# Patient Record
Sex: FEMALE | Race: WHITE | Employment: OTHER | URBAN - METROPOLITAN AREA
[De-identification: names, ages, dates, MRNs, and addresses within clinical notes are randomized per-mention and may not be internally consistent; named-entity substitution may affect disease eponyms.]

---

## 2017-01-23 ENCOUNTER — HOSPITAL ENCOUNTER (INPATIENT)
Facility: HOSPITAL | Age: 72
LOS: 3 days | Discharge: HOME HEALTH | DRG: 470 | End: 2017-01-26
Attending: EMERGENCY MEDICINE | Admitting: HOSPITALIST
Payer: MEDICARE

## 2017-01-23 ENCOUNTER — APPOINTMENT (OUTPATIENT)
Dept: GENERAL RADIOLOGY | Facility: HOSPITAL | Age: 72
DRG: 470 | End: 2017-01-23
Attending: ORTHOPAEDIC SURGERY
Payer: MEDICARE

## 2017-01-23 ENCOUNTER — ANESTHESIA EVENT (OUTPATIENT)
Dept: SURGERY | Facility: HOSPITAL | Age: 72
DRG: 470 | End: 2017-01-23
Payer: MEDICARE

## 2017-01-23 ENCOUNTER — SURGERY (OUTPATIENT)
Age: 72
End: 2017-01-23

## 2017-01-23 ENCOUNTER — ANESTHESIA (OUTPATIENT)
Dept: SURGERY | Facility: HOSPITAL | Age: 72
DRG: 470 | End: 2017-01-23
Payer: MEDICARE

## 2017-01-23 ENCOUNTER — APPOINTMENT (OUTPATIENT)
Dept: GENERAL RADIOLOGY | Facility: HOSPITAL | Age: 72
DRG: 470 | End: 2017-01-23
Attending: EMERGENCY MEDICINE
Payer: MEDICARE

## 2017-01-23 DIAGNOSIS — S72.012A SUBCAPITAL FRACTURE OF LEFT HIP, CLOSED, INITIAL ENCOUNTER (HCC): Primary | ICD-10-CM

## 2017-01-23 DIAGNOSIS — S72.002A HIP FRACTURE, LEFT, CLOSED, INITIAL ENCOUNTER (HCC): ICD-10-CM

## 2017-01-23 LAB
ANTIBODY SCREEN: NEGATIVE
RH BLOOD TYPE: POSITIVE

## 2017-01-23 PROCEDURE — 73552 X-RAY EXAM OF FEMUR 2/>: CPT

## 2017-01-23 PROCEDURE — 73502 X-RAY EXAM HIP UNI 2-3 VIEWS: CPT

## 2017-01-23 PROCEDURE — 73501 X-RAY EXAM HIP UNI 1 VIEW: CPT

## 2017-01-23 PROCEDURE — 0SRB04Z REPLACEMENT OF LEFT HIP JOINT WITH CERAMIC ON POLYETHYLENE SYNTHETIC SUBSTITUTE, OPEN APPROACH: ICD-10-PCS | Performed by: ORTHOPAEDIC SURGERY

## 2017-01-23 PROCEDURE — 99222 1ST HOSP IP/OBS MODERATE 55: CPT | Performed by: HOSPITALIST

## 2017-01-23 PROCEDURE — 76001 XR OR HIP (1 VIEWS) >60 C-ARM  (CPT=73501/76001): CPT

## 2017-01-23 DEVICE — TRI-LOCK BPS FEMORAL STEM 12/14 TAPER TRI-LOCK BPS W/GRIPTION SIZE 4 STD 103MM
Type: IMPLANTABLE DEVICE | Site: HIP | Status: FUNCTIONAL
Brand: TRI-LOCK GRIPTION

## 2017-01-23 DEVICE — PINNACLE HIP SOLUTIONS ALTRX POLYETHYLENE ACETABULAR LINER +4 NEUTRAL 32MM ID 50MM OD
Type: IMPLANTABLE DEVICE | Site: HIP | Status: FUNCTIONAL
Brand: PINNACLE ALTRX

## 2017-01-23 DEVICE — BIOLOX DELTA CERAMIC FEMORAL HEAD 32MM DIA +5.0 12/14 TAPER
Type: IMPLANTABLE DEVICE | Site: HIP | Status: FUNCTIONAL
Brand: BIOLOX DELTA

## 2017-01-23 DEVICE — PINNACLE GRIPTION ACETABULAR SHELL SECTOR 50MM OD
Type: IMPLANTABLE DEVICE | Site: HIP | Status: FUNCTIONAL
Brand: PINNACLE GRIPTION

## 2017-01-23 RX ORDER — ACETAMINOPHEN 325 MG/1
650 TABLET ORAL EVERY 4 HOURS PRN
Status: DISCONTINUED | OUTPATIENT
Start: 2017-01-23 | End: 2017-01-26

## 2017-01-23 RX ORDER — SENNOSIDES 8.6 MG
17.2 TABLET ORAL NIGHTLY
Status: DISCONTINUED | OUTPATIENT
Start: 2017-01-23 | End: 2017-01-26

## 2017-01-23 RX ORDER — HYDROCODONE BITARTRATE AND ACETAMINOPHEN 7.5; 325 MG/1; MG/1
1 TABLET ORAL EVERY 4 HOURS PRN
Status: DISCONTINUED | OUTPATIENT
Start: 2017-01-23 | End: 2017-01-26

## 2017-01-23 RX ORDER — MORPHINE SULFATE 2 MG/ML
4 INJECTION, SOLUTION INTRAMUSCULAR; INTRAVENOUS ONCE
Status: COMPLETED | OUTPATIENT
Start: 2017-01-23 | End: 2017-01-23

## 2017-01-23 RX ORDER — MAGNESIUM HYDROXIDE 1200 MG/15ML
LIQUID ORAL CONTINUOUS PRN
Status: DISCONTINUED | OUTPATIENT
Start: 2017-01-23 | End: 2017-01-23

## 2017-01-23 RX ORDER — CYCLOBENZAPRINE HCL 10 MG
10 TABLET ORAL EVERY 8 HOURS PRN
Status: DISCONTINUED | OUTPATIENT
Start: 2017-01-23 | End: 2017-01-26

## 2017-01-23 RX ORDER — ROCURONIUM BROMIDE 10 MG/ML
INJECTION, SOLUTION INTRAVENOUS AS NEEDED
Status: DISCONTINUED | OUTPATIENT
Start: 2017-01-23 | End: 2017-01-23 | Stop reason: SURG

## 2017-01-23 RX ORDER — MORPHINE SULFATE 10 MG/ML
6 INJECTION, SOLUTION INTRAMUSCULAR; INTRAVENOUS EVERY 10 MIN PRN
Status: DISCONTINUED | OUTPATIENT
Start: 2017-01-23 | End: 2017-01-23 | Stop reason: HOSPADM

## 2017-01-23 RX ORDER — ONDANSETRON 2 MG/ML
4 INJECTION INTRAMUSCULAR; INTRAVENOUS EVERY 4 HOURS PRN
Status: DISCONTINUED | OUTPATIENT
Start: 2017-01-23 | End: 2017-01-26

## 2017-01-23 RX ORDER — ONDANSETRON 2 MG/ML
4 INJECTION INTRAMUSCULAR; INTRAVENOUS ONCE
Status: COMPLETED | OUTPATIENT
Start: 2017-01-23 | End: 2017-01-23

## 2017-01-23 RX ORDER — BISACODYL 10 MG
10 SUPPOSITORY, RECTAL RECTAL
Status: DISCONTINUED | OUTPATIENT
Start: 2017-01-23 | End: 2017-01-26

## 2017-01-23 RX ORDER — SODIUM CHLORIDE, SODIUM LACTATE, POTASSIUM CHLORIDE, CALCIUM CHLORIDE 600; 310; 30; 20 MG/100ML; MG/100ML; MG/100ML; MG/100ML
INJECTION, SOLUTION INTRAVENOUS CONTINUOUS PRN
Status: DISCONTINUED | OUTPATIENT
Start: 2017-01-23 | End: 2017-01-23 | Stop reason: SURG

## 2017-01-23 RX ORDER — NEOSTIGMINE METHYLSULFATE 0.5 MG/ML
INJECTION INTRAVENOUS AS NEEDED
Status: DISCONTINUED | OUTPATIENT
Start: 2017-01-23 | End: 2017-01-23 | Stop reason: SURG

## 2017-01-23 RX ORDER — MORPHINE SULFATE 4 MG/ML
4 INJECTION, SOLUTION INTRAMUSCULAR; INTRAVENOUS EVERY 10 MIN PRN
Status: DISCONTINUED | OUTPATIENT
Start: 2017-01-23 | End: 2017-01-23 | Stop reason: HOSPADM

## 2017-01-23 RX ORDER — MIDAZOLAM HYDROCHLORIDE 1 MG/ML
INJECTION INTRAMUSCULAR; INTRAVENOUS AS NEEDED
Status: DISCONTINUED | OUTPATIENT
Start: 2017-01-23 | End: 2017-01-23 | Stop reason: SURG

## 2017-01-23 RX ORDER — LORAZEPAM 2 MG/ML
0.5 INJECTION INTRAMUSCULAR ONCE
Status: COMPLETED | OUTPATIENT
Start: 2017-01-23 | End: 2017-01-23

## 2017-01-23 RX ORDER — ONDANSETRON 2 MG/ML
INJECTION INTRAMUSCULAR; INTRAVENOUS AS NEEDED
Status: DISCONTINUED | OUTPATIENT
Start: 2017-01-23 | End: 2017-01-23 | Stop reason: SURG

## 2017-01-23 RX ORDER — DIPHENHYDRAMINE HYDROCHLORIDE 50 MG/ML
12.5 INJECTION INTRAMUSCULAR; INTRAVENOUS EVERY 4 HOURS PRN
Status: DISCONTINUED | OUTPATIENT
Start: 2017-01-23 | End: 2017-01-26

## 2017-01-23 RX ORDER — HYDROMORPHONE HYDROCHLORIDE 1 MG/ML
0.8 INJECTION, SOLUTION INTRAMUSCULAR; INTRAVENOUS; SUBCUTANEOUS EVERY 2 HOUR PRN
Status: DISCONTINUED | OUTPATIENT
Start: 2017-01-23 | End: 2017-01-26

## 2017-01-23 RX ORDER — GABAPENTIN 300 MG/1
600 CAPSULE ORAL ONCE
Status: COMPLETED | OUTPATIENT
Start: 2017-01-23 | End: 2017-01-23

## 2017-01-23 RX ORDER — HYDROMORPHONE HYDROCHLORIDE 1 MG/ML
0.6 INJECTION, SOLUTION INTRAMUSCULAR; INTRAVENOUS; SUBCUTANEOUS EVERY 5 MIN PRN
Status: DISCONTINUED | OUTPATIENT
Start: 2017-01-23 | End: 2017-01-23 | Stop reason: HOSPADM

## 2017-01-23 RX ORDER — FAMOTIDINE 10 MG/ML
20 INJECTION, SOLUTION INTRAVENOUS 2 TIMES DAILY
Status: DISCONTINUED | OUTPATIENT
Start: 2017-01-23 | End: 2017-01-26

## 2017-01-23 RX ORDER — ACETAMINOPHEN 325 MG/1
650 TABLET ORAL EVERY 6 HOURS PRN
Status: DISCONTINUED | OUTPATIENT
Start: 2017-01-23 | End: 2017-01-23

## 2017-01-23 RX ORDER — HYDROMORPHONE HYDROCHLORIDE 1 MG/ML
0.4 INJECTION, SOLUTION INTRAMUSCULAR; INTRAVENOUS; SUBCUTANEOUS EVERY 5 MIN PRN
Status: DISCONTINUED | OUTPATIENT
Start: 2017-01-23 | End: 2017-01-23 | Stop reason: HOSPADM

## 2017-01-23 RX ORDER — SODIUM CHLORIDE 9 MG/ML
INJECTION, SOLUTION INTRAVENOUS CONTINUOUS
Status: DISCONTINUED | OUTPATIENT
Start: 2017-01-23 | End: 2017-01-26

## 2017-01-23 RX ORDER — POLYETHYLENE GLYCOL 3350 17 G/17G
17 POWDER, FOR SOLUTION ORAL DAILY PRN
Status: DISCONTINUED | OUTPATIENT
Start: 2017-01-23 | End: 2017-01-26

## 2017-01-23 RX ORDER — NALOXONE HYDROCHLORIDE 0.4 MG/ML
80 INJECTION, SOLUTION INTRAMUSCULAR; INTRAVENOUS; SUBCUTANEOUS AS NEEDED
Status: DISCONTINUED | OUTPATIENT
Start: 2017-01-23 | End: 2017-01-23 | Stop reason: HOSPADM

## 2017-01-23 RX ORDER — HYDROCODONE BITARTRATE AND ACETAMINOPHEN 5; 325 MG/1; MG/1
2 TABLET ORAL AS NEEDED
Status: DISCONTINUED | OUTPATIENT
Start: 2017-01-23 | End: 2017-01-23 | Stop reason: HOSPADM

## 2017-01-23 RX ORDER — FAMOTIDINE 20 MG/1
20 TABLET ORAL 2 TIMES DAILY
Status: DISCONTINUED | OUTPATIENT
Start: 2017-01-23 | End: 2017-01-26

## 2017-01-23 RX ORDER — LIDOCAINE HYDROCHLORIDE 10 MG/ML
INJECTION, SOLUTION EPIDURAL; INFILTRATION; INTRACAUDAL; PERINEURAL AS NEEDED
Status: DISCONTINUED | OUTPATIENT
Start: 2017-01-23 | End: 2017-01-23 | Stop reason: SURG

## 2017-01-23 RX ORDER — HYDROMORPHONE HYDROCHLORIDE 1 MG/ML
0.2 INJECTION, SOLUTION INTRAMUSCULAR; INTRAVENOUS; SUBCUTANEOUS EVERY 2 HOUR PRN
Status: DISCONTINUED | OUTPATIENT
Start: 2017-01-23 | End: 2017-01-26

## 2017-01-23 RX ORDER — DIPHENHYDRAMINE HYDROCHLORIDE 50 MG/ML
25 INJECTION INTRAMUSCULAR; INTRAVENOUS ONCE AS NEEDED
Status: ACTIVE | OUTPATIENT
Start: 2017-01-23 | End: 2017-01-23

## 2017-01-23 RX ORDER — SODIUM CHLORIDE 0.9 % (FLUSH) 0.9 %
10 SYRINGE (ML) INJECTION AS NEEDED
Status: DISCONTINUED | OUTPATIENT
Start: 2017-01-23 | End: 2017-01-26

## 2017-01-23 RX ORDER — ACETAMINOPHEN 500 MG
1000 TABLET ORAL ONCE
Status: COMPLETED | OUTPATIENT
Start: 2017-01-23 | End: 2017-01-23

## 2017-01-23 RX ORDER — DIPHENHYDRAMINE HCL 25 MG
25 CAPSULE ORAL EVERY 4 HOURS PRN
Status: DISCONTINUED | OUTPATIENT
Start: 2017-01-23 | End: 2017-01-26

## 2017-01-23 RX ORDER — HYDROMORPHONE HYDROCHLORIDE 1 MG/ML
0.5 INJECTION, SOLUTION INTRAMUSCULAR; INTRAVENOUS; SUBCUTANEOUS ONCE
Status: COMPLETED | OUTPATIENT
Start: 2017-01-23 | End: 2017-01-23

## 2017-01-23 RX ORDER — METOCLOPRAMIDE HYDROCHLORIDE 5 MG/ML
10 INJECTION INTRAMUSCULAR; INTRAVENOUS EVERY 6 HOURS PRN
Status: ACTIVE | OUTPATIENT
Start: 2017-01-23 | End: 2017-01-25

## 2017-01-23 RX ORDER — SODIUM CHLORIDE 9 MG/ML
INJECTION, SOLUTION INTRAVENOUS CONTINUOUS
Status: DISCONTINUED | OUTPATIENT
Start: 2017-01-23 | End: 2017-01-23

## 2017-01-23 RX ORDER — HALOPERIDOL 5 MG/ML
0.25 INJECTION INTRAMUSCULAR ONCE AS NEEDED
Status: DISCONTINUED | OUTPATIENT
Start: 2017-01-23 | End: 2017-01-23 | Stop reason: HOSPADM

## 2017-01-23 RX ORDER — HYDROCODONE BITARTRATE AND ACETAMINOPHEN 5; 325 MG/1; MG/1
1 TABLET ORAL AS NEEDED
Status: DISCONTINUED | OUTPATIENT
Start: 2017-01-23 | End: 2017-01-23 | Stop reason: HOSPADM

## 2017-01-23 RX ORDER — ONDANSETRON 2 MG/ML
4 INJECTION INTRAMUSCULAR; INTRAVENOUS EVERY 6 HOURS PRN
Status: DISCONTINUED | OUTPATIENT
Start: 2017-01-23 | End: 2017-01-23

## 2017-01-23 RX ORDER — LORAZEPAM 2 MG/ML
0.5 INJECTION INTRAMUSCULAR EVERY 6 HOURS PRN
Status: DISCONTINUED | OUTPATIENT
Start: 2017-01-23 | End: 2017-01-23

## 2017-01-23 RX ORDER — DEXAMETHASONE SODIUM PHOSPHATE 4 MG/ML
VIAL (ML) INJECTION AS NEEDED
Status: DISCONTINUED | OUTPATIENT
Start: 2017-01-23 | End: 2017-01-23 | Stop reason: SURG

## 2017-01-23 RX ORDER — SODIUM CHLORIDE, SODIUM LACTATE, POTASSIUM CHLORIDE, CALCIUM CHLORIDE 600; 310; 30; 20 MG/100ML; MG/100ML; MG/100ML; MG/100ML
INJECTION, SOLUTION INTRAVENOUS CONTINUOUS
Status: DISCONTINUED | OUTPATIENT
Start: 2017-01-23 | End: 2017-01-26

## 2017-01-23 RX ORDER — DOCUSATE SODIUM 100 MG/1
100 CAPSULE, LIQUID FILLED ORAL 2 TIMES DAILY
Status: DISCONTINUED | OUTPATIENT
Start: 2017-01-23 | End: 2017-01-26

## 2017-01-23 RX ORDER — SCOLOPAMINE TRANSDERMAL SYSTEM 1 MG/1
1 PATCH, EXTENDED RELEASE TRANSDERMAL ONCE
Status: DISCONTINUED | OUTPATIENT
Start: 2017-01-23 | End: 2017-01-23

## 2017-01-23 RX ORDER — HYDROMORPHONE HYDROCHLORIDE 1 MG/ML
0.4 INJECTION, SOLUTION INTRAMUSCULAR; INTRAVENOUS; SUBCUTANEOUS EVERY 2 HOUR PRN
Status: DISCONTINUED | OUTPATIENT
Start: 2017-01-23 | End: 2017-01-26

## 2017-01-23 RX ORDER — HYDROCODONE BITARTRATE AND ACETAMINOPHEN 7.5; 325 MG/1; MG/1
2 TABLET ORAL EVERY 4 HOURS PRN
Status: DISCONTINUED | OUTPATIENT
Start: 2017-01-23 | End: 2017-01-26

## 2017-01-23 RX ORDER — HYDROMORPHONE HYDROCHLORIDE 1 MG/ML
0.8 INJECTION, SOLUTION INTRAMUSCULAR; INTRAVENOUS; SUBCUTANEOUS EVERY 2 HOUR PRN
Status: DISCONTINUED | OUTPATIENT
Start: 2017-01-23 | End: 2017-01-25

## 2017-01-23 RX ORDER — HYDROMORPHONE HYDROCHLORIDE 1 MG/ML
0.2 INJECTION, SOLUTION INTRAMUSCULAR; INTRAVENOUS; SUBCUTANEOUS EVERY 5 MIN PRN
Status: DISCONTINUED | OUTPATIENT
Start: 2017-01-23 | End: 2017-01-23 | Stop reason: HOSPADM

## 2017-01-23 RX ORDER — SODIUM PHOSPHATE, DIBASIC AND SODIUM PHOSPHATE, MONOBASIC 7; 19 G/133ML; G/133ML
1 ENEMA RECTAL ONCE AS NEEDED
Status: ACTIVE | OUTPATIENT
Start: 2017-01-23 | End: 2017-01-23

## 2017-01-23 RX ORDER — MORPHINE SULFATE 2 MG/ML
2 INJECTION, SOLUTION INTRAMUSCULAR; INTRAVENOUS EVERY 10 MIN PRN
Status: DISCONTINUED | OUTPATIENT
Start: 2017-01-23 | End: 2017-01-23 | Stop reason: HOSPADM

## 2017-01-23 RX ORDER — GLYCOPYRROLATE 0.2 MG/ML
INJECTION INTRAMUSCULAR; INTRAVENOUS AS NEEDED
Status: DISCONTINUED | OUTPATIENT
Start: 2017-01-23 | End: 2017-01-23 | Stop reason: SURG

## 2017-01-23 RX ORDER — HYDROMORPHONE HYDROCHLORIDE 1 MG/ML
0.2 INJECTION, SOLUTION INTRAMUSCULAR; INTRAVENOUS; SUBCUTANEOUS EVERY 2 HOUR PRN
Status: DISCONTINUED | OUTPATIENT
Start: 2017-01-23 | End: 2017-01-25

## 2017-01-23 RX ORDER — ONDANSETRON 2 MG/ML
4 INJECTION INTRAMUSCULAR; INTRAVENOUS ONCE AS NEEDED
Status: DISCONTINUED | OUTPATIENT
Start: 2017-01-23 | End: 2017-01-23 | Stop reason: HOSPADM

## 2017-01-23 RX ORDER — HYDROMORPHONE HYDROCHLORIDE 1 MG/ML
0.4 INJECTION, SOLUTION INTRAMUSCULAR; INTRAVENOUS; SUBCUTANEOUS EVERY 2 HOUR PRN
Status: DISCONTINUED | OUTPATIENT
Start: 2017-01-23 | End: 2017-01-25

## 2017-01-23 RX ADMIN — NEOSTIGMINE METHYLSULFATE 3 MG: 0.5 INJECTION INTRAVENOUS at 21:10:00

## 2017-01-23 RX ADMIN — LIDOCAINE HYDROCHLORIDE 25 MG: 10 INJECTION, SOLUTION EPIDURAL; INFILTRATION; INTRACAUDAL; PERINEURAL at 19:46:00

## 2017-01-23 RX ADMIN — SODIUM CHLORIDE, SODIUM LACTATE, POTASSIUM CHLORIDE, CALCIUM CHLORIDE: 600; 310; 30; 20 INJECTION, SOLUTION INTRAVENOUS at 21:00:00

## 2017-01-23 RX ADMIN — ROCURONIUM BROMIDE 50 MG: 10 INJECTION, SOLUTION INTRAVENOUS at 19:46:00

## 2017-01-23 RX ADMIN — SODIUM CHLORIDE, SODIUM LACTATE, POTASSIUM CHLORIDE, CALCIUM CHLORIDE: 600; 310; 30; 20 INJECTION, SOLUTION INTRAVENOUS at 21:39:00

## 2017-01-23 RX ADMIN — ONDANSETRON 4 MG: 2 INJECTION INTRAMUSCULAR; INTRAVENOUS at 19:46:00

## 2017-01-23 RX ADMIN — GLYCOPYRROLATE 0.6 MG: 0.2 INJECTION INTRAMUSCULAR; INTRAVENOUS at 21:10:00

## 2017-01-23 RX ADMIN — DEXAMETHASONE SODIUM PHOSPHATE 4 MG: 4 MG/ML VIAL (ML) INJECTION at 19:46:00

## 2017-01-23 RX ADMIN — MIDAZOLAM HYDROCHLORIDE 2 MG: 1 INJECTION INTRAMUSCULAR; INTRAVENOUS at 19:46:00

## 2017-01-23 RX ADMIN — SODIUM CHLORIDE, SODIUM LACTATE, POTASSIUM CHLORIDE, CALCIUM CHLORIDE: 600; 310; 30; 20 INJECTION, SOLUTION INTRAVENOUS at 19:45:00

## 2017-01-23 NOTE — H&P
[address] [facility] Patient Status:  Inpatient    10/2/1945 MRN [id number]   Location [facility] 4W/SW/SE Attending Ebenezer Zuñiga MD   Hosp Day # 0 PCP PHYSICIAN NONSTAFF     Date:  2017    Date Normal affect, calm and appropriate  Skin:  No rash, no lesion    Results:    No results for input(s): WBC, HGB, HCT, PLT, RBC, MCV, MCH, MCHC, RDW, NEPRELIM in the last 72 hours.   No results for input(s): BUN, CREATSERUM, GFRAA, GFRNAA, CA, ALB, NA, K, CL

## 2017-01-23 NOTE — ED INITIAL ASSESSMENT (HPI)
Pt arrived via medics to rm 37 with 20g left ac sl for complaint of fall and left hip pain, states she fell yesterday on buttocks, no loc and denied pain last night however this am unable to bear weight or ambulate

## 2017-01-23 NOTE — ED PROVIDER NOTES
Patient Seen in: [facility] 4w/sw/se    History   Patient presents with:  Trauma (cardiovascular, musculoskeletal)    Stated Complaint: fall, left hip pain    HPI    Patient complains of l hip pain fell today. Can't stand. Pain 10/10.   No head n oiented *3, 2-12 intact, no focal deficit noted  SKIN: good skin turgor, no  rashes  PSYCH: calm, cooperative,    Differential includes:fx vs dislocation vs contusion    ED Course   Labs Reviewed - No data to display    MDM       Cardiac Monitor: Pulse Cody Abts

## 2017-01-24 ENCOUNTER — APPOINTMENT (OUTPATIENT)
Dept: PHYSICAL THERAPY | Facility: HOSPITAL | Age: 72
DRG: 470 | End: 2017-01-24
Attending: ORTHOPAEDIC SURGERY
Payer: MEDICARE

## 2017-01-24 LAB
ANION GAP SERPL CALC-SCNC: 7 MMOL/L (ref 0–18)
BUN SERPL-MCNC: 11 MG/DL (ref 8–20)
BUN/CREAT SERPL: 15.7 (ref 10–20)
CALCIUM SERPL-MCNC: 7.6 MG/DL (ref 8.5–10.5)
CHLORIDE SERPL-SCNC: 106 MMOL/L (ref 95–110)
CO2 SERPL-SCNC: 26 MMOL/L (ref 22–32)
CREAT SERPL-MCNC: 0.7 MG/DL (ref 0.5–1.5)
ERYTHROCYTE [DISTWIDTH] IN BLOOD BY AUTOMATED COUNT: 15.6 % (ref 11–15)
GLUCOSE SERPL-MCNC: 133 MG/DL (ref 70–99)
HCT VFR BLD AUTO: 24.3 % (ref 35–48)
HGB BLD-MCNC: 7.8 G/DL (ref 12–16)
MCH RBC QN AUTO: 27.3 PG (ref 27–32)
MCHC RBC AUTO-ENTMCNC: 32 G/DL (ref 32–37)
MCV RBC AUTO: 85.5 FL (ref 80–100)
OSMOLALITY UR CALC.SUM OF ELEC: 289 MOSM/KG (ref 275–295)
PLATELET # BLD AUTO: 177 K/UL (ref 140–400)
PMV BLD AUTO: 8.2 FL (ref 7.4–10.3)
POTASSIUM SERPL-SCNC: 4.5 MMOL/L (ref 3.3–5.1)
RBC # BLD AUTO: 2.85 M/UL (ref 3.7–5.4)
SODIUM SERPL-SCNC: 139 MMOL/L (ref 136–144)
WBC # BLD AUTO: 8.2 K/UL (ref 4–11)

## 2017-01-24 PROCEDURE — 99232 SBSQ HOSP IP/OBS MODERATE 35: CPT | Performed by: HOSPITALIST

## 2017-01-24 RX ORDER — PSEUDOEPHEDRINE HCL 30 MG
100 TABLET ORAL 2 TIMES DAILY
Qty: 60 CAPSULE | Refills: 0 | Status: SHIPPED | OUTPATIENT
Start: 2017-01-24 | End: 2017-07-19

## 2017-01-24 RX ORDER — MELATONIN
325 2 TIMES DAILY WITH MEALS
Qty: 60 TABLET | Refills: 0 | Status: SHIPPED | OUTPATIENT
Start: 2017-01-24 | End: 2017-07-19

## 2017-01-24 RX ORDER — MELATONIN
325 2 TIMES DAILY WITH MEALS
Status: DISCONTINUED | OUTPATIENT
Start: 2017-01-24 | End: 2017-01-26

## 2017-01-24 RX ORDER — HYDROCODONE BITARTRATE AND ACETAMINOPHEN 5; 325 MG/1; MG/1
1-2 TABLET ORAL EVERY 6 HOURS PRN
Qty: 45 TABLET | Refills: 0 | Status: SHIPPED | OUTPATIENT
Start: 2017-01-24 | End: 2017-07-19

## 2017-01-24 NOTE — ANESTHESIA PREPROCEDURE EVALUATION
Anesthesia PreOp Note    HPI:     Ronnell Cavazos is a 73year old female who presents for preoperative consultation requested by: Joselyn De MD    Date of Surgery: 1/23/2017    Procedure(s):  HIP TOTAL ANTERIOR APPROACH  Indication: Hip fracture ([address] irrigation 500 mL 500 mL Irrigation Once Tosha Quinn MD    EPINEPHrine (ADRENALIN) 1 MG/ML 0.5 mg, ketorolac tromethamine (TORADOL) 30 MG/ML 30 mg, morphINE Sulfate (PF) 5 mg in sodium chloride 0.9 % syringe  Intra-articular Once Tosha Quinn MD    [address] the anesthetic plan, benefits, risks, major complications, and any alternative forms of anesthetic management. All of the patient's questions were answered to the best of my ability. The patient desires the anesthetic management as planned.   Macrina Salinas

## 2017-01-24 NOTE — PROGRESS NOTES
[facility]    Progress Note    Rajat Porras Patient Status:  Inpatient    10/2/1945 MRN [id number]   Location [facility] 4W/SW/SE Attending Ebenezer Zuñiga MD   Hosp Day # 1 PCP PHYSICIAN NONSTAFF     Subjective:  Feels well.  No c guidance utilized during left hip arthroplasty.  Left hip prosthesis in anatomic position             Medications:  • Senna  17.2 mg Oral Nightly   • docusate sodium  100 mg Oral BID   • famoTIDine  20 mg Oral BID    Or   • famoTIDine  20 mg Intravenous BID

## 2017-01-24 NOTE — OPERATIVE REPORT
Operative Note  DATE OF SURGERY: 01/23/2017    Anesthesia Type:  General  Pre-Op Diagnosis:     (1) left femoral neck fracture    Post-Op Diagnosis:     (1) left femoral neck fracture  Procedure Performed  left DENY    Indications  Left femoral neck fractur the capsule and noted to be a severe arthritis about the hip as well as superior osteophyte formation. We made our femoral neck cut according to our preoperative template. We identified the acetabulum.   We placed The retractors carefully and started ream condition extubated without complication. POSTOPERATIVE PLAN:  Weightbearing as tolerated. Deep venous thrombosis prophylaxis. Mobilization. Perioperative antibiotics.     IMPLANTS: DePuy Trilock stem size 4 standard offset with a 50 cup, 52 x 32 charmaine

## 2017-01-24 NOTE — PHYSICAL THERAPY NOTE
PHYSICAL THERAPY HIP TREATMENT NOTE - INPATIENT    Room Number: 367/690-F            Presenting Problem: left nila    Problem List  Principal Problem:    Hip fracture, left, closed, initial encounter  Active Problems:    Hip fracture, left ([address]      ASSESS Lot    AM-PAC Score:  Raw Score: 17   PT Approx Degree of Impairment Score: 50.57%   Standardized Score (AM-PAC Scale): 42.13   CMS Modifier (G-Code): CK    FUNCTIONAL ABILITY STATUS  Gait Assessment   Gait Assistance: Minimum assistance (cga with cues)  D

## 2017-01-24 NOTE — PLAN OF CARE
Pt came from PACU ~2315. Pain has been minimal overnight, given one 7.5 norco around 2345. Ancef for IVAB. Ankle pumps and scds for DVT. To be started on an anticoagulant this AM.  Tolerating foods and fluids well, so progressed to general diet.   Ant p

## 2017-01-24 NOTE — PLAN OF CARE
DISCHARGE PLANNING    • Discharge to home or other facility with appropriate resources Progressing        HEMATOLOGIC - ADULT    • Free from bleeding injury Progressing        MUSCULOSKELETAL - ADULT    • Return mobility to safest level of function Progres

## 2017-01-24 NOTE — ANESTHESIA POSTPROCEDURE EVALUATION
Patient: Angel Rodriguez    Procedure Summary     Date Anesthesia Start Anesthesia Stop Room / Location    01/23/17 1945 2140 [address] [facility] MAIN OR 11 / 300 [facility] MAIN OR       Procedure Diagnosis Surgeon Responsible Provider    HIP TOTAL ANTERIOR APPROACH (Left ) Hip f

## 2017-01-24 NOTE — OCCUPATIONAL THERAPY NOTE
OCCUPATIONAL THERAPY EVALUATION - INPATIENT      Room Number: 415/415-A  Evaluation Date: 1/24/2017  Type of Evaluation: Initial  Presenting Problem: fall, s/p  LT DENY    Physician Order: IP Consult to Occupational Therapy  Reason for Therapy: ADL/IADL Dys 1+1 to enter 7+7 with one rail to second floor  Assistive Device(s) Used: none    Prior Level of [place]:I in all areas, drives, retired.  and adult son available to assist as needed per pt report.      SUBJECTIVE  \"I have balanace issues\" fol using R/W with wide ALICIA, step to gait pattern, appears \"stiff\" and anxious      FUNCTIONAL ADL ASSESSMENT  Grooming: I from sitting  Bathing: NT  Upper Body Dressing: set up   Lower Body Dressing: CGA for standing portion.  Pt is able to reach MARY feet wi

## 2017-01-24 NOTE — CONSULTS
[facility]    Report of Consultation    Francisco Adandread Patient Status:  Inpatient    10/2/1945 MRN [id number]   Location [address] [facility] Attending Jose Bradshaw MD   Hosp Day # 0 PCP PHYSICIAN NONSTAFF     Date of Admis Intravenous, Q6H PRN  •  [MAR Hold] bupivacaine liposome (EXPAREL) 1.3 % injection SUSP 20 mL, 20 mL, Infiltration, Once  •  [MAR Hold] Tranexamic Acid (CYKLOKAPRON) 1,000 mg in sodium chloride 0.9 % 60 mL IVPB, 1,000 mg, Intravenous, 2 times per day on Mo Prochlorperazine Edisylate (COMPAZINE) injection 5 mg, 5 mg, Intravenous, Once PRN  •  haloperidol lactate (HALDOL) 5 MG/ML injection 0.25 mg, 0.25 mg, Intravenous, Once PRN  •  Naloxone HCl (NARCAN) 0.4 MG/ML injection 80 mcg, 80 mcg, Intravenous, PRN bilaterally. Motor:  No arm or leg weakness noted. Finger-to-nose coordination is intact. Gait deferred.      Laboratory Data:  BLOOD TYPE, ABO AND RH M[[id number]]                         Final result               ANTIBODY SCREEN[[id number]]

## 2017-01-25 LAB
ANION GAP SERPL CALC-SCNC: 6 MMOL/L (ref 0–18)
BUN SERPL-MCNC: 11 MG/DL (ref 8–20)
BUN/CREAT SERPL: 14.9 (ref 10–20)
CALCIUM SERPL-MCNC: 8.2 MG/DL (ref 8.5–10.5)
CHLORIDE SERPL-SCNC: 106 MMOL/L (ref 95–110)
CO2 SERPL-SCNC: 26 MMOL/L (ref 22–32)
CREAT SERPL-MCNC: 0.74 MG/DL (ref 0.5–1.5)
ERYTHROCYTE [DISTWIDTH] IN BLOOD BY AUTOMATED COUNT: 15.6 % (ref 11–15)
GLUCOSE SERPL-MCNC: 137 MG/DL (ref 70–99)
HCT VFR BLD AUTO: 21.6 % (ref 35–48)
HGB BLD-MCNC: 7 G/DL (ref 12–16)
MCH RBC QN AUTO: 27.7 PG (ref 27–32)
MCHC RBC AUTO-ENTMCNC: 32.6 G/DL (ref 32–37)
MCV RBC AUTO: 84.7 FL (ref 80–100)
OSMOLALITY UR CALC.SUM OF ELEC: 288 MOSM/KG (ref 275–295)
PLATELET # BLD AUTO: 160 K/UL (ref 140–400)
PMV BLD AUTO: 9 FL (ref 7.4–10.3)
POTASSIUM SERPL-SCNC: 3.7 MMOL/L (ref 3.3–5.1)
RBC # BLD AUTO: 2.55 M/UL (ref 3.7–5.4)
SODIUM SERPL-SCNC: 138 MMOL/L (ref 136–144)
WBC # BLD AUTO: 6.4 K/UL (ref 4–11)

## 2017-01-25 PROCEDURE — 30233N1 TRANSFUSION OF NONAUTOLOGOUS RED BLOOD CELLS INTO PERIPHERAL VEIN, PERCUTANEOUS APPROACH: ICD-10-PCS | Performed by: HOSPITALIST

## 2017-01-25 PROCEDURE — 99233 SBSQ HOSP IP/OBS HIGH 50: CPT | Performed by: HOSPITALIST

## 2017-01-25 RX ORDER — SODIUM CHLORIDE 0.9 % (FLUSH) 0.9 %
10 SYRINGE (ML) INJECTION AS NEEDED
Status: DISCONTINUED | OUTPATIENT
Start: 2017-01-25 | End: 2017-01-26

## 2017-01-25 RX ORDER — SODIUM CHLORIDE 9 MG/ML
INJECTION, SOLUTION INTRAVENOUS
Status: DISPENSED
Start: 2017-01-25 | End: 2017-01-25

## 2017-01-25 RX ORDER — SODIUM CHLORIDE 9 MG/ML
INJECTION, SOLUTION INTRAVENOUS ONCE
Status: COMPLETED | OUTPATIENT
Start: 2017-01-25 | End: 2017-01-26

## 2017-01-25 RX ORDER — POTASSIUM CHLORIDE 20 MEQ/1
40 TABLET, EXTENDED RELEASE ORAL ONCE
Status: COMPLETED | OUTPATIENT
Start: 2017-01-25 | End: 2017-01-25

## 2017-01-25 NOTE — PROGRESS NOTES
[facility]    Progress Note    Angel Rodriguez Patient Status:  Inpatient    10/2/1945 MRN [id number]   Location [facility] 4W/SW/SE Attending Ari Gray,  [facility] Se Day # 2 PCP PHYSICIAN NONSTAFF       Subjective:   Hip erin

## 2017-01-25 NOTE — PAYOR COMM NOTE
Attending Physician: Candance Lewis, MD    Review Type: CONTINUED STAY  Reviewer: Iker Bianchi     Date: January 25, 2017 - 10:44 AM  Payor: [address] [place] [address] Number: [id number]  Admit date: 1/23/2017  7:47 AM        SHARON GLEASON tab 40 mEq     Date Action Dose Route User    1/25/2017 0901 Given 40 mEq Oral Albino NGUYEN RN      [facility]) tab TABS 17.2 mg     Date Action Dose Route User    1/24/2017 2059 Given 17.2 mg Oral Alisha Cortes RN      0.9%  NaCl infusion     [employer]    Positive for stated complaint: fall, left hip pain  Other systems are as noted in HPI. Constitutional and vital signs reviewed. All other systems reviewed and negative except as noted above.     PSFH elements reviewed from today and agreed specified. Medications Prescribed:  There are no discharge medications for this patient.       Present on Admission  Date Reviewed: 9/29/2014          ICD-10-CM Noted POA    * (Principal)Hip fracture, left, closed, initial encounter S72.002A 1/23/2017 Un Allergies    Medications :  Not on File      Review of Systems:  A comprehensive 12 point review of systems was negative. See History of Present Illness for other pertinent details.     Physical Exam:  Temp:  [99.1 °F (37.3 °C)] 99.1 °F (37.3 °C)  Pulse:  [ AM        REVIEWER COMMENTS:     OTHER:

## 2017-01-25 NOTE — PHYSICAL THERAPY NOTE
PHYSICAL THERAPY HIP TREATMENT NOTE - INPATIENT    Room Number: 949/760-S            Presenting Problem: left nila    Problem List  Principal Problem:    Hip fracture, left, closed, initial encounter [facility])  Active Problems:    Hip fracture, left ([facility] [address] Score (AM-PAC Scale): 42.13   CMS Modifier (G-Code): CK    FUNCTIONAL ABILITY STATUS  Gait Assessment   Gait Assistance: Not tested  Distance (ft): 200  Assistive Device: Rolling walker  Pattern: L Decreased stance time (step through gait pattern with verb

## 2017-01-25 NOTE — PAYOR COMM NOTE
Admit Orders     Start     Ordered    01/23/17 1128  Admit to inpatient Once   Once     Ordering Provider:  Sarah Huntley MD   Question:  Diagnosis  Answer:  Hip fracture, left, closed, initial encounter [facility])    01/23/17 1129    01/23/17 1045  Admit to inpat

## 2017-01-25 NOTE — PROGRESS NOTES
[address] [facility] Patient Status:  Inpatient    10/2/1945 MRN [id number]   Location [facility] 4W/SW/SE Attending Donovan Mai, [facility] Day # 2 PCP PHYSICIAN NONSTAFF     Subjective:  Post-Operative Day: 2 Status Post left

## 2017-01-26 VITALS
RESPIRATION RATE: 18 BRPM | WEIGHT: 118 LBS | SYSTOLIC BLOOD PRESSURE: 133 MMHG | DIASTOLIC BLOOD PRESSURE: 67 MMHG | HEART RATE: 82 BPM | TEMPERATURE: 100 F | BODY MASS INDEX: 18.96 KG/M2 | HEIGHT: 66 IN | OXYGEN SATURATION: 96 %

## 2017-01-26 LAB
BLOOD TYPE BARCODE: 5100
ERYTHROCYTE [DISTWIDTH] IN BLOOD BY AUTOMATED COUNT: 16.4 % (ref 11–15)
HCT VFR BLD AUTO: 25.4 % (ref 35–48)
HGB BLD-MCNC: 8.3 G/DL (ref 12–16)
MCH RBC QN AUTO: 27.2 PG (ref 27–32)
MCHC RBC AUTO-ENTMCNC: 32.7 G/DL (ref 32–37)
MCV RBC AUTO: 83.1 FL (ref 80–100)
PLATELET # BLD AUTO: 161 K/UL (ref 140–400)
PMV BLD AUTO: 8.3 FL (ref 7.4–10.3)
POTASSIUM SERPL-SCNC: 4.1 MMOL/L (ref 3.3–5.1)
RBC # BLD AUTO: 3.05 M/UL (ref 3.7–5.4)
WBC # BLD AUTO: 7 K/UL (ref 4–11)

## 2017-01-26 PROCEDURE — 99239 HOSP IP/OBS DSCHRG MGMT >30: CPT | Performed by: HOSPITALIST

## 2017-01-26 RX ORDER — CYCLOBENZAPRINE HCL 10 MG
10 TABLET ORAL EVERY 8 HOURS PRN
Qty: 20 TABLET | Refills: 0 | Status: SHIPPED | OUTPATIENT
Start: 2017-01-26 | End: 2017-07-19

## 2017-01-26 NOTE — PLAN OF CARE
DISCHARGE PLANNING    • Discharge to home or other facility with appropriate resources Progressing    Plan is [facility] with [facility] HEMATOLOGIC - ADULT    • Free from bleeding injury Progressing    No bleeding noted. hgb 7.0, 1 unit prbc  given.

## 2017-01-26 NOTE — PHYSICAL THERAPY NOTE
PHYSICAL THERAPY HIP TREATMENT NOTE - INPATIENT    Room Number: 770/674-V            Presenting Problem: left nila    Problem List  Principal Problem:    Hip fracture, left, closed, initial encounter [facility])  Active Problems:    Hip fracture, left ([address] Standardized Score (AM-PAC Scale): 42.13   CMS Modifier (G-Code): CK    FUNCTIONAL ABILITY STATUS  Gait Assessment   Gait Assistance: Supervision  Distance (ft): 150  Assistive Device: Rolling walker  Pattern: L Decreased stance time (step through gait p

## 2017-01-26 NOTE — OCCUPATIONAL THERAPY NOTE
OCCUPATIONAL THERAPY TREATMENT NOTE - INPATIENT     Room Number: 058/714-S         Presenting Problem: fall, s/p  LT DENY    Problem List  Principal Problem:    Hip fracture, left, closed, initial encounter [facility])  Active Problems:    Hip fracture, left ([address] another person does the patient currently need…  -   Putting on and taking off regular lower body clothing?: A Little  -   Bathing (including washing, rinsing, drying)?: A Little  -   Toileting, which includes using toilet, bedpan or urinal? : None  -   Pu

## 2017-01-26 NOTE — DISCHARGE PLANNING
The plan is for the pt. To discharge home today 1/26 with [facility]. [facility] is aware and will start care tomorrow 1/27.     [facility] ext [phone]

## 2017-01-26 NOTE — DISCHARGE SUMMARY
[place] [facility]    Discharge Summary    Ronal Rmaesh Patient Status:  Inpatient    10/2/1945 MRN [id number]   Location [facility] 4W/SW/SE Attending No att. providers found   Hosp Day # 3 PCP PHYSICIAN NONSTAFF     Date of Admis Medications:      Discharge Medications      START taking these medications       Instructions Prescription details    aspirin 325 MG Tbec   Commonly known as:  ECOTRIN        Take 1 tablet (325 mg total) by mouth 2 (two) times daily.     Stop taking on:  3 Specialties:  SURGERY, ORTHOPEDIC, Surgery, Orthopaedic    Why:  For wound re-check    Contact information:    Ebenezer Clifford [address]  [address] [address] [phone]                 Other Discharge Instructions:     Keep dressing and incision dry and

## 2017-01-27 NOTE — PAYOR COMM NOTE
Odell Cavazos #[id number]  (23 year old F)       Ingris Cota MD Physician Signed  Discharge Summaries 1/26/2017  3:39 PM      [address]    Discharge Summary  [facility] with PT/OT  - DVT proph: eliquis.  Ecotrin bid on dc    Acute blood loss anemia 2/2 surgery  - transfused 1 unit prbc 1/25  - cont ferrous sulfate    DVT proph: eliquis        Discharge Condition: Good    Discharge Medications:       Discharge Medications  old F)       Raman Walden MD Physician Signed  Progress Notes 1/25/2017 12:04 PM      Expand All Collapse [facility] Patient Status:  Inpatient    Virlinda Pressman 10/2/1945  Melvin Rubio [id number]    Location

## 2017-01-27 NOTE — PAYOR COMM NOTE
DISCHARGED 1/26  THIS IS COLIN'S PATIENT  [phone]    Christophe Feliz #[id number]  (42 year old F)       Brittany Young MD Physician Signed  Discharge Summaries 1/26/2017  3:39 PM      Expand All Collapse All      Elm hip total arthroplasty  - pain controlled  - worked well with PT/OT  - DVT proph: eliquis.  Ecotrin bid on dc    Acute blood loss anemia 2/2 surgery  - transfused 1 unit prbc 1/25  - cont ferrous sulfate    DVT proph: eliquis        Discharge Condition: Go Tbec  - Cyclobenzaprine HCl 10 MG Tabs  - docusate sodium 100 MG Caps  - ferrous sulfate 325 (65 FE) MG Tbec  - HYDROcodone-acetaminophen 5-325 MG Tabs  - Sennosides 17.2 MG Tabs            Follow up Visits:              Follow-up Information       Follow 01/25/2017    BUN  11  01/25/2017    NA  138  01/25/2017    K  3.7  01/25/2017    CL  106  01/25/2017    CO2  26  01/25/2017    GLU  137*  01/25/2017    CA  8.2*  01/25/2017        Xr Hip W Or Wo Pelvis 1 View, Left (cpt=73501)    1/23/2017  CONCLUSION:    cyanosis, no clubbing  Neurologic:  nonfocal  Psychiatric:  Normal affect, calm and appropriate  Skin:  No rash, no lesion       Intake/Output Summary (Last 24 hours) at 01/24/17 1244  Last data filed at 01/24/17 0600    Gross per 24 hour    Intake    3099 PF, Cyclobenzaprine HCl     Assessment and Plan:      Acute subcapital left hip fracture  - s/p L hip total arthroplasty  - pain control, dilaudid IV prn  - PT/OT  - DVT proph: eliquis    Acute blood loss anemia 2/2 surgery  - cbc in am  - ferrous sulfate CL   106    CO2   26    GLU   133*        Xr Femur Min 2 Views Left (cpt=73552)    1/23/2017  CONCLUSION:         1. Subcapital fracture left hip.         Xr Hip W Or Wo Pelvis 1 View, Left (cpt=73501)    1/23/2017  CONCLUSION:         1.  Status post lef Diagnosis:     (1) left femoral neck fracture  Procedure Performed  left DENY    Indications  Left femoral neck fracture  DATE OF SURGERY: 01/23/2017    SURGEON:  Alex Medina M.D.  [facility]  ASSISTANT:  Gray Fish and Dalia rasmussen, surgical assistant.  Please according to our preoperative template.  We identified the acetabulum.  We placed The retractors carefully and started reaming.  We reamed up in 1 mm increments up to a 49 and inserted a 50 mm cup.  The cup was placed under fluoroscopic to assure appropria Mobilization.  Perioperative antibiotics. IMPLANTS: DePuy Trilock stem size 4 standard offset with a 50 cup, 52 x 32 neutral liner, and a 32, +5 Delta ceramic head.         Cristian Hamilton #[id number]  (73 year old F)       [facility] 4TH [id number] SpO2 98%  Breastfeeding?  No    PULSE OX Nl on room air    GENERAL: in pain           HEAD: normocephalic, atraumatic,    EYES: PERRLA, EOMI, conj sclera clear  THROAT: mmm, no lesions  NECK: supple, no meningeal signs  LUNGS: no resp distress, cta bilater [address] [facility] [address] 4W/SW/SE  Attending  Victoriano Garcia MD    Hosp Day #  0  PCP  PHYSICIAN Juan De La Cruz     Date:  1/23/2017    Date of Admission:  1/23/2017      Chief Complaint: left hip pain    HPI: Althea Diallo is a(n) 73year old female with n for input(s): WBC, HGB, HCT, PLT, RBC, MCV, MCH, MCHC, RDW, NEPRELIM in the last 72 hours.   No results for input(s): BUN, CREATSERUM, GFRAA, GFRNAA, CA, ALB, NA, K, CL, CO2, GLU, MG, PHOS, BILT, AST, ALT, ALKPHO, TP, JULIANA, GGT, LIP, PTT, PT, INR, DDIMER, TS

## 2017-07-10 ENCOUNTER — APPOINTMENT (OUTPATIENT)
Dept: CT IMAGING | Facility: HOSPITAL | Age: 72
End: 2017-07-10
Attending: EMERGENCY MEDICINE
Payer: COMMERCIAL

## 2017-07-10 ENCOUNTER — HOSPITAL ENCOUNTER (EMERGENCY)
Facility: HOSPITAL | Age: 72
Discharge: HOME | End: 2017-07-10
Attending: EMERGENCY MEDICINE
Payer: COMMERCIAL

## 2017-07-10 VITALS
TEMPERATURE: 98 F | SYSTOLIC BLOOD PRESSURE: 120 MMHG | WEIGHT: 130 LBS | HEART RATE: 89 BPM | HEIGHT: 65 IN | RESPIRATION RATE: 18 BRPM | OXYGEN SATURATION: 96 % | DIASTOLIC BLOOD PRESSURE: 68 MMHG | BODY MASS INDEX: 21.66 KG/M2

## 2017-07-10 DIAGNOSIS — F10.929: ICD-10-CM

## 2017-07-10 DIAGNOSIS — S01.01XA SCALP LACERATION, INITIAL ENCOUNTER: Primary | ICD-10-CM

## 2017-07-10 LAB
ANION GAP SERPL CALC-SCNC: 11 MMOL/L (ref 0–18)
BASOPHILS # BLD: 0 K/UL (ref 0–0.2)
BASOPHILS NFR BLD: 1 %
BUN SERPL-MCNC: 9 MG/DL (ref 8–20)
BUN/CREAT SERPL: 14.1 (ref 10–20)
CALCIUM SERPL-MCNC: 8.8 MG/DL (ref 8.5–10.5)
CHLORIDE SERPL-SCNC: 107 MMOL/L (ref 95–110)
CO2 SERPL-SCNC: 26 MMOL/L (ref 22–32)
CREAT SERPL-MCNC: 0.64 MG/DL (ref 0.5–1.5)
EOSINOPHIL # BLD: 0.1 K/UL (ref 0–0.7)
EOSINOPHIL NFR BLD: 1 %
ERYTHROCYTE [DISTWIDTH] IN BLOOD BY AUTOMATED COUNT: 19.9 % (ref 11–15)
ETHANOL SERPL-MCNC: 366 MG/DL
GLUCOSE SERPL-MCNC: 107 MG/DL (ref 70–99)
HCT VFR BLD AUTO: 31.4 % (ref 35–48)
HGB BLD-MCNC: 10.2 G/DL (ref 12–16)
LYMPHOCYTES # BLD: 2.2 K/UL (ref 1–4)
LYMPHOCYTES NFR BLD: 37 %
MCH RBC QN AUTO: 28.1 PG (ref 27–32)
MCHC RBC AUTO-ENTMCNC: 32.4 G/DL (ref 32–37)
MCV RBC AUTO: 86.9 FL (ref 80–100)
MONOCYTES # BLD: 0.5 K/UL (ref 0–1)
MONOCYTES NFR BLD: 8 %
NEUTROPHILS # BLD AUTO: 3.2 K/UL (ref 1.8–7.7)
NEUTROPHILS NFR BLD: 54 %
OSMOLALITY UR CALC.SUM OF ELEC: 297 MOSM/KG (ref 275–295)
PLATELET # BLD AUTO: 236 K/UL (ref 140–400)
PMV BLD AUTO: 8.1 FL (ref 7.4–10.3)
POTASSIUM SERPL-SCNC: 4 MMOL/L (ref 3.3–5.1)
RBC # BLD AUTO: 3.61 M/UL (ref 3.7–5.4)
SODIUM SERPL-SCNC: 144 MMOL/L (ref 136–144)
WBC # BLD AUTO: 5.9 K/UL (ref 4–11)

## 2017-07-10 PROCEDURE — 80320 DRUG SCREEN QUANTALCOHOLS: CPT | Performed by: EMERGENCY MEDICINE

## 2017-07-10 PROCEDURE — 70450 CT HEAD/BRAIN W/O DYE: CPT | Performed by: EMERGENCY MEDICINE

## 2017-07-10 PROCEDURE — 85025 COMPLETE CBC W/AUTO DIFF WBC: CPT | Performed by: EMERGENCY MEDICINE

## 2017-07-10 PROCEDURE — 72125 CT NECK SPINE W/O DYE: CPT | Performed by: EMERGENCY MEDICINE

## 2017-07-10 PROCEDURE — 80048 BASIC METABOLIC PNL TOTAL CA: CPT | Performed by: EMERGENCY MEDICINE

## 2017-07-10 PROCEDURE — 36415 COLL VENOUS BLD VENIPUNCTURE: CPT

## 2017-07-10 PROCEDURE — 12002 RPR S/N/AX/GEN/TRNK2.6-7.5CM: CPT

## 2017-07-10 PROCEDURE — 99284 EMERGENCY DEPT VISIT MOD MDM: CPT
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
## 2017-07-10 NOTE — ED NOTES
Discharge information given to pt, voices understanding.  Waiting for cab while in the [address] [facility]

## 2017-07-10 NOTE — PROGRESS NOTES
Has never seen us can come in and see anyone. Should follow-up with an MD whether she sees us or she has another MD but she should come in and be seen.
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
## 2017-07-19 ENCOUNTER — OFFICE VISIT (OUTPATIENT)
Dept: INTERNAL MEDICINE CLINIC | Facility: CLINIC | Age: 72
End: 2017-07-19

## 2017-07-19 VITALS
WEIGHT: 115.38 LBS | HEART RATE: 80 BPM | DIASTOLIC BLOOD PRESSURE: 80 MMHG | BODY MASS INDEX: 18.11 KG/M2 | TEMPERATURE: 99 F | SYSTOLIC BLOOD PRESSURE: 138 MMHG | HEIGHT: 67 IN

## 2017-07-19 DIAGNOSIS — Z48.02 ENCOUNTER FOR STAPLE REMOVAL: Primary | ICD-10-CM

## 2017-07-19 DIAGNOSIS — E04.1 THYROID NODULE: ICD-10-CM

## 2017-07-19 PROCEDURE — G0463 HOSPITAL OUTPT CLINIC VISIT: HCPCS | Performed by: INTERNAL MEDICINE

## 2017-07-19 PROCEDURE — 99213 OFFICE O/P EST LOW 20 MIN: CPT | Performed by: INTERNAL MEDICINE

## 2017-07-21 NOTE — PATIENT INSTRUCTIONS
ASSESSMENT/PLAN:   Encounter for staple removal  (primary encounter diagnosis)-staples removed wound healed.   Thyroid nodule in the ER at CAT scan of the head and neck and there was incidental finding of thyroid nodule ultrasound was recommended will order
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
## 2017-09-05 ENCOUNTER — TELEPHONE (OUTPATIENT)
Dept: INTERNAL MEDICINE CLINIC | Facility: CLINIC | Age: 72
End: 2017-09-05

## 2017-09-07 ENCOUNTER — HOSPITAL ENCOUNTER (OUTPATIENT)
Dept: ULTRASOUND IMAGING | Facility: HOSPITAL | Age: 72
Discharge: HOME | End: 2017-09-07
Attending: INTERNAL MEDICINE
Payer: MEDICARE

## 2017-09-07 DIAGNOSIS — E04.1 THYROID NODULE: ICD-10-CM

## 2017-09-07 PROCEDURE — 76536 US EXAM OF HEAD AND NECK: CPT | Performed by: INTERNAL MEDICINE

## 2017-09-11 ENCOUNTER — OFFICE VISIT (OUTPATIENT)
Dept: INTERNAL MEDICINE CLINIC | Facility: CLINIC | Age: 72
End: 2017-09-11

## 2017-09-11 VITALS
HEIGHT: 67 IN | TEMPERATURE: 99 F | HEART RATE: 78 BPM | SYSTOLIC BLOOD PRESSURE: 138 MMHG | BODY MASS INDEX: 18.65 KG/M2 | WEIGHT: 118.81 LBS | DIASTOLIC BLOOD PRESSURE: 77 MMHG

## 2017-09-11 DIAGNOSIS — D64.9 ANEMIA, UNSPECIFIED TYPE: ICD-10-CM

## 2017-09-11 DIAGNOSIS — E04.2 MULTINODULAR GOITER: ICD-10-CM

## 2017-09-11 DIAGNOSIS — Z28.21 INFLUENZA VACCINE REFUSED: ICD-10-CM

## 2017-09-11 DIAGNOSIS — Z00.00 PREVENTATIVE HEALTH CARE: ICD-10-CM

## 2017-09-11 DIAGNOSIS — M85.80 OSTEOPENIA, UNSPECIFIED LOCATION: ICD-10-CM

## 2017-09-11 DIAGNOSIS — R03.0 ELEVATED BP WITHOUT DIAGNOSIS OF HYPERTENSION: ICD-10-CM

## 2017-09-11 DIAGNOSIS — Z00.00 MEDICARE ANNUAL WELLNESS VISIT, SUBSEQUENT: Primary | ICD-10-CM

## 2017-09-11 DIAGNOSIS — Z92.89 HISTORY OF SCREENING MAMMOGRAPHY: ICD-10-CM

## 2017-09-11 PROBLEM — S72.002A HIP FRACTURE, LEFT (HCC): Status: RESOLVED | Noted: 2017-01-23 | Resolved: 2017-09-11

## 2017-09-11 PROBLEM — S72.002A HIP FRACTURE, LEFT, CLOSED, INITIAL ENCOUNTER (HCC): Status: RESOLVED | Noted: 2017-01-23 | Resolved: 2017-09-11

## 2017-09-11 LAB
ALBUMIN SERPL BCP-MCNC: 4.1 G/DL (ref 3.5–4.8)
ALBUMIN/GLOB SERPL: 1.7 {RATIO} (ref 1–2)
ALP SERPL-CCNC: 90 U/L (ref 32–100)
ALT SERPL-CCNC: 10 U/L (ref 14–54)
ANION GAP SERPL CALC-SCNC: 9 MMOL/L (ref 0–18)
AST SERPL-CCNC: 18 U/L (ref 15–41)
BASOPHILS # BLD: 0 K/UL (ref 0–0.2)
BASOPHILS NFR BLD: 1 %
BILIRUB SERPL-MCNC: 0.2 MG/DL (ref 0.3–1.2)
BILIRUB UR QL: NEGATIVE
BUN SERPL-MCNC: 9 MG/DL (ref 8–20)
BUN/CREAT SERPL: 12.2 (ref 10–20)
CALCIUM SERPL-MCNC: 9.3 MG/DL (ref 8.5–10.5)
CHLORIDE SERPL-SCNC: 105 MMOL/L (ref 95–110)
CHOLEST SERPL-MCNC: 259 MG/DL (ref 110–200)
CLARITY UR: CLEAR
CO2 SERPL-SCNC: 28 MMOL/L (ref 22–32)
COLOR UR: YELLOW
CREAT SERPL-MCNC: 0.74 MG/DL (ref 0.5–1.5)
EOSINOPHIL # BLD: 0.2 K/UL (ref 0–0.7)
EOSINOPHIL NFR BLD: 3 %
ERYTHROCYTE [DISTWIDTH] IN BLOOD BY AUTOMATED COUNT: 17.8 % (ref 11–15)
GLOBULIN PLAS-MCNC: 2.4 G/DL (ref 2.5–3.7)
GLUCOSE SERPL-MCNC: 95 MG/DL (ref 70–99)
GLUCOSE UR-MCNC: NEGATIVE MG/DL
HCT VFR BLD AUTO: 33.2 % (ref 35–48)
HDLC SERPL-MCNC: 74 MG/DL
HGB BLD-MCNC: 10.8 G/DL (ref 12–16)
HYALINE CASTS #/AREA URNS AUTO: 1 /LPF
KETONES UR-MCNC: NEGATIVE MG/DL
LDLC SERPL CALC-MCNC: 167 MG/DL (ref 0–99)
LEUKOCYTE ESTERASE UR QL STRIP.AUTO: NEGATIVE
LYMPHOCYTES # BLD: 1.5 K/UL (ref 1–4)
LYMPHOCYTES NFR BLD: 23 %
MCH RBC QN AUTO: 27.7 PG (ref 27–32)
MCHC RBC AUTO-ENTMCNC: 32.5 G/DL (ref 32–37)
MCV RBC AUTO: 85.3 FL (ref 80–100)
MONOCYTES # BLD: 0.5 K/UL (ref 0–1)
MONOCYTES NFR BLD: 8 %
NEUTROPHILS # BLD AUTO: 4.3 K/UL (ref 1.8–7.7)
NEUTROPHILS NFR BLD: 66 %
NITRITE UR QL STRIP.AUTO: NEGATIVE
NONHDLC SERPL-MCNC: 185 MG/DL
OSMOLALITY UR CALC.SUM OF ELEC: 292 MOSM/KG (ref 275–295)
PH UR: 5 [PH] (ref 5–8)
PLATELET # BLD AUTO: 282 K/UL (ref 140–400)
PMV BLD AUTO: 8.5 FL (ref 7.4–10.3)
POTASSIUM SERPL-SCNC: 4.3 MMOL/L (ref 3.3–5.1)
PROT SERPL-MCNC: 6.5 G/DL (ref 5.9–8.4)
PROT UR-MCNC: NEGATIVE MG/DL
RBC # BLD AUTO: 3.9 M/UL (ref 3.7–5.4)
RBC #/AREA URNS AUTO: 1 /HPF
SODIUM SERPL-SCNC: 142 MMOL/L (ref 136–144)
SP GR UR STRIP: 1.01 (ref 1–1.03)
TRIGL SERPL-MCNC: 90 MG/DL (ref 1–149)
TSH SERPL-ACNC: 0.8 UIU/ML (ref 0.45–5.33)
UROBILINOGEN UR STRIP-ACNC: <2
VIT C UR-MCNC: NEGATIVE MG/DL
WBC # BLD AUTO: 6.5 K/UL (ref 4–11)
WBC #/AREA URNS AUTO: <1 /HPF

## 2017-09-11 PROCEDURE — G0439 PPPS, SUBSEQ VISIT: HCPCS | Performed by: INTERNAL MEDICINE

## 2017-09-11 PROCEDURE — 36415 COLL VENOUS BLD VENIPUNCTURE: CPT | Performed by: INTERNAL MEDICINE

## 2017-09-11 PROCEDURE — 96160 PT-FOCUSED HLTH RISK ASSMT: CPT | Performed by: INTERNAL MEDICINE

## 2017-09-11 PROCEDURE — 99397 PER PM REEVAL EST PAT 65+ YR: CPT | Performed by: INTERNAL MEDICINE

## 2017-09-11 NOTE — PROGRESS NOTES
HPI:   Mart Chris is a 73year old female who presents for a Medicare Subsequent Annual Wellness visit (Pt already had Initial Annual Wellness).   Patient here for annual Medicare physical exam denies any complaints patient refusing colonoscopy today discharge or itching, no complaint of urinary incontinence   MUSCULOSKELETAL: denies back pain  NEURO: denies headaches  PSYCHE: denies depression or anxiety  HEMATOLOGIC: denies hx of anemia  ENDOCRINE: denies thyroid history  ALL/ASTHMA: denies hx of all 20/50   Left Eye Visual Acuity: Uncorrected Left Eye Chart Acuity: 20/70   Both Eyes Visual Acuity: Uncorrected Both Eyes Chart Acuity: 20/40   Able To Tolerate Visual Acuity: Yes           SUGGESTED VACCINATIONS - Influenza, Pneumococcal, Zoster, Tetanus hypertension stable watch diet watch salt intake follow  -     CBC WITH DIFFERENTIAL WITH PLATELET; Future  -     COMP METABOLIC PANEL (14); Future  -     LIPID PANEL;  Future  -     TSH W REFLEX TO FREE T4; Future  -     URINALYSIS, ROUTINE; Future  - document but we do not have it in Epic, and patient is instructed to get our office a copy of it for scanning into Epic           PLAN:  The patient indicates understanding of these issues and agrees to the plan. No Follow-up on file.      SOY Diallo 0-No    Fall/Risk Scorin    Scoring Interpretation: 0 - 3 No Risk     Depression Screening (PHQ-2/PHQ-9): Over the LAST 2 WEEKS   Little interest or pleasure in doing things (over the last two weeks)?: Not at all    Feeling down, depressed, or hopeless previous visit. No flowsheet data found. Pap and Pelvic      Pap: Every 3 yrs age 21-68 or Pap+HPV every 5 yrs age 33-67, age 72 and older at high risk There are no preventive care reminders to display for this patient.  Update InDMusic if appl (mg/dL)   Date Value   07/10/2017 9    No flowsheet data found. Drug Serum Conc  Annually No results found for: DIGOXIN, DIG, VALP No flowsheet data found. Diabetes      HgbA1C  Annually No results found for: A1C No flowsheet data found.     Creat/al

## 2017-09-14 ENCOUNTER — OFFICE VISIT (OUTPATIENT)
Dept: OTOLARYNGOLOGY | Facility: CLINIC | Age: 72
End: 2017-09-14

## 2017-09-14 ENCOUNTER — APPOINTMENT (OUTPATIENT)
Dept: LAB | Facility: HOSPITAL | Age: 72
End: 2017-09-14
Attending: INTERNAL MEDICINE
Payer: MEDICARE

## 2017-09-14 VITALS
WEIGHT: 118.63 LBS | DIASTOLIC BLOOD PRESSURE: 68 MMHG | SYSTOLIC BLOOD PRESSURE: 120 MMHG | BODY MASS INDEX: 18.62 KG/M2 | TEMPERATURE: 99 F | HEIGHT: 67 IN

## 2017-09-14 DIAGNOSIS — R03.0 ELEVATED BP WITHOUT DIAGNOSIS OF HYPERTENSION: ICD-10-CM

## 2017-09-14 DIAGNOSIS — Z00.00 MEDICARE ANNUAL WELLNESS VISIT, SUBSEQUENT: ICD-10-CM

## 2017-09-14 DIAGNOSIS — E04.1 THYROID NODULE: Primary | ICD-10-CM

## 2017-09-14 DIAGNOSIS — M85.80 OSTEOPENIA, UNSPECIFIED LOCATION: ICD-10-CM

## 2017-09-14 DIAGNOSIS — D64.9 ANEMIA, UNSPECIFIED TYPE: ICD-10-CM

## 2017-09-14 DIAGNOSIS — Z28.21 INFLUENZA VACCINE REFUSED: ICD-10-CM

## 2017-09-14 LAB — HEMOCCULT STL QL: NEGATIVE

## 2017-09-14 PROCEDURE — 99203 OFFICE O/P NEW LOW 30 MIN: CPT | Performed by: OTOLARYNGOLOGY

## 2017-09-14 PROCEDURE — G0463 HOSPITAL OUTPT CLINIC VISIT: HCPCS | Performed by: OTOLARYNGOLOGY

## 2017-09-14 PROCEDURE — 82272 OCCULT BLD FECES 1-3 TESTS: CPT

## 2017-09-14 NOTE — PROGRESS NOTES
Francisco Escamilla is a 73year old female. Patient presents with:  Thyroid Problem: Thyroid ultrasound 9/7/17    HPI:   She was noted to have an enlarged thyroid during a full medical examination.  She had an ultrasound which demonstrates nodules in both lo Normal. Pupil - Right: Normal, Left: Normal.    Ears Normal Inspection - Right: Normal, Left: Normal. Canal - Left: Normal. TM - Right: Normal, Left: Normal.     ASSESSMENT AND PLAN:   1.  Thyroid nodule  He has what appears to be a multinodular goiter but

## 2017-10-12 ENCOUNTER — TELEPHONE (OUTPATIENT)
Dept: INTERNAL MEDICINE CLINIC | Facility: CLINIC | Age: 72
End: 2017-10-12

## 2018-03-05 ENCOUNTER — TELEPHONE (OUTPATIENT)
Dept: OTOLARYNGOLOGY | Facility: CLINIC | Age: 73
End: 2018-03-05

## 2018-03-05 DIAGNOSIS — E04.2 MULTINODULAR GOITER: Primary | ICD-10-CM

## 2018-04-03 ENCOUNTER — TELEPHONE (OUTPATIENT)
Dept: INTERNAL MEDICINE CLINIC | Facility: CLINIC | Age: 73
End: 2018-04-03

## 2018-05-02 ENCOUNTER — TELEPHONE (OUTPATIENT)
Dept: INTERNAL MEDICINE CLINIC | Facility: CLINIC | Age: 73
End: 2018-05-02

## 2018-08-06 ENCOUNTER — PATIENT OUTREACH (OUTPATIENT)
Dept: CASE MANAGEMENT | Age: 73
End: 2018-08-06

## 2018-09-11 ENCOUNTER — HOSPITAL ENCOUNTER (OUTPATIENT)
Dept: BONE DENSITY | Facility: HOSPITAL | Age: 73
Discharge: HOME | End: 2018-09-11
Attending: INTERNAL MEDICINE
Payer: MEDICARE

## 2018-09-11 ENCOUNTER — TELEPHONE (OUTPATIENT)
Dept: CASE MANAGEMENT | Age: 73
End: 2018-09-11

## 2018-09-11 ENCOUNTER — HOSPITAL ENCOUNTER (OUTPATIENT)
Dept: MAMMOGRAPHY | Facility: HOSPITAL | Age: 73
Discharge: HOME | End: 2018-09-11
Attending: INTERNAL MEDICINE
Payer: MEDICARE

## 2018-09-11 DIAGNOSIS — Z00.00 PREVENTATIVE HEALTH CARE: ICD-10-CM

## 2018-09-11 DIAGNOSIS — M85.80 OSTEOPENIA, UNSPECIFIED LOCATION: ICD-10-CM

## 2018-09-11 DIAGNOSIS — Z92.89 HISTORY OF SCREENING MAMMOGRAPHY: ICD-10-CM

## 2018-09-11 DIAGNOSIS — Z12.39 BREAST CANCER SCREENING: Primary | ICD-10-CM

## 2018-09-11 PROCEDURE — 77067 SCR MAMMO BI INCL CAD: CPT | Performed by: INTERNAL MEDICINE

## 2018-09-11 PROCEDURE — 77080 DXA BONE DENSITY AXIAL: CPT | Performed by: INTERNAL MEDICINE

## 2018-09-11 NOTE — TELEPHONE ENCOUNTER
Patient returned our call and scheduled Px exam for 9/19/18. Dexa scan and mammogram are scheduled for today.

## 2018-10-16 ENCOUNTER — PATIENT OUTREACH (OUTPATIENT)
Dept: CASE MANAGEMENT | Age: 73
End: 2018-10-16

## 2018-12-07 ENCOUNTER — MA CHART PREP (OUTPATIENT)
Dept: FAMILY MEDICINE CLINIC | Facility: CLINIC | Age: 73
End: 2018-12-07

## 2019-01-31 ENCOUNTER — TELEPHONE (OUTPATIENT)
Dept: CASE MANAGEMENT | Age: 74
End: 2019-01-31

## 2019-01-31 NOTE — TELEPHONE ENCOUNTER
Patient is eligible for a 2019 Annual Medicare Health Assessment. Left message to call back [phone]. Letter sent.

## 2019-06-23 ENCOUNTER — HOSPITAL ENCOUNTER (EMERGENCY)
Facility: HOSPITAL | Age: 74
Discharge: HOME | End: 2019-06-23
Attending: EMERGENCY MEDICINE
Payer: MEDICARE

## 2019-06-23 VITALS
WEIGHT: 125 LBS | DIASTOLIC BLOOD PRESSURE: 70 MMHG | SYSTOLIC BLOOD PRESSURE: 136 MMHG | TEMPERATURE: 99 F | OXYGEN SATURATION: 99 % | RESPIRATION RATE: 18 BRPM | HEIGHT: 66 IN | BODY MASS INDEX: 20.09 KG/M2 | HEART RATE: 98 BPM

## 2019-06-23 DIAGNOSIS — R45.851 SUICIDAL IDEATION: ICD-10-CM

## 2019-06-23 DIAGNOSIS — T74.91XA DOMESTIC VIOLENCE OF ADULT, INITIAL ENCOUNTER: Primary | ICD-10-CM

## 2019-06-23 PROCEDURE — 80307 DRUG TEST PRSMV CHEM ANLYZR: CPT | Performed by: EMERGENCY MEDICINE

## 2019-06-23 PROCEDURE — 87086 URINE CULTURE/COLONY COUNT: CPT | Performed by: EMERGENCY MEDICINE

## 2019-06-23 PROCEDURE — 99284 EMERGENCY DEPT VISIT MOD MDM: CPT

## 2019-06-23 PROCEDURE — 80048 BASIC METABOLIC PNL TOTAL CA: CPT | Performed by: EMERGENCY MEDICINE

## 2019-06-23 PROCEDURE — 81001 URINALYSIS AUTO W/SCOPE: CPT | Performed by: EMERGENCY MEDICINE

## 2019-06-23 PROCEDURE — 85025 COMPLETE CBC W/AUTO DIFF WBC: CPT | Performed by: EMERGENCY MEDICINE

## 2019-06-23 PROCEDURE — 96360 HYDRATION IV INFUSION INIT: CPT

## 2019-06-23 PROCEDURE — 80320 DRUG SCREEN QUANTALCOHOLS: CPT | Performed by: EMERGENCY MEDICINE
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
## 2019-06-24 NOTE — ED PROVIDER NOTES
Pt seen by psych liaison here and deemed appropriate for d/c home. Patient states she feels safe going home. She states she has a phone in a backpack should she have concern for her safety and will leave.   She adamantly has no thoughts of harming herself

## 2019-08-21 ENCOUNTER — TELEPHONE (OUTPATIENT)
Dept: CASE MANAGEMENT | Age: 74
End: 2019-08-21

## 2019-11-12 ENCOUNTER — TELEPHONE (OUTPATIENT)
Dept: CASE MANAGEMENT | Age: 74
End: 2019-11-12

## 2019-11-12 NOTE — TELEPHONE ENCOUNTER
Patient is due for 2019 Medicare AHA visit, no answer and no voicemail, letters have been previously sent to patient on 1/31/19 and 8/21/19.

## 2020-06-23 ENCOUNTER — APPOINTMENT (OUTPATIENT)
Dept: CT IMAGING | Facility: HOSPITAL | Age: 75
DRG: 812 | End: 2020-06-23
Attending: EMERGENCY MEDICINE
Payer: MEDICARE

## 2020-06-23 ENCOUNTER — APPOINTMENT (OUTPATIENT)
Dept: GENERAL RADIOLOGY | Facility: HOSPITAL | Age: 75
DRG: 812 | End: 2020-06-23
Attending: EMERGENCY MEDICINE
Payer: MEDICARE

## 2020-06-23 ENCOUNTER — HOSPITAL ENCOUNTER (INPATIENT)
Facility: HOSPITAL | Age: 75
LOS: 6 days | Discharge: NURSING, REHAB OR LONG-TERM CARE | DRG: 812 | End: 2020-06-29
Attending: EMERGENCY MEDICINE | Admitting: INTERNAL MEDICINE
Payer: MEDICARE

## 2020-06-23 DIAGNOSIS — S09.90XA INJURY OF HEAD, INITIAL ENCOUNTER: ICD-10-CM

## 2020-06-23 DIAGNOSIS — S42.224A CLOSED 2-PART NONDISPLACED FRACTURE OF SURGICAL NECK OF RIGHT HUMERUS, INITIAL ENCOUNTER: ICD-10-CM

## 2020-06-23 DIAGNOSIS — F10.920 ALCOHOLIC INTOXICATION WITHOUT COMPLICATION (HCC): ICD-10-CM

## 2020-06-23 DIAGNOSIS — D64.9 ANEMIA, UNSPECIFIED TYPE: Primary | ICD-10-CM

## 2020-06-23 LAB
ALBUMIN SERPL-MCNC: 3.5 G/DL (ref 3.4–5)
ALBUMIN SERPL-MCNC: 3.5 G/DL (ref 3.4–5)
ALBUMIN/GLOB SERPL: 1.1 {RATIO} (ref 1–2)
ALP LIVER SERPL-CCNC: 88 U/L (ref 55–142)
ALP LIVER SERPL-CCNC: 89 U/L (ref 55–142)
ALT SERPL-CCNC: 15 U/L (ref 13–56)
ALT SERPL-CCNC: 15 U/L (ref 13–56)
AMPHET UR QL SCN: NEGATIVE
ANION GAP SERPL CALC-SCNC: 10 MMOL/L (ref 0–18)
ANION GAP SERPL CALC-SCNC: 10 MMOL/L (ref 0–18)
ANTIBODY SCREEN: NEGATIVE
APTT PPP: 25.2 SECONDS (ref 23.2–35.3)
AST SERPL-CCNC: 13 U/L (ref 15–37)
AST SERPL-CCNC: 17 U/L (ref 15–37)
BACTERIA UR QL AUTO: NEGATIVE /HPF
BARBITURATES UR QL SCN: NEGATIVE
BASOPHILS # BLD AUTO: 0.04 X10(3) UL (ref 0–0.2)
BASOPHILS # BLD AUTO: 0.04 X10(3) UL (ref 0–0.2)
BASOPHILS NFR BLD AUTO: 0.5 %
BASOPHILS NFR BLD AUTO: 0.6 %
BENZODIAZ UR QL SCN: NEGATIVE
BILIRUB DIRECT SERPL-MCNC: <0.1 MG/DL (ref 0–0.2)
BILIRUB SERPL-MCNC: 0.2 MG/DL (ref 0.1–2)
BILIRUB SERPL-MCNC: 0.3 MG/DL (ref 0.1–2)
BILIRUB UR QL: NEGATIVE
BUN BLD-MCNC: 11 MG/DL (ref 7–18)
BUN BLD-MCNC: 13 MG/DL (ref 7–18)
BUN/CREAT SERPL: 15.3 (ref 10–20)
BUN/CREAT SERPL: 22 (ref 10–20)
CALCIUM BLD-MCNC: 8.2 MG/DL (ref 8.5–10.1)
CALCIUM BLD-MCNC: 8.4 MG/DL (ref 8.5–10.1)
CANNABINOIDS UR QL SCN: NEGATIVE
CHLORIDE SERPL-SCNC: 106 MMOL/L (ref 98–112)
CHLORIDE SERPL-SCNC: 111 MMOL/L (ref 98–112)
CLARITY UR: CLEAR
CO2 SERPL-SCNC: 25 MMOL/L (ref 21–32)
CO2 SERPL-SCNC: 27 MMOL/L (ref 21–32)
COCAINE UR QL: NEGATIVE
COLOR UR: YELLOW
CREAT BLD-MCNC: 0.59 MG/DL (ref 0.55–1.02)
CREAT BLD-MCNC: 0.72 MG/DL (ref 0.55–1.02)
CREAT UR-SCNC: 56.1 MG/DL
DEPRECATED HBV CORE AB SER IA-ACNC: 4.1 NG/ML (ref 18–340)
DEPRECATED RDW RBC AUTO: 54.8 FL (ref 35.1–46.3)
DEPRECATED RDW RBC AUTO: 56.1 FL (ref 35.1–46.3)
EOSINOPHIL # BLD AUTO: 0 X10(3) UL (ref 0–0.7)
EOSINOPHIL # BLD AUTO: 0.04 X10(3) UL (ref 0–0.7)
EOSINOPHIL NFR BLD AUTO: 0 %
EOSINOPHIL NFR BLD AUTO: 0.6 %
ERYTHROCYTE [DISTWIDTH] IN BLOOD BY AUTOMATED COUNT: 21.5 % (ref 11–15)
ERYTHROCYTE [DISTWIDTH] IN BLOOD BY AUTOMATED COUNT: 21.7 % (ref 11–15)
ETHANOL SERPL-MCNC: 378 MG/DL (ref ?–3)
GLOBULIN PLAS-MCNC: 3.1 G/DL (ref 2.8–4.4)
GLUCOSE BLD-MCNC: 83 MG/DL (ref 70–99)
GLUCOSE BLD-MCNC: 98 MG/DL (ref 70–99)
GLUCOSE UR-MCNC: NEGATIVE MG/DL
HAV IGM SER QL: 1.7 MG/DL (ref 1.6–2.6)
HAV IGM SER QL: NONREACTIVE
HBV CORE IGM SER QL: NONREACTIVE
HBV SURFACE AG SERPL QL IA: NONREACTIVE
HCT VFR BLD AUTO: 24.8 % (ref 35–48)
HCT VFR BLD AUTO: 27.3 % (ref 35–48)
HCV AB SERPL QL IA: NONREACTIVE
HGB BLD-MCNC: 7.2 G/DL (ref 12–16)
HGB BLD-MCNC: 7.7 G/DL (ref 12–16)
IMM GRANULOCYTES # BLD AUTO: 0.02 X10(3) UL (ref 0–1)
IMM GRANULOCYTES # BLD AUTO: 0.04 X10(3) UL (ref 0–1)
IMM GRANULOCYTES NFR BLD: 0.2 %
IMM GRANULOCYTES NFR BLD: 0.6 %
INR BLD: 1.04 (ref 0.9–1.2)
IRON SATURATION: 2 % (ref 15–50)
IRON SERPL-MCNC: 9 UG/DL (ref 50–170)
KETONES UR-MCNC: NEGATIVE MG/DL
LEUKOCYTE ESTERASE UR QL STRIP.AUTO: NEGATIVE
LYMPHOCYTES # BLD AUTO: 0.88 X10(3) UL (ref 1–4)
LYMPHOCYTES # BLD AUTO: 2.85 X10(3) UL (ref 1–4)
LYMPHOCYTES NFR BLD AUTO: 10.9 %
LYMPHOCYTES NFR BLD AUTO: 45 %
M PROTEIN MFR SERPL ELPH: 6.6 G/DL (ref 6.4–8.2)
M PROTEIN MFR SERPL ELPH: 6.8 G/DL (ref 6.4–8.2)
MCH RBC QN AUTO: 20.4 PG (ref 26–34)
MCH RBC QN AUTO: 20.6 PG (ref 26–34)
MCHC RBC AUTO-ENTMCNC: 28.2 G/DL (ref 31–37)
MCHC RBC AUTO-ENTMCNC: 29 G/DL (ref 31–37)
MCV RBC AUTO: 71.1 FL (ref 80–100)
MCV RBC AUTO: 72.2 FL (ref 80–100)
MDMA UR QL SCN: NEGATIVE
METHADONE UR QL SCN: NEGATIVE
MONOCYTES # BLD AUTO: 0.39 X10(3) UL (ref 0.1–1)
MONOCYTES # BLD AUTO: 0.59 X10(3) UL (ref 0.1–1)
MONOCYTES NFR BLD AUTO: 6.2 %
MONOCYTES NFR BLD AUTO: 7.3 %
NEUTROPHILS # BLD AUTO: 2.98 X10 (3) UL (ref 1.5–7.7)
NEUTROPHILS # BLD AUTO: 2.98 X10(3) UL (ref 1.5–7.7)
NEUTROPHILS # BLD AUTO: 6.51 X10 (3) UL (ref 1.5–7.7)
NEUTROPHILS # BLD AUTO: 6.51 X10(3) UL (ref 1.5–7.7)
NEUTROPHILS NFR BLD AUTO: 47 %
NEUTROPHILS NFR BLD AUTO: 81.1 %
NITRITE UR QL STRIP.AUTO: NEGATIVE
OPIATES UR QL SCN: NEGATIVE
OSMOLALITY SERPL CALC.SUM OF ELEC: 295 MOSM/KG (ref 275–295)
OSMOLALITY SERPL CALC.SUM OF ELEC: 301 MOSM/KG (ref 275–295)
OXYCODONE UR QL SCN: NEGATIVE
PCP UR QL SCN: NEGATIVE
PH UR: 5.5 [PH] (ref 5–8)
PLATELET # BLD AUTO: 279 10(3)UL (ref 150–450)
PLATELET # BLD AUTO: 315 10(3)UL (ref 150–450)
PLATELET MORPHOLOGY: NORMAL
POTASSIUM SERPL-SCNC: 3.3 MMOL/L (ref 3.5–5.1)
POTASSIUM SERPL-SCNC: 3.9 MMOL/L (ref 3.5–5.1)
PROT UR-MCNC: NEGATIVE MG/DL
PROTHROMBIN TIME: 13.4 SECONDS (ref 11.8–14.5)
RBC # BLD AUTO: 3.49 X10(6)UL (ref 3.8–5.3)
RBC # BLD AUTO: 3.78 X10(6)UL (ref 3.8–5.3)
RBC #/AREA URNS AUTO: 1 /HPF
RH BLOOD TYPE: POSITIVE
SARS-COV-2 RNA RESP QL NAA+PROBE: NOT DETECTED
SODIUM SERPL-SCNC: 143 MMOL/L (ref 136–145)
SODIUM SERPL-SCNC: 146 MMOL/L (ref 136–145)
SP GR UR STRIP: 1.01 (ref 1–1.03)
TOTAL IRON BINDING CAPACITY: 499 UG/DL (ref 240–450)
TRANSFERRIN SERPL-MCNC: 335 MG/DL (ref 200–360)
UROBILINOGEN UR STRIP-ACNC: <2
WBC # BLD AUTO: 6.3 X10(3) UL (ref 4–11)
WBC # BLD AUTO: 8 X10(3) UL (ref 4–11)
WBC #/AREA URNS AUTO: 0 /HPF

## 2020-06-23 PROCEDURE — 99222 1ST HOSP IP/OBS MODERATE 55: CPT | Performed by: INTERNAL MEDICINE

## 2020-06-23 PROCEDURE — 72125 CT NECK SPINE W/O DYE: CPT | Performed by: EMERGENCY MEDICINE

## 2020-06-23 PROCEDURE — 70450 CT HEAD/BRAIN W/O DYE: CPT | Performed by: EMERGENCY MEDICINE

## 2020-06-23 PROCEDURE — 73030 X-RAY EXAM OF SHOULDER: CPT | Performed by: EMERGENCY MEDICINE

## 2020-06-23 PROCEDURE — 70486 CT MAXILLOFACIAL W/O DYE: CPT | Performed by: EMERGENCY MEDICINE

## 2020-06-23 RX ORDER — ACETAMINOPHEN 500 MG
500 TABLET ORAL EVERY 6 HOURS PRN
Status: DISCONTINUED | OUTPATIENT
Start: 2020-06-23 | End: 2020-06-29

## 2020-06-23 RX ORDER — LORAZEPAM 1 MG/1
2 TABLET ORAL
Status: DISCONTINUED | OUTPATIENT
Start: 2020-06-23 | End: 2020-06-25

## 2020-06-23 RX ORDER — FOLIC ACID 1 MG/1
1 TABLET ORAL DAILY
Status: DISCONTINUED | OUTPATIENT
Start: 2020-06-23 | End: 2020-06-29

## 2020-06-23 RX ORDER — METOCLOPRAMIDE HYDROCHLORIDE 5 MG/ML
10 INJECTION INTRAMUSCULAR; INTRAVENOUS EVERY 8 HOURS PRN
Status: DISCONTINUED | OUTPATIENT
Start: 2020-06-23 | End: 2020-06-29

## 2020-06-23 RX ORDER — MELATONIN
100 DAILY
Status: DISCONTINUED | OUTPATIENT
Start: 2020-06-24 | End: 2020-06-29

## 2020-06-23 RX ORDER — LORAZEPAM 2 MG/ML
2 INJECTION INTRAMUSCULAR
Status: DISCONTINUED | OUTPATIENT
Start: 2020-06-23 | End: 2020-06-25

## 2020-06-23 RX ORDER — MORPHINE SULFATE 4 MG/ML
4 INJECTION, SOLUTION INTRAMUSCULAR; INTRAVENOUS ONCE
Status: DISCONTINUED | OUTPATIENT
Start: 2020-06-23 | End: 2020-06-29

## 2020-06-23 RX ORDER — ACETAMINOPHEN 325 MG/1
650 TABLET ORAL EVERY 6 HOURS PRN
Status: COMPLETED | OUTPATIENT
Start: 2020-06-23 | End: 2020-06-23

## 2020-06-23 RX ORDER — MULTIPLE VITAMINS W/ MINERALS TAB 9MG-400MCG
1 TAB ORAL DAILY
Status: DISCONTINUED | OUTPATIENT
Start: 2020-06-23 | End: 2020-06-29

## 2020-06-23 RX ORDER — MAGNESIUM OXIDE 400 MG (241.3 MG MAGNESIUM) TABLET
400 TABLET ONCE
Status: COMPLETED | OUTPATIENT
Start: 2020-06-23 | End: 2020-06-23

## 2020-06-23 RX ORDER — LORAZEPAM 2 MG/ML
1 INJECTION INTRAMUSCULAR
Status: DISCONTINUED | OUTPATIENT
Start: 2020-06-23 | End: 2020-06-25

## 2020-06-23 RX ORDER — ACETAMINOPHEN 325 MG/1
TABLET ORAL
Status: COMPLETED
Start: 2020-06-23 | End: 2020-06-23

## 2020-06-23 RX ORDER — ACETAMINOPHEN 500 MG
500 TABLET ORAL EVERY 6 HOURS PRN
Status: DISCONTINUED | OUTPATIENT
Start: 2020-06-23 | End: 2020-06-23

## 2020-06-23 RX ORDER — SODIUM CHLORIDE 9 MG/ML
INJECTION, SOLUTION INTRAVENOUS CONTINUOUS
Status: DISCONTINUED | OUTPATIENT
Start: 2020-06-23 | End: 2020-06-27

## 2020-06-23 RX ORDER — ACETAMINOPHEN 325 MG/1
650 TABLET ORAL ONCE
Status: COMPLETED | OUTPATIENT
Start: 2020-06-23 | End: 2020-06-23

## 2020-06-23 RX ORDER — LORAZEPAM 1 MG/1
1 TABLET ORAL
Status: DISCONTINUED | OUTPATIENT
Start: 2020-06-23 | End: 2020-06-25

## 2020-06-23 NOTE — CONSULTS
[facility] HOSP - [facility]    Report of Consultation    Angel Rodriguez Patient Status:  Inpatient    10/2/1945 MRN [id number]   Location [facility] 5SW/SE Attending Abdifatah Guan MD   Hosp Day # 0 PCP Linda Campbell MD     Date of Admission: is 102. Her respiration is 20 and oxygen saturation is 94%. Physical Exam:  Some ecchymosis to the right shoulder. No gross deformity. No tenderness to the clavicle, AC joint, elbow, distal radius/ulna, or anatomic snuff box.   She has pain along the la thickening. 4. Small periapical dental abscess involving tooth # 15. 5. Lesser incidental findings as above.    Remote - PS  Dictated by (CST): Albaro Ragland MD on 6/23/2020 at 10:56 AM     Finalized by (CST): Albaro Ragland MD on 6/23/2020 at 11:05 AM

## 2020-06-23 NOTE — H&P
[facility]    History and Physical    Jeff Moni Patient Status:  Inpatient    10/2/1945 MRN [id number]   Location [facility] 5SW/SE Attending Kaylee Ordoñez MD   Hosp Day # 0 PCP Ramírez Jay MD     Date:  2020  Date palpitations and leg swelling. Gastrointestinal: Negative for heartburn, nausea, abdominal pain, diarrhea, constipation, blood in stool, abdominal distention, anal bleeding and rectal pain. Endocrine: Negative. Genitourinary: Negative.     Musculoske 06/23/2020    .0 06/23/2020    CREATSERUM 0.72 06/23/2020    BUN 11 06/23/2020     (H) 06/23/2020    K 3.3 (L) 06/23/2020     06/23/2020    CO2 25.0 06/23/2020    GLU 98 06/23/2020    CA 8.4 (L) 06/23/2020    ALB 3.5 06/23/2020    [facility] [address] [place] [address] pronounced at C4-C5 and C5-C6. 3. Bilateral low-density thyroid nodules measuring up to 2 cm in the right lobe. 4. Lesser incidental findings as above.    Remote - PS  Dictated by (CST): Rebecca Rollins MD on 6/23/2020 at 11:05 AM     Finalized by (CST):

## 2020-06-23 NOTE — ED NOTES
Orders for admission, patient is aware of plan and ready to go upstairs.  Any questions, please call ED RN Vinod Enriquez  at extension [phone]

## 2020-06-23 NOTE — CONSULTS
[facility]   G[facility]enterology Consultation Note    Reyne Moritz  Patient Status:    Emergency  Date of Admission:         6/23/2020, Hospital day #0  Attending:   Joseph Pena MD  PCP:     Ruma Sunshine MD    Reason for Consult or melena. She denies any abdominal pain, nausea or vomiting. She denies unintentional weight loss, chest pain or shortness of breath. She denies history of heart attack, stroke, cardiac stents/pacemaker or obstructive sleep apnea.     Pertinent Family negative for dysuria or gross hematuria  INTEGUMENT/BREAST:  SKIN:  negative for jaundice   ENDOCRINE:  negative for cold intolerance and heat intolerance  MUSCULOSKELETAL: +back and shoulder pain   BEHAVIOR/PSYCH:  +tangential +anxious +hx depression abnormalities. Dictated by (CST): Janelle Grubbs MD on 6/23/2020 at 10:35 AM     Finalized by (CST): Janelle Grubbs MD on 6/23/2020 at 10:46 AM          Ct Facial Bones (cpt=70486)    Result Date: 6/23/2020  CONCLUSION:  1.  No acute fracture o blood loss is possible, however it is difficult to obtain a history today. Patient has refused rectal examination, denies any known hematochezia/melena. Iron studies have been ordered.  The patient should be monitored for DTs/withdrawal, and urgent EGD/CLN
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
## 2020-06-24 LAB
BASOPHILS # BLD AUTO: 0.02 X10(3) UL (ref 0–0.2)
BASOPHILS # BLD AUTO: 0.03 X10(3) UL (ref 0–0.2)
BASOPHILS NFR BLD AUTO: 0.2 %
BASOPHILS NFR BLD AUTO: 0.4 %
CHLORIDE SERPL-SCNC: 106 MMOL/L (ref 98–112)
CO2 SERPL-SCNC: 27 MMOL/L (ref 21–32)
DEPRECATED RDW RBC AUTO: 51.3 FL (ref 35.1–46.3)
DEPRECATED RDW RBC AUTO: 52.3 FL (ref 35.1–46.3)
EOSINOPHIL # BLD AUTO: 0 X10(3) UL (ref 0–0.7)
EOSINOPHIL # BLD AUTO: 0.01 X10(3) UL (ref 0–0.7)
EOSINOPHIL NFR BLD AUTO: 0 %
EOSINOPHIL NFR BLD AUTO: 0.1 %
ERYTHROCYTE [DISTWIDTH] IN BLOOD BY AUTOMATED COUNT: 20.9 % (ref 11–15)
ERYTHROCYTE [DISTWIDTH] IN BLOOD BY AUTOMATED COUNT: 21 % (ref 11–15)
HAV IGM SER QL: 1.7 MG/DL (ref 1.6–2.6)
HCT VFR BLD AUTO: 22.5 % (ref 35–48)
HCT VFR BLD AUTO: 23.4 % (ref 35–48)
HGB BLD-MCNC: 6.6 G/DL (ref 12–16)
HGB BLD-MCNC: 6.9 G/DL (ref 12–16)
HGB BLD-MCNC: 9 G/DL (ref 12–16)
IMM GRANULOCYTES # BLD AUTO: 0.03 X10(3) UL (ref 0–1)
IMM GRANULOCYTES # BLD AUTO: 0.05 X10(3) UL (ref 0–1)
IMM GRANULOCYTES NFR BLD: 0.4 %
IMM GRANULOCYTES NFR BLD: 0.6 %
LYMPHOCYTES # BLD AUTO: 0.71 X10(3) UL (ref 1–4)
LYMPHOCYTES # BLD AUTO: 0.79 X10(3) UL (ref 1–4)
LYMPHOCYTES NFR BLD AUTO: 10.5 %
LYMPHOCYTES NFR BLD AUTO: 9.1 %
MCH RBC QN AUTO: 20.3 PG (ref 26–34)
MCH RBC QN AUTO: 20.4 PG (ref 26–34)
MCHC RBC AUTO-ENTMCNC: 29.3 G/DL (ref 31–37)
MCHC RBC AUTO-ENTMCNC: 29.5 G/DL (ref 31–37)
MCV RBC AUTO: 69 FL (ref 80–100)
MCV RBC AUTO: 69.2 FL (ref 80–100)
MONOCYTES # BLD AUTO: 0.8 X10(3) UL (ref 0.1–1)
MONOCYTES # BLD AUTO: 0.83 X10(3) UL (ref 0.1–1)
MONOCYTES NFR BLD AUTO: 11.9 %
MONOCYTES NFR BLD AUTO: 9.6 %
NEUTROPHILS # BLD AUTO: 5.15 X10 (3) UL (ref 1.5–7.7)
NEUTROPHILS # BLD AUTO: 5.15 X10(3) UL (ref 1.5–7.7)
NEUTROPHILS # BLD AUTO: 6.96 X10 (3) UL (ref 1.5–7.7)
NEUTROPHILS # BLD AUTO: 6.96 X10(3) UL (ref 1.5–7.7)
NEUTROPHILS NFR BLD AUTO: 76.7 %
NEUTROPHILS NFR BLD AUTO: 80.5 %
PLATELET # BLD AUTO: 265 10(3)UL (ref 150–450)
PLATELET # BLD AUTO: 268 10(3)UL (ref 150–450)
POTASSIUM SERPL-SCNC: 3.2 MMOL/L (ref 3.5–5.1)
POTASSIUM SERPL-SCNC: 3.7 MMOL/L (ref 3.5–5.1)
RBC # BLD AUTO: 3.25 X10(6)UL (ref 3.8–5.3)
RBC # BLD AUTO: 3.39 X10(6)UL (ref 3.8–5.3)
SODIUM SERPL-SCNC: 139 MMOL/L (ref 136–145)
WBC # BLD AUTO: 6.7 X10(3) UL (ref 4–11)
WBC # BLD AUTO: 8.7 X10(3) UL (ref 4–11)

## 2020-06-24 PROCEDURE — 99233 SBSQ HOSP IP/OBS HIGH 50: CPT | Performed by: INTERNAL MEDICINE

## 2020-06-24 PROCEDURE — 30233N1 TRANSFUSION OF NONAUTOLOGOUS RED BLOOD CELLS INTO PERIPHERAL VEIN, PERCUTANEOUS APPROACH: ICD-10-PCS | Performed by: INTERNAL MEDICINE

## 2020-06-24 PROCEDURE — 99233 SBSQ HOSP IP/OBS HIGH 50: CPT | Performed by: PHYSICIAN ASSISTANT

## 2020-06-24 PROCEDURE — 90792 PSYCH DIAG EVAL W/MED SRVCS: CPT | Performed by: OTHER

## 2020-06-24 RX ORDER — POTASSIUM CHLORIDE 1.5 G/1.77G
40 POWDER, FOR SOLUTION ORAL EVERY 4 HOURS
Status: COMPLETED | OUTPATIENT
Start: 2020-06-24 | End: 2020-06-24

## 2020-06-24 RX ORDER — SODIUM CHLORIDE 9 MG/ML
INJECTION, SOLUTION INTRAVENOUS ONCE
Status: DISCONTINUED | OUTPATIENT
Start: 2020-06-24 | End: 2020-06-29

## 2020-06-24 RX ORDER — MAGNESIUM OXIDE 400 MG (241.3 MG MAGNESIUM) TABLET
400 TABLET ONCE
Status: COMPLETED | OUTPATIENT
Start: 2020-06-24 | End: 2020-06-24

## 2020-06-24 RX ORDER — POTASSIUM CHLORIDE 14.9 MG/ML
20 INJECTION INTRAVENOUS ONCE
Status: COMPLETED | OUTPATIENT
Start: 2020-06-24 | End: 2020-06-24

## 2020-06-24 RX ORDER — ACETAMINOPHEN AND CODEINE PHOSPHATE 300; 30 MG/1; MG/1
1 TABLET ORAL 2 TIMES DAILY PRN
Status: DISCONTINUED | OUTPATIENT
Start: 2020-06-24 | End: 2020-06-29

## 2020-06-24 NOTE — PROGRESS NOTES
[facility]    Progress Note    Rylan Mikes Patient Status:  Inpatient    10/2/1945 MRN [id number]   Location [facility] 5SW/SE Attending Sheridan Espinoza MD   Hosp Day # 1 PCP Cathryn Oppenheim, MD        Subjective:   Subjective comminuted fracture of the surgical neck of the right humerus as discussed above. No dislocation.     Dictated by (CST): Sharmila Hogan MD on 6/23/2020 at 11:51 AM     Finalized by (CST): Sharmila Hogan MD on 6/23/2020 at 11:55 AM          Ct Brain Or Head MD Hadley Rice PA-C  6/24/2020

## 2020-06-24 NOTE — PAYOR COMM NOTE
--------------  ADMISSION REVIEW     Payor: [employer] [place] #:  [id number]  Authorization Number: [id number]       ED Provider Notes        Patient Seen in: [facility] Emergency Department      History   Patient presents with: internal trauma by exam.  Neurological: Speech slurred. Motor and sensation is intact and symmetric to bilateral upper and lower extremities. Skin: No laceration or abrasions.   Musculoskeletal                Head: Superficial abrasions and contusions not (*)     MCHC 28.2 (*)     RDW-SD 56.1 (*)     RDW 21.7 (*)     All other components within normal limits   RAPID SARS-COV-2 BY PCR - Normal   CBC WITH DIFFERENTIAL WITH PLATELET    Narrative:      The following orders were created for panel order CBC WITH D diagnosis)  Alcoholic intoxication without complication ([facility] [address]  Closed 2-part nondisplaced fracture of surgical neck of right humerus, initial encounter  Injury of head, initial encounter    Disposition:  Admit  6/23/2020  1:01 pm                   H&P - H& asymmetry, speech difficulty, weakness, light-headedness and numbness. Hematological: Negative. Psychiatric/Behavioral: Negative.         Physical Exam:   Vital Signs:  Blood pressure 137/59, pulse 87, temperature 98.2 °F (36.8 °C), temperature source 06/23/2020         Xr Shoulder, Complete (min 2 Views), Right (cpt=73030)    Result Date: 6/23/2020  CONCLUSION:  1. Nondisplaced minimally impacted slightly comminuted fracture of the surgical neck of the right humerus as discussed above. No dislocation. Low voltage -possible pulmonary disease.  ABNORMAL When compared with ECG of 12/27/2015 05:21:10 No significant change Electronically signed on 49/68/2866 at 76:66 by Tiffanie Khan MD      Assessment/Plan:     Anemia, unspecified type  Microcytic first and foremost. At present, it does not seem the patient will be cooperative with GI work up.     Recommend:  -PPI ppx   -iron studies   -monitor Hgb and GI output  -monitor for DTs/withdrawal  -hospitalist/social work to address above concerns  -noted celebration at home. 6/23 ORTHO    Impression:     Anemia, unspecified type  Per medicine       Alcoholic intoxication without complication [facility])  Per medicine.   Avoid narcotic pain meds, Tylenol for pain control.       Closed 2-part nondisp patient), chronic disease and certainly GI blood loss is possible. Today patient denies any hematochezia/melena, no abd pain/reflux.  She has refused rectal examination on multiple accounts - states she has checked her own stool for blood at home and this h 6/24/2020 06:41 6/24/2020 09:03   Hemoglobin Latest Ref Range: 12.0 - 16.0 g/dL 7.7 (L) 7.2 (L) 6.6 (LL) 6.9 (LL)               06/24/20 0841 98.3 °F (36.8 °C) 88 18 137/63 98 % — None (Room air       06/23/20 0932 97.3 °F (36.3 °C) 114 20 139/73 97 % 120

## 2020-06-24 NOTE — PROGRESS NOTES
[facility]     Gastroenterology Progress Note    Nasreen Julian Patient Status:  Inpatient    10/2/1945 MRN [id number]   Location [facility] 5SW/SE Attending Daniela Estrada MD   Hosp Day # 1 PCP MD Ra Restrepo - starting with EGD evaluation OR pursuing bi-directional same day - patient refuses both. She understands risks of delayed diagnosis of ulcer disease, malignancy, or slow bleed and missed lesions. She accepts these risks.  Reports she would prefer to [place] 06/23/2020    PTT 25.2 06/23/2020    INR 1.04 06/23/2020    TSH 0.80 09/11/2017    MG 1.7 06/24/2020    ETOH 378 (H) 06/23/2020       Xr Shoulder, Complete (min 2 Views), Right (cpt=73030)    Result Date: 6/23/2020  CONCLUSION:  1.  Nondisplaced minimally i Date: 6/23/2020  ECG Report  Interpretation  -------------------------- Sinus Tachycardia Low voltage -possible pulmonary disease.  ABNORMAL When compared with ECG of 12/27/2015 05:21:10 No significant change Electronically signed on 06/23/2020 at 14:50 by

## 2020-06-24 NOTE — PLAN OF CARE
Problem: Patient Centered Care  Goal: Patient preferences are identified and integrated in the patient's plan of care  Description  Interventions:  - What would you like us to know as we care for you?  Used to work as a nurse in critical care  - Provide t physical limitations  - Instruct pt to call for assistance with activity based on assessment  - Modify environment to reduce risk of injury  - Provide assistive devices as appropriate  - Consider OT/PT consult to assist with strengthening/mobility  - Encou

## 2020-06-24 NOTE — CM/SW NOTE
MDO for AD. No records on file in [address] CM provided pt with document and instruction on completion. CM available to witness if pt wants to complete while hospitalized. Ref sent to [facility] due to prior hx. Asked for confirmation of pt status.     Social wo

## 2020-06-24 NOTE — PROGRESS NOTES
[facility]    Progress Note    Macey Romero Patient Status:  Inpatient    10/2/1945 MRN [id number]   Location [facility] 5SW/SE Attending Brenda Leggett MD   Hosp Day # 1 PCP Matthieu Fletcher MD        Subjective:she is feelin Cardiovascular: Normal rate and regular rhythm. Exam reveals no gallop and no friction rub. No murmur heard. Pulmonary/Chest: Effort normal and breath sounds normal. No respiratory distress. She has no wheezes. She has no rales.  She exhibits no tend minimally impacted slightly comminuted fracture of the surgical neck of the right humerus as discussed above. No dislocation.     Dictated by (CST): Magalis Hoang MD on 6/23/2020 at 11:51 AM     Finalized by (CST): Magalis Hoang MD on 6/23/2020 at 11:55 02:53 by MD Catrachito Patten MD  6/24/2020

## 2020-06-25 ENCOUNTER — TELEPHONE (OUTPATIENT)
Dept: INTERNAL MEDICINE CLINIC | Facility: CLINIC | Age: 75
End: 2020-06-25

## 2020-06-25 PROBLEM — F39 EPISODIC MOOD DISORDER (HCC): Status: ACTIVE | Noted: 2020-06-25

## 2020-06-25 LAB
AMPHET UR QL SCN: NEGATIVE
BASOPHILS # BLD AUTO: 0.02 X10(3) UL (ref 0–0.2)
BASOPHILS NFR BLD AUTO: 0.2 %
BENZODIAZ UR QL SCN: NEGATIVE
BLOOD TYPE BARCODE: 5100
CANNABINOIDS UR QL SCN: NEGATIVE
COCAINE UR QL: NEGATIVE
CREAT UR-SCNC: 29.9 MG/DL
DEPRECATED RDW RBC AUTO: 57.1 FL (ref 35.1–46.3)
EOSINOPHIL # BLD AUTO: 0.03 X10(3) UL (ref 0–0.7)
EOSINOPHIL NFR BLD AUTO: 0.3 %
ERYTHROCYTE [DISTWIDTH] IN BLOOD BY AUTOMATED COUNT: 22.1 % (ref 11–15)
HAV IGM SER QL: 1.9 MG/DL (ref 1.6–2.6)
HCT VFR BLD AUTO: 31 % (ref 35–48)
HGB BLD-MCNC: 9.3 G/DL (ref 12–16)
IMM GRANULOCYTES # BLD AUTO: 0.03 X10(3) UL (ref 0–1)
IMM GRANULOCYTES NFR BLD: 0.3 %
LYMPHOCYTES # BLD AUTO: 1.05 X10(3) UL (ref 1–4)
LYMPHOCYTES NFR BLD AUTO: 11.1 %
MCH RBC QN AUTO: 21.8 PG (ref 26–34)
MCHC RBC AUTO-ENTMCNC: 30 G/DL (ref 31–37)
MCV RBC AUTO: 72.8 FL (ref 80–100)
MDMA UR QL SCN: NEGATIVE
MONOCYTES # BLD AUTO: 1.03 X10(3) UL (ref 0.1–1)
MONOCYTES NFR BLD AUTO: 10.9 %
NEUTROPHILS # BLD AUTO: 7.27 X10 (3) UL (ref 1.5–7.7)
NEUTROPHILS # BLD AUTO: 7.27 X10(3) UL (ref 1.5–7.7)
NEUTROPHILS NFR BLD AUTO: 77.2 %
OXYCODONE UR QL SCN: NEGATIVE
PLATELET # BLD AUTO: 254 10(3)UL (ref 150–450)
POTASSIUM SERPL-SCNC: 4.1 MMOL/L (ref 3.5–5.1)
RBC # BLD AUTO: 4.26 X10(6)UL (ref 3.8–5.3)
WBC # BLD AUTO: 9.4 X10(3) UL (ref 4–11)

## 2020-06-25 PROCEDURE — 99232 SBSQ HOSP IP/OBS MODERATE 35: CPT | Performed by: INTERNAL MEDICINE

## 2020-06-25 NOTE — PROGRESS NOTES
[facility]    Progress Note    Ronnell Cavazos Patient Status:  Inpatient    10/2/1945 MRN [id number]   Location [facility] 5SW/SE Attending Marvell Soulier, MD   Hosp Day # 2 PCP Donovan Cobrin MD        Subjective:she is feelin regular rhythm. Exam reveals no gallop and no friction rub. No murmur heard. Pulmonary/Chest: Effort normal and breath sounds normal. No respiratory distress. She has no wheezes. She has no rales. She exhibits no tenderness. Abdominal: Soft.  Bowel s (cpt=73030)    Result Date: 6/23/2020  CONCLUSION:  1. Nondisplaced minimally impacted slightly comminuted fracture of the surgical neck of the right humerus as discussed above. No dislocation.     Dictated by (CST): Anisa Grover MD on 6/23/2020 at 11:51 05:21:10 No significant change Electronically signed on 51/98/2996 at 06:70 by MD Angelo Ugalde MD  6/25/2020

## 2020-06-25 NOTE — TELEPHONE ENCOUNTER
Ivis Galvan with [facility] calling to see if Dr. Mary Cole will sign home health orders for the patient for nursing visits and physical therapy. Verbal order is okay. Please advise.

## 2020-06-25 NOTE — DIETARY NOTE
ADULT NUTRITION INITIAL ASSESSMENT    RECOMMENDATIONS TO MD:  CPM    Pt is at moderate nutrition risk. Pt does not meet malnutrition criteria.       NUTRITION DIAGNOSIS/PROBLEM:  Inadequate oral intake related to limited access to food as evidenced by katelin index is 21.26 kg/m².          BMI Classification: 19-24.9 kg/m2 - WNL  IBW: 115 #       104% IBW       Usual Body Wt: 130# per pt        92% UBW  WEIGHT HISTORY:   Wt Readings from Last 6 Encounters:  06/23/20 : 54.4 kg (120 lb)  06/23/19 : 56.7 kg (125 lb
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
## 2020-06-25 NOTE — CM/SW NOTE
CM notified by Sadie Hartman RN that OT rec is [facility]. Ref placed in [facility], will present pt with accepting facilities when available. / to remain available for support and/or discharge planning.      Wei Trammell, RN

## 2020-06-25 NOTE — OCCUPATIONAL THERAPY NOTE
OCCUPATIONAL THERAPY EVALUATION - INPATIENT     Room Number: 560/560-A  Evaluation Date: 6/25/2020  Type of Evaluation: Initial  Presenting Problem: (anemia)    Physician Order: IP Consult to Occupational Therapy  Reason for Therapy: ADL/IADL Dysfunction a clicks' Inpatient Daily Activity Short Form. Research supports that patients with this level of impairment may benefit from [facility].      DISCHARGE RECOMMENDATIONS  OT Discharge Recommendations: Sub-acute rehabilitation  OT Device Recommendations: TBD    PLAN Source: Monitor    O2 SATURATIONS    COGNITION  Overall Cognitive Status:  WFL - within functional limits    Communication: Receptive and expressive language intact    Behavioral/Emotional/Social:  Patient was pleasant and cooperative    RANGE OF MOTION addressed    OT Goals  Patients self stated goal is: does not state      Patient will complete functional transfer with CGA  Comment:     Patient will complete toileting with min A   Comment:     Patient will tolerate standing for 2-3 minutes in prep for a

## 2020-06-25 NOTE — PROGRESS NOTES
[facility]    Progress Note    Yoshi Vizcaino Patient Status:  Inpatient    10/2/1945 MRN [id number]   Location [facility] 5SW/SE Attending Radha Page MD   Hosp Day # 2 PCP Sorin Bloom MD        Subjective:     Constitu

## 2020-06-25 NOTE — PHYSICAL THERAPY NOTE
PHYSICAL THERAPY EVALUATION - INPATIENT     Room Number: 560/560-A  Evaluation Date: 6/25/2020  Type of Evaluation: Initial   Physician Order: PT Eval and Treat    Presenting Problem: anemia, alcohol intoxication, R humeral fx  Reason for Therapy: Skippy Ducking pre-admission status. The patient's Approx Degree of Impairment: 64.91% has been calculated based on documentation in the [facility] '6 clicks' Inpatient Basic Mobility Short Form.   Research supports that patients with this level of impairment may benefit from THERAPY EXAMINATION     OBJECTIVE  Precautions: Limb alert - right;Bed/chair alarm  Fall Risk: High fall risk    WEIGHT BEARING RESTRICTION  Weight Bearing Restriction: R upper extremity  R Upper Extremity: Non-Weight Bearing(sling)    PAIN ASSESSMENT  Rat SBA    Transfers: sit to stand tx with LUE support at MIN A x 2, verbal cues for hand placement    Exercise/Education Provided:  Bed mobility  Body mechanics  Energy conservation  Functional activity tolerated  Gait training  Neuromuscular re-educate  [place]

## 2020-06-26 LAB
BASOPHILS # BLD AUTO: 0.02 X10(3) UL (ref 0–0.2)
BASOPHILS NFR BLD AUTO: 0.3 %
DEPRECATED RDW RBC AUTO: 57.3 FL (ref 35.1–46.3)
EOSINOPHIL # BLD AUTO: 0.08 X10(3) UL (ref 0–0.7)
EOSINOPHIL NFR BLD AUTO: 1.1 %
ERYTHROCYTE [DISTWIDTH] IN BLOOD BY AUTOMATED COUNT: 22.2 % (ref 11–15)
HCT VFR BLD AUTO: 27.7 % (ref 35–48)
HEMOCCULT STL QL: NEGATIVE
HGB BLD-MCNC: 8.4 G/DL (ref 12–16)
IMM GRANULOCYTES # BLD AUTO: 0.03 X10(3) UL (ref 0–1)
IMM GRANULOCYTES NFR BLD: 0.4 %
LYMPHOCYTES # BLD AUTO: 1.16 X10(3) UL (ref 1–4)
LYMPHOCYTES NFR BLD AUTO: 15.8 %
MCH RBC QN AUTO: 22 PG (ref 26–34)
MCHC RBC AUTO-ENTMCNC: 30.3 G/DL (ref 31–37)
MCV RBC AUTO: 72.7 FL (ref 80–100)
MONOCYTES # BLD AUTO: 0.83 X10(3) UL (ref 0.1–1)
MONOCYTES NFR BLD AUTO: 11.3 %
NEUTROPHILS # BLD AUTO: 5.22 X10 (3) UL (ref 1.5–7.7)
NEUTROPHILS # BLD AUTO: 5.22 X10(3) UL (ref 1.5–7.7)
NEUTROPHILS NFR BLD AUTO: 71.1 %
PLATELET # BLD AUTO: 250 10(3)UL (ref 150–450)
RBC # BLD AUTO: 3.81 X10(6)UL (ref 3.8–5.3)
WBC # BLD AUTO: 7.3 X10(3) UL (ref 4–11)

## 2020-06-26 PROCEDURE — 99232 SBSQ HOSP IP/OBS MODERATE 35: CPT | Performed by: INTERNAL MEDICINE

## 2020-06-26 NOTE — CM/SW NOTE
Pt rec is [facility]. Talon Bosch pt options provided to pt. Choice is [facility]. Savita Colvin liaison notified and expedited ins auth requested. Plan [facility] pending ins auth. / to remain available for support and/or discharge planning.

## 2020-06-26 NOTE — PROGRESS NOTES
[facility]    Progress Note    Gurdeep Alonso Patient Status:  Inpatient    10/2/1945 MRN [id number]   Location [facility] 5SW/SE Attending Jeannine Larson MD   Hosp Day # 3 PCP Aguilar Luevano MD        Subjective:she is feelin regular rhythm. Exam reveals no gallop and no friction rub. No murmur heard. Pulmonary/Chest: Effort normal and breath sounds normal. No respiratory distress. She has no wheezes. She has no rales. She exhibits no tenderness. Abdominal: Soft.  Bowel s MD  6/26/2020

## 2020-06-26 NOTE — PLAN OF CARE
Pt is alert and oriented. Plan for pt is home tomorrow to an acute rehab facility. Will continue to monitor.       Problem: Patient Centered Care  Goal: Patient preferences are identified and integrated in the patient's plan of care  Description  Interventi falls.  - [place] fall precautions as indicated by assessment.  - Educate pt/family on patient safety including physical limitations  - Instruct pt to call for assistance with activity based on assessment  - Modify environment to reduce risk of injury  -

## 2020-06-27 ENCOUNTER — APPOINTMENT (OUTPATIENT)
Dept: ULTRASOUND IMAGING | Facility: HOSPITAL | Age: 75
DRG: 812 | End: 2020-06-27
Attending: INTERNAL MEDICINE
Payer: MEDICARE

## 2020-06-27 LAB
BASOPHILS # BLD AUTO: 0.01 X10(3) UL (ref 0–0.2)
BASOPHILS NFR BLD AUTO: 0.2 %
DEPRECATED RDW RBC AUTO: 60.6 FL (ref 35.1–46.3)
EOSINOPHIL # BLD AUTO: 0.11 X10(3) UL (ref 0–0.7)
EOSINOPHIL NFR BLD AUTO: 1.9 %
ERYTHROCYTE [DISTWIDTH] IN BLOOD BY AUTOMATED COUNT: 22.5 % (ref 11–15)
HCT VFR BLD AUTO: 25.4 % (ref 35–48)
HGB BLD-MCNC: 7.5 G/DL (ref 12–16)
IMM GRANULOCYTES # BLD AUTO: 0.03 X10(3) UL (ref 0–1)
IMM GRANULOCYTES NFR BLD: 0.5 %
LYMPHOCYTES # BLD AUTO: 0.69 X10(3) UL (ref 1–4)
LYMPHOCYTES NFR BLD AUTO: 12.2 %
MCH RBC QN AUTO: 22.1 PG (ref 26–34)
MCHC RBC AUTO-ENTMCNC: 29.5 G/DL (ref 31–37)
MCV RBC AUTO: 74.7 FL (ref 80–100)
MONOCYTES # BLD AUTO: 0.58 X10(3) UL (ref 0.1–1)
MONOCYTES NFR BLD AUTO: 10.3 %
NEUTROPHILS # BLD AUTO: 4.23 X10 (3) UL (ref 1.5–7.7)
NEUTROPHILS # BLD AUTO: 4.23 X10(3) UL (ref 1.5–7.7)
NEUTROPHILS NFR BLD AUTO: 74.9 %
PLATELET # BLD AUTO: 244 10(3)UL (ref 150–450)
RBC # BLD AUTO: 3.4 X10(6)UL (ref 3.8–5.3)
T4 FREE SERPL-MCNC: 1 NG/DL (ref 0.8–1.7)
TSI SER-ACNC: 0.78 MIU/ML (ref 0.36–3.74)
VIT B12 SERPL-MCNC: 170 PG/ML (ref 193–986)
WBC # BLD AUTO: 5.7 X10(3) UL (ref 4–11)

## 2020-06-27 PROCEDURE — 76536 US EXAM OF HEAD AND NECK: CPT | Performed by: INTERNAL MEDICINE

## 2020-06-27 PROCEDURE — 99233 SBSQ HOSP IP/OBS HIGH 50: CPT | Performed by: INTERNAL MEDICINE

## 2020-06-27 RX ORDER — MECLIZINE HYDROCHLORIDE 25 MG/1
12.5 TABLET ORAL 3 TIMES DAILY PRN
Status: DISCONTINUED | OUTPATIENT
Start: 2020-06-27 | End: 2020-06-29

## 2020-06-27 RX ORDER — CYANOCOBALAMIN 1000 UG/ML
1000 INJECTION INTRAMUSCULAR; SUBCUTANEOUS ONCE
Status: COMPLETED | OUTPATIENT
Start: 2020-06-27 | End: 2020-06-28

## 2020-06-27 RX ORDER — PANTOPRAZOLE SODIUM 40 MG/1
40 TABLET, DELAYED RELEASE ORAL
Status: DISCONTINUED | OUTPATIENT
Start: 2020-06-27 | End: 2020-06-29

## 2020-06-27 NOTE — PLAN OF CARE
Pt in bed throughout shift. Offered to get her to chair and pt refusing at this time. Non weight bearing maintained to RUE. Sling in place. Hand warm and good cap refill. VSS. Denies pain. Using bedpan for voiding.    Problem: Patient Centered Care  Goal: P for physical needs  - Identify cognitive and physical deficits and behaviors that affect risk of falls.   - [place] fall precautions as indicated by assessment.  - Educate pt/family on patient safety including physical limitations  - Instruct pt to call f

## 2020-06-27 NOTE — PLAN OF CARE
Pt is alert and oriented. Plan will be to go to [facility] and we are waiting on insurance approval. Will continue to monitor.       Problem: Patient Centered Care  Goal: Patient preferences are identified and integrated in the patient's plan of care  [place] affect risk of falls.   - [place] fall precautions as indicated by assessment.  - Educate pt/family on patient safety including physical limitations  - Instruct pt to call for assistance with activity based on assessment  - Modify environment to reduce ri Problem: PAIN - ADULT  Goal: Verbalizes/displays adequate comfort level or patient's stated pain goal  Description  INTERVENTIONS:  - Encourage pt to monitor pain and request assistance  - Assess pain using appropriate pain scale  - Administer analgesics appropriate  - Identify discharge learning needs (meds, wound care, etc)  - Arrange for interpreters to assist at discharge as needed  - Consider post-discharge preferences of patient/family/discharge partner  - Complete POLST form as appropriate  - Assess

## 2020-06-28 LAB
ANION GAP SERPL CALC-SCNC: 8 MMOL/L (ref 0–18)
BASOPHILS # BLD AUTO: 0.02 X10(3) UL (ref 0–0.2)
BASOPHILS NFR BLD AUTO: 0.3 %
BUN BLD-MCNC: 9 MG/DL (ref 7–18)
BUN/CREAT SERPL: 12.7 (ref 10–20)
CALCIUM BLD-MCNC: 8.6 MG/DL (ref 8.5–10.1)
CHLORIDE SERPL-SCNC: 103 MMOL/L (ref 98–112)
CO2 SERPL-SCNC: 29 MMOL/L (ref 21–32)
CREAT BLD-MCNC: 0.71 MG/DL (ref 0.55–1.02)
DEPRECATED RDW RBC AUTO: 59.1 FL (ref 35.1–46.3)
EOSINOPHIL # BLD AUTO: 0.11 X10(3) UL (ref 0–0.7)
EOSINOPHIL NFR BLD AUTO: 1.6 %
ERYTHROCYTE [DISTWIDTH] IN BLOOD BY AUTOMATED COUNT: 22.3 % (ref 11–15)
GLUCOSE BLD-MCNC: 126 MG/DL (ref 70–99)
HAV IGM SER QL: 1.9 MG/DL (ref 1.6–2.6)
HCT VFR BLD AUTO: 27.5 % (ref 35–48)
HGB BLD-MCNC: 8.3 G/DL (ref 12–16)
IMM GRANULOCYTES # BLD AUTO: 0.03 X10(3) UL (ref 0–1)
IMM GRANULOCYTES NFR BLD: 0.4 %
LYMPHOCYTES # BLD AUTO: 0.74 X10(3) UL (ref 1–4)
LYMPHOCYTES NFR BLD AUTO: 10.7 %
MCH RBC QN AUTO: 22.4 PG (ref 26–34)
MCHC RBC AUTO-ENTMCNC: 30.2 G/DL (ref 31–37)
MCV RBC AUTO: 74.1 FL (ref 80–100)
MONOCYTES # BLD AUTO: 0.65 X10(3) UL (ref 0.1–1)
MONOCYTES NFR BLD AUTO: 9.4 %
NEUTROPHILS # BLD AUTO: 5.34 X10 (3) UL (ref 1.5–7.7)
NEUTROPHILS # BLD AUTO: 5.34 X10(3) UL (ref 1.5–7.7)
NEUTROPHILS NFR BLD AUTO: 77.6 %
OSMOLALITY SERPL CALC.SUM OF ELEC: 290 MOSM/KG (ref 275–295)
PLATELET # BLD AUTO: 280 10(3)UL (ref 150–450)
POTASSIUM SERPL-SCNC: 3.2 MMOL/L (ref 3.5–5.1)
RBC # BLD AUTO: 3.71 X10(6)UL (ref 3.8–5.3)
SODIUM SERPL-SCNC: 140 MMOL/L (ref 136–145)
WBC # BLD AUTO: 6.9 X10(3) UL (ref 4–11)

## 2020-06-28 PROCEDURE — 99233 SBSQ HOSP IP/OBS HIGH 50: CPT | Performed by: INTERNAL MEDICINE

## 2020-06-28 RX ORDER — POTASSIUM CHLORIDE 20 MEQ/1
40 TABLET, EXTENDED RELEASE ORAL EVERY 4 HOURS
Status: COMPLETED | OUTPATIENT
Start: 2020-06-28 | End: 2020-06-28

## 2020-06-28 NOTE — PROGRESS NOTES
[facility]    Progress Note    Marylen Gondola Patient Status:  Inpatient    10/2/1945 MRN [id number]   Location [facility] 5SW/SE Attending Eduard Lee MD   Hosp Day # 4 PCP Valente Rojas MD        Subjective:     Constitu Non-toxic appearance. She does not have a sickly appearance. She does not appear ill. No distress. She is not intubated. HENT:   Mouth/Throat: Oropharynx is clear and moist. No oropharyngeal exudate or posterior oropharyngeal erythema.    Neck: No trachea insurance authorization. Social work. PT OT. Discussed with patient. Discussed with RN taking care of patient. Results:     Lab Results   Component Value Date    WBC 5.7 06/27/2020    HGB 7.5 (L) 06/27/2020    HCT 25.4 (L) 06/27/2020    .

## 2020-06-28 NOTE — PROGRESS NOTES
[facility]    Progress Note    Reyne Moritz Patient Status:  Inpatient    10/2/1945 MRN [id number]   Location [facility] 5SW/SE Attending Sylvia Rod MD   Hosp Day # 5 PCP Ruma Sunshine MD        Subjective:     Constitu Non-toxic appearance. She does not have a sickly appearance. She does not appear ill. No distress. She is not intubated. HENT:   Mouth/Throat: Oropharynx is clear and moist. No oropharyngeal exudate or posterior oropharyngeal erythema.    Neck: No trachea nodule. NL TFT's. Needs Bx. As outpt. VT. Check lytes. Electrolyte protocol. Cards. consulted. Patient wanting rehab. Awaiting for insurance authorization. Social work. PT OT. Discussed with patient.   Discussed with RN taking care of pat Antonio Croft MD on 6/27/2020 at 9:08 PM     Finalized by (CST): Josee Luciano MD on 6/27/2020 at 9:17 PM                 Precious Pugh MD  6/28/2020

## 2020-06-28 NOTE — PLAN OF CARE
Pt alert and oriented. Up to chair throughout shift with one assist and pivot. One run of v-tach today. MD aware. Orders placed. Electrolyte protocol initiated. Denies pain. Right arm in sling.      Problem: Patient Centered Care  Goal: Patient prefere needs  - Identify cognitive and physical deficits and behaviors that affect risk of falls.   - [place] fall precautions as indicated by assessment.  - Educate pt/family on patient safety including physical limitations  - Instruct pt to call for assistance appropriate  - Assess patient's ability to be responsible for managing their own health  - Refer to Case Management Department for coordinating discharge planning if the patient needs post-hospital services based on physician/LIP order or complex needs rel

## 2020-06-29 ENCOUNTER — APPOINTMENT (OUTPATIENT)
Dept: CV DIAGNOSTICS | Facility: HOSPITAL | Age: 75
DRG: 812 | End: 2020-06-29
Attending: INTERNAL MEDICINE
Payer: MEDICARE

## 2020-06-29 VITALS
HEART RATE: 86 BPM | BODY MASS INDEX: 21.26 KG/M2 | WEIGHT: 120 LBS | OXYGEN SATURATION: 98 % | HEIGHT: 63 IN | TEMPERATURE: 98 F | SYSTOLIC BLOOD PRESSURE: 135 MMHG | RESPIRATION RATE: 18 BRPM | DIASTOLIC BLOOD PRESSURE: 57 MMHG

## 2020-06-29 LAB
OPIATES, UR, 6-ACETYLMORPHINE: <10 NG/ML
OPIATES, URINE, CODEINE: 261 NG/ML
OPIATES, URINE, HYDROCODONE: <20 NG/ML
OPIATES, URINE, HYDROMORPHONE: <20 NG/ML
OPIATES, URINE, MORPHINE: 38 NG/ML
OPIATES, URINE, NORHYDROCODONE: <20 NG/ML
OPIATES, URINE, NOROXYCODONE: <20 NG/ML
OPIATES, URINE, NOROXYMORPHONE: <20 NG/ML
OPIATES, URINE, OXYCODONE: <20 NG/ML
OPIATES, URINE, OXYMORPHONE: <20 NG/ML
POTASSIUM SERPL-SCNC: 4.7 MMOL/L (ref 3.5–5.1)

## 2020-06-29 PROCEDURE — 93306 TTE W/DOPPLER COMPLETE: CPT | Performed by: INTERNAL MEDICINE

## 2020-06-29 PROCEDURE — 99239 HOSP IP/OBS DSCHRG MGMT >30: CPT | Performed by: INTERNAL MEDICINE

## 2020-06-29 RX ORDER — PANTOPRAZOLE SODIUM 40 MG/1
40 TABLET, DELAYED RELEASE ORAL
Qty: 90 TABLET | Refills: 0 | Status: SHIPPED | OUTPATIENT
Start: 2020-06-30

## 2020-06-29 RX ORDER — MULTIPLE VITAMINS W/ MINERALS TAB 9MG-400MCG
1 TAB ORAL DAILY
Qty: 30 TABLET | Refills: 0 | Status: SHIPPED | OUTPATIENT
Start: 2020-06-30

## 2020-06-29 RX ORDER — FOLIC ACID 1 MG/1
1 TABLET ORAL DAILY
Qty: 90 TABLET | Refills: 0 | Status: SHIPPED | OUTPATIENT
Start: 2020-06-30

## 2020-06-29 RX ORDER — MELATONIN
100 DAILY
Qty: 90 TABLET | Refills: 0 | Status: SHIPPED | OUTPATIENT
Start: 2020-06-30

## 2020-06-29 NOTE — DISCHARGE SUMMARY
DISCHARGE SUMMARY     Marylen Gondola Patient Status:  Inpatient    10/2/1945 MRN [id number]   [facility] 5SW/SE Attending Eduard Lee MD   Hosp Day # 6 PCP Valente Rojas MD     Date of Admission: 2020  Date of Discharge:  well-nourished. HENT:   Head: Normocephalic and atraumatic. Eyes: Pupils are equal, round, and reactive to light. EOM are normal.   Neck: Neck supple. Cardiovascular: Normal rate and regular rhythm. Exam reveals no gallop and no friction rub.     No

## 2020-06-29 NOTE — OCCUPATIONAL THERAPY NOTE
OCCUPATIONAL THERAPY TREATMENT NOTE - INPATIENT    Room Number: 560/560-A    Presenting Problem: (anemia)     Problem List  Principal Problem:    Anemia, unspecified type  Active Problems:    Anemia    Alcoholic intoxication without complication ([facility]    C Balance activities; Energy conservation/work simplification techniques;ADL training;Functional transfer training; Endurance training;Patient/Family education;Patient/Family training;Equipment eval/education; Compensatory technique education    SUBJECTIVE  \"T Patient will complete functional transfer with CGA  Comment: mod    Patient will complete toileting with min A   Comment: progressing    Patient will tolerate standing for 2-3 minutes in prep for adls with CGA   Comment: tolerates 3 minutes with mod a

## 2020-06-29 NOTE — PHYSICAL THERAPY NOTE
PHYSICAL THERAPY TREATMENT NOTE - INPATIENT     Room Number: 560/560-A       Presenting Problem: anemia, alcohol intoxication, R humeral fx    Problem List  Principal Problem:    Anemia, unspecified type  Active Problems:    Anemia    Alcoholic intoxicatio STAY. PT recommendation: GHULAM. DISCHARGE RECOMMENDATIONS  PT Discharge Recommendations: Sub-acute rehabilitation;24 hour care/supervision     PLAN  PT Treatment Plan: Bed mobility; Body mechanics; Patient education;Gait training;Neuromuscular re-educate;T tested     Patient End of Session: Up in chair;Needs met;Call light within reach;RN aware of session/findings; All patient questions and concerns addressed; Alarm set    CURRENT GOALS     Goals to be met by: 7/9/20  Patient Goal Patient's self-stated goal is

## 2020-06-29 NOTE — CM/SW NOTE
Austin Traylor @ [facility] that pt has insurance authorization.     OSWALDO Casarez, [facility]  Social Work   LME:#[id number]
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
## 2020-06-29 NOTE — CM/SW NOTE
06/29/20 1100   Discharge disposition   Expected discharge disposition Skilled Nurs   Name of [address] [facility]   Patient is Discharged to a [address] [place] [address] Yes   Discharge transportation [address] [facility]  (6:00 per [employer])

## 2020-06-29 NOTE — PROGRESS NOTES
Prepared pt transfer papers, no physical scripts in transfer paper work. Mic Koroma RN aware she needs to remove IV prior to dc and include echo reading in dc papers.

## 2020-06-30 ENCOUNTER — EXTERNAL FACILITY (OUTPATIENT)
Dept: INTERNAL MEDICINE CLINIC | Facility: CLINIC | Age: 75
End: 2020-06-30

## 2020-06-30 DIAGNOSIS — F39 EPISODIC MOOD DISORDER (HCC): ICD-10-CM

## 2020-06-30 DIAGNOSIS — S42.224A CLOSED 2-PART NONDISPLACED FRACTURE OF SURGICAL NECK OF RIGHT HUMERUS, INITIAL ENCOUNTER: ICD-10-CM

## 2020-06-30 DIAGNOSIS — D50.8 OTHER IRON DEFICIENCY ANEMIA: ICD-10-CM

## 2020-06-30 PROCEDURE — 1111F DSCHRG MED/CURRENT MED MERGE: CPT | Performed by: INTERNAL MEDICINE

## 2020-06-30 PROCEDURE — 99306 1ST NF CARE HIGH MDM 50: CPT | Performed by: INTERNAL MEDICINE
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
## 2020-06-30 NOTE — PAYOR COMM NOTE
--------------  DISCHARGE REVIEW    Payor: [facility] [employer] [place] #:  [id number]  Authorization Number: [id number]    Admit date: 6/23/20  Admit time:  1440  Discharge Date: 6/29/2020  6:58 PM     Admitting Physician: Sheridan Espinoza MD  Att n pain meds as needed   mood disorder - seen by psych , refusing medication    Injury of head, initial encounter  nsvt- seen by cardio- echo done , monitor electrolytes   Thyroid nodule will need outpatient FNA   Ct head negative , monitor   Gi and dvtp pr 135/57    -----------------------------------------------------------------------------------------------  PATIENT DISCHARGE INSTRUCTIONS: See electronic chart    Zacarias Menon MD 6/29/2020    Time spent:  30 to 74 minutes      Electronically signed by [employer]

## 2020-07-01 ENCOUNTER — INITIAL APN SNF VISIT (OUTPATIENT)
Dept: INTERNAL MEDICINE CLINIC | Facility: SKILLED NURSING FACILITY | Age: 75
End: 2020-07-01

## 2020-07-01 VITALS
DIASTOLIC BLOOD PRESSURE: 70 MMHG | WEIGHT: 120 LBS | BODY MASS INDEX: 21 KG/M2 | HEART RATE: 100 BPM | TEMPERATURE: 98 F | SYSTOLIC BLOOD PRESSURE: 153 MMHG | RESPIRATION RATE: 20 BRPM | OXYGEN SATURATION: 98 %

## 2020-07-01 DIAGNOSIS — M85.80 OSTEOPENIA, UNSPECIFIED LOCATION: ICD-10-CM

## 2020-07-01 DIAGNOSIS — S42.224A CLOSED 2-PART NONDISPLACED FRACTURE OF SURGICAL NECK OF RIGHT HUMERUS, INITIAL ENCOUNTER: ICD-10-CM

## 2020-07-01 DIAGNOSIS — D50.8 OTHER IRON DEFICIENCY ANEMIA: ICD-10-CM

## 2020-07-01 DIAGNOSIS — F39 EPISODIC MOOD DISORDER (HCC): ICD-10-CM

## 2020-07-01 PROCEDURE — 99310 SBSQ NF CARE HIGH MDM 45: CPT | Performed by: NURSE PRACTITIONER

## 2020-07-01 NOTE — PROGRESS NOTES
[facility]  : 10/2/1945  Age 69year old  female patient is admitted to [place] [address] for rehab and strengthening     Chief complaint: Injury of head,  Altered mental status, Closed 2-part nondisplaced fracture of surgical neck of right h Surgical History:   Procedure Laterality Date   • HIP REPLACEMENT SURGERY Left    • HIP TOTAL ANTERIOR APPROACH Left 1/23/2017    Performed by Rafat Meier MD at [address] [place] [address] MAIN OR     Family History   Problem Relation Age of Onset   • Heart Disorder Father    • hematuria  MUSCULOSKELETAL:no joint complaints upper or lower extremities  NEURO:no sensory or motor complaint  PSYCHE: no symptoms of depression or anxiety  HEMATOLOGY:denies hx anemia  ENDOCRINE: denies excessive thirst or urination; denies unexpected wt monitor   -GI and dvtp prophylaxis      5. Malnutrition   -cont Ensure Plus po bid   -Dietician consult, to eval and treat in rehab    This is a 35 minute visit and greater than 50% of the time was spent counseling the patient and/or coordinating care.

## 2020-07-02 ENCOUNTER — EXTERNAL FACILITY (OUTPATIENT)
Dept: INTERNAL MEDICINE CLINIC | Facility: CLINIC | Age: 75
End: 2020-07-02

## 2020-07-02 DIAGNOSIS — D64.9 ANEMIA, UNSPECIFIED TYPE: ICD-10-CM

## 2020-07-02 DIAGNOSIS — F39 EPISODIC MOOD DISORDER (HCC): ICD-10-CM

## 2020-07-02 DIAGNOSIS — S42.224A CLOSED 2-PART NONDISPLACED FRACTURE OF SURGICAL NECK OF RIGHT HUMERUS, INITIAL ENCOUNTER: ICD-10-CM

## 2020-07-02 DIAGNOSIS — E04.2 MULTINODULAR GOITER: ICD-10-CM

## 2020-07-02 PROCEDURE — 99309 SBSQ NF CARE MODERATE MDM 30: CPT | Performed by: INTERNAL MEDICINE

## 2020-07-02 NOTE — PROGRESS NOTES
follow up      Past Medical History:  Diagnosis Date  • Depression    • ETOH abuse    • Falls frequently      12/27/2017     Past Surgical History:  Procedure Laterality Date  • HIP REPLACEMENT SURGERY Left    • HIP TOTAL ANTERIOR APPROACH Left 1/23/2017 refused egd and colonoscopy , hb slight drop , continue with ppi , moniotr hb    a/p       Alcoholic intoxication without complication (HCC)on  Resolved  , seen by psych refused any medication,        Closed 2-part nondisplaced fracture of surgical neck of

## 2020-07-04 PROCEDURE — 99308 SBSQ NF CARE LOW MDM 20: CPT | Performed by: INTERNAL MEDICINE

## 2020-07-05 ENCOUNTER — EXTERNAL FACILITY (OUTPATIENT)
Dept: INTERNAL MEDICINE CLINIC | Facility: CLINIC | Age: 75
End: 2020-07-05

## 2020-07-05 DIAGNOSIS — F39 EPISODIC MOOD DISORDER (HCC): ICD-10-CM

## 2020-07-05 DIAGNOSIS — S42.224A CLOSED 2-PART NONDISPLACED FRACTURE OF SURGICAL NECK OF RIGHT HUMERUS, INITIAL ENCOUNTER: ICD-10-CM

## 2020-07-05 DIAGNOSIS — D50.8 OTHER IRON DEFICIENCY ANEMIA: ICD-10-CM

## 2020-07-05 NOTE — PROGRESS NOTES
follow up    SEEN 7/4/2020  Past Medical History:  Diagnosis Date  • Depression    • ETOH abuse    • Falls frequently      12/27/2017     Past Surgical History:  Procedure Laterality Date  • HIP REPLACEMENT SURGERY Left    • HIP TOTAL ANTERIOR APPROACH Lef hb noted , she refused egd and colonoscopy , hb slight drop , continue with ppi , moniotr hb    a/p       Alcoholic intoxication without complication (HCC)on  Resolved  ,       Closed 2-part nondisplaced fracture of surgical neck of right humerus, initial

## 2020-07-06 ENCOUNTER — HOSPITAL ENCOUNTER (OUTPATIENT)
Dept: GENERAL RADIOLOGY | Facility: HOSPITAL | Age: 75
Discharge: HOME | End: 2020-07-06
Attending: ORTHOPAEDIC SURGERY
Payer: MEDICARE

## 2020-07-06 ENCOUNTER — SNF VISIT (OUTPATIENT)
Dept: INTERNAL MEDICINE CLINIC | Facility: SKILLED NURSING FACILITY | Age: 75
End: 2020-07-06

## 2020-07-06 ENCOUNTER — OFFICE VISIT (OUTPATIENT)
Dept: ORTHOPEDICS CLINIC | Facility: CLINIC | Age: 75
End: 2020-07-06
Payer: COMMERCIAL

## 2020-07-06 VITALS
RESPIRATION RATE: 20 BRPM | BODY MASS INDEX: 21 KG/M2 | OXYGEN SATURATION: 98 % | SYSTOLIC BLOOD PRESSURE: 130 MMHG | TEMPERATURE: 97 F | HEART RATE: 88 BPM | WEIGHT: 120 LBS | DIASTOLIC BLOOD PRESSURE: 61 MMHG

## 2020-07-06 VITALS — HEIGHT: 67 IN | BODY MASS INDEX: 18.21 KG/M2 | WEIGHT: 116 LBS

## 2020-07-06 DIAGNOSIS — Z47.89 ORTHOPEDIC AFTERCARE: ICD-10-CM

## 2020-07-06 DIAGNOSIS — S42.224A CLOSED 2-PART NONDISPLACED FRACTURE OF SURGICAL NECK OF RIGHT HUMERUS, INITIAL ENCOUNTER: ICD-10-CM

## 2020-07-06 DIAGNOSIS — M85.80 OSTEOPENIA, UNSPECIFIED LOCATION: ICD-10-CM

## 2020-07-06 DIAGNOSIS — F39 EPISODIC MOOD DISORDER (HCC): ICD-10-CM

## 2020-07-06 DIAGNOSIS — S42.224A CLOSED 2-PART NONDISPLACED FRACTURE OF SURGICAL NECK OF RIGHT HUMERUS, INITIAL ENCOUNTER: Primary | ICD-10-CM

## 2020-07-06 DIAGNOSIS — D50.8 OTHER IRON DEFICIENCY ANEMIA: ICD-10-CM

## 2020-07-06 PROCEDURE — 73030 X-RAY EXAM OF SHOULDER: CPT | Performed by: ORTHOPAEDIC SURGERY

## 2020-07-06 PROCEDURE — 99309 SBSQ NF CARE MODERATE MDM 30: CPT | Performed by: NURSE PRACTITIONER

## 2020-07-06 PROCEDURE — 99203 OFFICE O/P NEW LOW 30 MIN: CPT | Performed by: ORTHOPAEDIC SURGERY

## 2020-07-06 PROCEDURE — G0463 HOSPITAL OUTPT CLINIC VISIT: HCPCS | Performed by: ORTHOPAEDIC SURGERY

## 2020-07-06 RX ORDER — SERTRALINE HYDROCHLORIDE 25 MG/1
25 TABLET, FILM COATED ORAL DAILY
COMMUNITY

## 2020-07-06 NOTE — PROGRESS NOTES
Lexi Bean  : 10/2/1945  Age 69year old  female patient is admitted to [facility] 6 20 for rehab and strengthening      Chief complaint: Injury of head,  Altered mental status, Closed 2-part nondisplaced fracture of surgical neck of right the house. She mentioned it to Dr. Chloé Pierre as well. Patient was a nurse in critical care in past. She reports she is a full code and no changes needed to advance directives.  Discussed with RN that patient having surgery on Friday, will need to make sure excessive thirst or urination; denies unexpected wt gain or wt loss  ALLERGY/IMM.: denies food or seasonal allergies        PHYSICAL EXAM:  GENERAL HEALTH: thin female, appears in no distress   LINES, TUBES, DRAINS: sling  SKIN: none  WOUND:none  EYES: PER rehab    This is a 25 minute visit and greater than 50% of the time was spent counseling the patient and/or coordinating care.     MARQUIS Mccord  07/06/20   10:50 AM

## 2020-07-06 NOTE — H&P
NURSING INTAKE COMMENTS: Patient presents with:  Consult: right shoulder injury- pt fell on 6/23/20 and injured right arm. pt went to ER and had XR. HPI: This 69year old female presents today almost 2 weeks after right proximal humerus fracture.   X- Smoker      Smokeless tobacco: Never Used    Substance and Sexual Activity      Alcohol use:  Yes        Alcohol/week: 3.0 standard drinks        Types: 3 Glasses of wine per week        Comment: socail      Drug use: No      Sexual activity: Not on file There is no dislocation. Intact glenohumeral joint. Mild degenerative narrowing of the AC joint. SOFT TISSUES: Negative. No visible soft tissue swelling. EFFUSION: None visible. OTHER: Negative. CONCLUSION:  1.  Nondisplaced minimally impacted sli Dictated by (CST): Adria House MD on 6/23/2020 at 10:35 AM     Finalized by (CST): Adria House MD on 6/23/2020 at 10:46 AM          Ct Facial Bones (cpt=70486)    Result Date: 6/23/2020  PROCEDURE: CT FACIAL BONES (CPT=70486)  COMPARISON: E mucosal thickening. 4. Small periapical dental abscess involving tooth # 15. 5. Lesser incidental findings as above.    Remote - PS  Dictated by (CST): Rebecca Rollins MD on 6/23/2020 at 10:56 AM     Finalized by (CST): Rebecca Rollins MD on 6/23/2020 at 11 Meera Man MD on 6/23/2020 at 11:05 AM     Finalized by (CST): Jean Marie Edge MD on 6/23/2020 at 11:09 AM          Us Thyroid (WQC=57292)    Result Date: 6/27/2020  PROCEDURE: US THYROID (CPT= 71513)  COMPARISON: [facility], [facility]. [facility], [address] [place] [address] Score:   3 TI-RADS CLASSIFICATION:  3   Nodule #2     Location/Size:   Lower pole, 1.3 x 0.9 x 1.1 cm similar in size to the prior study Composition:   Mixed cystic and solid (1)   Echogenicity:   Hyperechoic or isoechoic (1) Shape:    Wider-than-tall (0) up for 5 years.            Dictated by (CST): Rudy Tavarez MD on 6/27/2020 at 9:08 PM     Finalized by (CST): Rudy Tavarez MD on 6/27/2020 at 9:17 PM             Lab Results   Component Value Date    WBC 6.9 06/28/2020    HGB 8.3 (L) 06/28/2020

## 2020-07-07 ENCOUNTER — TELEPHONE (OUTPATIENT)
Dept: ORTHOPEDICS CLINIC | Facility: CLINIC | Age: 75
End: 2020-07-07

## 2020-07-07 ENCOUNTER — EXTERNAL FACILITY (OUTPATIENT)
Dept: INTERNAL MEDICINE CLINIC | Facility: CLINIC | Age: 75
End: 2020-07-07

## 2020-07-07 ENCOUNTER — MED REC SCAN ONLY (OUTPATIENT)
Dept: ORTHOPEDICS CLINIC | Facility: CLINIC | Age: 75
End: 2020-07-07

## 2020-07-07 DIAGNOSIS — S42.224A CLOSED 2-PART NONDISPLACED FRACTURE OF SURGICAL NECK OF RIGHT HUMERUS, INITIAL ENCOUNTER: ICD-10-CM

## 2020-07-07 DIAGNOSIS — D51.3 OTHER DIETARY VITAMIN B12 DEFICIENCY ANEMIA: ICD-10-CM

## 2020-07-07 DIAGNOSIS — F10.920 ALCOHOLIC INTOXICATION WITHOUT COMPLICATION (HCC): ICD-10-CM

## 2020-07-07 DIAGNOSIS — F39 EPISODIC MOOD DISORDER (HCC): ICD-10-CM

## 2020-07-07 PROCEDURE — 99309 SBSQ NF CARE MODERATE MDM 30: CPT | Performed by: INTERNAL MEDICINE

## 2020-07-07 NOTE — PROGRESS NOTES
oliver baptiste      Past Medical History:  Diagnosis Date  • Depression    • ETOH abuse    • Falls frequently      12/27/2017     Past Surgical History:  Procedure Laterality Date  • HIP REPLACEMENT SURGERY Left    • HIP TOTAL ANTERIOR APPROACH Left 1/23/2017 - s/p 1 unit prbc  hb noted , she refused egd and colonoscopy , hb slight drop , continue with ppi , moniotr hb    a/p       Alcoholic intoxication without complication (HCC)on  Resolved  ,       Closed 2-part nondisplaced fracture of surgical neck of righ

## 2020-07-07 NOTE — TELEPHONE ENCOUNTER
S/w April. Informed her that the only test she mostly needs is the COVID test.  Informed her that PAT will be giving her a call to let them know where/when to obtain it. She verbalized understanding.

## 2020-07-07 NOTE — TELEPHONE ENCOUNTER
April/Jarrod Encinas calling for mutual patient to ask if any orders or labs need completion prior to upcoming procedure scheduled 7/10/2020, please call at:[phone],thanks.

## 2020-07-07 NOTE — H&P
NURSING INTAKE COMMENTS: No chief complaint on file. HPI: This 69year old female presents today almost 2 weeks after right proximal humerus fracture. X-ray in the office today shows its displaced. She was alone in the office.   She has been living a Alcohol/week: 3.0 standard drinks        Types: 3 Glasses of wine per week        Comment: socail      Drug use: No      Sexual activity: Not on file       Review of Systems:  GENERAL: feels generally well, no significant weight loss or weight gain  SKIN No visible soft tissue swelling. EFFUSION: None visible. OTHER: Negative. CONCLUSION:  1. Nondisplaced minimally impacted slightly comminuted fracture of the surgical neck of the right humerus as discussed above. No dislocation.     Dictated by (CS 6/23/2020 at 10:46 AM          Ct Facial Bones (cpt=70486)    Result Date: 6/23/2020  PROCEDURE: CT FACIAL BONES (WMY=60868)  COMPARISON: [facility], CT BRAIN OR HEAD (CPT=70450), 6/23/2020, 10:12 AM.  [facility], [facility] Remote - PS  Dictated by (CST): Terra Bustos MD on 6/23/2020 at 10:56 AM     Finalized by (CST): Terra Bustos MD on 6/23/2020 at 11:05 AM          Ct Spine Cervical (cpt=72125)    Result Date: 6/23/2020  PROCEDURE: CT SPINE CERVICAL (CPT=72125)  COM Thyroid (cpt=76536)    Result Date: 6/27/2020  PROCEDURE: US THYROID (CPT= 38174)  COMPARISON: [facility], [place], [place] HEAD/NECK (FMM=65492), 9/07/2017, 2:10 PM.  INDICATIONS: Nodules.   TECHNIQUE: High-resolution ultrasound was performed of to the prior study Composition:   Mixed cystic and solid (1)   Echogenicity:   Hyperechoic or isoechoic (1) Shape:    Wider-than-tall (0)  Margin:   Smooth (0)  Echogenic Foci:   None or large comet tail artifacts (0)   Total Score:   2 TI-RADS CLASSIFICATI Tash Rich MD on 6/27/2020 at 9:17 PM             Lab Results   Component Value Date    WBC 6.9 06/28/2020    HGB 8.3 (L) 06/28/2020    .0 06/28/2020      Lab Results   Component Value Date     (H) 06/28/2020    BUN 9 06/28/2020    CRE

## 2020-07-08 ENCOUNTER — SNF VISIT (OUTPATIENT)
Dept: INTERNAL MEDICINE CLINIC | Facility: SKILLED NURSING FACILITY | Age: 75
End: 2020-07-08

## 2020-07-08 ENCOUNTER — TELEPHONE (OUTPATIENT)
Dept: INTERNAL MEDICINE CLINIC | Facility: CLINIC | Age: 75
End: 2020-07-08

## 2020-07-08 ENCOUNTER — TELEPHONE (OUTPATIENT)
Dept: ORTHOPEDICS CLINIC | Facility: CLINIC | Age: 75
End: 2020-07-08

## 2020-07-08 VITALS
DIASTOLIC BLOOD PRESSURE: 55 MMHG | TEMPERATURE: 97 F | BODY MASS INDEX: 19 KG/M2 | WEIGHT: 121 LBS | RESPIRATION RATE: 20 BRPM | SYSTOLIC BLOOD PRESSURE: 118 MMHG | HEART RATE: 76 BPM | OXYGEN SATURATION: 98 %

## 2020-07-08 DIAGNOSIS — S42.224A CLOSED 2-PART NONDISPLACED FRACTURE OF SURGICAL NECK OF RIGHT HUMERUS, INITIAL ENCOUNTER: ICD-10-CM

## 2020-07-08 DIAGNOSIS — D64.9 ANEMIA, UNSPECIFIED TYPE: ICD-10-CM

## 2020-07-08 DIAGNOSIS — Z01.818 PRE-OP TESTING: ICD-10-CM

## 2020-07-08 DIAGNOSIS — M85.80 OSTEOPENIA, UNSPECIFIED LOCATION: ICD-10-CM

## 2020-07-08 PROCEDURE — 99309 SBSQ NF CARE MODERATE MDM 30: CPT | Performed by: NURSE PRACTITIONER

## 2020-07-08 NOTE — PROGRESS NOTES
Pt informed is due for Medicare Annual Health Assessment visit and offered assistance in rescheduling cancelled appointment of 9/19/18, states has to arrange transportation and will call back to schedule.
maximum assist (25% patients effort)

## 2020-07-08 NOTE — TELEPHONE ENCOUNTER
Called [email or web address] to give us a call back to let us know if pt would like to keep Dr Micha Cain as their PCP or if they have a new PCP.  If pt would like to keep  as their PCP, needs to schedule an annual physical.

## 2020-07-08 NOTE — TELEPHONE ENCOUNTER
S/w Jhonathan Bell and she informed me if I needed to do a pre determination. Informed her that I only requested if pt needed a PA or not. She stts that she will remove case from pre determination due to CPT code 38484 does not need a PA.   I verbalized Le[place]

## 2020-07-08 NOTE — TELEPHONE ENCOUNTER
Per Danika Kim needing to speak to Abigail Dewey regarding an expedited request for surgical procedure on 7/10/20, that they received.  Please advise
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
## 2020-07-09 ENCOUNTER — EXTERNAL FACILITY (OUTPATIENT)
Dept: INTERNAL MEDICINE CLINIC | Facility: CLINIC | Age: 75
End: 2020-07-09

## 2020-07-09 DIAGNOSIS — F39 EPISODIC MOOD DISORDER (HCC): ICD-10-CM

## 2020-07-09 DIAGNOSIS — F10.920 ALCOHOLIC INTOXICATION WITHOUT COMPLICATION (HCC): ICD-10-CM

## 2020-07-09 DIAGNOSIS — D64.9 ANEMIA, UNSPECIFIED TYPE: ICD-10-CM

## 2020-07-09 DIAGNOSIS — S42.224A CLOSED 2-PART NONDISPLACED FRACTURE OF SURGICAL NECK OF RIGHT HUMERUS, INITIAL ENCOUNTER: ICD-10-CM

## 2020-07-09 PROCEDURE — 99309 SBSQ NF CARE MODERATE MDM 30: CPT | Performed by: INTERNAL MEDICINE

## 2020-07-09 RX ORDER — ACETAMINOPHEN 325 MG/1
650 TABLET ORAL EVERY 6 HOURS PRN
COMMUNITY

## 2020-07-09 NOTE — PAT NURSING NOTE
Spoke to Olivia Pearl at [facility]. [place] received the pre-op instructions via fax and verbalized understanding.
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
## 2020-07-10 ENCOUNTER — HOSPITAL ENCOUNTER (OUTPATIENT)
Facility: HOSPITAL | Age: 75
Setting detail: HOSPITAL OUTPATIENT SURGERY
Discharge: HOME | End: 2020-07-10
Attending: ORTHOPAEDIC SURGERY | Admitting: ORTHOPAEDIC SURGERY
Payer: MEDICARE

## 2020-07-10 ENCOUNTER — APPOINTMENT (OUTPATIENT)
Dept: GENERAL RADIOLOGY | Facility: HOSPITAL | Age: 75
End: 2020-07-10
Attending: ORTHOPAEDIC SURGERY
Payer: MEDICARE

## 2020-07-10 ENCOUNTER — ANESTHESIA (OUTPATIENT)
Dept: SURGERY | Facility: HOSPITAL | Age: 75
End: 2020-07-10
Payer: MEDICARE

## 2020-07-10 ENCOUNTER — ANESTHESIA EVENT (OUTPATIENT)
Dept: SURGERY | Facility: HOSPITAL | Age: 75
End: 2020-07-10
Payer: MEDICARE

## 2020-07-10 VITALS
HEART RATE: 66 BPM | DIASTOLIC BLOOD PRESSURE: 78 MMHG | TEMPERATURE: 97 F | SYSTOLIC BLOOD PRESSURE: 123 MMHG | RESPIRATION RATE: 14 BRPM | WEIGHT: 115.19 LBS | BODY MASS INDEX: 18.08 KG/M2 | HEIGHT: 67 IN | OXYGEN SATURATION: 98 %
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
## 2020-07-10 PROBLEM — S42.291S: Status: ACTIVE | Noted: 2020-06-23
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
## 2020-07-10 PROBLEM — M80.021D: Status: ACTIVE | Noted: 2020-07-10

## 2020-07-10 LAB — SARS-COV-2 RNA RESP QL NAA+PROBE: NOT DETECTED

## 2020-07-10 PROCEDURE — 3E0T3BZ INTRODUCTION OF ANESTHETIC AGENT INTO PERIPHERAL NERVES AND PLEXI, PERCUTANEOUS APPROACH: ICD-10-PCS | Performed by: ORTHOPAEDIC SURGERY

## 2020-07-10 PROCEDURE — 76942 ECHO GUIDE FOR BIOPSY: CPT | Performed by: ANESTHESIOLOGY

## 2020-07-10 PROCEDURE — 99152 MOD SED SAME PHYS/QHP 5/>YRS: CPT | Performed by: ORTHOPAEDIC SURGERY

## 2020-07-10 PROCEDURE — 76000 FLUOROSCOPY <1 HR PHYS/QHP: CPT | Performed by: ORTHOPAEDIC SURGERY

## 2020-07-10 PROCEDURE — 64415 NJX AA&/STRD BRCH PLXS IMG: CPT | Performed by: ORTHOPAEDIC SURGERY

## 2020-07-10 PROCEDURE — 0PSC04Z REPOSITION RIGHT HUMERAL HEAD WITH INTERNAL FIXATION DEVICE, OPEN APPROACH: ICD-10-PCS | Performed by: ORTHOPAEDIC SURGERY

## 2020-07-10 PROCEDURE — 76942 ECHO GUIDE FOR BIOPSY: CPT | Performed by: ORTHOPAEDIC SURGERY

## 2020-07-10 DEVICE — SCREW LOCKING 3.5X32 212.112: Type: IMPLANTABLE DEVICE | Site: HUMERUS | Status: FUNCTIONAL

## 2020-07-10 DEVICE — SCREW LOCKING 3.5X26 212.109: Type: IMPLANTABLE DEVICE | Site: HUMERUS | Status: FUNCTIONAL

## 2020-07-10 DEVICE — IMPLANTABLE DEVICE: Type: IMPLANTABLE DEVICE | Site: HUMERUS | Status: FUNCTIONAL

## 2020-07-10 DEVICE — SCREW LOCKING 3.5X22 212.107: Type: IMPLANTABLE DEVICE | Site: HUMERUS | Status: FUNCTIONAL

## 2020-07-10 DEVICE — SCREW LOCKING 3.5X34 212.113: Type: IMPLANTABLE DEVICE | Site: HUMERUS | Status: FUNCTIONAL

## 2020-07-10 RX ORDER — SODIUM CHLORIDE, SODIUM LACTATE, POTASSIUM CHLORIDE, CALCIUM CHLORIDE 600; 310; 30; 20 MG/100ML; MG/100ML; MG/100ML; MG/100ML
INJECTION, SOLUTION INTRAVENOUS CONTINUOUS
Status: DISCONTINUED | OUTPATIENT
Start: 2020-07-10 | End: 2020-07-10

## 2020-07-10 RX ORDER — HYDROCODONE BITARTRATE AND ACETAMINOPHEN 5; 325 MG/1; MG/1
1 TABLET ORAL AS NEEDED
Status: DISCONTINUED | OUTPATIENT
Start: 2020-07-10 | End: 2020-07-10

## 2020-07-10 RX ORDER — IBUPROFEN 200 MG
600 TABLET ORAL EVERY 8 HOURS PRN
Qty: 40 TABLET | Refills: 0 | Status: SHIPPED | OUTPATIENT
Start: 2020-07-10

## 2020-07-10 RX ORDER — FAMOTIDINE 20 MG/1
20 TABLET ORAL ONCE
Status: DISCONTINUED | OUTPATIENT
Start: 2020-07-10 | End: 2020-07-10 | Stop reason: HOSPADM

## 2020-07-10 RX ORDER — HYDROCODONE BITARTRATE AND ACETAMINOPHEN 5; 325 MG/1; MG/1
1 TABLET ORAL EVERY 8 HOURS PRN
Qty: 20 TABLET | Refills: 0 | Status: SHIPPED | OUTPATIENT
Start: 2020-07-10

## 2020-07-10 RX ORDER — MORPHINE SULFATE 4 MG/ML
4 INJECTION, SOLUTION INTRAMUSCULAR; INTRAVENOUS EVERY 10 MIN PRN
Status: DISCONTINUED | OUTPATIENT
Start: 2020-07-10 | End: 2020-07-10

## 2020-07-10 RX ORDER — ONDANSETRON 2 MG/ML
4 INJECTION INTRAMUSCULAR; INTRAVENOUS EVERY 6 HOURS PRN
Status: DISCONTINUED | OUTPATIENT
Start: 2020-07-10 | End: 2020-07-10

## 2020-07-10 RX ORDER — METOCLOPRAMIDE 10 MG/1
10 TABLET ORAL ONCE
Status: DISCONTINUED | OUTPATIENT
Start: 2020-07-10 | End: 2020-07-10 | Stop reason: HOSPADM

## 2020-07-10 RX ORDER — CEFAZOLIN SODIUM/WATER 2 G/20 ML
2 SYRINGE (ML) INTRAVENOUS ONCE
Status: DISCONTINUED | OUTPATIENT
Start: 2020-07-10 | End: 2020-07-10 | Stop reason: HOSPADM

## 2020-07-10 RX ORDER — HYDROCODONE BITARTRATE AND ACETAMINOPHEN 5; 325 MG/1; MG/1
2 TABLET ORAL AS NEEDED
Status: DISCONTINUED | OUTPATIENT
Start: 2020-07-10 | End: 2020-07-10

## 2020-07-10 RX ORDER — CEFAZOLIN SODIUM 1 G/3ML
INJECTION, POWDER, FOR SOLUTION INTRAMUSCULAR; INTRAVENOUS AS NEEDED
Status: DISCONTINUED | OUTPATIENT
Start: 2020-07-10 | End: 2020-07-10 | Stop reason: SURG

## 2020-07-10 RX ORDER — ONDANSETRON 2 MG/ML
4 INJECTION INTRAMUSCULAR; INTRAVENOUS ONCE AS NEEDED
Status: DISCONTINUED | OUTPATIENT
Start: 2020-07-10 | End: 2020-07-10

## 2020-07-10 RX ORDER — MORPHINE SULFATE 4 MG/ML
2 INJECTION, SOLUTION INTRAMUSCULAR; INTRAVENOUS EVERY 10 MIN PRN
Status: DISCONTINUED | OUTPATIENT
Start: 2020-07-10 | End: 2020-07-10

## 2020-07-10 RX ORDER — MORPHINE SULFATE 10 MG/ML
6 INJECTION, SOLUTION INTRAMUSCULAR; INTRAVENOUS EVERY 10 MIN PRN
Status: DISCONTINUED | OUTPATIENT
Start: 2020-07-10 | End: 2020-07-10

## 2020-07-10 RX ORDER — HYDROMORPHONE HYDROCHLORIDE 1 MG/ML
0.2 INJECTION, SOLUTION INTRAMUSCULAR; INTRAVENOUS; SUBCUTANEOUS EVERY 5 MIN PRN
Status: DISCONTINUED | OUTPATIENT
Start: 2020-07-10 | End: 2020-07-10

## 2020-07-10 RX ORDER — HYDROMORPHONE HYDROCHLORIDE 1 MG/ML
0.6 INJECTION, SOLUTION INTRAMUSCULAR; INTRAVENOUS; SUBCUTANEOUS EVERY 5 MIN PRN
Status: DISCONTINUED | OUTPATIENT
Start: 2020-07-10 | End: 2020-07-10

## 2020-07-10 RX ORDER — HYDROMORPHONE HYDROCHLORIDE 1 MG/ML
0.4 INJECTION, SOLUTION INTRAMUSCULAR; INTRAVENOUS; SUBCUTANEOUS EVERY 5 MIN PRN
Status: DISCONTINUED | OUTPATIENT
Start: 2020-07-10 | End: 2020-07-10

## 2020-07-10 RX ORDER — ROPIVACAINE HYDROCHLORIDE 5 MG/ML
INJECTION, SOLUTION EPIDURAL; INFILTRATION; PERINEURAL
Status: COMPLETED | OUTPATIENT
Start: 2020-07-10 | End: 2020-07-10

## 2020-07-10 RX ORDER — PROCHLORPERAZINE EDISYLATE 5 MG/ML
5 INJECTION INTRAMUSCULAR; INTRAVENOUS ONCE AS NEEDED
Status: DISCONTINUED | OUTPATIENT
Start: 2020-07-10 | End: 2020-07-10

## 2020-07-10 RX ORDER — HALOPERIDOL 5 MG/ML
0.25 INJECTION INTRAMUSCULAR ONCE AS NEEDED
Status: DISCONTINUED | OUTPATIENT
Start: 2020-07-10 | End: 2020-07-10

## 2020-07-10 RX ORDER — ONDANSETRON 2 MG/ML
INJECTION INTRAMUSCULAR; INTRAVENOUS AS NEEDED
Status: DISCONTINUED | OUTPATIENT
Start: 2020-07-10 | End: 2020-07-10 | Stop reason: SURG

## 2020-07-10 RX ORDER — ACETAMINOPHEN 500 MG
1000 TABLET ORAL ONCE
Status: COMPLETED | OUTPATIENT
Start: 2020-07-10 | End: 2020-07-10

## 2020-07-10 RX ORDER — CYANOCOBALAMIN (VITAMIN B-12) 1000 MCG
1 TABLET, EXTENDED RELEASE ORAL 2 TIMES DAILY WITH MEALS
Qty: 60 TABLET | Refills: 0 | Status: SHIPPED | OUTPATIENT
Start: 2020-07-10

## 2020-07-10 RX ORDER — MIDAZOLAM HYDROCHLORIDE 1 MG/ML
INJECTION INTRAMUSCULAR; INTRAVENOUS
Status: COMPLETED | OUTPATIENT
Start: 2020-07-10 | End: 2020-07-10

## 2020-07-10 RX ORDER — NALOXONE HYDROCHLORIDE 0.4 MG/ML
80 INJECTION, SOLUTION INTRAMUSCULAR; INTRAVENOUS; SUBCUTANEOUS AS NEEDED
Status: DISCONTINUED | OUTPATIENT
Start: 2020-07-10 | End: 2020-07-10

## 2020-07-10 RX ADMIN — ROPIVACAINE HYDROCHLORIDE 30 ML: 5 INJECTION, SOLUTION EPIDURAL; INFILTRATION; PERINEURAL at 11:22:00

## 2020-07-10 RX ADMIN — ONDANSETRON 4 MG: 2 INJECTION INTRAMUSCULAR; INTRAVENOUS at 13:08:00

## 2020-07-10 RX ADMIN — CEFAZOLIN SODIUM 2 G: 1 INJECTION, POWDER, FOR SOLUTION INTRAMUSCULAR; INTRAVENOUS at 11:57:00

## 2020-07-10 RX ADMIN — MIDAZOLAM HYDROCHLORIDE 2 MG: 1 INJECTION INTRAMUSCULAR; INTRAVENOUS at 11:22:00

## 2020-07-10 NOTE — ANESTHESIA POSTPROCEDURE EVALUATION
Patient: Macey Romero    Procedure Summary     Date:  07/10/20 Room / Location:  [facility] OR  / [facility] OR    Anesthesia Start:  1137 Anesthesia Stop:  1628    Procedure:  HUMERUS OPEN REDUCTION INTERNAL FIXATION/ PINNING (Right ) Diagnosis:  (righ

## 2020-07-10 NOTE — INTERVAL H&P NOTE
Pre-op Diagnosis: right proximal humerus fracture    The above referenced H&P was reviewed by Danae Abraham MD on 7/10/2020, the patient was examined and no significant changes have occurred in the patient's condition since the H&P was performed.   I dis

## 2020-07-10 NOTE — BRIEF OP NOTE
Pre-Operative Diagnosis: 1) right proximal humerus fracture, 2) pathological fracture due to osteoporosis right humerus, 3) fall at home     Post-Operative Diagnosis: Same     Procedure Performed: open reduction internal fixation right proximal humerus, fl

## 2020-07-10 NOTE — ANESTHESIA PROCEDURE NOTES
Peripheral Block  Date/Time: 7/10/2020 11:22 AM  Performed by: Lit Espinal MD  Authorized by: Lit Espinal MD       General Information and Staff    Start Time:  7/10/2020 11:16 AM  End Time:  7/10/2020 11:22 AM  Anesthesiologist:  Lit Espinal MD

## 2020-07-10 NOTE — ANESTHESIA PREPROCEDURE EVALUATION
Anesthesia PreOp Note    HPI:     Gurdeep Alonso is a 69year old female who presents for preoperative consultation requested by: Mesfin Hanson MD    Date of Surgery: 7/10/2020    Procedure(s):  HUMERUS OPEN REDUCTION INTERNAL FIXATION/ PINNING  Ind capsule, Rfl: 0, 7/9/2020 at 2000  multivitamin with minerals Oral Tab, Take 1 tablet by mouth daily. , Disp: 30 tablet, Rfl: 0, 7/9/2020 at Unknown time  Pantoprazole Sodium 40 MG Oral Tab EC, Take 1 tablet (40 mg total) by mouth every morning before break Comment: socail      Drug use: No      Sexual activity: Not on file    Lifestyle      Physical activity:        Days per week: Not on file        Minutes per session: Not on file      Stress: Not on file    Relationships      Social connections: Nursing notes reviewed    No history of anesthetic complications   Airway   Mallampati: II  TM distance: >3 FB  Neck ROM: full  Dental      Pulmonary - normal exam   Cardiovascular - normal exam  Exercise tolerance: good    ROS comment: TTE 6/20  Study Con

## 2020-07-10 NOTE — ANESTHESIA PROCEDURE NOTES
Airway  Date/Time: 7/10/2020 11:40 AM  Urgency: Elective    Difficult airway    General Information and Staff    Patient location during procedure: OR  Anesthesiologist: Anita Puri MD  Performed: anesthesiologist     Indications and Patient Condition

## 2020-07-11 NOTE — OPERATIVE REPORT
[facility]    PATIENT'S NAME: Julien Castellanos   ATTENDING PHYSICIAN: Chaitanya Coelho MD   OPERATING PHYSICIAN: Natasha Coelho MD   PATIENT ACCOUNT#:   [id number]    LOCATION:  [facility] 6 [facility] 10  MEDICAL RECORD #:   [id number]       ANA ROSA atrophic but was preserved. The fracture was then reduced and a [employer] stainless steel proximal humerus locking plate for the right side was then applied and 2 shaft screws were placed. This helped reduce the fracture further.   A third screw was then pl 61:28:31  [facility] [id number]  [place]/    cc: Madhu Edge MD

## 2020-07-13 ENCOUNTER — SNF VISIT (OUTPATIENT)
Dept: INTERNAL MEDICINE CLINIC | Facility: SKILLED NURSING FACILITY | Age: 75
End: 2020-07-13

## 2020-07-13 VITALS
WEIGHT: 124 LBS | TEMPERATURE: 97 F | SYSTOLIC BLOOD PRESSURE: 118 MMHG | OXYGEN SATURATION: 98 % | BODY MASS INDEX: 19 KG/M2 | DIASTOLIC BLOOD PRESSURE: 68 MMHG | HEART RATE: 66 BPM | RESPIRATION RATE: 20 BRPM

## 2020-07-13 DIAGNOSIS — Z87.898 NO POST-OP COMPLICATIONS: ICD-10-CM

## 2020-07-13 DIAGNOSIS — S42.291S CLOSED 3-PART FRACTURE OF PROXIMAL HUMERUS, RIGHT, SEQUELA: ICD-10-CM
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
## 2020-07-13 DIAGNOSIS — M80.021D: ICD-10-CM

## 2020-07-13 DIAGNOSIS — D51.3 OTHER DIETARY VITAMIN B12 DEFICIENCY ANEMIA: ICD-10-CM
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
## 2020-07-13 PROCEDURE — 99309 SBSQ NF CARE MODERATE MDM 30: CPT | Performed by: NURSE PRACTITIONER

## 2020-07-13 NOTE — PROGRESS NOTES
Fei Craft  : 10/2/1945  Age 69year old  female patient is admitted to [place] [address] for rehab and strengthening      Chief complaint: Injury of head,  Altered mental status, Closed 2-part nondisplaced fracture of surgical neck of right Babs Johnson       ALLERGIES:  No Known Allergies    CODE STATUS:  Full Code    ADVANCED CARE PLANNING TEAM: will need family care plan, s/p  surgery 7/10/20, unclear of discharge plan  and with  home situation          CURRENT MEDICATIONS - reviewed and updated    Cu palpitations   GI: denies nausea, vomiting, constipation, diarrhea; no rectal bleeding; no heartburn  :no dysuria, urgency or frequency; no vaginal discharge; no urinary incontinence; no hematuria  MUSCULOSKELETAL:no joint complaints upper or lower extre Anmol Maldonado follow up 7/6/20, s/p  surgery 7/10/20 for open reduction internal fixation   - continue with sling   -staples in place   - non operative management  -  cont pain meds as needed, Norco 5mg/325mg  or  Tylenol 500 mg prn        4. Injury of

## 2020-07-14 ENCOUNTER — EXTERNAL FACILITY (OUTPATIENT)
Dept: INTERNAL MEDICINE CLINIC | Facility: CLINIC | Age: 75
End: 2020-07-14

## 2020-07-14 DIAGNOSIS — S42.291S CLOSED 3-PART FRACTURE OF PROXIMAL HUMERUS, RIGHT, SEQUELA: ICD-10-CM

## 2020-07-14 DIAGNOSIS — F39 EPISODIC MOOD DISORDER (HCC): ICD-10-CM

## 2020-07-14 DIAGNOSIS — E04.2 MULTINODULAR GOITER: ICD-10-CM

## 2020-07-14 PROCEDURE — 99309 SBSQ NF CARE MODERATE MDM 30: CPT | Performed by: INTERNAL MEDICINE

## 2020-07-15 ENCOUNTER — SNF VISIT (OUTPATIENT)
Dept: INTERNAL MEDICINE CLINIC | Facility: SKILLED NURSING FACILITY | Age: 75
End: 2020-07-15

## 2020-07-15 ENCOUNTER — MED REC SCAN ONLY (OUTPATIENT)
Dept: INTERNAL MEDICINE CLINIC | Facility: CLINIC | Age: 75
End: 2020-07-15

## 2020-07-15 VITALS
HEART RATE: 77 BPM | OXYGEN SATURATION: 98 % | RESPIRATION RATE: 20 BRPM | DIASTOLIC BLOOD PRESSURE: 59 MMHG | TEMPERATURE: 97 F | WEIGHT: 124 LBS | BODY MASS INDEX: 19 KG/M2 | SYSTOLIC BLOOD PRESSURE: 114 MMHG
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
## 2020-07-15 DIAGNOSIS — S42.291S CLOSED 3-PART FRACTURE OF PROXIMAL HUMERUS, RIGHT, SEQUELA: ICD-10-CM

## 2020-07-15 DIAGNOSIS — D51.3 OTHER DIETARY VITAMIN B12 DEFICIENCY ANEMIA: ICD-10-CM
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
## 2020-07-15 DIAGNOSIS — R52 INADEQUATE PAIN CONTROL: ICD-10-CM

## 2020-07-15 PROCEDURE — 99308 SBSQ NF CARE LOW MDM 20: CPT | Performed by: NURSE PRACTITIONER

## 2020-07-15 NOTE — PROGRESS NOTES
Jeff Montenegro  : 10/2/1945  Age 69year old  female patient is admitted to [place] [address] for rehab and strengthening      Chief complaint: Injury of head,  Altered mental status, Closed 2-part nondisplaced fracture of surgical neck of right STATUS:  Full Code     ADVANCED CARE PLANNING TEAM: will need family care plan, s/p  surgery 7/10/20, discharge 7/30/20            CURRENT MEDICATIONS - reviewed and updated            Current Outpatient Medications   Medication Sig Dispense Refill   • ibu constipation, diarrhea; no rectal bleeding; no heartburn  :no dysuria, urgency or frequency; no vaginal discharge; no urinary incontinence; no hematuria  MUSCULOSKELETAL:no joint complaints upper or lower extremities  NEURO:no sensory or motor complaint surgery 7/10/20 for open reduction internal fixation   - continue with sling   -staples in place   - non operative management  -  cont pain meds as needed, Norco 5mg/325mg  or  Tylenol 500 mg prn        4. Injury of head- resolved   -CT head negative , vishnu

## 2020-07-16 ENCOUNTER — EXTERNAL FACILITY (OUTPATIENT)
Dept: INTERNAL MEDICINE CLINIC | Facility: CLINIC | Age: 75
End: 2020-07-16

## 2020-07-16 ENCOUNTER — TELEPHONE (OUTPATIENT)
Dept: ORTHOPEDICS CLINIC | Facility: CLINIC | Age: 75
End: 2020-07-16

## 2020-07-16 DIAGNOSIS — E04.2 MULTINODULAR GOITER: ICD-10-CM

## 2020-07-16 DIAGNOSIS — F10.920 ALCOHOLIC INTOXICATION WITHOUT COMPLICATION (HCC): ICD-10-CM

## 2020-07-16 DIAGNOSIS — S42.291S CLOSED 3-PART FRACTURE OF PROXIMAL HUMERUS, RIGHT, SEQUELA: ICD-10-CM

## 2020-07-16 DIAGNOSIS — F39 EPISODIC MOOD DISORDER (HCC): ICD-10-CM

## 2020-07-16 PROCEDURE — 99309 SBSQ NF CARE MODERATE MDM 30: CPT | Performed by: INTERNAL MEDICINE

## 2020-07-16 NOTE — TELEPHONE ENCOUNTER
Spoke with Jazmin Tovar RN at [facility] and informed her per Dr. Jerri Bustos as long as the patient is not having problems to follow up as planned and wound should be painted daily with Betadine and staples may come out 2 weeks after surgery.  Jazmin Tovar RN verbalize

## 2020-07-16 NOTE — PROGRESS NOTES
follow up      Past Medical History:  Diagnosis Date  • Depression    • ETOH abuse    • Falls frequently      12/27/2017     Past Surgical History:  Procedure Laterality Date  • HIP REPLACEMENT SURGERY Left    • HIP TOTAL ANTERIOR APPROACH Left 1/23/2017 colonoscopy , hb slight drop , continue with ppi , moniotr hb    a/p       Alcoholic intoxication without complication (HCC)  Resolved  ,       Closed 2-part nondisplaced fracture of surgical neck of right humerus  s/p internal  fixation  she is doing ok ,
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
## 2020-07-16 NOTE — TELEPHONE ENCOUNTER
If there is no problem, f/u as planned. Wound should be painted daily with Betadine and staples can come out 2 weeks after surgery.

## 2020-07-16 NOTE — TELEPHONE ENCOUNTER
Lilian Awad from [address] [place] [address] Dr walsh. Pt had surgery on 7-10-20. Post op appointment is scheduled 7-28-20. Should pt be seen sooner? Do want us to remove the staples?   Please call to advise

## 2020-07-16 NOTE — TELEPHONE ENCOUNTER
Dr. Jair Alejandra please see message below from [facility], [facility]. - [facility] and advise, thanks. Per surgery notes on 7-10-20 patient to return in 1 week.

## 2020-07-20 ENCOUNTER — SNF VISIT (OUTPATIENT)
Dept: INTERNAL MEDICINE CLINIC | Facility: SKILLED NURSING FACILITY | Age: 75
End: 2020-07-20

## 2020-07-20 VITALS
BODY MASS INDEX: 19 KG/M2 | DIASTOLIC BLOOD PRESSURE: 63 MMHG | WEIGHT: 122 LBS | OXYGEN SATURATION: 97 % | RESPIRATION RATE: 20 BRPM | TEMPERATURE: 98 F | SYSTOLIC BLOOD PRESSURE: 142 MMHG | HEART RATE: 82 BPM

## 2020-07-20 DIAGNOSIS — S42.291S CLOSED 3-PART FRACTURE OF PROXIMAL HUMERUS, RIGHT, SEQUELA: ICD-10-CM

## 2020-07-20 DIAGNOSIS — F39 EPISODIC MOOD DISORDER (HCC): ICD-10-CM

## 2020-07-20 DIAGNOSIS — M80.021D: ICD-10-CM

## 2020-07-20 DIAGNOSIS — D51.3 OTHER DIETARY VITAMIN B12 DEFICIENCY ANEMIA: ICD-10-CM

## 2020-07-20 DIAGNOSIS — R26.89 ABNORMALITY OF GAIT DUE TO IMPAIRMENT OF BALANCE: ICD-10-CM

## 2020-07-20 PROCEDURE — 99309 SBSQ NF CARE MODERATE MDM 30: CPT | Performed by: NURSE PRACTITIONER

## 2020-07-20 NOTE — PROGRESS NOTES
Makayla Joya  : 10/2/1945  Age 69year old  female patient is admitted to [facility] 6  20 for rehab and strengthening      Chief complaint: Injury of head,  Altered mental status, Closed 2-part nondisplaced fracture of surgical neck of right STATUS:  Full Code    ADVANCED CARE PLANNING TEAM: will need family care plan, s/p  surgery 7/10/20, discharge 7/30/20           CURRENT MEDICATIONS - reviewed and updated     Current Outpatient Medications   Medication Sig Dispense Refill   • ibuprofen 20 diarrhea; no rectal bleeding; no heartburn  :no dysuria, urgency or frequency; no vaginal discharge; no urinary incontinence; no hematuria  MUSCULOSKELETAL:no joint complaints upper or lower extremities  NEURO:no sensory or motor complaint  PSYCHE: no sy for open reduction internal fixation  and to see again next week   - continue with sling   -staples in place   - non operative management  -  cont pain meds as needed, Norco 5mg/325mg  or  Tylenol 500 mg prn        4. Injury of head- resolved   -CT head neg

## 2020-07-21 ENCOUNTER — EXTERNAL FACILITY (OUTPATIENT)
Dept: INTERNAL MEDICINE CLINIC | Facility: CLINIC | Age: 75
End: 2020-07-21

## 2020-07-21 DIAGNOSIS — S42.291S CLOSED 3-PART FRACTURE OF PROXIMAL HUMERUS, RIGHT, SEQUELA: ICD-10-CM

## 2020-07-21 DIAGNOSIS — F39 EPISODIC MOOD DISORDER (HCC): ICD-10-CM

## 2020-07-21 DIAGNOSIS — D64.9 ANEMIA, UNSPECIFIED TYPE: ICD-10-CM

## 2020-07-21 PROCEDURE — 99308 SBSQ NF CARE LOW MDM 20: CPT | Performed by: INTERNAL MEDICINE
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
## 2020-07-22 ENCOUNTER — SNF VISIT (OUTPATIENT)
Dept: INTERNAL MEDICINE CLINIC | Facility: SKILLED NURSING FACILITY | Age: 75
End: 2020-07-22

## 2020-07-22 VITALS
DIASTOLIC BLOOD PRESSURE: 72 MMHG | HEART RATE: 78 BPM | TEMPERATURE: 98 F | BODY MASS INDEX: 19 KG/M2 | OXYGEN SATURATION: 98 % | SYSTOLIC BLOOD PRESSURE: 124 MMHG | RESPIRATION RATE: 20 BRPM | WEIGHT: 121 LBS

## 2020-07-22 DIAGNOSIS — D51.3 OTHER DIETARY VITAMIN B12 DEFICIENCY ANEMIA: ICD-10-CM

## 2020-07-22 DIAGNOSIS — S42.291S CLOSED 3-PART FRACTURE OF PROXIMAL HUMERUS, RIGHT, SEQUELA: ICD-10-CM

## 2020-07-22 DIAGNOSIS — F39 EPISODIC MOOD DISORDER (HCC): ICD-10-CM

## 2020-07-22 PROCEDURE — 99309 SBSQ NF CARE MODERATE MDM 30: CPT | Performed by: NURSE PRACTITIONER

## 2020-07-22 NOTE — PROGRESS NOTES
Polo Hernandez  : 10/2/1945  Age 69year old  female patient is admitted to [place] [address]  20 for rehab and strengthening      Chief complaint: Injury of head,  Altered mental status, Closed 2-part nondisplaced fracture of surgical neck of right new issues or concerns.         ALLERGIES:  No Known Allergies    CODE STATUS:  Full Code    ADVANCED CARE PLANNING TEAM: will need family care plan, s/p  surgery 7/10/20, discharge 7/30/20    -would benefit from outpatient therapy     CURRENT MEDICATIONS or cough   CARDIOVASCULAR:denies chest pain, no palpitations   GI: denies nausea, vomiting, constipation, diarrhea; no rectal bleeding; no heartburn  :no dysuria, urgency or frequency; no vaginal discharge; no urinary incontinence; no hematuria  MUSCULOS surgical neck of right humerus  - Seen by ortho Dr. Lucio Daily follow up 7/6/20, s/p  surgery 7/10/20 for open reduction internal fixation  and to see again next week   - continue with sling   -staples in place   - non operative management  -  cont

## 2020-07-23 ENCOUNTER — EXTERNAL FACILITY (OUTPATIENT)
Dept: INTERNAL MEDICINE CLINIC | Facility: CLINIC | Age: 75
End: 2020-07-23

## 2020-07-23 DIAGNOSIS — S42.291S CLOSED 3-PART FRACTURE OF PROXIMAL HUMERUS, RIGHT, SEQUELA: ICD-10-CM

## 2020-07-23 DIAGNOSIS — F39 EPISODIC MOOD DISORDER (HCC): ICD-10-CM

## 2020-07-23 DIAGNOSIS — D64.9 ANEMIA, UNSPECIFIED TYPE: ICD-10-CM

## 2020-07-23 PROCEDURE — 99309 SBSQ NF CARE MODERATE MDM 30: CPT | Performed by: INTERNAL MEDICINE

## 2020-07-23 NOTE — PROGRESS NOTES
follow up      Past Medical History:  Diagnosis Date  • Depression    • ETOH abuse    • Falls frequently      12/27/2017     Past Surgical History:  Procedure Laterality Date  • HIP REPLACEMENT SURGERY Left    • HIP TOTAL ANTERIOR APPROACH Left 1/23/2017 stable , ocntinue with ppi     Alcoholic intoxication without complication (HCC)  Resolved  ,   anemia - monitor hb, continue with iron pills , repeat cbc     Closed 2-part nondisplaced fracture of surgical neck of right humerus  s/p internal  fixation  sh

## 2020-07-27 ENCOUNTER — SNF VISIT (OUTPATIENT)
Dept: INTERNAL MEDICINE CLINIC | Facility: SKILLED NURSING FACILITY | Age: 75
End: 2020-07-27

## 2020-07-27 VITALS
OXYGEN SATURATION: 98 % | RESPIRATION RATE: 20 BRPM | WEIGHT: 121 LBS | DIASTOLIC BLOOD PRESSURE: 58 MMHG | SYSTOLIC BLOOD PRESSURE: 133 MMHG | HEART RATE: 66 BPM | BODY MASS INDEX: 19 KG/M2 | TEMPERATURE: 97 F

## 2020-07-27 DIAGNOSIS — M85.80 OSTEOPENIA, UNSPECIFIED LOCATION: ICD-10-CM

## 2020-07-27 DIAGNOSIS — Z02.9 DISCHARGE PLANNING ISSUES: ICD-10-CM
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
## 2020-07-27 DIAGNOSIS — S42.291S CLOSED 3-PART FRACTURE OF PROXIMAL HUMERUS, RIGHT, SEQUELA: ICD-10-CM
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
## 2020-07-27 PROCEDURE — 3075F SYST BP GE 130 - 139MM HG: CPT | Performed by: NURSE PRACTITIONER

## 2020-07-27 PROCEDURE — 99309 SBSQ NF CARE MODERATE MDM 30: CPT | Performed by: NURSE PRACTITIONER

## 2020-07-27 PROCEDURE — 3078F DIAST BP <80 MM HG: CPT | Performed by: NURSE PRACTITIONER

## 2020-07-27 NOTE — PROGRESS NOTES
Soren Guzmán  : 10/2/1945  Age 69year old  female patient is admitted to [place] [address] 20 for rehab and strengthening      Chief complaint: Injury of head,  Altered mental status, Closed 2-part nondisplaced fracture of surgical neck of right time. Reports participating well in therapy, balance still  slightly off. No changes in bowels or bladder .  No other new issues or concerns.         ALLERGIES:  No Known Allergies    CODE STATUS:  Full Code    ADVANCED CARE PLANNING TEAM:  will need family complaints or deficits  HENT: denies nasal congestion, sinus pain or sore throat;  RESPIRATORY: denies shortness of breath, wheezing or cough   CARDIOVASCULAR:denies chest pain, no palpitations   GI: denies nausea, vomiting, constipation, diarrhea; no rect hospital   - iv fluids in hospital  -cont  mvi/thiamine   - psych consult in hospital       3.  Closed 2-part nondisplaced fracture of surgical neck of right humerus  - Seen by ortho Dr. Kimberly Montilla follow up 7/6/20, s/p  surgery 7/10/20 for open re

## 2020-07-28 ENCOUNTER — TELEPHONE (OUTPATIENT)
Dept: ORTHOPEDICS CLINIC | Facility: CLINIC | Age: 75
End: 2020-07-28

## 2020-07-28 ENCOUNTER — EXTERNAL FACILITY (OUTPATIENT)
Dept: INTERNAL MEDICINE CLINIC | Facility: CLINIC | Age: 75
End: 2020-07-28

## 2020-07-28 DIAGNOSIS — F39 EPISODIC MOOD DISORDER (HCC): ICD-10-CM

## 2020-07-28 DIAGNOSIS — D64.9 ANEMIA, UNSPECIFIED TYPE: ICD-10-CM

## 2020-07-28 DIAGNOSIS — S42.291S CLOSED 3-PART FRACTURE OF PROXIMAL HUMERUS, RIGHT, SEQUELA: ICD-10-CM

## 2020-07-28 PROCEDURE — 99309 SBSQ NF CARE MODERATE MDM 30: CPT | Performed by: INTERNAL MEDICINE

## 2020-07-28 NOTE — TELEPHONE ENCOUNTER
I spoke to her nurse. Apparently there was a miscommunication and MsJc Naseem Nakuldisha thought the appointment was this coming Thursday. It was actually today. The nurse asked if she can remove the staples and I told her that was fine.   The appointment has bee

## 2020-07-28 NOTE — PROGRESS NOTES
follow up      Past Medical History:  Diagnosis Date  • Depression    • ETOH abuse    • Falls frequently      12/27/2017     Past Surgical History:  Procedure Laterality Date  • HIP REPLACEMENT SURGERY Left    • HIP TOTAL ANTERIOR APPROACH Left 1/23/2017 prbc, continue with iron pills , hb stable ppi - will foloow up cbc putpatient    Alcoholic intoxication without complication (HCC)  Resolved  ,   anemia - monitor hb, continue with iron pills , repeat cbc     Closed 2-part nondisplaced fracture of surgica

## 2020-07-28 NOTE — TELEPHONE ENCOUNTER
Per April pt is being discharged on 7/30, requesting to speak to Dr. Jessica Man or PA. Please call thank you.

## 2020-07-29 ENCOUNTER — SNF VISIT (OUTPATIENT)
Dept: INTERNAL MEDICINE CLINIC | Facility: SKILLED NURSING FACILITY | Age: 75
End: 2020-07-29

## 2020-07-29 VITALS
TEMPERATURE: 98 F | DIASTOLIC BLOOD PRESSURE: 61 MMHG | BODY MASS INDEX: 19 KG/M2 | OXYGEN SATURATION: 98 % | RESPIRATION RATE: 20 BRPM | HEART RATE: 81 BPM | SYSTOLIC BLOOD PRESSURE: 138 MMHG | WEIGHT: 121 LBS

## 2020-07-29 DIAGNOSIS — M80.021D: ICD-10-CM
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
## 2020-07-29 DIAGNOSIS — S42.291S CLOSED 3-PART FRACTURE OF PROXIMAL HUMERUS, RIGHT, SEQUELA: ICD-10-CM

## 2020-07-29 DIAGNOSIS — Z02.9 DISCHARGE PLANNING ISSUES: ICD-10-CM

## 2020-07-29 DIAGNOSIS — F39 EPISODIC MOOD DISORDER (HCC): ICD-10-CM

## 2020-07-29 PROCEDURE — 3078F DIAST BP <80 MM HG: CPT | Performed by: NURSE PRACTITIONER

## 2020-07-29 PROCEDURE — 3075F SYST BP GE 130 - 139MM HG: CPT | Performed by: NURSE PRACTITIONER

## 2020-07-29 PROCEDURE — 99310 SBSQ NF CARE HIGH MDM 45: CPT | Performed by: NURSE PRACTITIONER

## 2020-07-29 NOTE — PROGRESS NOTES
Mercado Abebe, 82/1945, 69year old, female is being discharged from [facility] 6 7/30/20 to home with  and son       DISCHARGE SUMMARY    Date of Admission [address] [place] [address] Avenue: 6/23/20 to 6/29/20    Date of Discharge [facility] : 6/29/20 to 7/3 home moved to  723/20. Would benefit from outpatient therapy but may start with [facility].  SW arranging, home with [facility], then may transition to outpatient after becomes WB.    Pain improved  with Norco or pain tylenol, reports pain 2/10 .  Hyacinth soft, nondistended; no organomegaly, no masses; no bruits; nontender, no guarding, no rebound tenderness.   :no suprapubic distension  LYMPHATIC:no lymphedema  MUSCULOSKELETAL: no acute synovitis upper or lower extremity s/p surgery , sling in place  EXTR

## 2020-07-30 ENCOUNTER — TELEPHONE (OUTPATIENT)
Dept: INTERNAL MEDICINE CLINIC | Facility: CLINIC | Age: 75
End: 2020-07-30

## 2020-07-30 ENCOUNTER — EXTERNAL FACILITY (OUTPATIENT)
Dept: INTERNAL MEDICINE CLINIC | Facility: CLINIC | Age: 75
End: 2020-07-30

## 2020-07-30 DIAGNOSIS — D64.9 ANEMIA, UNSPECIFIED TYPE: ICD-10-CM

## 2020-07-30 DIAGNOSIS — F39 EPISODIC MOOD DISORDER (HCC): ICD-10-CM

## 2020-07-30 DIAGNOSIS — E04.2 MULTINODULAR GOITER: ICD-10-CM

## 2020-07-30 DIAGNOSIS — S42.291S CLOSED 3-PART FRACTURE OF PROXIMAL HUMERUS, RIGHT, SEQUELA: ICD-10-CM

## 2020-07-30 PROCEDURE — 99316 NF DSCHRG MGMT 30 MIN+: CPT | Performed by: INTERNAL MEDICINE

## 2020-07-30 NOTE — PROGRESS NOTES
follow up      Past Medical History:  Diagnosis Date  • Depression    • ETOH abuse    • Falls frequently      12/27/2017     Past Surgical History:  Procedure Laterality Date  • HIP REPLACEMENT SURGERY Left    • HIP TOTAL ANTERIOR APPROACH Left 1/23/2017 with iron pills ,, protoninx- will recheck hb outpatient     Alcoholic intoxication without complication (HCC)  Resolved  ,   anemia -  continue with iron supplement, will check  hb     Closed 2-part nondisplaced fracture of surgical neck of right humerus

## 2020-07-30 NOTE — TELEPHONE ENCOUNTER
Franca, clinical intake from [facility] states patient is being referred to a different agency due to her psych needs.

## 2020-07-31 ENCOUNTER — PATIENT OUTREACH (OUTPATIENT)
Dept: CASE MANAGEMENT | Age: 75
End: 2020-07-31

## 2020-07-31 DIAGNOSIS — Z02.9 ENCOUNTERS FOR ADMINISTRATIVE PURPOSE: ICD-10-CM

## 2020-07-31 NOTE — PROGRESS NOTES
Initial Post Discharge Follow Up   Discharge Date: 7/10/20  Contact Date: 7/31/2020    Consent Verification:  Assessment Completed With: Patient  HIPAA Verified?   Yes    Discharge Dx:   S/p [facility] for Injury of head,  Altered mental status, Closed 2-part n Tab Take 650 mg by mouth every 6 (six) hours as needed for Pain. • Sertraline HCl 25 MG Oral Tab Take 25 mg by mouth daily. • folic acid 1 MG Oral Tab Take 1 tablet (1 mg total) by mouth daily.  90 tablet 0   • Iron polysacch [id number]- appointments? No     NCM Reviewed upcoming Specialist Appt with patient     Yes, Ortho on 8/3       Interventions by NCM: NCM reviewed discharge instructions and when to seek medical attention with the patient. She states that she is feeling great.  She s

## 2020-08-03 ENCOUNTER — HOSPITAL ENCOUNTER (OUTPATIENT)
Dept: GENERAL RADIOLOGY | Facility: HOSPITAL | Age: 75
Discharge: HOME | End: 2020-08-03
Attending: ORTHOPAEDIC SURGERY
Payer: MEDICARE

## 2020-08-03 ENCOUNTER — OFFICE VISIT (OUTPATIENT)
Dept: ORTHOPEDICS CLINIC | Facility: CLINIC | Age: 75
End: 2020-08-03
Payer: COMMERCIAL

## 2020-08-03 VITALS — HEIGHT: 67 IN | WEIGHT: 122 LBS | BODY MASS INDEX: 19.15 KG/M2

## 2020-08-03 DIAGNOSIS — Z47.89 ORTHOPEDIC AFTERCARE: ICD-10-CM

## 2020-08-03 DIAGNOSIS — S42.224D CLOSED 2-PART NONDISPLACED FRACTURE OF SURGICAL NECK OF RIGHT HUMERUS WITH ROUTINE HEALING, SUBSEQUENT ENCOUNTER: Primary | ICD-10-CM

## 2020-08-03 DIAGNOSIS — M80.021D: ICD-10-CM

## 2020-08-03 PROCEDURE — 3008F BODY MASS INDEX DOCD: CPT | Performed by: ORTHOPAEDIC SURGERY

## 2020-08-03 PROCEDURE — 99024 POSTOP FOLLOW-UP VISIT: CPT | Performed by: ORTHOPAEDIC SURGERY

## 2020-08-03 PROCEDURE — G0463 HOSPITAL OUTPT CLINIC VISIT: HCPCS | Performed by: ORTHOPAEDIC SURGERY

## 2020-08-03 PROCEDURE — 73030 X-RAY EXAM OF SHOULDER: CPT | Performed by: ORTHOPAEDIC SURGERY

## 2020-08-03 NOTE — PROGRESS NOTES
NURSING INTAKE COMMENTS: Patient presents with:  Post-Op: R humerus repair follow up, staples removed at [facility], denies signs of infection or pain, wearing sling consistently      HPI: This 69year old female presents today for follow-up after right pro tablet (40 mg total) by mouth every morning before breakfast. 90 tablet 0   • Thiamine HCl 100 MG Oral Tab Take 1 tablet (100 mg total) by mouth daily.  90 tablet 0     No Known Allergies  Family History   Problem Relation Age of Onset   • Heart Disorder Fa noted.  Musculoskeletal: Able to forward flex to 80 degrees abduct to 70 degrees. Neurological: Sensation gross intact light touch throughout right upper extremity. 5/5 strength with thumb extension, abduction of fingers, and  strength.     Imaging: X FINDINGS:  BONES: Again visualized is a transverse comminuted fracture of the proximal humeral neck. There appears to be lateral displacement of the greater tuberosity, slightly increased since 6/23/2020.   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   I instructed her

## 2020-08-12 ENCOUNTER — TELEPHONE (OUTPATIENT)
Dept: INTERNAL MEDICINE CLINIC | Facility: CLINIC | Age: 75
End: 2020-08-12

## 2020-08-12 NOTE — TELEPHONE ENCOUNTER
Patient was no show for her appointment yesterday per Dr Kwame Longoria patient needs to be seen message was left with family member to have patient call office

## 2020-08-12 NOTE — TELEPHONE ENCOUNTER
Patient returned call and stated she does not feel as though she needs to be seen. Also mentioned that when she needs an appointment she will reach out and request one.

## 2021-07-21 ENCOUNTER — TELEPHONE (OUTPATIENT)
Dept: INTERNAL MEDICINE CLINIC | Facility: CLINIC | Age: 76
End: 2021-07-21

## 2022-05-16 ENCOUNTER — TELEPHONE (OUTPATIENT)
Dept: CASE MANAGEMENT | Age: 77
End: 2022-05-16

## 2022-05-16 NOTE — TELEPHONE ENCOUNTER
Patient needs to contact insurance company to have PCP updated. Insurance has patient assigned to Dr Blessing Richards. Left message to call back.

## 2023-02-01 ENCOUNTER — MED REC SCAN ONLY (OUTPATIENT)
Dept: INTERNAL MEDICINE CLINIC | Facility: CLINIC | Age: 78
End: 2023-02-01

## 2023-03-16 NOTE — TELEPHONE ENCOUNTER
LMTCB.   Pls transfer call to ext [phone] Arava Counseling:  Patient counseled regarding adverse effects of Arava including but not limited to nausea, vomiting, abnormalities in liver function tests. Patients may develop mouth sores, rash, diarrhea, and abnormalities in blood counts. The patient understands that monitoring is required including LFTs and blood counts.  There is a rare possibility of scarring of the liver and lung problems that can occur when taking methotrexate. Persistent nausea, loss of appetite, pale stools, dark urine, cough, and shortness of breath should be reported immediately. Patient advised to discontinue Arava treatment and consult with a physician prior to attempting conception. The patient will have to undergo a treatment to eliminate Arava from the body prior to conception.
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
## 2024-07-05 ENCOUNTER — HOSPITAL ENCOUNTER (EMERGENCY)
Facility: HOSPITAL | Age: 79
Discharge: HOME | End: 2024-07-05
Attending: EMERGENCY MEDICINE
Payer: MEDICARE

## 2024-07-05 ENCOUNTER — TELEPHONE (OUTPATIENT)
Dept: INTERNAL MEDICINE CLINIC | Facility: CLINIC | Age: 79
End: 2024-07-05

## 2024-07-05 ENCOUNTER — APPOINTMENT (OUTPATIENT)
Dept: GENERAL RADIOLOGY | Facility: HOSPITAL | Age: 79
End: 2024-07-05
Payer: MEDICARE

## 2024-07-05 VITALS
BODY MASS INDEX: 16 KG/M2 | OXYGEN SATURATION: 98 % | WEIGHT: 105 LBS | TEMPERATURE: 98 F | HEART RATE: 113 BPM | RESPIRATION RATE: 20 BRPM | SYSTOLIC BLOOD PRESSURE: 112 MMHG | DIASTOLIC BLOOD PRESSURE: 74 MMHG

## 2024-07-05 DIAGNOSIS — J98.01 BRONCHOSPASM: ICD-10-CM

## 2024-07-05 DIAGNOSIS — J20.8 VIRAL BRONCHITIS: ICD-10-CM

## 2024-07-05 LAB
ATRIAL RATE: 97 BPM
P AXIS: 83 DEGREES
P-R INTERVAL: 176 MS
Q-T INTERVAL: 344 MS
QRS DURATION: 74 MS
QTC CALCULATION (BEZET): 436 MS
R AXIS: -6 DEGREES
T AXIS: 81 DEGREES
VENTRICULAR RATE: 97 BPM

## 2024-07-05 PROCEDURE — 93005 ELECTROCARDIOGRAM TRACING: CPT

## 2024-07-05 PROCEDURE — 99284 EMERGENCY DEPT VISIT MOD MDM: CPT

## 2024-07-05 PROCEDURE — 71045 X-RAY EXAM CHEST 1 VIEW: CPT | Performed by: EMERGENCY MEDICINE

## 2024-07-05 PROCEDURE — 93010 ELECTROCARDIOGRAM REPORT: CPT

## 2024-07-05 PROCEDURE — 94640 AIRWAY INHALATION TREATMENT: CPT

## 2024-07-05 RX ORDER — ALBUTEROL SULFATE 90 UG/1
2 AEROSOL, METERED RESPIRATORY (INHALATION) EVERY 4 HOURS PRN
Qty: 18 G | Refills: 0 | Status: SHIPPED | OUTPATIENT
Start: 2024-07-05 | End: 2024-08-04

## 2024-07-05 RX ORDER — PREDNISONE 20 MG/1
TABLET ORAL
Qty: 12 TABLET | Refills: 0 | Status: SHIPPED | OUTPATIENT
Start: 2024-07-05 | End: 2024-07-10

## 2024-07-05 RX ORDER — IPRATROPIUM BROMIDE AND ALBUTEROL SULFATE 2.5; .5 MG/3ML; MG/3ML
3 SOLUTION RESPIRATORY (INHALATION) ONCE
Status: COMPLETED | OUTPATIENT
Start: 2024-07-05 | End: 2024-07-05

## 2024-07-05 NOTE — ED INITIAL ASSESSMENT (HPI)
Patient c/o cough for the past 2 weeks with clear phlegm. Patient states she also lost her voice as well and feels the coughing has gotten worse. Denies fevers. Denies sick contacts. Denies N/V/D.

## 2024-07-05 NOTE — ED PROVIDER NOTES
Patient Seen in: [facility] Emergency Department      History     Chief Complaint   Patient presents with    Cough/URI     Stated Complaint: productive cough, geetha    Subjective:   HPI    78-year-old female former smoker with 2 weeks of ongoing cough and sputum production.  No fever.  No recent travel.  No recent antibiotic therapy.  No established cardiac history.  Mild sore throat associated with a cough    Objective:   Past Medical History:    Depression    ETOH abuse    Falls frequently    2017    History of blood transfusion    [facility]               Past Surgical History:   Procedure Laterality Date    Hip replacement surgery Left                 Social History     Socioeconomic History    Marital status:    Tobacco Use    Smoking status: Former     Current packs/day: 0.00     Average packs/day: 0.5 packs/day for 40.0 years (20.0 ttl pk-yrs)     Types: Cigarettes     Start date: 1978     Quit date: 2018     Years since quittin.9    Smokeless tobacco: Never   Vaping Use    Vaping status: Never Used   Substance and Sexual Activity    Alcohol use: Yes     Alcohol/week: 3.0 standard drinks of alcohol     Types: 3 Glasses of wine per week     Comment: socail    Drug use: No     Social Determinants of Health      Received from [facility]    Social Connections    Received from [facility]    Housing Stability              Review of Systems    Positive for stated Chief Complaint: Cough/URI    Other systems are as noted in HPI.  Constitutional and vital signs reviewed.      All other systems reviewed and negative except as noted above.    Physical Exam     ED Triage Vitals [24 1604]   /65   Pulse (!) 123   Resp 22   Temp 98.3 °F (36.8 °C)   Temp src Oral   SpO2 97 %   O2 Device None (Room air)       Current Vitals:   Vital Signs  BP: 104/65  Pulse: (!) 123  Resp: 22  Temp: 98.3 °F (36.8 °C)  Temp src: Oral    Oxygen Therapy  SpO2: 97 %  O2  Device: None (Room air)            Physical Exam    Constitutional: Oriented to person, place, and time.  Appears well-developed. No distress.   Head: Normocephalic and atraumatic.   Eyes: Conjunctivae are normal. Pupils are equal, round, and reactive to light.   ENT: Posterior visualized portions of the oropharynx grossly normal.  Neck: Normal range of motion. Neck supple.  No stridor.  No obvious mass on palpation or lymphadenopathy.  Cardiovascular: Normal rate, regular rhythm and intact distal pulses.    Pulmonary/Chest: Effort normal. No respiratory distress.  Mild coarse breath sounds posteriorly and bilaterally.  Mild fine end expiratory wheezes well.  No crackles  Abdominal: Soft. There is no tenderness. There is no guarding.   Musculoskeletal: Normal range of motion. No edema or tenderness.   Neurological: Alert and oriented to person, place, and time.   Skin: Skin is warm and dry.   Psychiatric: Normal mood and affect.  Behavior is normal.   Nursing note and vitals reviewed.    Differential diagnosis includes viral syndrome, bronchitis with bronchospasm, less likely bacterial pneumonia pulmonary edema pleural effusion.      ED Course   Labs Reviewed - No data to display  EKG    Rate, intervals and axes as noted on EKG Report.  Rate: 97  Rhythm: Sinus Rhythm  Reading: No gross acute ischemic changes                 XR CHEST AP PORTABLE  (CPT=71045)    Result Date: 7/5/2024  PROCEDURE: XR CHEST AP PORTABLE  (CPT=71045) TIME: 1716 hours.   COMPARISON: [facility], X CHEST PORTABLE, 12/27/2015, 6:29 AM.  INDICATIONS: Productive cough and shortness of breath for 2 weeks with white plegm  TECHNIQUE:   Single view.   FINDINGS:  CARDIAC/VASC: Heart is normal size.  Aortic atherosclerosis. MEDIAST/ALLEY:   No visible mass or adenopathy. LUNGS/PLEURA: Calcified granuloma in the left lung is similar to the prior exam.  No significant pulmonary parenchymal abnormalities.  No effusion or pleural  thickening. BONES: No fracture or visible bony lesion. OTHER: Negative.          CONCLUSION: No radiographic evidence of acute cardiopulmonary abnormality.    [facility]    Dictated by (CST): Percy Anderson MD on 7/05/2024 at 5:47 PM     Finalized by (CST): Percy Anderson MD on 7/05/2024 at 5:47 PM                  [facility]                                         Medical Decision Making  Heart rate improved at rest.  No concern for PE or cardiac/aortic pathology.  Patient will be prescribed Ventolin and steroids.  She was instructed to follow-up with her doctor to return with worsening or change.    I did discuss privately with Nadia the nurse in the room.  The patient does feel safe at home with her son.  She states her son is safe as well.  She knows what to do in case she feels in danger regarding her and her son.    Problems Addressed:  Bronchospasm: acute illness or injury  Viral bronchitis: acute illness or injury    Amount and/or Complexity of Data Reviewed  Radiology: ordered and independent interpretation performed. Decision-making details documented in ED Course.     Details: By my review there is no obvious evidence of pulmonary edema, pleural effusion, pneumothorax or focal infiltrate on x-ray imaging.    Risk  OTC drugs.  Prescription drug management.        Disposition and Plan     Clinical Impression:  1. Viral bronchitis    2. Bronchospasm         Disposition:  Discharge  7/5/2024  5:56 pm    Follow-up:  Precious Maravilla MD  [address] [place] [id number]  [phone]    Schedule an appointment as soon as possible for a visit      We recommend that you schedule follow up care with a primary care provider within the next three months to obtain basic health screening including reassessment of your blood pressure.      Medications Prescribed:  Current Discharge Medication List        START taking these medications    Details   albuterol 108 (90 Base) MCG/ACT Inhalation Aero Soln Inhale 2 puffs  into the lungs every 4 (four) hours as needed.  Qty: 18 g, Refills: 0      predniSONE 20 MG Oral Tab Take 3 tablets (60 mg total) by mouth daily for 2 days, THEN 2 tablets (40 mg total) daily for 3 days.  Qty: 12 tablet, Refills: 0
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
## 2024-07-05 NOTE — ED QUICK NOTES
Discharge instructions reviewed with patient. Verbalized understanding. [employer] called. Pt assisted out to waiting room.

## 2024-07-26 ENCOUNTER — OFFICE VISIT (OUTPATIENT)
Dept: INTERNAL MEDICINE CLINIC | Facility: CLINIC | Age: 79
End: 2024-07-26

## 2024-07-26 ENCOUNTER — LAB ENCOUNTER (OUTPATIENT)
Dept: LAB | Age: 79
End: 2024-07-26
Attending: INTERNAL MEDICINE
Payer: MEDICARE

## 2024-07-26 VITALS
HEART RATE: 103 BPM | BODY MASS INDEX: 14.12 KG/M2 | WEIGHT: 90 LBS | TEMPERATURE: 98 F | HEIGHT: 67 IN | SYSTOLIC BLOOD PRESSURE: 86 MMHG | DIASTOLIC BLOOD PRESSURE: 46 MMHG

## 2024-07-26 DIAGNOSIS — R49.0 HOARSENESS: Primary | ICD-10-CM

## 2024-07-26 DIAGNOSIS — R63.4 UNINTENTIONAL WEIGHT LOSS: ICD-10-CM

## 2024-07-26 LAB
ALBUMIN SERPL-MCNC: 4.3 G/DL (ref 3.2–4.8)
ALBUMIN/GLOB SERPL: 1.4 {RATIO} (ref 1–2)
ALP LIVER SERPL-CCNC: 108 U/L
ALT SERPL-CCNC: <7 U/L
ANION GAP SERPL CALC-SCNC: 9 MMOL/L (ref 0–18)
AST SERPL-CCNC: 13 U/L (ref ?–34)
BILIRUB SERPL-MCNC: 0.4 MG/DL (ref 0.2–1.1)
BUN BLD-MCNC: 10 MG/DL (ref 9–23)
BUN/CREAT SERPL: 10.3 (ref 10–20)
CALCIUM BLD-MCNC: 9.9 MG/DL (ref 8.7–10.4)
CHLORIDE SERPL-SCNC: 103 MMOL/L (ref 98–112)
CO2 SERPL-SCNC: 30 MMOL/L (ref 21–32)
CREAT BLD-MCNC: 0.97 MG/DL
DEPRECATED RDW RBC AUTO: 42.8 FL (ref 35.1–46.3)
EGFRCR SERPLBLD CKD-EPI 2021: 61 ML/MIN/1.73M2 (ref 60–?)
ERYTHROCYTE [DISTWIDTH] IN BLOOD BY AUTOMATED COUNT: 13 % (ref 11–15)
FASTING STATUS PATIENT QL REPORTED: NO
GLOBULIN PLAS-MCNC: 3.1 G/DL (ref 2–3.5)
GLUCOSE BLD-MCNC: 119 MG/DL (ref 70–99)
HCT VFR BLD AUTO: 38.9 %
HGB BLD-MCNC: 12.5 G/DL
MCH RBC QN AUTO: 29.1 PG (ref 26–34)
MCHC RBC AUTO-ENTMCNC: 32.1 G/DL (ref 31–37)
MCV RBC AUTO: 90.5 FL
OSMOLALITY SERPL CALC.SUM OF ELEC: 294 MOSM/KG (ref 275–295)
PLATELET # BLD AUTO: 379 10(3)UL (ref 150–450)
POTASSIUM SERPL-SCNC: 4.4 MMOL/L (ref 3.5–5.1)
PROT SERPL-MCNC: 7.4 G/DL (ref 5.7–8.2)
RBC # BLD AUTO: 4.3 X10(6)UL
SODIUM SERPL-SCNC: 142 MMOL/L (ref 136–145)
T3FREE SERPL-MCNC: 2.87 PG/ML (ref 2.4–4.2)
T4 FREE SERPL-MCNC: 1.2 NG/DL (ref 0.8–1.7)
TSI SER-ACNC: 0.28 MIU/ML (ref 0.55–4.78)
WBC # BLD AUTO: 8.4 X10(3) UL (ref 4–11)

## 2024-07-26 PROCEDURE — 80053 COMPREHEN METABOLIC PANEL: CPT | Performed by: INTERNAL MEDICINE

## 2024-07-26 PROCEDURE — 84481 FREE ASSAY (FT-3): CPT | Performed by: INTERNAL MEDICINE

## 2024-07-26 PROCEDURE — 36415 COLL VENOUS BLD VENIPUNCTURE: CPT | Performed by: INTERNAL MEDICINE

## 2024-07-26 PROCEDURE — 99204 OFFICE O/P NEW MOD 45 MIN: CPT | Performed by: INTERNAL MEDICINE

## 2024-07-26 PROCEDURE — 84443 ASSAY THYROID STIM HORMONE: CPT | Performed by: INTERNAL MEDICINE

## 2024-07-26 PROCEDURE — 85027 COMPLETE CBC AUTOMATED: CPT | Performed by: INTERNAL MEDICINE

## 2024-07-26 PROCEDURE — 84439 ASSAY OF FREE THYROXINE: CPT | Performed by: INTERNAL MEDICINE

## 2024-07-26 NOTE — PROGRESS NOTES
Hortencia Kelley is a 78 year old female.   Chief Complaint   Patient presents with    ER F/U     7/5/24 - URI - completed prednisone - c/o phlegm     HPI:   Ms. Kelley presents this morning for her initial visit for ER follow-up.    Since the beginning of this month, she has had ongoing symptoms of sore throat worse when swallowing, hoarseness and cough productive of clear sputum.  No associated fever.  No nasal congestion, purulent nasal drainage, sinus pressure or pain, ear pain, purulent sputum, hemoptysis, wheezing or shortness of breath.  She went to [place] ER on July 5.  Chest x-ray was negative.  A 5-day course of prednisone and albuterol were prescribed.  She has completed the course of prednisone.  She used albuterol once.  Symptoms persist.  Of note, according to records, she has lost 15 pounds since her ER visit earlier this month.    Retired nurse.  She denies any significant past medical history.  Record review reveals a history of alcohol dependence and withdrawal.  She underwent left DENY for a left femoral neck fracture after a fall in January 2017, and ORIF of right humerus fracture July 2020.  She takes no regular medications.  No medication allergies.    She previously smoked 1/2 pack of cigarettes daily for 50 years but quit 5 years ago.  She says she has not had any alcohol for 3 years.  Current Outpatient Medications   Medication Sig Dispense Refill    albuterol 108 (90 Base) MCG/ACT Inhalation Aero Soln Inhale 2 puffs into the lungs every 4 (four) hours as needed. 18 g 0     No Known Allergies   Past Medical History:    Depression    ETOH abuse    Falls frequently    12/27/2017    History of blood transfusion    [facility]      Past Surgical History:   Procedure Laterality Date    Hip replacement surgery Left       Social History:  Social History     Socioeconomic History    Marital status:    Tobacco Use    Smoking status: Former     Current packs/day: 0.00     Average packs/day: 0.5  packs/day for 40.0 years (20.0 ttl pk-yrs)     Types: Cigarettes     Start date: 1978     Quit date: 2018     Years since quittin.0    Smokeless tobacco: Never   Vaping Use    Vaping status: Never Used   Substance and Sexual Activity    Alcohol use: Yes     Alcohol/week: 3.0 standard drinks of alcohol     Types: 3 Glasses of wine per week     Comment: socail    Drug use: No     Social Determinants of Health      Received from [facility]    Social Connections    Received from [facility]    Housing Stability        EXAM:   GENERAL: Pleasant female appearing well but extremely thin, with significant hoarseness but breathing comfortably in no distress  BP (!) 86/46   Pulse 103   Temp 97.9 °F (36.6 °C)   Ht 5' 7\" (1.702 m)   Wt 90 lb (40.8 kg)   BMI 14.10 kg/m²   HEENT: Anicteric, conjunctiva pink, oropharynx normal without mass or lesions  NECK: Supple without mass or thyromegaly  NODES: No peripheral adenopathy  LUNGS: Resonant to percussion, slightly decreased breath sounds bilaterally, clear to auscultation without crackles or wheezes  CARDIAC: Rhythm regular S1 S2 normal without murmur or edema  ABDOMEN: Bowel sounds normal soft nontender without mass or hepatosplenomegaly      ASSESSMENT AND PLAN:   1. Hoarseness  Persistent.  Concerned about possible malignancy given associated weight loss and history of tobacco/alcohol dependence  Check CMP CBC and TSH with reflex T4 today.  Order sent  Recommend appointment with ENT as soon as possible.  Referral with contact information given.  She will schedule.  - Comp Metabolic Panel (14)  - CBC, Platelet; No Differential  - TSH W Reflex To Free T4  - ENT - INTERNAL    2. Unintentional weight loss  As above  - Comp Metabolic Panel (14)  - CBC, Platelet; No Differential  - TSH W Reflex To Free T4  - ENT - INTERNAL      The patient indicates understanding of these issues and agrees to the plan.  The patient is asked to  return as needed.    Flako Purvis MD  7/26/2024  11:11 AM

## 2024-07-29 ENCOUNTER — OFFICE VISIT (OUTPATIENT)
Dept: OTOLARYNGOLOGY | Facility: CLINIC | Age: 79
End: 2024-07-29

## 2024-07-29 DIAGNOSIS — R49.0 HOARSENESS: ICD-10-CM

## 2024-07-29 DIAGNOSIS — J38.00 VOCAL CORD PARALYSIS: Primary | ICD-10-CM

## 2024-07-29 NOTE — PROGRESS NOTES
Hortencia Kelley is a 78 year old female.   Chief Complaint   Patient presents with    Throat Problem     Difficulty swallowing and hoarseness x1 month     HPI:   78-year-old presents with hoarseness and difficulty swallowing.  Has been going on for about 1 month.  She says she has difficulty swallowing solids and she has been coughing up phlegm.  No difficulty breathing.  Has a history of smoking she says she quit 5 years ago    Current Outpatient Medications   Medication Sig Dispense Refill    albuterol 108 (90 Base) MCG/ACT Inhalation Aero Soln Inhale 2 puffs into the lungs every 4 (four) hours as needed. 18 g 0      Past Medical History:    Depression    ETOH abuse    Falls frequently    2017    History of blood transfusion    [facility]       Social History:  Social History     Socioeconomic History    Marital status:    Tobacco Use    Smoking status: Former     Current packs/day: 0.00     Average packs/day: 0.5 packs/day for 40.0 years (20.0 ttl pk-yrs)     Types: Cigarettes     Start date: 1978     Quit date: 2018     Years since quittin.0    Smokeless tobacco: Never   Vaping Use    Vaping status: Never Used   Substance and Sexual Activity    Alcohol use: Yes     Alcohol/week: 3.0 standard drinks of alcohol     Types: 3 Glasses of wine per week     Comment: socail    Drug use: No     Social Determinants of Health      Received from [facility]    Social Connections    Received from [facility]    Housing Stability      Past Surgical History:   Procedure Laterality Date    Hip replacement surgery Left          EXAM:   There were no vitals taken for this visit.    System Details   Skin Inspection - Normal.   Constitutional Overall appearance - Normal.   Head/Face Symmetric, TMJ tenderness not present    Eyes EOMI, PERRL   Right ear:  Canal clear, TM intact, no GRACE   Left ear:  Canal clear, TM intact, no GRACE   Nose: Septum midline, inferior turbinates not  enlarged, nasal valves without collapse    Oral cavity/Oropharynx: No lesions or masses on inspection or palpation, tonsils symmetric    Neck: Soft without LAD, thyroid not enlarged  Voice hoarse and slightly breathy/ no stridor   Other:      SCOPES AND PROCEDURES:       Flexible Laryngoscopy Procedure Note (73027)    Due to inability for adequate examination of the larynx and need for magnification to perform the examination, endoscopy was performed.  Risks and benefits were discussed with patient/family and they have given verbal consent to proceed.    Pre-operative Diagnosis:   1. Vocal cord paralysis    2. Hoarseness        Post-operative Diagnosis: Same    Procedure: Diagnostic flexible fiberoptic laryngoscopy    Anesthesia: Topical anesthetic [place]     Surgeon Luis Angel Courtney MD    EBL: 0cc    Procedure Detail & Findings:     After placement of topical anesthetic intranasally the flexible laryngoscope was inserted into the nare and driven through the nasal cavity with no significant abnormal findings noted in the nasal cavities or nasopharynx. The oropharynx, hypopharynx and larynx were subsequently examined and the following findings were noted:        Base of Tongue: Normal        Valeculla: Normal        Arytenoids: Normal/Erythemous: Normal        Introitus of the esophagus: Normal        Epiglottis: Normal        False vocal cords: Normal        True vocal cords: Left true vocal cord is paralyzed in the paramedian position.  No obvious mucosal changes        Subglottic space: Subglottic tumor?  Possible tumor in the anterior region of the subglottis        Mobility of True vocal cords: Normal    Condition: Stable    Complications: Patient tolerated the procedure well with no immediate complication.    Luis Angel Courtney MD    AUDIOGRAM AND IMAGING:         IMPRESSION:   1. Vocal cord paralysis  - CT SOFT TISSUE OF NECK(CONTRAST ONLY) (CPT=70491); Future    2. Hoarseness       Recommendations:  -She has a left  true vocal cord paralysis, possible malignancy of the larynx although no obvious mucosal changes on the laryngoscopy.  Possible subglottic?  -Will obtain a CT scan of her neck stat with contrast  -Discussed that this may require a biopsy depending on the CT scan results we will follow-up on the CT scan results    The patient indicates understanding of these issues and agrees to the plan.  Consult from Dr Purvis regarding larynx evaluation    Luis Angel Courtney MD  7/29/2024  1:24 PM

## 2024-07-30 ENCOUNTER — HOSPITAL ENCOUNTER (OUTPATIENT)
Dept: CT IMAGING | Facility: HOSPITAL | Age: 79
Discharge: HOME | End: 2024-07-30
Attending: STUDENT IN AN ORGANIZED HEALTH CARE EDUCATION/TRAINING PROGRAM
Payer: MEDICARE

## 2024-07-30 DIAGNOSIS — J38.00 VOCAL CORD PARALYSIS: ICD-10-CM

## 2024-07-30 PROCEDURE — 70491 CT SOFT TISSUE NECK W/DYE: CPT | Performed by: STUDENT IN AN ORGANIZED HEALTH CARE EDUCATION/TRAINING PROGRAM

## 2024-07-31 ENCOUNTER — TELEPHONE (OUTPATIENT)
Dept: OTOLARYNGOLOGY | Facility: CLINIC | Age: 79
End: 2024-07-31

## 2024-07-31 DIAGNOSIS — J98.59 MEDIASTINAL MASS: Primary | ICD-10-CM

## 2024-07-31 NOTE — TELEPHONE ENCOUNTER
Dr. Courtney, See patient message below, CT soft tissue of neck results are available. Thank you.

## 2024-07-31 NOTE — TELEPHONE ENCOUNTER
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 Will refer to Thoracic surgery and have office reach out to her to coordinate.

## 2024-08-07 ENCOUNTER — APPOINTMENT (OUTPATIENT)
Dept: PICC SERVICES | Facility: HOSPITAL | Age: 79
End: 2024-08-07
Attending: HOSPITALIST
Payer: MEDICARE

## 2024-08-07 ENCOUNTER — APPOINTMENT (OUTPATIENT)
Dept: GENERAL RADIOLOGY | Facility: HOSPITAL | Age: 79
End: 2024-08-07
Attending: EMERGENCY MEDICINE
Payer: MEDICARE

## 2024-08-07 ENCOUNTER — APPOINTMENT (OUTPATIENT)
Dept: CV DIAGNOSTICS | Facility: HOSPITAL | Age: 79
End: 2024-08-07
Attending: HOSPITALIST
Payer: MEDICARE

## 2024-08-07 ENCOUNTER — APPOINTMENT (OUTPATIENT)
Dept: CT IMAGING | Facility: HOSPITAL | Age: 79
End: 2024-08-07
Attending: EMERGENCY MEDICINE
Payer: MEDICARE

## 2024-08-07 ENCOUNTER — HOSPITAL ENCOUNTER (INPATIENT)
Facility: HOSPITAL | Age: 79
LOS: 13 days | Discharge: HOME HEALTH | End: 2024-08-20
Attending: EMERGENCY MEDICINE | Admitting: EMERGENCY MEDICINE
Payer: MEDICARE

## 2024-08-07 DIAGNOSIS — I21.4 NSTEMI (NON-ST ELEVATED MYOCARDIAL INFARCTION) (HCC): Primary | ICD-10-CM

## 2024-08-07 PROBLEM — K22.89 ESOPHAGEAL MASS: Status: ACTIVE | Noted: 2024-08-07

## 2024-08-07 LAB
ANION GAP SERPL CALC-SCNC: 11 MMOL/L (ref 0–18)
APTT PPP: 26.8 SECONDS (ref 23–36)
APTT PPP: 34.8 SECONDS (ref 23–36)
ATRIAL RATE: 105 BPM
ATRIAL RATE: 108 BPM
BASOPHILS # BLD AUTO: 0.04 X10(3) UL (ref 0–0.2)
BASOPHILS NFR BLD AUTO: 0.3 %
BUN BLD-MCNC: 24 MG/DL (ref 9–23)
BUN/CREAT SERPL: 34.8 (ref 10–20)
CALCIUM BLD-MCNC: 9.4 MG/DL (ref 8.7–10.4)
CHLORIDE SERPL-SCNC: 101 MMOL/L (ref 98–112)
CHOLEST SERPL-MCNC: 188 MG/DL (ref ?–200)
CO2 SERPL-SCNC: 27 MMOL/L (ref 21–32)
CREAT BLD-MCNC: 0.69 MG/DL
DEPRECATED RDW RBC AUTO: 42 FL (ref 35.1–46.3)
EGFRCR SERPLBLD CKD-EPI 2021: 90 ML/MIN/1.73M2 (ref 60–?)
EOSINOPHIL # BLD AUTO: 0.01 X10(3) UL (ref 0–0.7)
EOSINOPHIL NFR BLD AUTO: 0.1 %
ERYTHROCYTE [DISTWIDTH] IN BLOOD BY AUTOMATED COUNT: 12.7 % (ref 11–15)
FLUAV + FLUBV RNA SPEC NAA+PROBE: NEGATIVE
FLUAV + FLUBV RNA SPEC NAA+PROBE: NEGATIVE
GLUCOSE BLD-MCNC: 136 MG/DL (ref 70–99)
HCT VFR BLD AUTO: 35.8 %
HDLC SERPL-MCNC: 64 MG/DL (ref 40–59)
HGB BLD-MCNC: 11.5 G/DL
IMM GRANULOCYTES # BLD AUTO: 0.05 X10(3) UL (ref 0–1)
IMM GRANULOCYTES NFR BLD: 0.3 %
LDLC SERPL CALC-MCNC: 106 MG/DL (ref ?–100)
LYMPHOCYTES # BLD AUTO: 2.2 X10(3) UL (ref 1–4)
LYMPHOCYTES NFR BLD AUTO: 15.1 %
MCH RBC QN AUTO: 28.8 PG (ref 26–34)
MCHC RBC AUTO-ENTMCNC: 32.1 G/DL (ref 31–37)
MCV RBC AUTO: 89.7 FL
MONOCYTES # BLD AUTO: 1.02 X10(3) UL (ref 0.1–1)
MONOCYTES NFR BLD AUTO: 7 %
NEUTROPHILS # BLD AUTO: 11.24 X10 (3) UL (ref 1.5–7.7)
NEUTROPHILS # BLD AUTO: 11.24 X10(3) UL (ref 1.5–7.7)
NEUTROPHILS NFR BLD AUTO: 77.2 %
NONHDLC SERPL-MCNC: 124 MG/DL (ref ?–130)
OSMOLALITY SERPL CALC.SUM OF ELEC: 294 MOSM/KG (ref 275–295)
P AXIS: 78 DEGREES
P AXIS: 90 DEGREES
P-R INTERVAL: 162 MS
P-R INTERVAL: 180 MS
PLATELET # BLD AUTO: 344 10(3)UL (ref 150–450)
POTASSIUM SERPL-SCNC: 3.7 MMOL/L (ref 3.5–5.1)
Q-T INTERVAL: 324 MS
Q-T INTERVAL: 374 MS
QRS DURATION: 66 MS
QRS DURATION: 68 MS
QTC CALCULATION (BEZET): 434 MS
QTC CALCULATION (BEZET): 494 MS
R AXIS: -31 DEGREES
R AXIS: -53 DEGREES
RBC # BLD AUTO: 3.99 X10(6)UL
RSV RNA SPEC NAA+PROBE: NEGATIVE
SARS-COV-2 RNA RESP QL NAA+PROBE: NOT DETECTED
SODIUM SERPL-SCNC: 139 MMOL/L (ref 136–145)
T AXIS: 82 DEGREES
T AXIS: 84 DEGREES
TRIGL SERPL-MCNC: 100 MG/DL (ref 30–149)
TROPONIN I SERPL HS-MCNC: 1223 NG/L
TROPONIN I SERPL HS-MCNC: 1407 NG/L
TROPONIN I SERPL HS-MCNC: 703 NG/L
VENTRICULAR RATE: 105 BPM
VENTRICULAR RATE: 108 BPM
VLDLC SERPL CALC-MCNC: 17 MG/DL (ref 0–30)
WBC # BLD AUTO: 14.6 X10(3) UL (ref 4–11)

## 2024-08-07 PROCEDURE — 99223 1ST HOSP IP/OBS HIGH 75: CPT | Performed by: INTERNAL MEDICINE

## 2024-08-07 PROCEDURE — 93306 TTE W/DOPPLER COMPLETE: CPT | Performed by: HOSPITALIST

## 2024-08-07 PROCEDURE — 71260 CT THORAX DX C+: CPT | Performed by: EMERGENCY MEDICINE

## 2024-08-07 PROCEDURE — 71045 X-RAY EXAM CHEST 1 VIEW: CPT | Performed by: EMERGENCY MEDICINE

## 2024-08-07 PROCEDURE — 99223 1ST HOSP IP/OBS HIGH 75: CPT | Performed by: HOSPITALIST

## 2024-08-07 RX ORDER — ASPIRIN 81 MG/1
324 TABLET, CHEWABLE ORAL ONCE
Status: COMPLETED | OUTPATIENT
Start: 2024-08-07 | End: 2024-08-07

## 2024-08-07 RX ORDER — HEPARIN SODIUM 1000 [USP'U]/ML
60 INJECTION, SOLUTION INTRAVENOUS; SUBCUTANEOUS ONCE
Status: COMPLETED | OUTPATIENT
Start: 2024-08-07 | End: 2024-08-07

## 2024-08-07 RX ORDER — HEPARIN SODIUM AND DEXTROSE 10000; 5 [USP'U]/100ML; G/100ML
12 INJECTION INTRAVENOUS ONCE
Status: COMPLETED | OUTPATIENT
Start: 2024-08-07 | End: 2024-08-07

## 2024-08-07 RX ORDER — IPRATROPIUM BROMIDE AND ALBUTEROL SULFATE 2.5; .5 MG/3ML; MG/3ML
3 SOLUTION RESPIRATORY (INHALATION) EVERY 6 HOURS PRN
Status: DISCONTINUED | OUTPATIENT
Start: 2024-08-07 | End: 2024-08-20

## 2024-08-07 RX ORDER — ACETAMINOPHEN 500 MG
500 TABLET ORAL EVERY 4 HOURS PRN
Status: DISCONTINUED | OUTPATIENT
Start: 2024-08-07 | End: 2024-08-20

## 2024-08-07 RX ORDER — HEPARIN SODIUM 1000 [USP'U]/ML
30 INJECTION, SOLUTION INTRAVENOUS; SUBCUTANEOUS ONCE
Status: COMPLETED | OUTPATIENT
Start: 2024-08-07 | End: 2024-08-07

## 2024-08-07 RX ORDER — NITROGLYCERIN 0.4 MG/1
0.4 TABLET SUBLINGUAL EVERY 5 MIN PRN
Status: DISCONTINUED | OUTPATIENT
Start: 2024-08-07 | End: 2024-08-20

## 2024-08-07 RX ORDER — BUDESONIDE 0.25 MG/2ML
0.25 INHALANT ORAL 2 TIMES DAILY
Status: DISCONTINUED | OUTPATIENT
Start: 2024-08-07 | End: 2024-08-20

## 2024-08-07 RX ORDER — METHYLPREDNISOLONE SODIUM SUCCINATE 40 MG/ML
40 INJECTION, POWDER, LYOPHILIZED, FOR SOLUTION INTRAMUSCULAR; INTRAVENOUS EVERY 8 HOURS
Status: DISCONTINUED | OUTPATIENT
Start: 2024-08-07 | End: 2024-08-08

## 2024-08-07 RX ORDER — IPRATROPIUM BROMIDE AND ALBUTEROL SULFATE 2.5; .5 MG/3ML; MG/3ML
3 SOLUTION RESPIRATORY (INHALATION) EVERY 6 HOURS PRN
Status: DISCONTINUED | OUTPATIENT
Start: 2024-08-07 | End: 2024-08-13

## 2024-08-07 RX ORDER — IPRATROPIUM BROMIDE AND ALBUTEROL SULFATE 2.5; .5 MG/3ML; MG/3ML
3 SOLUTION RESPIRATORY (INHALATION) ONCE
Status: COMPLETED | OUTPATIENT
Start: 2024-08-07 | End: 2024-08-07

## 2024-08-07 RX ORDER — PREDNISONE 20 MG/1
60 TABLET ORAL ONCE
Status: COMPLETED | OUTPATIENT
Start: 2024-08-07 | End: 2024-08-07

## 2024-08-07 RX ORDER — DEXTROSE MONOHYDRATE, SODIUM CHLORIDE, AND POTASSIUM CHLORIDE 50; 1.49; 9 G/1000ML; G/1000ML; G/1000ML
INJECTION, SOLUTION INTRAVENOUS CONTINUOUS
Status: DISCONTINUED | OUTPATIENT
Start: 2024-08-07 | End: 2024-08-16

## 2024-08-07 RX ORDER — METOCLOPRAMIDE HYDROCHLORIDE 5 MG/ML
5 INJECTION INTRAMUSCULAR; INTRAVENOUS EVERY 8 HOURS PRN
Status: DISCONTINUED | OUTPATIENT
Start: 2024-08-07 | End: 2024-08-20

## 2024-08-07 RX ORDER — TEMAZEPAM 15 MG/1
15 CAPSULE ORAL NIGHTLY PRN
Status: DISCONTINUED | OUTPATIENT
Start: 2024-08-07 | End: 2024-08-20

## 2024-08-07 RX ORDER — BENZONATATE 100 MG/1
200 CAPSULE ORAL 3 TIMES DAILY PRN
Status: DISCONTINUED | OUTPATIENT
Start: 2024-08-07 | End: 2024-08-20

## 2024-08-07 RX ORDER — ONDANSETRON 2 MG/ML
4 INJECTION INTRAMUSCULAR; INTRAVENOUS EVERY 6 HOURS PRN
Status: DISCONTINUED | OUTPATIENT
Start: 2024-08-07 | End: 2024-08-20

## 2024-08-07 RX ORDER — HEPARIN SODIUM AND DEXTROSE 10000; 5 [USP'U]/100ML; G/100ML
INJECTION INTRAVENOUS CONTINUOUS
Status: DISCONTINUED | OUTPATIENT
Start: 2024-08-07 | End: 2024-08-09

## 2024-08-07 RX ORDER — BUDESONIDE 0.5 MG/2ML
INHALANT ORAL
Status: COMPLETED
Start: 2024-08-07 | End: 2024-08-07

## 2024-08-07 RX ORDER — ASPIRIN 325 MG
325 TABLET ORAL DAILY
Status: DISCONTINUED | OUTPATIENT
Start: 2024-08-08 | End: 2024-08-08

## 2024-08-07 NOTE — H&P
[facility]    PATIENT'S NAME: BENJAMIN BONDS   ATTENDING PHYSICIAN: Cali Mclain DO   PATIENT ACCOUNT#:   [id number]    LOCATION:  [address]  MEDICAL RECORD #:   [id number]       YOB: 1945  ADMISSION DATE:       08/07/2024    HISTORY AND PHYSICAL EXAMINATION    (Addendum added 08/07/2024)    CHIEF COMPLAINT:  COPD exacerbation, hoarseness, weight loss, and possible non-ST-elevation myocardial infarction.    HISTORY OF PRESENT ILLNESS:  Patient is a 78-year-old  female who came in to the emergency department for evaluation of persistent cough associated with chest pain.  EKG initially showed ST ischemic changes in the inferior leads.  Repeat EKG changes have resolved.  Troponin was 1223.  CBC showed white blood cell count of 14.6 with left shift.  Chemistry was unremarkable.  Chest x-ray showed granulomatous disease without radiographic evidence, acute intrathoracic process.  CT scan of the chest.  PE protocol still pending.  Patient was initiated on IV heparin in the emergency room, and given aspirin.  She will be admitted to the hospital for further management.     PAST MEDICAL HISTORY:  Chronic obstructive pulmonary disease, osteoarthritis, osteoporosis.    PAST SURGICAL HISTORY:  Left total hip arthroplasty, right proximal humerus open reduction and internal fixation.    MEDICATIONS:  Please see medication reconciliation list.    FAMILY HISTORY:  Mother with dementia.  Father had coronary artery disease.    SOCIAL HISTORY:  Ex-tobacco and alcohol user.  No current tobacco, alcohol, or drug use.  Usually independent for basic activities of daily living.    REVIEW OF SYSTEMS:  Patient reports since late June, around 5 to 6 weeks ago, started losing her voice, with persistent hoarseness, weight loss, cough productive of copious amount of clear phlegm.  Also, she has been having intermittent midsternal chest pain that comes and goes, mainly with cough.  She does not  do much physical activity at baseline.      PHYSICAL EXAMINATION:    GENERAL:  Alert.  Oriented to time, place, and person.  Appears cachectic.  VITAL SIGNS:  Temperature 99.1, pulse 101, respiratory rate 26, blood pressure 137/95, pulse ox 94% on room air.  HEENT:  Atraumatic.  Oropharynx clear.  NECK:  Supple.  No lymphadenopathy.  LUNGS:  Diminished breathing sounds bilaterally with faint end-expiratory wheezing.  HEART:  Regular rate and rhythm.  S1 and S2 auscultated.  No murmur.  ABDOMEN:  Soft, nondistended, no tenderness.  Positive bowel sounds.  EXTREMITIES:  No peripheral edema.  No cyanosis.  NEUROLOGIC:  Motor and sensory intact.    ASSESSMENT AND PLAN:    1.   Hoarseness, weight loss, and history of chronic tobacco use, rule out underlying malignancy.  2.   Non-ST-elevation myocardial infarction.  3.   Component of chronic obstructive pulmonary disease exacerbation.    Patient will be admitted to telemetry floor.  Obtain CT scan of the chest.  Rule out PE.  Start IV heparin, oral aspirin.  Trend troponin level.  Obtain 2D echocardiogram with Doppler.  DuoNeb and Solu-Medrol.  Monitor her respiratory and hemodynamic status.  Cardiology and Pulmonary consults.  Bed rest.  Further recommendations to follow.    ADDENDUM (Job [id number]):    CT scan of the chest showed soft tissue focus involving the left upper half of esophagus measuring 3 x 5 cm, causes upstream esophageal dilation, concerning for esophageal neoplasm, prominent lymph nodes and/or exophytic nodules within the mediastinum concerning for metastases.  There is effacement, narrowing of the trachea which appears to be related to mass effect caused by the esophageal distention and mass.  Gastroenterology consult will be notified.  Patient will be kept n.p.o. for now.    Dictated By Anita Knox MD  d: 08/07/2024 11:01:48  t: 08/07/2024 11:49:56  Job [id number]  FB/    cc: Cali Mclain DO

## 2024-08-07 NOTE — ED QUICK NOTES
Orders for admission, patient is aware of plan and ready to go upstairs. Any questions, please call ED CLEMENTE Garcia  at extension [phone].     Patient Covid vaccination status: Unvaccinated     COVID Test Ordered in ED: SARS-CoV-2/Flu A and B/RSV by PCR (GeneXpert)    COVID Suspicion at Admission: N/A    Running Infusions:    continuous dose heparin      None    Mental Status/LOC at time of transport: A&ox4    Other pertinent information:   CIWA score: N/A   NIH score:  N/A

## 2024-08-07 NOTE — ED INITIAL ASSESSMENT (HPI)
Pt to ED via EMS c/o SOB/Wheezing ongoing x 1 month. Pt was diagnosed with bronchitis and has history of COPD. Pt presents with audible wheezing and hoarse voice.

## 2024-08-07 NOTE — CONSULTS
[facility]   Gastroenterology Consultation Note    Hortencia Kelley  Patient Status:    Inpatient  Date of Admission:         8/7/2024, Hospital day #0  Attending:   Anita Knox MD  PCP:     No primary care provider on file.    Reason for Consultation:  Esophageal mass     History of Present Illness:  Hortencia Kelley is a a(n) 78 year old female w/ a history of COPD, former smoker with 20 pack year smoking history, depression, ETOH abuse, who presents with shortness of breath. She notes hoarseness of voice. Progressive dysphagia over the past few months, cannot tolerate solids, liquids ok. No abdominal pain, nausea or emesis. No blood in stool. No prior egd or colonoscopy. 10 lbs weight loss over the past few weeks.     History:  Past Medical History:    Depression    ETOH abuse    Falls frequently    12/27/2017    History of blood transfusion    [facility]      Past Surgical History:   Procedure Laterality Date    Hip replacement surgery Left      Family History   Problem Relation Age of Onset    Heart Disorder Father     Dementia Mother     Dementia Sister       reports that she quit smoking about 6 years ago. She started smoking about 46 years ago. She has a 20 pack-year smoking history. She has never used smokeless tobacco. She reports current alcohol use of about 3.0 standard drinks of alcohol per week. She reports that she does not use drugs.    Allergies:  No Known Allergies    Medications:    Current Facility-Administered Medications:     ipratropium-albuterol (Duoneb) 0.5-2.5 (3) MG/3ML inhalation solution 3 mL, 3 mL, Nebulization, Q6H PRN    heparin (Porcine) 51417 units/250mL infusion ED INITIAL DOSE, 12 Units/kg/hr, Intravenous, Once    heparin (Porcine) 51245 units/250mL infusion ACS/AFIB CONTINUOUS, 200-3,000 Units/hr, Intravenous, Continuous    acetaminophen (Tylenol Extra Strength) tab 500 mg, 500 mg, Oral, Q4H PRN    ondansetron (Zofran) 4 MG/2ML injection 4 mg, 4 mg, Intravenous,  Q6H PRN    metoclopramide (Reglan) 5 mg/mL injection 5 mg, 5 mg, Intravenous, Q8H PRN    temazepam (Restoril) cap 15 mg, 15 mg, Oral, Nightly PRN    guaiFENesin (Robitussin) 100 MG/5 ML oral liquid 200 mg, 200 mg, Oral, Q4H PRN    benzonatate (Tessalon) cap 200 mg, 200 mg, Oral, TID PRN    [START ON 2024] aspirin tab 325 mg, 325 mg, Oral, Daily    nitroglycerin (Nitrostat) SL tab 0.4 mg, 0.4 mg, Sublingual, Q5 Min PRN    ipratropium-albuterol (Duoneb) 0.5-2.5 (3) MG/3ML inhalation solution 3 mL, 3 mL, Nebulization, Q6H PRN    methylPREDNISolone sodium succinate (Solu-MEDROL) injection 40 mg, 40 mg, Intravenous, Q8H    potassium chloride 20 mEq in dextrose 5%-sodium chloride 0.9% 1000mL infusion premix, , Intravenous, Continuous  Medications Prior to Admission   Medication Sig Dispense Refill Last Dose    [] albuterol 108 (90 Base) MCG/ACT Inhalation Aero Soln Inhale 2 puffs into the lungs every 4 (four) hours as needed. 18 g 0     [] predniSONE 20 MG Oral Tab Take 3 tablets (60 mg total) by mouth daily for 2 days, THEN 2 tablets (40 mg total) daily for 3 days. 12 tablet 0        Review of Systems:  CONSTITUTIONAL:  negative for fevers, rigors  EYES:  negative for diplopia   RESPIRATORY:  negative for severe shortness of breath  CARDIOVASCULAR:  negative for crushing sub-sternal chest pain  GASTROINTESTINAL:  see HPI  GENITOURINARY:  negative for dysuria or gross hematuria  INTEGUMENT/BREAST:  SKIN:  negative for jaundice   ALLERGIC/IMMUNOLOGIC:  negative for hay fever  ENDOCRINE:  negative for cold intolerance and heat intolerance  MUSCULOSKELETAL:  negative for joint effusion/severe erythema  NEURO: negative for dizziness or loss of conscious or paresthesias  BEHAVIOR/PSYCH:  negative for psychotic behavior    Physical Exam:    Blood pressure 97/69, pulse 101, temperature 97.3 °F (36.3 °C), temperature source Oral, resp. rate 20, height 5' 7\" (1.702 m), weight 91 lb 7.9 oz (41.5 kg), SpO2 97%, not  currently breastfeeding. Body mass index is 14.33 kg/m².    General: awake, alert and oriented, no acute distress, frail appearing  HEENT: moist mucus membranes, hoarseness of voice   PULM: no conversational dyspnea  CARDIOVASCULAR: regular rate and rhythm, the extremities are warm and well perfused  GI: soft, non-tender, non-distended, + BS, no rebound/guarding   EXTREMITIES: no edema, moving all extremities  SKIN: no visible rash  NEURO: appropriate and interactive    Laboratory Data:  Lab Results   Component Value Date    WBC 14.6 08/07/2024    HGB 11.5 08/07/2024    HCT 35.8 08/07/2024    .0 08/07/2024    CREATSERUM 0.69 08/07/2024    BUN 24 08/07/2024     08/07/2024    K 3.7 08/07/2024     08/07/2024    CO2 27.0 08/07/2024     08/07/2024    CA 9.4 08/07/2024    PTT 26.8 08/07/2024       Imaging:  CT CHEST PE AORTA (IV ONLY) (CPT=71260)    Result Date: 8/7/2024  CONCLUSION:  1.  There is a soft tissue focus involving the upper half of the esophagus measuring approximately 30 x 33 x 56 mm.  It causes upstream esophageal dilatation.  This is concerning for esophageal neoplasm.  Prominent lymph nodes and/or exophytic nodules within the mediastinum concerning for metastasis.  There is effacement and narrowing of the trachea which appears to be related to mass effect caused by esophageal distention/mass. 2.  No pulmonary embolus.  No acute aortic injury. 3.  Coronary atherosclerosis. 4.  Renal cysts. 5.  14 mm nodule within the right lobe of the thyroid.  Correlate with 6/27/20 thyroid ultrasound.   Dictated by (CST): Dean Short MD on 8/07/2024 at 11:46 AM     Finalized by (CST): Dean Short MD on 8/07/2024 at 11:55 AM          XR CHEST AP PORTABLE  (CPT=71045)    Result Date: 8/7/2024  CONCLUSION:  Stable chest demonstrating sequela remote granulomatous disease without radiographically evident acute intrathoracic process.    Dictated by (CST): Roberto Hilaroi MD on 8/07/2024 at 10:13 AM      Finalized by (CST): Roberto Hilario MD on 8/07/2024 at 10:15 AM           Assessment & Plan   Hortencia Kelley is a a(n) 78 year old female w/ a history of COPD, former smoker with 20 pack year smoking history, depression, ETOH abuse, who presents with shortness of breath, weight loss, and dysphagia. 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 Will plan EGD during admission for biopsy and tissue diagnosis once stabilized from cardiac stance given NSTEMI. Ok for clear liquids from my stance.     Ina Huddleston MD  [facility]  8/7/2024    This note was partially prepared using Dragon Medical voice recognition dictation software. As a result, errors may occur. When identified, these errors have been corrected. While every attempt is made to correct errors during dictation, discrepancies may still exist.

## 2024-08-07 NOTE — ED QUICK NOTES
Orders for admission, patient is aware of plan and ready to go upstairs. Any questions, please call ED RN bruna at extension [phone].     Patient Covid vaccination status: Unvaccinated     COVID Test Ordered in ED: SARS-CoV-2/Flu A and B/RSV by PCR (GeneXpert)    COVID Suspicion at Admission: N/A    Running Infusions:  None    Mental Status/LOC at time of transport: x3    Other pertinent information:   CIWA score: N/A   NIH score:  N/A

## 2024-08-07 NOTE — CONSULTS
Cardiology Consult Note    Hortencia Kelley Patient Status:  Emergency    10/2/1945 MRN [id number]   Location [facility] EMERGENCY DEPARTMENT Attending Se Mclain,    Hosp Day # 0 PCP No primary care provider on file.     HPI.  Hortencia Kelley is a 78 year old female with a history of EtOH abuse, COPD, depression presenting to the hospital with shortness of breath and chest discomfort.  Triage vitals pertinent for pulse 118, respiratory rate 30, /95 mmHg, SpO2 97%.  Labs with normal CMP, troponin 1200, CBC with WBC 14.6.  Cardiology consulted for opinion on NSTEMI.  Upon my evaluation of the patient, she states she has shortness of breath, has had a hoarse voice since respiratory infection earlier this year.  Denies any chest pressure or chest pain prior to present to the emergency room or today.  CT pulm angiogram did not reveal pulm embolism however there is coronary artery  atherosclerosis as well as soft tissue esophageal mass measuring 30 x 30 x 56 mm concerning for neoplasm.  Patient is being admitted for further evaluation.        --------------------------------------------------------------------------------------------------------------------------------  ROS 10 systems reviewed, pertinent findings above.  ROS    History:  Past Medical History:    Depression    ETOH abuse    Falls frequently    2017    History of blood transfusion    [facility]      Past Surgical History:   Procedure Laterality Date    Hip replacement surgery Left      Family History   Problem Relation Age of Onset    Heart Disorder Father     Dementia Mother     Dementia Sister       reports that she quit smoking about 6 years ago. She started smoking about 46 years ago. She has a 20 pack-year smoking history. She has never used smokeless tobacco. She reports current alcohol use of about 3.0 standard drinks of alcohol per week. She reports that she does not use drugs.    Objective:   Temp: 99.1 °F (37.3  °C)  Pulse: 101  Resp: 26  BP: 137/95    Intake/Output:   No intake or output data in the 24 hours ending 08/07/24 1112    Physical Exam:     General: Alert and oriented x 3  HEENT: Normocephalic, anicteric sclera, neck supple.  Neck: No JVD, carotids 2+, no bruits.  Cardiac: Regular rate and rhythm. S1, S2 normal. No murmur, pericardial rub, S3.  Lungs: Clear without wheezes, rales, rhonchi or dullness.  Normal excursions and effort.  Abdomen: Soft, non-tender. BS-present.  Extremities: Without clubbing, cyanosis or edema.  Peripheral pulses are 2+.  Neurologic: Non-focal  Skin: Warm and dry.       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  Recommend admission for cardiac workup, echo and ischemic evaluation  GI consultation for esophageal mass evaluation/biopsy  Continue heparin infusion    Thank you for allowing me to take part in the care of Hortencia Kelley. Please call with any questions of concerns.    Level of care: C5    Dr. Fady Blanca,   August 07, 2024  11:12 AM

## 2024-08-07 NOTE — CONSULTS
[facility], [place]  part of [facility]    Report of Consultation    Hortencia Kelley Patient Status:  Inpatient    10/2/1945 MRN [id number]   Location [facility] 3W/SW Attending Anita Knox MD   Hosp Day # 0 PCP No primary care provider on file.     Date of Admission:  2024    Reason for Consultation:   COPD    History of Present Illness:   Patient is a 78-year-old female with prior history of tobacco abuse, COPD presenting with chief complaint of ongoing cough, dysphagia, hoarseness with some associated chest pain.  Troponin elevation on presentation emergency department.  Admits to 10 pound weight loss over the course of the last month.  CT chest with 5 cm mass seen within the esophagus.  Some occasional wheezing.  Saturation stable.  Hemodynamically stable    Past Medical History  Past Medical History:    Depression    ETOH abuse    Falls frequently    2017    History of blood transfusion    [facility]        Past Surgical History  Past Surgical History:   Procedure Laterality Date    Hip replacement surgery Left        Family History  Family History   Problem Relation Age of Onset    Heart Disorder Father     Dementia Mother     Dementia Sister        Social History  Social History     Socioeconomic History    Marital status:    Tobacco Use    Smoking status: Former     Current packs/day: 0.00     Average packs/day: 0.5 packs/day for 40.0 years (20.0 ttl pk-yrs)     Types: Cigarettes     Start date: 1978     Quit date: 2018     Years since quittin.0    Smokeless tobacco: Never   Vaping Use    Vaping status: Never Used   Substance and Sexual Activity    Alcohol use: Yes     Alcohol/week: 3.0 standard drinks of alcohol     Types: 3 Glasses of wine per week     Comment: socail    Drug use: No           Current Medications:  Current Facility-Administered Medications   Medication Dose Route Frequency    ipratropium-albuterol (Duoneb) 0.5-2.5 (3) MG/3ML inhalation  solution 3 mL  3 mL Nebulization Q6H PRN    heparin (Porcine) 52391 units/250mL infusion ED INITIAL DOSE  12 Units/kg/hr Intravenous Once    heparin (Porcine) 36309 units/250mL infusion ACS/AFIB CONTINUOUS  200-3,000 Units/hr Intravenous Continuous    acetaminophen (Tylenol Extra Strength) tab 500 mg  500 mg Oral Q4H PRN    ondansetron (Zofran) 4 MG/2ML injection 4 mg  4 mg Intravenous Q6H PRN    metoclopramide (Reglan) 5 mg/mL injection 5 mg  5 mg Intravenous Q8H PRN    temazepam (Restoril) cap 15 mg  15 mg Oral Nightly PRN    guaiFENesin (Robitussin) 100 MG/5 ML oral liquid 200 mg  200 mg Oral Q4H PRN    benzonatate (Tessalon) cap 200 mg  200 mg Oral TID PRN    [START ON 2024] aspirin tab 325 mg  325 mg Oral Daily    nitroglycerin (Nitrostat) SL tab 0.4 mg  0.4 mg Sublingual Q5 Min PRN    ipratropium-albuterol (Duoneb) 0.5-2.5 (3) MG/3ML inhalation solution 3 mL  3 mL Nebulization Q6H PRN    methylPREDNISolone sodium succinate (Solu-MEDROL) injection 40 mg  40 mg Intravenous Q8H    potassium chloride 20 mEq in dextrose 5%-sodium chloride 0.9% 1000mL infusion premix   Intravenous Continuous    pantoprazole (Protonix) 40 mg in sodium chloride 0.9% PF 10 mL IV push  40 mg Intravenous Q12H     Medications Prior to Admission   Medication Sig    [] albuterol 108 (90 Base) MCG/ACT Inhalation Aero Soln Inhale 2 puffs into the lungs every 4 (four) hours as needed.    [] predniSONE 20 MG Oral Tab Take 3 tablets (60 mg total) by mouth daily for 2 days, THEN 2 tablets (40 mg total) daily for 3 days.       Allergies  No Known Allergies    Review of Systems:   Constitutional: denies fevers, chills, weakness, fatigue, recent illness  HEENT: denies headache, sore throat, vision loss  Cardio: denies chest pain, chest pressure, palpitations  Respiratory: Dyspnea, cough, wheeze  GI: denies nausea, vomiting, abdominal pain  : denies dysuria, hematuria  Musculoskeletal: denies arthralgia, myalgia  Integumentary:  denies rash, itching  Neurological: denies syncope, weakness, dizziness,   Psychiatric: denies depression, anxiety  Hematologic: denies bruising        Physical Exam:   Blood pressure 97/69, pulse 101, temperature 97.3 °F (36.3 °C), temperature source Oral, resp. rate 20, height 5' 7\" (1.702 m), weight 91 lb 7.9 oz (41.5 kg), SpO2 97%, not currently breastfeeding.    Constitutional: no acute distress  Eyes: PERRL  ENT: nares patent  Neck: neck supple, no JVD  Cardio: RRR, S1 S2  Respiratory: Diminished expiratory breath sounds with faint expiratory wheeze  GI: abdomen soft, non tender, active bowel souds, no organomegaly  Extremities: no clubbing, cyanosis, edema  Neurologic: no gross motor deficits  Skin: warm, dry    Results:   Laboratory Data  Lab Results   Component Value Date    WBC 14.6 (H) 08/07/2024    HGB 11.5 (L) 08/07/2024    HCT 35.8 08/07/2024    .0 08/07/2024    CREATSERUM 0.69 08/07/2024    BUN 24 (H) 08/07/2024     08/07/2024    K 3.7 08/07/2024     08/07/2024    CO2 27.0 08/07/2024     (H) 08/07/2024    CA 9.4 08/07/2024    ALB 4.3 07/26/2024    ALKPHO 108 07/26/2024    TP 7.4 07/26/2024    AST 13 07/26/2024    ALT <7 (L) 07/26/2024    PTT 26.8 08/07/2024    INR 1.04 06/23/2020    PTP 13.4 06/23/2020    T4F 1.2 07/26/2024    TSH 0.282 (L) 07/26/2024    MG 1.9 06/28/2020    B12 170 (L) 06/27/2020    ETOH 378 (H) 06/23/2020         Imaging  CT CHEST PE AORTA (IV ONLY) (CPT=71260)    Result Date: 8/7/2024  CONCLUSION:  1.  There is a soft tissue focus involving the upper half of the esophagus measuring approximately 30 x 33 x 56 mm.  It causes upstream esophageal dilatation.  This is concerning for esophageal neoplasm.  Prominent lymph nodes and/or exophytic nodules within the mediastinum concerning for metastasis.  There is effacement and narrowing of the trachea which appears to be related to mass effect caused by esophageal distention/mass. 2.  No pulmonary embolus.  No  acute aortic injury. 3.  Coronary atherosclerosis. 4.  Renal cysts. 5.  14 mm nodule within the right lobe of the thyroid.  Correlate with 6/27/20 thyroid ultrasound.   Dictated by (CST): Dean Short MD on 8/07/2024 at 11:46 AM     Finalized by (CST): Dean Short MD on 8/07/2024 at 11:55 AM          XR CHEST AP PORTABLE  (CPT=71045)    Result Date: 8/7/2024  CONCLUSION:  Stable chest demonstrating sequela remote granulomatous disease without radiographically evident acute intrathoracic process.    Dictated by (CST): Roberto Hilario MD on 8/07/2024 at 10:13 AM     Finalized by (CST): Roberto Hilario MD on 8/07/2024 at 10:15 AM           Assessment   1.  Esophageal mass  2.  Hoarseness  3.  COPD with acute exacerbation  4.  Non-ST elevation myocardial infarction  5.  Prior nicotine dependence    Plan   -Patient presents with evidence of ongoing cough hoarseness weight loss with some associated chest pain.  -CT chest on presentation with mass seen within the mid/proximal esophagus concerning for esophageal neoplasm.  Effacement and narrowing of the trachea secondary to mass  -Eval by GI.  Plan for EGD with biopsies once cleared from cardiac perspective  -Concern for COPD exacerbation  -IV steroids and gradually wean  -Nebulizer treatments  -Further pulmonary disease per cardiology  -Reviewed vitals, labs and  -    Prakash Quispe DO  Pulmonary Critical Care Medicine  [facility]  8/7/2024  3:20 PM

## 2024-08-07 NOTE — ED PROVIDER NOTES
Patient Seen in: [facility] Emergency Department      History     Chief Complaint   Patient presents with    Difficulty Breathing     Stated Complaint:     Subjective:   HPI    78-year-old female with past medical history notable for anemia, depression, COPD presenting to the emergency department with complaints of shortness of breath and wheezing.    Patient states that symptoms have been intermittent over the past 2 months.  Patient states that she has had similar symptoms in the past which have been improved with steroids and inhalers.  Patient states that she noticed this episode occurring overnight.  States that symptoms have been ongoing worsening through this morning causing her to present to the emergency department.  Did not take any medications prior to presenting.  Does not report any specific aggravating relieving factors.  Denies acute fall or trauma otherwise.  Does report dull chest pain ongoing.  Denies exacerbation of chest pain symptoms with activity.    Denies sharp chest pain, head neck or back pain, abdominal pain, extremity sensation or strength changes, lower extremity edematous changes.    Objective:   Past Medical History:    Depression    ETOH abuse    Falls frequently    12/27/2017    History of blood transfusion    [facility]               No pertinent past surgical history.              No pertinent social history.            Review of Systems    Positive for stated Chief Complaint: Difficulty Breathing    Other systems are as noted in HPI.  Constitutional and vital signs reviewed.      All other systems reviewed and negative except as noted above.    Physical Exam     ED Triage Vitals [08/07/24 0909]   BP (!) 137/95   Pulse 118   Resp (!) 30   Temp 99.1 °F (37.3 °C)   Temp src Oral   SpO2 97 %   O2 Device None (Room air)       Current Vitals:   Vital Signs  BP: 97/69  Pulse: 101  Resp: 20  Temp: 97.3 °F (36.3 °C)  Temp src: Oral  MAP (mmHg): 79    Oxygen Therapy  SpO2: 97 %  O2  Device: None (Room air)            Physical Exam    Physical Exam:   BP 97/69 (BP Location: Right arm)   Pulse 101   Temp 97.3 °F (36.3 °C) (Oral)   Resp 20   Ht 170.2 cm (5' 7\")   Wt 41.5 kg   SpO2 97%   BMI 14.33 kg/m²  - I reviewed these vital signs    Constitutional: Pt is well appearing, in no distress  HEENT: Normocephalic/Atraumatic, EOM grossly normal, Conjunctiva Clear, MMM   Neck: Range of motion intact  Lungs: Diffuse inspiratory and expiratory wheezing noted in all lung fields, tachypneic, in no respiratory distress, no retractions noted  Cardiovascular: RRR: Yes, equal pulses  Abdominal: No discomfort to palpation   Back: No costo-vertebral angle discomfort on percussion, no point spinal ttp, no erythema or rash  Rectal: deferred  : deferred  Musculoskeletal: No deformities noted, No cyanosis/clubbing noted, No tenderness to palpation  Neurologic: A&O x 3, Speech clear, No facial asymmetry noted, Equal strength and sensation in extremities appreciated  Psychiatric: Mood and affect are appropriate, Speech not pressured, Thought process is logical  Skin: Warm and dry, No rash      ED Course     Labs Reviewed   CBC WITH DIFFERENTIAL WITH PLATELET - Abnormal; Notable for the following components:       Result Value    WBC 14.6 (*)     HGB 11.5 (*)     Neutrophil Absolute Prelim 11.24 (*)     Neutrophil Absolute 11.24 (*)     Monocyte Absolute 1.02 (*)     All other components within normal limits   BASIC METABOLIC PANEL (8) - Abnormal; Notable for the following components:    Glucose 136 (*)     BUN 24 (*)     BUN/CREA Ratio 34.8 (*)     All other components within normal limits   TROPONIN I HIGH SENSITIVITY - Abnormal; Notable for the following components:    Troponin I (High Sensitivity) 1,223 (*)     All other components within normal limits   LIPID PANEL - Abnormal; Notable for the following components:    HDL Cholesterol 64 (*)     LDL Cholesterol 106 (*)     All other components within normal  limits   TROPONIN I HIGH SENSITIVITY - Abnormal; Notable for the following components:    Troponin I (High Sensitivity) 1,407 (*)     All other components within normal limits   PTT, ACTIVATED - Normal   SARS-COV-2/FLU A AND B/RSV BY PCR ([employer]) - Normal    Narrative:     This test is intended for the qualitative detection and differentiation of SARS-CoV-2, influenza A, influenza B, and respiratory syncytial virus (RSV) viral RNA in nasopharyngeal or nares swabs from individuals suspected of respiratory viral infection consistent with COVID-19 by their healthcare provider. Signs and symptoms of respiratory viral infection due to SARS-CoV-2, influenza, and RSV can be similar.    Test performed using the Xpert Xpress SARS-CoV-2/FLU/RSV (real time RT-PCR)  assay on the [employer] instrument, [employer] [place] [address].   This test is being used under the Food and Drug Administration's Emergency Use Authorization.    The authorized Fact Sheet for Healthcare Providers for this assay is available upon request from the laboratory.   TROPONIN I HIGH SENSITIVITY   PTT, ACTIVATED          MDM         Medical Decision Making  78-year-old female presenting to the emergency department with complaints of dull chest pain with shortness of breath complaints and wheezing.    On arrival to the emergency department patient noted to have both inspiratory and expiratory wheezing noted.    Patient given prednisone in addition to DuoNeb.    ED Course as of 08/07/24 1624  ------------------------------------------------------------  Time: 08/07 0946  Comment: EKG read and interpreted by me showing rate 108, , QRS 66, QTc 434, sinus tachycardia, no STEMI.  ------------------------------------------------------------  Time: 08/07 0946  Comment: CBC with leukocytosis noted.  Patient afebrile.  Likely reactive.  Hemoglobin at 11.5 increased from patient's baseline less than concern for anemia as a cause.  Platelets  normal.  ------------------------------------------------------------  Time: 08/07 1014  Value: COVID19: Not Detected  Comment: (Reviewed)  ------------------------------------------------------------  Time: 08/07 1015  Value: INFLUENZA A BY PCR: Negative  Comment: (Reviewed)  ------------------------------------------------------------  Time: 08/07 1015  Value: INFLUENZA B BY PCR: Negative  Comment: (Reviewed)  ------------------------------------------------------------  Time: 08/07 1015  Value: RSV BY PCR: Negative.  Low concern for viral infectious etiology as a cause for the patient symptoms with positive findings as detailed on exam above.  Comment: (Reviewed)  ------------------------------------------------------------  Time: 08/07 1015  Comment: BMP with no acute electrolyte abnormality noted.  ------------------------------------------------------------  Time: 08/07 1015  Comment: Troponin elevated at 1223.  Patient was given aspirin in the emergency department.  ------------------------------------------------------------  Time: 08/07 1019  Comment: X-ray imaging read and interpreted me showing no evidence of acute pleural effusion.  Radiology read agreeable noting no pleural effusion, consolidation, pneumothorax.   Cranial numbness calcifications noted at left lung base.  Cardiomediastinal silhouette not enlarged.  Mild tortuosity of thoracic aorta noted.  No visible mass.  Pulmonary vascularity within normal limits. Unlikely acute pulmonary infectious etiology  ------------------------------------------------------------  Time: 08/07 1050  Comment: Repeat EKG to be obtained to ensure no interval changes.  ------------------------------------------------------------  Time: 08/07 1051  Comment: Heparin ordered in the emergency department after discussion with admitting physician.  Cardiology consulted.  ------------------------------------------------------------  Time: 08/07 1056  Comment: Pulmonology  consulted as well.  CT PE ordered per request of admitting physician.  ------------------------------------------------------------  Time: 08/07 1057  Comment: Repeat EKG read and interpreted by me showing rate 105, , QRS 68, QTc 494, sinus tachycardia, no STEMI  ------------------------------------------------------------  Time: 08/07 1157  Comment: CT PE with soft tissue focus involving upper half of the esophagus concerning for esophageal neoplasm.  Prominent lymph nodes within the mediastinum concerning for metastasis.  Effacement and narrowing of trachea which appears to be related to mass effect.  No PE.  14 mm nodule within the right lobe of thyroid noted.  Coronary atherosclerosis noted.  Renal cyst noted.     CT results discussed with admitting physician.  Gastroenterology consulted as well.  Concern for metastasis.     At this time suspect NSTEMI as a cause of the patient's dull chest pain complaints and elevated troponin.  Discomfort may be related to newly identified metastasis as well.  Suspect COPD exacerbation as a cause for the patient's wheezing and shortness of breath symptoms.    Patient will be admitted at this time.    Amount and/or Complexity of Data Reviewed  External Data Reviewed: notes.  Labs: ordered. Decision-making details documented in ED Course.  Radiology: ordered and independent interpretation performed. Decision-making details documented in ED Course.  ECG/medicine tests: ordered and independent interpretation performed. Decision-making details documented in ED Course.  Discussion of management or test interpretation with external provider(s): Admitting physician, cardiology, gastroenterology    Risk  Prescription drug management.  Decision regarding hospitalization.        Disposition and Plan     Clinical Impression:  1. NSTEMI (non-ST elevated myocardial infarction) (HCC)         Disposition:  Admit  8/7/2024 11:58 am    Follow-up:  No follow-up provider specified.  We  recommend that you schedule follow up care with a primary care provider within the next three months to obtain basic health screening including reassessment of your blood pressure.      Medications Prescribed:  Current Discharge Medication List                            Hospital Problems       Present on Admission  Date Reviewed: 7/29/2024            ICD-10-CM Noted POA    * (Principal) NSTEMI (non-ST elevated myocardial infarction) (HCC) I21.4 8/7/2024 Unknown    Esophageal mass K22.89 8/7/2024 Unknown

## 2024-08-07 NOTE — PLAN OF CARE
Patient is alert and oriented x4 on room air standby assist. Heparin gtt infusing. Fluids infusing. Patient updated on plan of care.     Problem: Patient Centered Care  Goal: Patient preferences are identified and integrated in the patient's plan of care  Description: Interventions:  - What would you like us to know as we care for you? I am from home with .  - Provide timely, complete, and accurate information to patient/family  - Incorporate patient and family knowledge, values, beliefs, and cultural backgrounds into the planning and delivery of care  - Encourage patient/family to participate in care and decision-making at the level they choose  - Honor patient and family perspectives and choices  Outcome: Progressing     Problem: Patient/Family Goals  Goal: Patient/Family Long Term Goal  Description: Patient's Long Term Goal: To get my voice back.    Interventions:  - see consults/plan of care  - See additional Care Plan goals for specific interventions  Outcome: Progressing  Goal: Patient/Family Short Term Goal  Description: Patient's Short Term Goal: To be comfortable.    Interventions:   - See additional Care Plan goals for specific interventions  Outcome: Progressing

## 2024-08-07 NOTE — H&P (VIEW-ONLY)
[facility]   Gastroenterology Consultation Note    Hortencia Kelley  Patient Status:    Inpatient  Date of Admission:         8/7/2024, Hospital day #0  Attending:   Anita Knox MD  PCP:     No primary care provider on file.    Reason for Consultation:  Esophageal mass     History of Present Illness:  Hortencia Kelley is a a(n) 78 year old female w/ a history of COPD, former smoker with 20 pack year smoking history, depression, ETOH abuse, who presents with shortness of breath. She notes hoarseness of voice. Progressive dysphagia over the past few months, cannot tolerate solids, liquids ok. No abdominal pain, nausea or emesis. No blood in stool. No prior egd or colonoscopy. 10 lbs weight loss over the past few weeks.     History:  Past Medical History:    Depression    ETOH abuse    Falls frequently    12/27/2017    History of blood transfusion    [facility]      Past Surgical History:   Procedure Laterality Date    Hip replacement surgery Left      Family History   Problem Relation Age of Onset    Heart Disorder Father     Dementia Mother     Dementia Sister       reports that she quit smoking about 6 years ago. She started smoking about 46 years ago. She has a 20 pack-year smoking history. She has never used smokeless tobacco. She reports current alcohol use of about 3.0 standard drinks of alcohol per week. She reports that she does not use drugs.    Allergies:  No Known Allergies    Medications:    Current Facility-Administered Medications:     ipratropium-albuterol (Duoneb) 0.5-2.5 (3) MG/3ML inhalation solution 3 mL, 3 mL, Nebulization, Q6H PRN    heparin (Porcine) 31745 units/250mL infusion ED INITIAL DOSE, 12 Units/kg/hr, Intravenous, Once    heparin (Porcine) 96824 units/250mL infusion ACS/AFIB CONTINUOUS, 200-3,000 Units/hr, Intravenous, Continuous    acetaminophen (Tylenol Extra Strength) tab 500 mg, 500 mg, Oral, Q4H PRN    ondansetron (Zofran) 4 MG/2ML injection 4 mg, 4 mg, Intravenous,  Q6H PRN    metoclopramide (Reglan) 5 mg/mL injection 5 mg, 5 mg, Intravenous, Q8H PRN    temazepam (Restoril) cap 15 mg, 15 mg, Oral, Nightly PRN    guaiFENesin (Robitussin) 100 MG/5 ML oral liquid 200 mg, 200 mg, Oral, Q4H PRN    benzonatate (Tessalon) cap 200 mg, 200 mg, Oral, TID PRN    [START ON 2024] aspirin tab 325 mg, 325 mg, Oral, Daily    nitroglycerin (Nitrostat) SL tab 0.4 mg, 0.4 mg, Sublingual, Q5 Min PRN    ipratropium-albuterol (Duoneb) 0.5-2.5 (3) MG/3ML inhalation solution 3 mL, 3 mL, Nebulization, Q6H PRN    methylPREDNISolone sodium succinate (Solu-MEDROL) injection 40 mg, 40 mg, Intravenous, Q8H    potassium chloride 20 mEq in dextrose 5%-sodium chloride 0.9% 1000mL infusion premix, , Intravenous, Continuous  Medications Prior to Admission   Medication Sig Dispense Refill Last Dose    [] albuterol 108 (90 Base) MCG/ACT Inhalation Aero Soln Inhale 2 puffs into the lungs every 4 (four) hours as needed. 18 g 0     [] predniSONE 20 MG Oral Tab Take 3 tablets (60 mg total) by mouth daily for 2 days, THEN 2 tablets (40 mg total) daily for 3 days. 12 tablet 0        Review of Systems:  CONSTITUTIONAL:  negative for fevers, rigors  EYES:  negative for diplopia   RESPIRATORY:  negative for severe shortness of breath  CARDIOVASCULAR:  negative for crushing sub-sternal chest pain  GASTROINTESTINAL:  see HPI  GENITOURINARY:  negative for dysuria or gross hematuria  INTEGUMENT/BREAST:  SKIN:  negative for jaundice   ALLERGIC/IMMUNOLOGIC:  negative for hay fever  ENDOCRINE:  negative for cold intolerance and heat intolerance  MUSCULOSKELETAL:  negative for joint effusion/severe erythema  NEURO: negative for dizziness or loss of conscious or paresthesias  BEHAVIOR/PSYCH:  negative for psychotic behavior    Physical Exam:    Blood pressure 97/69, pulse 101, temperature 97.3 °F (36.3 °C), temperature source Oral, resp. rate 20, height 5' 7\" (1.702 m), weight 91 lb 7.9 oz (41.5 kg), SpO2 97%, not  currently breastfeeding. Body mass index is 14.33 kg/m².    General: awake, alert and oriented, no acute distress, frail appearing  HEENT: moist mucus membranes, hoarseness of voice   PULM: no conversational dyspnea  CARDIOVASCULAR: regular rate and rhythm, the extremities are warm and well perfused  GI: soft, non-tender, non-distended, + BS, no rebound/guarding   EXTREMITIES: no edema, moving all extremities  SKIN: no visible rash  NEURO: appropriate and interactive    Laboratory Data:  Lab Results   Component Value Date    WBC 14.6 08/07/2024    HGB 11.5 08/07/2024    HCT 35.8 08/07/2024    .0 08/07/2024    CREATSERUM 0.69 08/07/2024    BUN 24 08/07/2024     08/07/2024    K 3.7 08/07/2024     08/07/2024    CO2 27.0 08/07/2024     08/07/2024    CA 9.4 08/07/2024    PTT 26.8 08/07/2024       Imaging:  CT CHEST PE AORTA (IV ONLY) (CPT=71260)    Result Date: 8/7/2024  CONCLUSION:  1.  There is a soft tissue focus involving the upper half of the esophagus measuring approximately 30 x 33 x 56 mm.  It causes upstream esophageal dilatation.  This is concerning for esophageal neoplasm.  Prominent lymph nodes and/or exophytic nodules within the mediastinum concerning for metastasis.  There is effacement and narrowing of the trachea which appears to be related to mass effect caused by esophageal distention/mass. 2.  No pulmonary embolus.  No acute aortic injury. 3.  Coronary atherosclerosis. 4.  Renal cysts. 5.  14 mm nodule within the right lobe of the thyroid.  Correlate with 6/27/20 thyroid ultrasound.   Dictated by (CST): Dean Short MD on 8/07/2024 at 11:46 AM     Finalized by (CST): Dean Short MD on 8/07/2024 at 11:55 AM          XR CHEST AP PORTABLE  (CPT=71045)    Result Date: 8/7/2024  CONCLUSION:  Stable chest demonstrating sequela remote granulomatous disease without radiographically evident acute intrathoracic process.    Dictated by (CST): Roberto Hilario MD on 8/07/2024 at 10:13 AM      Finalized by (CST): Roberto Hilario MD on 8/07/2024 at 10:15 AM           Assessment & Plan   Hortencia Kelley is a a(n) 78 year old female w/ a history of COPD, former smoker with 20 pack year smoking history, depression, ETOH abuse, who presents with shortness of breath, weight loss, and dysphagia. 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 Will plan EGD during admission for biopsy and tissue diagnosis once stabilized from cardiac stance given NSTEMI. Ok for clear liquids from my stance.     Ina Huddleston MD  [facility] Gastroenterology  8/7/2024    This note was partially prepared using Dragon Medical voice recognition dictation software. As a result, errors may occur. When identified, these errors have been corrected. While every attempt is made to correct errors during dictation, discrepancies may still exist.

## 2024-08-08 LAB
ANION GAP SERPL CALC-SCNC: 6 MMOL/L (ref 0–18)
APTT PPP: 45.2 SECONDS (ref 23–36)
APTT PPP: 48.5 SECONDS (ref 23–36)
APTT PPP: 73 SECONDS (ref 23–36)
BASOPHILS # BLD AUTO: 0.01 X10(3) UL (ref 0–0.2)
BASOPHILS NFR BLD AUTO: 0.1 %
BUN BLD-MCNC: 19 MG/DL (ref 9–23)
BUN/CREAT SERPL: 30.2 (ref 10–20)
CALCIUM BLD-MCNC: 8.9 MG/DL (ref 8.7–10.4)
CHLORIDE SERPL-SCNC: 107 MMOL/L (ref 98–112)
CO2 SERPL-SCNC: 28 MMOL/L (ref 21–32)
CREAT BLD-MCNC: 0.63 MG/DL
DEPRECATED RDW RBC AUTO: 41.6 FL (ref 35.1–46.3)
EGFRCR SERPLBLD CKD-EPI 2021: 92 ML/MIN/1.73M2 (ref 60–?)
EOSINOPHIL # BLD AUTO: 0 X10(3) UL (ref 0–0.7)
EOSINOPHIL NFR BLD AUTO: 0 %
ERYTHROCYTE [DISTWIDTH] IN BLOOD BY AUTOMATED COUNT: 12.9 % (ref 11–15)
GLUCOSE BLD-MCNC: 148 MG/DL (ref 70–99)
HCT VFR BLD AUTO: 28.2 %
HGB BLD-MCNC: 9.3 G/DL
IMM GRANULOCYTES # BLD AUTO: 0.04 X10(3) UL (ref 0–1)
IMM GRANULOCYTES NFR BLD: 0.5 %
LYMPHOCYTES # BLD AUTO: 0.58 X10(3) UL (ref 1–4)
LYMPHOCYTES NFR BLD AUTO: 6.9 %
MCH RBC QN AUTO: 29.1 PG (ref 26–34)
MCHC RBC AUTO-ENTMCNC: 33 G/DL (ref 31–37)
MCV RBC AUTO: 88.1 FL
MONOCYTES # BLD AUTO: 0.25 X10(3) UL (ref 0.1–1)
MONOCYTES NFR BLD AUTO: 3 %
NEUTROPHILS # BLD AUTO: 7.5 X10 (3) UL (ref 1.5–7.7)
NEUTROPHILS # BLD AUTO: 7.5 X10(3) UL (ref 1.5–7.7)
NEUTROPHILS NFR BLD AUTO: 89.5 %
OSMOLALITY SERPL CALC.SUM OF ELEC: 297 MOSM/KG (ref 275–295)
PLATELET # BLD AUTO: 283 10(3)UL (ref 150–450)
POTASSIUM SERPL-SCNC: 4.4 MMOL/L (ref 3.5–5.1)
RBC # BLD AUTO: 3.2 X10(6)UL
SODIUM SERPL-SCNC: 141 MMOL/L (ref 136–145)
WBC # BLD AUTO: 8.4 X10(3) UL (ref 4–11)

## 2024-08-08 PROCEDURE — 99233 SBSQ HOSP IP/OBS HIGH 50: CPT | Performed by: INTERNAL MEDICINE

## 2024-08-08 PROCEDURE — 99232 SBSQ HOSP IP/OBS MODERATE 35: CPT | Performed by: INTERNAL MEDICINE

## 2024-08-08 PROCEDURE — 99233 SBSQ HOSP IP/OBS HIGH 50: CPT | Performed by: HOSPITALIST

## 2024-08-08 RX ORDER — ASPIRIN 81 MG/1
81 TABLET ORAL DAILY
Status: DISCONTINUED | OUTPATIENT
Start: 2024-08-09 | End: 2024-08-20

## 2024-08-08 RX ORDER — SODIUM CHLORIDE 9 MG/ML
INJECTION, SOLUTION INTRAVENOUS
Status: ACTIVE | OUTPATIENT
Start: 2024-08-09 | End: 2024-08-09

## 2024-08-08 RX ORDER — CHLORHEXIDINE GLUCONATE 40 MG/ML
SOLUTION TOPICAL
Status: COMPLETED | OUTPATIENT
Start: 2024-08-09 | End: 2024-08-09

## 2024-08-08 RX ORDER — METHYLPREDNISOLONE SODIUM SUCCINATE 40 MG/ML
40 INJECTION, POWDER, LYOPHILIZED, FOR SOLUTION INTRAMUSCULAR; INTRAVENOUS EVERY 12 HOURS
Status: DISCONTINUED | OUTPATIENT
Start: 2024-08-08 | End: 2024-08-11

## 2024-08-08 NOTE — PROGRESS NOTES
[facility]  Progress Note     Hortencia Kelley  : 10/2/1945    Status: Inpatient  Day #: 1    Attending: Rico Frost MD  PCP: No primary care provider on file.     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 No SOB currently. Tolerating CLD.    10 point ROS completed and was negative, except for pertinent positive and negatives stated in subjective.      Objective:      Temp:  [97.7 °F (36.5 °C)-98.3 °F (36.8 °C)] 98.3 °F (36.8 °C)  Pulse:  [70-95] 70  Resp:  [18-20] 18  BP: (91-93)/(58-61) 91/61  SpO2:  [96 %-97 %] 97 %  General:  Alert, no distress  HEENT:  Normocephalic, atraumatic  Cardiac:  Regular rate, regular rhythm  Pulmonary:  Clear to auscultation bilaterally, respirations unlabored  Gastrointestinal:  Soft, non-tender, normal bowel sounds  Musculoskeletal:  No joint swelling  Extremities:  No edema, no cyanosis, no clubbing  Neurologic:  nonfocal  Psychiatric:  Normal affect, calm and appropriate    Intake/Output Summary (Last 24 hours) at 2024 1441  Last data filed at 2024 2127  Gross per 24 hour   Intake 20 ml   Output --   Net 20 ml         Recent Labs   Lab 24  0916 24  0522   WBC 14.6* 8.4   HGB 11.5* 9.3*   HCT 35.8 28.2*   .0 283.0   RBC 3.99 3.20*   MCV 89.7  88.1   MCH 28.8 29.1   MCHC 32.1 33.0   RDW 12.7 12.9   NEPRELIM 11.24* 7.50     Recent Labs   Lab 08/07/24  0916 08/07/24  1058 08/07/24  1815 08/08/24  0212 08/08/24  0522 08/08/24  1011   BUN 24*  --   --   --  19  --    CREATSERUM 0.69  --   --   --  0.63  --    CA 9.4  --   --   --  8.9  --      --   --   --  141  --    K 3.7  --   --   --  4.4  --      --   --   --  107  --    CO2 27.0  --   --   --  28.0  --    *  --   --   --  148*  --    PTT  --    < > 34.8 48.5*  --  45.2*    < > = values in this interval not displayed.       CT CHEST PE AORTA (IV ONLY) (CPT=71260)    Result Date: 8/7/2024  CONCLUSION:  1.  There is a soft tissue focus involving the upper half of the esophagus measuring approximately 30 x 33 x 56 mm.  It causes upstream esophageal dilatation.  This is concerning for esophageal neoplasm.  Prominent lymph nodes and/or exophytic nodules within the mediastinum concerning for metastasis.  There is effacement and narrowing of the trachea which appears to be related to mass effect caused by esophageal distention/mass. 2.  No pulmonary embolus.  No acute aortic injury. 3.  Coronary atherosclerosis. 4.  Renal cysts. 5.  14 mm nodule within the right lobe of the thyroid.  Correlate with 6/27/20 thyroid ultrasound.   Dictated by (CST): Dean Short MD on 8/07/2024 at 11:46 AM     Finalized by (CST): Dean Short MD on 8/07/2024 at 11:55 AM          XR CHEST AP PORTABLE  (CPT=71045)    Result Date: 8/7/2024  CONCLUSION:  Stable chest demonstrating sequela remote granulomatous disease without radiographically evident acute intrathoracic process.    Dictated by (CST): Roberto Hilario MD on 8/07/2024 at 10:13 AM     Finalized by (CST): Roberto Hilario MD on 8/07/2024 at 10:15 AM         EKG 12 Lead    Result Date: 8/7/2024  Sinus tachycardia Left axis deviation Low voltage QRS, consider pulmonary disease, pericardial effusion, or normal variant Cannot rule out Anteroseptal infarct  (cited on or before 05-JUL-2024) Abnormal ECG When compared with ECG of 07-AUG-2024 09:26, has not changed Confirmed by BLESSING MACIEL ELMER ([id number]) on 8/7/2024 5:07:26 PM    EKG    Result Date: 8/7/2024  Sinus tachycardia Left axis deviation Anteroseptal infarct (cited on or before 05-JUL-2024) Abnormal ECG When compared with ECG of 05-JUL-2024 16:13, The axis Shifted left Confirmed by BLESSING MACIEL ELMER ([id number]) on 8/7/2024 5:07:15 PM   Medications:   methylPREDNISolone  40 mg Intravenous Q12H    [START ON 8/9/2024] aspirin  81 mg Oral Daily    metoprolol tartrate  12.5 mg Oral 2x Daily(Beta Blocker)    pantoprazole  40 mg Intravenous Q12H    revefenacin  175 mcg Nebulization Daily    budesonide  0.25 mg Nebulization BID      PRN Meds:   ipratropium-albuterol    acetaminophen    ondansetron    metoclopramide    temazepam    guaiFENesin    benzonatate    nitroglycerin    ipratropium-albuterol    Supplementary Documentation:        MDM High. Time spent on chart/note review, review of labs/imaging, discussion with patient, physical exam, discussion with staff, consultants, coordinating care, writing progress note, discussion of plan of care.      Rico Frost MD
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
## 2024-08-08 NOTE — PLAN OF CARE
Patient is alert and oriented x4 on room air able to move x1 with a walker. Heparin gtt infusing, IV fluids infusing. Plan for cath tomorrow.     Problem: Patient Centered Care  Goal: Patient preferences are identified and integrated in the patient's plan of care  Description: Interventions:  - What would you like us to know as we care for you? I don't want information shared.  - Provide timely, complete, and accurate information to patient/family  - Incorporate patient and family knowledge, values, beliefs, and cultural backgrounds into the planning and delivery of care  - Encourage patient/family to participate in care and decision-making at the level they choose  - Honor patient and family perspectives and choices  Outcome: Progressing     Problem: Patient/Family Goals  Goal: Patient/Family Long Term Goal  Description: Patient's Long Term Goal: To get my voice back.    Interventions:  - See additional Care Plan goals for specific interventions  Outcome: Progressing  Goal: Patient/Family Short Term Goal  Description: Patient's Short Term Goal: To be able to eat solids.    Interventions:   - GI consult  - See additional Care Plan goals for specific interventions  Outcome: Progressing     Problem: CARDIOVASCULAR - ADULT  Goal: Maintains optimal cardiac output and hemodynamic stability  Description: INTERVENTIONS:  - Monitor vital signs, rhythm, and trends  - Monitor for bleeding, hypotension and signs of decreased cardiac output  - Evaluate effectiveness of vasoactive medications to optimize hemodynamic stability  - Monitor arterial and/or venous puncture sites for bleeding and/or hematoma  - Assess quality of pulses, skin color and temperature  - Assess for signs of decreased coronary artery perfusion - ex. Angina  - Evaluate fluid balance, assess for edema, trend weights  Outcome: Progressing  Goal: Absence of cardiac arrhythmias or at baseline  Description: INTERVENTIONS:  - Continuous cardiac monitoring, monitor  vital signs, obtain 12 lead EKG if indicated  - Evaluate effectiveness of antiarrhythmic and heart rate control medications as ordered  - Initiate emergency measures for life threatening arrhythmias  - Monitor electrolytes and administer replacement therapy as ordered  Outcome: Progressing     Problem: SAFETY ADULT - FALL  Goal: Free from fall injury  Description: INTERVENTIONS:  - Assess pt frequently for physical needs  - Identify cognitive and physical deficits and behaviors that affect risk of falls.  - Harrell fall precautions as indicated by assessment.  - Educate pt/family on patient safety including physical limitations  - Instruct pt to call for assistance with activity based on assessment  - Modify environment to reduce risk of injury  - Provide assistive devices as appropriate  - Consider OT/PT consult to assist with strengthening/mobility  - Encourage toileting schedule  Outcome: Progressing     Problem: GASTROINTESTINAL - ADULT  Goal: Minimal or absence of nausea and vomiting  Description: INTERVENTIONS:  - Maintain adequate hydration with IV or PO as ordered and tolerated  - Nasogastric tube to low intermittent suction as ordered  - Evaluate effectiveness of ordered antiemetic medications  - Provide nonpharmacologic comfort measures as appropriate  - Advance diet as tolerated, if ordered  - Obtain nutritional consult as needed  - Evaluate fluid balance  Outcome: Progressing  Goal: Maintains or returns to baseline bowel function  Description: INTERVENTIONS:  - Assess bowel function  - Maintain adequate hydration with IV or PO as ordered and tolerated  - Evaluate effectiveness of GI medications  - Encourage mobilization and activity  - Obtain nutritional consult as needed  - Establish a toileting routine/schedule  - Consider collaborating with pharmacy to review patient's medication profile  Outcome: Progressing  Goal: Maintains adequate nutritional intake (undernourished)  Description:  INTERVENTIONS:  - Monitor percentage of each meal consumed  - Identify factors contributing to decreased intake, treat as appropriate  - Assist with meals as needed  - Monitor I&O, WT and lab values  - Obtain nutritional consult as needed  - Optimize oral hygiene and moisture  - Encourage food from home; allow for food preferences  - Enhance eating environment  Outcome: Progressing

## 2024-08-08 NOTE — PROGRESS NOTES
[facility]     Gastroenterology Progress Note    Hortencia Kelley Patient Status:  Inpatient    10/2/1945 MRN [id number]   Location [facility] 3W/SW Attending Rico Frost MD   Hosp Day # 1 PCP No primary care provider on file.       Subjective:   No n/v. No abd pain. No melena or hematochezia.     Objective:   Blood pressure 91/61, pulse 70, temperature 98.3 °F (36.8 °C), temperature source Oral, resp. rate 18, height 5' 7\" (1.702 m), weight 93 lb 3.2 oz (42.3 kg), SpO2 97%, not currently breastfeeding. Body mass index is 14.6 kg/m².    General: awake, alert and oriented, no acute distress  HEENT: moist mucus membranes  PULM: no conversational dyspnea  CARDIOVASCULAR: regular rate and rhythm, the extremities are warm and well perfused  GI: soft, non-tender, non-distended, + BS, no rebound/guarding   EXTREMITIES: no edema, moving all extremities  SKIN: no visible rash  NEURO: appropriate and interactive    Assessment and Plan:   Hortencia Kelley is a a(n) 78 year old female w/ a history of COPD, former smoker with 20 pack year smoking history, depression, ETOH abuse, who presents with shortness of breath, weight loss, and dysphagia. 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 Will plan EGD during admission for biopsy and tissue diagnosis once stabilized from cardiac stance given NSTEMI. Ok for antiplatelets as indicated.      D/w Dr Lucille Buckley Ma, MD  [facility] Gastroenterology      Results:     Lab Results   Component Value Date    WBC 8.4 2024    HGB 9.3 (L) 2024    HCT 28.2 (L) 2024    .0 2024    CREATSERUM 0.63 2024    BUN 19 2024     2024    K 4.4 2024     2024    CO2 28.0 2024     (H) 2024    CA 8.9 2024    ALB 4.3 2024    ALKPHO 108 2024    BILT 0.4 2024     TP 7.4 07/26/2024    AST 13 07/26/2024    ALT <7 (L) 07/26/2024    PTT 45.2 (H) 08/08/2024    INR 1.04 06/23/2020    T4F 1.2 07/26/2024    TSH 0.282 (L) 07/26/2024    MG 1.9 06/28/2020    B12 170 (L) 06/27/2020    ETOH 378 (H) 06/23/2020       CT CHEST PE AORTA (IV ONLY) (CPT=71260)    Result Date: 8/7/2024  CONCLUSION:  1.  There is a soft tissue focus involving the upper half of the esophagus measuring approximately 30 x 33 x 56 mm.  It causes upstream esophageal dilatation.  This is concerning for esophageal neoplasm.  Prominent lymph nodes and/or exophytic nodules within the mediastinum concerning for metastasis.  There is effacement and narrowing of the trachea which appears to be related to mass effect caused by esophageal distention/mass. 2.  No pulmonary embolus.  No acute aortic injury. 3.  Coronary atherosclerosis. 4.  Renal cysts. 5.  14 mm nodule within the right lobe of the thyroid.  Correlate with 6/27/20 thyroid ultrasound.   Dictated by (CST): Dean Short MD on 8/07/2024 at 11:46 AM     Finalized by (CST): Dean Short MD on 8/07/2024 at 11:55 AM          XR CHEST AP PORTABLE  (CPT=71045)    Result Date: 8/7/2024  CONCLUSION:  Stable chest demonstrating sequela remote granulomatous disease without radiographically evident acute intrathoracic process.    Dictated by (CST): Roberto Hilario MD on 8/07/2024 at 10:13 AM     Finalized by (CST): Roberto Hilario MD on 8/07/2024 at 10:15 AM         EKG 12 Lead    Result Date: 8/7/2024  Sinus tachycardia Left axis deviation Low voltage QRS, consider pulmonary disease, pericardial effusion, or normal variant Cannot rule out Anteroseptal infarct (cited on or before 05-JUL-2024) Abnormal ECG When compared with ECG of 07-AUG-2024 09:26, has not changed Confirmed by BLESSING MACIEL, FINA ([id number]) on 8/7/2024 5:07:26 PM    EKG    Result Date: 8/7/2024  Sinus tachycardia Left axis deviation Anteroseptal infarct (cited on or before 05-JUL-2024) Abnormal ECG When  compared with ECG of 05-JUL-2024 16:13, The axis Shifted left Confirmed by BLESSING MACIEL ELMER ([id number]) on 8/7/2024 5:07:15 PM

## 2024-08-08 NOTE — PLAN OF CARE
Problem: Patient Centered Care  Goal: Patient preferences are identified and integrated in the patient's plan of care  Description: Interventions:  - What would you like us to know as we care for you?   - Provide timely, complete, and accurate information to patient/family  - Incorporate patient and family knowledge, values, beliefs, and cultural backgrounds into the planning and delivery of care  - Encourage patient/family to participate in care and decision-making at the level they choose  - Honor patient and family perspectives and choices  Outcome: Progressing     Problem: Patient/Family Goals  Goal: Patient/Family Long Term Goal  Description: Patient's Long Term Goal:     Interventions:  -  See additional Care Plan goals for specific interventions  Outcome: Progressing  Goal: Patient/Family Short Term Goal  Description: Patient's Short Term Goal:     Interventions:   - - See additional Care Plan goals for specific interventions  Outcome: Progressing     Problem: CARDIOVASCULAR - ADULT  Goal: Maintains optimal cardiac output and hemodynamic stability  Description: INTERVENTIONS:  - Monitor vital signs, rhythm, and trends  - Monitor for bleeding, hypotension and signs of decreased cardiac output  - Evaluate effectiveness of vasoactive medications to optimize hemodynamic stability  - Monitor arterial and/or venous puncture sites for bleeding and/or hematoma  - Assess quality of pulses, skin color and temperature  - Assess for signs of decreased coronary artery perfusion - ex. Angina  - Evaluate fluid balance, assess for edema, trend weights  Outcome: Progressing  Goal: Absence of cardiac arrhythmias or at baseline  Description: INTERVENTIONS:  - Continuous cardiac monitoring, monitor vital signs, obtain 12 lead EKG if indicated  - Evaluate effectiveness of antiarrhythmic and heart rate control medications as ordered  - Initiate emergency measures for life threatening arrhythmias  - Monitor electrolytes and  administer replacement therapy as ordered  Outcome: Progressing     Problem: SAFETY ADULT - FALL  Goal: Free from fall injury  Description: INTERVENTIONS:  - Assess pt frequently for physical needs  - Identify cognitive and physical deficits and behaviors that affect risk of falls.  - [place] fall precautions as indicated by assessment.  - Educate pt/family on patient safety including physical limitations  - Instruct pt to call for assistance with activity based on assessment  - Modify environment to reduce risk of injury  - Provide assistive devices as appropriate  - Consider OT/PT consult to assist with strengthening/mobility  - Encourage toileting schedule  Outcome: Progressing     Problem: GASTROINTESTINAL - ADULT  Goal: Minimal or absence of nausea and vomiting  Description: INTERVENTIONS:  - Maintain adequate hydration with IV or PO as ordered and tolerated  - Nasogastric tube to low intermittent suction as ordered  - Evaluate effectiveness of ordered antiemetic medications  - Provide nonpharmacologic comfort measures as appropriate  - Advance diet as tolerated, if ordered  - Obtain nutritional consult as needed  - Evaluate fluid balance  Outcome: Progressing  Goal: Maintains or returns to baseline bowel function  Description: INTERVENTIONS:  - Assess bowel function  - Maintain adequate hydration with IV or PO as ordered and tolerated  - Evaluate effectiveness of GI medications  - Encourage mobilization and activity  - Obtain nutritional consult as needed  - Establish a toileting routine/schedule  - Consider collaborating with pharmacy to review patient's medication profile  Outcome: Progressing  Goal: Maintains adequate nutritional intake (undernourished)  Description: INTERVENTIONS:  - Monitor percentage of each meal consumed  - Identify factors contributing to decreased intake, treat as appropriate  - Assist with meals as needed  - Monitor I&O, WT and lab values  - Obtain nutritional consult as needed  -  Optimize oral hygiene and moisture  - Encourage food from home; allow for food preferences  - Enhance eating environment  Outcome: Progressing

## 2024-08-08 NOTE — PROGRESS NOTES
[facility]  part of [facility]     Progress Note    Hortencia Kelley Patient Status:  Inpatient    10/2/1945 MRN [id number]   Location [facility] 3W/SW Attending Rico Frost MD   Hosp Day # 1 PCP No primary care provider on file.       Subjective:   Patient seen and examined.  Admits to mild improvement in dyspnea.  Denies chest pain.    Objective:   Blood pressure 91/61, pulse 70, temperature 98.3 °F (36.8 °C), temperature source Oral, resp. rate 18, height 170.2 cm (5' 7\"), weight 93 lb 3.2 oz (42.3 kg), SpO2 97%, not currently breastfeeding.  Intake/Output:   Last 3 shifts: I/O last 3 completed shifts:  In: 20 [I.V.:20]  Out: -    This shift: No intake/output data recorded.     Vent Settings:      Hemodynamic parameters (last 24 hours):      Scheduled Meds:   Current Facility-Administered Medications   Medication Dose Route Frequency    ipratropium-albuterol (Duoneb) 0.5-2.5 (3) MG/3ML inhalation solution 3 mL  3 mL Nebulization Q6H PRN    heparin (Porcine) 82344 units/250mL infusion ACS/AFIB CONTINUOUS  200-3,000 Units/hr Intravenous Continuous    acetaminophen (Tylenol Extra Strength) tab 500 mg  500 mg Oral Q4H PRN    ondansetron (Zofran) 4 MG/2ML injection 4 mg  4 mg Intravenous Q6H PRN    metoclopramide (Reglan) 5 mg/mL injection 5 mg  5 mg Intravenous Q8H PRN    temazepam (Restoril) cap 15 mg  15 mg Oral Nightly PRN    guaiFENesin (Robitussin) 100 MG/5 ML oral liquid 200 mg  200 mg Oral Q4H PRN    benzonatate (Tessalon) cap 200 mg  200 mg Oral TID PRN    aspirin tab 325 mg  325 mg Oral Daily    nitroglycerin (Nitrostat) SL tab 0.4 mg  0.4 mg Sublingual Q5 Min PRN    ipratropium-albuterol (Duoneb) 0.5-2.5 (3) MG/3ML inhalation solution 3 mL  3 mL Nebulization Q6H PRN    methylPREDNISolone sodium succinate (Solu-MEDROL) injection 40 mg  40 mg Intravenous Q8H    potassium chloride 20 mEq in dextrose 5%-sodium chloride 0.9% 1000mL infusion premix   Intravenous Continuous     pantoprazole (Protonix) 40 mg in sodium chloride 0.9% PF 10 mL IV push  40 mg Intravenous Q12H    revefenacin (Yupelri) 175 MCG/3ML nebulizer solution 175 mcg  175 mcg Nebulization Daily    budesonide (Pulmicort) 0.25 MG/2ML nebulizer suspension 0.25 mg  0.25 mg Nebulization BID       Continuous Infusions:    continuous dose heparin 700 Units/hr (08/08/24 0405)    potassium chloride in dextrose 5%-sodium chloride 0.9% 100 mL/hr at 08/08/24 0507       Physical Exam  Constitutional: no acute distress  Eyes: PERRL  ENT: nares pateint  Neck: supple, no JVD  Cardio: RRR, S1 S2  Respiratory: Faint Rales  GI: abdomen soft, non tender, active bowel sounds, no organomegaly  Extremities: no clubbing, cyanosis, edema  Neurologic: no gross motor deficits  Skin: warm, dry      Results:     Lab Results   Component Value Date    WBC 8.4 08/08/2024    HGB 9.3 08/08/2024    HCT 28.2 08/08/2024    .0 08/08/2024    CREATSERUM 0.63 08/08/2024    BUN 19 08/08/2024     08/08/2024    K 4.4 08/08/2024     08/08/2024    CO2 28.0 08/08/2024     08/08/2024    CA 8.9 08/08/2024    PTT 48.5 08/08/2024       CT CHEST PE AORTA (IV ONLY) (CPT=71260)    Result Date: 8/7/2024  CONCLUSION:  1.  There is a soft tissue focus involving the upper half of the esophagus measuring approximately 30 x 33 x 56 mm.  It causes upstream esophageal dilatation.  This is concerning for esophageal neoplasm.  Prominent lymph nodes and/or exophytic nodules within the mediastinum concerning for metastasis.  There is effacement and narrowing of the trachea which appears to be related to mass effect caused by esophageal distention/mass. 2.  No pulmonary embolus.  No acute aortic injury. 3.  Coronary atherosclerosis. 4.  Renal cysts. 5.  14 mm nodule within the right lobe of the thyroid.  Correlate with 6/27/20 thyroid ultrasound.   Dictated by (CST): Dean Short MD on 8/07/2024 at 11:46 AM     Finalized by (CST): Dean Short MD on 8/07/2024 at  11:55 AM          XR CHEST AP PORTABLE  (CPT=71045)    Result Date: 8/7/2024  CONCLUSION:  Stable chest demonstrating sequela remote granulomatous disease without radiographically evident acute intrathoracic process.    Dictated by (CST): Roberto Hilario MD on 8/07/2024 at 10:13 AM     Finalized by (CST): Roberto Hilario MD on 8/07/2024 at 10:15 AM           EKG 12 Lead    Result Date: 8/7/2024  Sinus tachycardia Left axis deviation Low voltage QRS, consider pulmonary disease, pericardial effusion, or normal variant Cannot rule out Anteroseptal infarct (cited on or before 05-JUL-2024) Abnormal ECG When compared with ECG of 07-AUG-2024 09:26, has not changed Confirmed by BLESSING MACIEL ELMER ([id number]) on 8/7/2024 5:07:26 PM    EKG    Result Date: 8/7/2024  Sinus tachycardia Left axis deviation Anteroseptal infarct (cited on or before 05-JUL-2024) Abnormal ECG When compared with ECG of 05-JUL-2024 16:13, The axis Shifted left Confirmed by BLESSING MACIEL ELMER ([id number]) on 8/7/2024 5:07:15 PM     Assessment   1.  Esophageal mass  2.  Left vocal cord paralysis  3.  COPD with acute exacerbation  4.  Non-ST elevation myocardial infarction  5.  Prior nicotine dependence     Plan   -Patient presents with evidence of ongoing cough hoarseness weight loss with some associated chest pain.  -CT chest on presentation with mass seen within the mid/proximal esophagus concerning for esophageal neoplasm.  Effacement and narrowing of the trachea secondary to mass  -Eval by GI.  Plan for EGD with biopsies once cleared from cardiac perspective  -Concern for COPD exacerbation  -Wean steroid therapy  -Nebulizer treatments  -Further pulmonary disease per cardiology.  Patient on heparin gtt.    Prakash Quispe DO  Pulmonary Critical Care Medicine  [facility]

## 2024-08-08 NOTE — PAYOR COMM NOTE
--------------  ADMISSION REVIEW     Payor: [employer]  Subscriber #:  [id number]  Authorization Number: [id number]    Admit date: 8/7/24  Admit time:  1:23 PM       REVIEW DOCUMENTATION:    Patient Seen in: [facility] Emergency Department      History     Chief Complaint   Patient presents with    Difficulty Breathing     Stated Complaint:     Subjective:   HPI    78-year-old female with past medical history notable for anemia, depression, COPD presenting to the emergency department with complaints of shortness of breath and wheezing.    Patient states that symptoms have been intermittent over the past 2 months.  Patient states that she has had similar symptoms in the past which have been improved with steroids and inhalers.  Patient states that she noticed this episode occurring overnight.  States that symptoms have been ongoing worsening through this morning causing her to present to the emergency department.  Did not take any medications prior to presenting.  Does not report any specific aggravating relieving factors.  Denies acute fall or trauma otherwise.  Does report dull chest pain ongoing.  Denies exacerbation of chest pain symptoms with activity.      Objective:   Past Medical History:    Depression    ETOH abuse    Falls frequently    12/27/2017    History of blood transfusion    [facility]      Physical Exam     ED Triage Vitals [08/07/24 0909]   BP (!) 137/95   Pulse 118   Resp (!) 30   Temp 99.1 °F (37.3 °C)   Temp src Oral   SpO2 97 %   O2 Device None (Room air)       Current Vitals:   Vital Signs  BP: 97/69  Pulse: 101  Resp: 20  Temp: 97.3 °F (36.3 °C)  Temp src: Oral  MAP (mmHg): 79    Oxygen Therapy  SpO2: 97 %  O2 Device: None (Room air)      Physical Exam    Physical Exam:   BP 97/69 (BP Location: Right arm)   Pulse 101   Temp 97.3 °F (36.3 °C) (Oral)   Resp 20   Ht 170.2 cm (5' 7\")   Wt 41.5 kg   SpO2 97%   BMI 14.33 kg/m²  - I reviewed these vital signs    Constitutional: Pt is  well appearing, in no distress  HEENT: Normocephalic/Atraumatic, EOM grossly normal, Conjunctiva Clear, MMM   Neck: Range of motion intact  Lungs: Diffuse inspiratory and expiratory wheezing noted in all lung fields, tachypneic, in no respiratory distress, no retractions noted  Cardiovascular: RRR: Yes, equal pulses  Abdominal: No discomfort to palpation   Back: No costo-vertebral angle discomfort on percussion, no point spinal ttp, no erythema or rash  Rectal: deferred  : deferred  Musculoskeletal: No deformities noted, No cyanosis/clubbing noted, No tenderness to palpation  Neurologic: A&O x 3, Speech clear, No facial asymmetry noted, Equal strength and sensation in extremities appreciated  Psychiatric: Mood and affect are appropriate, Speech not pressured, Thought process is logical  Skin: Warm and dry, No rash      ED Course     Labs Reviewed   CBC WITH DIFFERENTIAL WITH PLATELET - Abnormal; Notable for the following components:       Result Value    WBC 14.6 (*)     HGB 11.5 (*)     Neutrophil Absolute Prelim 11.24 (*)     Neutrophil Absolute 11.24 (*)     Monocyte Absolute 1.02 (*)     All other components within normal limits   BASIC METABOLIC PANEL (8) - Abnormal; Notable for the following components:    Glucose 136 (*)     BUN 24 (*)     BUN/CREA Ratio 34.8 (*)     All other components within normal limits   TROPONIN I HIGH SENSITIVITY - Abnormal; Notable for the following components:    Troponin I (High Sensitivity) 1,223 (*)     All other components within normal limits   LIPID PANEL - Abnormal; Notable for the following components:    HDL Cholesterol 64 (*)     LDL Cholesterol 106 (*)     All other components within normal limits   TROPONIN I HIGH SENSITIVITY - Abnormal; Notable for the following components:    Troponin I (High Sensitivity) 1,407 (*)     All other components within normal limits   PTT, ACTIVATED - Normal   SARS-COV-2/FLU A AND B/RSV BY PCR (GENEXPERT) - Normal     ED Course as of  08/07/24 1624  ------------------------------------------------------------  Time: 08/07 0946  Comment: EKG read and interpreted by me showing rate 108, , QRS 66, QTc 434, sinus tachycardia, no STEMI.  ------------------------------------------------------------  Time: 08/07 1015  Comment: Troponin elevated at 1223.  Patient was given aspirin in the emergency department.  ------------------------------------------------------------  Time: 08/07 1019  Comment: X-ray imaging read and interpreted me showing no evidence of acute pleural effusion.  Radiology read agreeable noting no pleural effusion, consolidation, pneumothorax.   Cranial numbness calcifications noted at left lung base.  Cardiomediastinal silhouette not enlarged.  Mild tortuosity of thoracic aorta noted.  No visible mass.  Pulmonary vascularity within normal limits. Unlikely acute pulmonary infectious etiology  ------------------------------------------------------------  Time: 08/07 1050  Comment: Repeat EKG to be obtained to ensure no interval changes.  ------------------------------------------------------------  Time: 08/07 1051  Comment: Heparin ordered in the emergency department after discussion with admitting physician.  Cardiology consulted.  ------------------------------------------------------------  Time: 08/07 1056  Comment: Pulmonology consulted as well.  CT PE ordered per request of admitting physician.  ------------------------------------------------------------  Time: 08/07 1057  Comment: Repeat EKG read and interpreted by me showing rate 105, , QRS 68, QTc 494, sinus tachycardia, no STEMI  ------------------------------------------------------------  Time: 08/07 1157  Comment: CT PE with soft tissue focus involving upper half of the esophagus concerning for esophageal neoplasm.  Prominent lymph nodes within the mediastinum concerning for metastasis.  Effacement and narrowing of trachea which appears to be related to mass  effect.  No PE.  14 mm nodule within the right lobe of thyroid noted.  Coronary atherosclerosis noted.  Renal cyst noted.     CT results discussed with admitting physician.  Gastroenterology consulted as well.  Concern for metastasis.     At this time suspect NSTEMI as a cause of the patient's dull chest pain complaints and elevated troponin.  Discomfort may be related to newly identified metastasis as well.  Suspect COPD exacerbation as a cause for the patient's wheezing and shortness of breath symptoms.    Disposition and Plan     Clinical Impression:  1. NSTEMI (non-ST elevated myocardial infarction) (HCC)         Disposition:  Admit  8/7/2024 11:58 am    Signed by Se Mclain, DO on 8/7/2024  4:28 PM         HISTORY AND PHYSICAL    ASSESSMENT AND PLAN:    1.       Hoarseness, weight loss, and history of chronic tobacco use, rule out underlying malignancy.  2.       Non-ST-elevation myocardial infarction.  3.       Component of chronic obstructive pulmonary disease exacerbation.     Patient will be admitted to telemetry floor.  Obtain CT scan of the chest.  Rule out PE.  Start IV heparin, oral aspirin.  Trend troponin level.  Obtain 2D echocardiogram with Doppler.  DuoNeb and Solu-Medrol.  Monitor her respiratory and hemodynamic status.  Cardiology and Pulmonary consults.  Bed rest.       Gastroenterology Consultation Note    Assessment & Plan   Hortencia Kelley is a a(n) 78 year old female w/ a history of COPD, former smoker with 20 pack year smoking history, depression, ETOH abuse, who presents with shortness of breath, weight loss, and dysphagia. 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 Will plan EGD during admission for biopsy and tissue diagnosis once stabilized from cardiac stance given NSTEMI. Ok for clear liquids from my stance.     PULMONARY CONSULT       Date of Admission:  8/7/2024     Reason  for Consultation:   COPD     History of Present Illness:   Patient is a 78-year-old female with prior history of tobacco abuse, COPD presenting with chief complaint of ongoing cough, dysphagia, hoarseness with some associated chest pain.  Troponin elevation on presentation emergency department.  Admits to 10 pound weight loss over the course of the last month.  CT chest with 5 cm mass seen within the esophagus.  Some occasional wheezing.  Saturation stable.  Hemodynamically stable    Assessment   1.  Esophageal mass  2.  Hoarseness  3.  COPD with acute exacerbation  4.  Non-ST elevation myocardial infarction  5.  Prior nicotine dependence     Plan   -Patient presents with evidence of ongoing cough hoarseness weight loss with some associated chest pain.  -CT chest on presentation with mass seen within the mid/proximal esophagus concerning for esophageal neoplasm.  Effacement and narrowing of the trachea secondary to mass  -Eval by GI.  Plan for EGD with biopsies once cleared from cardiac perspective  -Concern for COPD exacerbation  -IV steroids and gradually wean  -Nebulizer treatments      CARDS CONSULT      Assessment:    NSTEMI, troponin  Shortness of breath  Likely esophageal malignancy, CT with esophageal mass measuring 3 x 3.3 x 5.6cm  Coronary artery atherosclerosis on CT        Plan:  78-year-old  female with above history presenting with shortness of breath and wheezing, admitted for evaluation of NSTEMI and new esophageal mass.  Recommend admission for cardiac workup, echo and ischemic evaluation  GI consultation for esophageal mass evaluation/biopsy  Continue heparin infusion    REVIEW DOCUMENTATION  8-8-24      Feels wheezing is improved, nearly resolved. Denies cp, sob. Reports that she would get chest pain when she would choke on food. Otherwise, no cardiac complaints.     Lab 08/08/24  0522   *   BUN 19   CREATSERUM 0.63   EGFRCR 92   CA 8.9      K 4.4      CO2 28.0          Recent  Labs   Lab 08/08/24  0522   RBC 3.20*   HGB 9.3*   HCT 28.2*   MCV 88.1   MCH 29.1   MCHC 33.0   RDW 12.9   NEPRELIM 7.50   WBC 8.4   .0     Physical Exam:    Gen: alert, oriented x 3, NAD, frail  Heent: pupils equal, reactive. Mucous membranes moist. Hoarse voice.  Neck: no jvd  Cardiac: regular rate and rhythm, normal S1,S2  Lungs: dim, no active wheezing heard.  Abd: soft, NT/ND +bs  Ext: no edema  Skin: Warm, dry  Neuro: No focal deficits    Medication Infusions    continuous dose heparin 700 Units/hr (08/08/24 0405)    potassium chloride in dextrose 5%-sodium chloride 0.9% 100 mL/hr at 08/08/24 0507                  Assessment:  SOB, wheezing, elevated troponin.   CTA negative for PE, coronary artery atherosclerosis on CT  Echo with LVEF 20-25% and akinesis of entire apical wall (previously normal LVEF 2020)  Trop 1223->1407-->703  Esophageal mass on CT concerning for malignancy, prominent mediastinal lymph nodes   GI following. Will need EGD with biopsy  Has had voice hoarseness and dysphagia   COPD with exacerbation  Pulm following. Steroids, nebs.   Etoh abuse   Mild-mod AI  Hx tobacco use--stopped ~5 years ago     Plan:  Echo 8/7/24 with LVEF 20-25%, akinesis of entire apical wall.   Tx of copd as per pulm. Currently on steroids, nebs.   Cont heparin gtt. Troponin is trending down.   Given new reduction in LVEF, will need ischemic work up, will discuss timing with MD given acute respiratory issues which are improving.   Tele nsr pvcs, brief episodes of pat. Will start low dose bb.   GI planning for EGD with biopsy once cleared from cardiology perspective.             MEDICATIONS ADMINISTERED IN LAST 1 DAY:  aspirin tab 325 mg       Date Action Dose Route User    8/8/2024 0919 Given 325 mg Oral Tiffanie Clarke RN          heparin (Porcine) 1000 UNIT/ML injection - BOLUS IV 2,500 Units       Date Action Dose Route User    8/7/2024 1151 Given 2,500 Units Intravenous Radha Fiore RN           budesonide (Pulmicort) 0.25 MG/2ML nebulizer suspension 0.25 mg       Date Action Dose Route User    8/8/2024 0806 Given 0.25 mg Nebulization Galdino Dumont RCP          budesonide (Pulmicort) 0.5 MG/2ML nebulizer suspension       Date Action Dose Route User    8/7/2024 2002 Given 0.5 mg (none) Nasir Patrick RCP          heparin (Porcine) 45190 units/250mL infusion ACS/AFIB CONTINUOUS       Date Action Dose Route User    8/8/2024 1046 Hi-Risk Rate/Dose Change 800 Units/hr Intravenous Tiffanie Clarke RN    8/8/2024 0405 Hi-Risk Rate/Dose Change 700 Units/hr Intravenous Chanel Malcolm RN    8/7/2024 2127 Hi-Risk Rate/Dose Change 600 Units/hr Intravenous Chanel Malcolm RN          heparin (Porcine) 18388 units/250mL infusion ED INITIAL DOSE       Date Action Dose Route User    8/7/2024 1151 New Bag 12 Units/kg/hr × 41.5 kg Intravenous Radha Fiore RN          heparin (Porcine) 1000 UNIT/ML injection 1,250 Units       Date Action Dose Route User    8/7/2024 2126 Given 1,250 Units Intravenous Chanel Malcolm RN          iopamidol 76% (ISOVUE-370) injection for power injector       Date Action Dose Route User    8/7/2024 1140 Given 50 mL Intravenous Julian, Trevon          potassium chloride 20 mEq in dextrose 5%-sodium chloride 0.9% 1000mL infusion premix       Date Action Dose Route User    8/8/2024 0507 New Bag (none) Intravenous Chanel Malcolm RN    8/7/2024 1729 New Bag (none) Intravenous Tiffanie Clarke RN          methylPREDNISolone sodium succinate (Solu-MEDROL) injection 40 mg       Date Action Dose Route User    8/8/2024 0512 Given by Other 40 mg Intravenous Chanel Malcolm RN    8/7/2024 2127 Given 40 mg Intravenous Chanel Malcolm RN    8/7/2024 1731 Given 40 mg Intravenous Tiffanie Clarke RN          pantoprazole (Protonix) 40 mg in sodium chloride 0.9% PF 10 mL IV push       Date Action Dose Route User    8/8/2024 0512 Given by Other 40 mg Intravenous Chanel Malcolm, RN    8/7/2024 7730  Given 40 mg Intravenous Tiffanie Clarke, RN          Perflutren Lipid Microsphere (DEFINITY) 6.52 MG/ML injection 1.5 mL       Date Action Dose Route User    8/7/2024 1632 Given 1.5 mL Intravenous Abi Llanos          revefenacin (Yupelri) 175 MCG/3ML nebulizer solution 175 mcg       Date Action Dose Route User    8/8/2024 0812 Given 175 mcg Nebulization Galdino Dumont, RADHA            Vitals (last day)       Date/Time Temp Pulse Resp BP SpO2 Weight O2 Device O2 Flow Rate (L/min) [facility]    08/08/24 0915 98.3 °F (36.8 °C) 70 18 91/61 97 % -- None (Room air) --     08/08/24 0627 -- -- -- -- -- 93 lb 3.2 oz (42.3 kg) -- --     08/07/24 1056 -- -- -- -- -- 91 lb 7.9 oz (41.5 kg) -- --     08/07/24 1000 -- 101 26 -- 94 % -- -- --     08/07/24 0909 99.1 °F (37.3 °C) 118 30 137/95 97 % 90 lb (40.8 kg) None (Room air) -- [place]

## 2024-08-08 NOTE — PROGRESS NOTES
Progress Note  Hortencia Kelley Patient Status:  Inpatient    10/2/1945 MRN [id number]   Location [facility] 3W/SW Attending Rico Frost MD   Hosp Day # 1 PCP No primary care provider on file.     Subjective:  In bed on exam. No acute distress. Feels wheezing is improved, nearly resolved. Denies cp, sob. Reports that she would get chest pain when she would choke on food. Otherwise, no cardiac complaints.     Objective:  BP 91/61 (BP Location: Right arm)   Pulse 70   Temp 98.3 °F (36.8 °C) (Oral)   Resp 18   Ht 5' 7\" (1.702 m)   Wt 93 lb 3.2 oz (42.3 kg)   SpO2 97%   BMI 14.60 kg/m²     Telemetry: nsr, episodes of pat      Intake/Output:    Intake/Output Summary (Last 24 hours) at 2024 1005  Last data filed at 2024 2127  Gross per 24 hour   Intake 20 ml   Output --   Net 20 ml       Last 3 Weights   24 0627 93 lb 3.2 oz (42.3 kg)   24 1358 91 lb 7.9 oz (41.5 kg)   24 1056 91 lb 7.9 oz (41.5 kg)   24 0909 90 lb (40.8 kg)   24 1046 90 lb (40.8 kg)   24 1604 105 lb (47.6 kg)       Labs:  Recent Labs   Lab 24  0916 24  0522   * 148*   BUN 24* 19   CREATSERUM 0.69 0.63   EGFRCR 90 92   CA 9.4 8.9    141   K 3.7 4.4    107   CO2 27.0 28.0     Recent Labs   Lab 24  0916 24  0522   RBC 3.99 3.20*   HGB 11.5* 9.3*   HCT 35.8 28.2*   MCV 89.7 88.1   MCH 28.8 29.1   MCHC 32.1 33.0   RDW 12.7 12.9   NEPRELIM 11.24* 7.50   WBC 14.6* 8.4   .0 283.0         Recent Labs   Lab 24  0916 24  1115 24  1938   TROPHS 1,223* 1,407* 703*       Review of Systems   Cardiovascular: Negative.    Respiratory: Negative.         Physical Exam:    Gen: alert, oriented x 3, NAD, frail  Heent: pupils equal, reactive. Mucous membranes moist. Hoarse voice.  Neck: no jvd  Cardiac: regular rate and rhythm, normal S1,S2  Lungs: dim, no active wheezing heard.  Abd: soft, NT/ND +bs  Ext: no edema  Skin: Warm, dry  Neuro: No  focal deficits        Medications:     methylPREDNISolone  40 mg Intravenous Q12H    aspirin  325 mg Oral Daily    pantoprazole  40 mg Intravenous Q12H    revefenacin  175 mcg Nebulization Daily    budesonide  0.25 mg Nebulization BID      continuous dose heparin 700 Units/hr (08/08/24 0405)    potassium chloride in dextrose 5%-sodium chloride 0.9% 100 mL/hr at 08/08/24 0507           Assessment:  SOB, wheezing, elevated troponin.   CTA negative for PE, coronary artery atherosclerosis on CT  Echo with LVEF 20-25% and akinesis of entire apical wall (previously normal LVEF 2020)  Trop 1223->1407-->703  Esophageal mass on CT concerning for malignancy, prominent mediastinal lymph nodes   GI following. Will need EGD with biopsy  Has had voice hoarseness and dysphagia   COPD with exacerbation  Pulm following. Steroids, nebs.   Etoh abuse   Mild-mod AI  Hx tobacco use--stopped ~5 years ago    Plan:  Echo 8/7/24 with LVEF 20-25%, akinesis of entire apical wall.   Tx of copd as per pulm. Currently on steroids, nebs.   Cont heparin gtt. Troponin is trending down.   Given new reduction in LVEF, will need ischemic work up, will discuss timing with MD given acute respiratory issues which are improving.   Tele nsr pvcs, brief episodes of pat. Will start low dose bb.   GI planning for EGD with biopsy once cleared from cardiology perspective.     Plan of care discussed with patient, RN.    Flory Granados, MARQUIS  8/8/2024  10:05 AM        Cardiology addendum:  Pt examined and assessed independently.Agree with above.  Patient presently denies chest pain and shortness of breath improved.  Patient still on IV heparin.  Started on beta-blockers which she is thus far tolerating.  Spoke with GI who is ok with patient being on aspirin and Plavix if needed before EGD biopsy.  With patient's presentation and MI would proceed with angiogram tomorrow to assess degree of coronary disease and see if patient a candidate for PCI.  Continue  heparin for now.  With blood pressure low I will not add ACE or ARB at this time.  May consider in the future.  Patient understands and agrees with plan    Flako Adkins MD FACC  L3

## 2024-08-09 ENCOUNTER — APPOINTMENT (OUTPATIENT)
Dept: INTERVENTIONAL RADIOLOGY/VASCULAR | Facility: HOSPITAL | Age: 79
End: 2024-08-09
Attending: NURSE PRACTITIONER
Payer: MEDICARE

## 2024-08-09 LAB
ANION GAP SERPL CALC-SCNC: 5 MMOL/L (ref 0–18)
APTT PPP: 154.4 SECONDS (ref 23–36)
BASOPHILS # BLD AUTO: 0.02 X10(3) UL (ref 0–0.2)
BASOPHILS NFR BLD AUTO: 0.1 %
BUN BLD-MCNC: 18 MG/DL (ref 9–23)
BUN/CREAT SERPL: 26.9 (ref 10–20)
CALCIUM BLD-MCNC: 8.9 MG/DL (ref 8.7–10.4)
CHLORIDE SERPL-SCNC: 112 MMOL/L (ref 98–112)
CO2 SERPL-SCNC: 27 MMOL/L (ref 21–32)
CREAT BLD-MCNC: 0.67 MG/DL
DEPRECATED RDW RBC AUTO: 43.2 FL (ref 35.1–46.3)
EGFRCR SERPLBLD CKD-EPI 2021: 91 ML/MIN/1.73M2 (ref 60–?)
EOSINOPHIL # BLD AUTO: 0 X10(3) UL (ref 0–0.7)
EOSINOPHIL NFR BLD AUTO: 0 %
ERYTHROCYTE [DISTWIDTH] IN BLOOD BY AUTOMATED COUNT: 13.2 % (ref 11–15)
GLUCOSE BLD-MCNC: 151 MG/DL (ref 70–99)
GLUCOSE BLDC GLUCOMTR-MCNC: 142 MG/DL (ref 70–99)
HCT VFR BLD AUTO: 29.4 %
HGB BLD-MCNC: 9.7 G/DL
IMM GRANULOCYTES # BLD AUTO: 0.11 X10(3) UL (ref 0–1)
IMM GRANULOCYTES NFR BLD: 0.7 %
LYMPHOCYTES # BLD AUTO: 0.87 X10(3) UL (ref 1–4)
LYMPHOCYTES NFR BLD AUTO: 5.2 %
MCH RBC QN AUTO: 29.6 PG (ref 26–34)
MCHC RBC AUTO-ENTMCNC: 33 G/DL (ref 31–37)
MCV RBC AUTO: 89.6 FL
MONOCYTES # BLD AUTO: 0.87 X10(3) UL (ref 0.1–1)
MONOCYTES NFR BLD AUTO: 5.2 %
NEUTROPHILS # BLD AUTO: 14.73 X10 (3) UL (ref 1.5–7.7)
NEUTROPHILS # BLD AUTO: 14.73 X10(3) UL (ref 1.5–7.7)
NEUTROPHILS NFR BLD AUTO: 88.8 %
OSMOLALITY SERPL CALC.SUM OF ELEC: 303 MOSM/KG (ref 275–295)
PLATELET # BLD AUTO: 293 10(3)UL (ref 150–450)
POTASSIUM SERPL-SCNC: 4.6 MMOL/L (ref 3.5–5.1)
RBC # BLD AUTO: 3.28 X10(6)UL
SODIUM SERPL-SCNC: 144 MMOL/L (ref 136–145)
WBC # BLD AUTO: 16.6 X10(3) UL (ref 4–11)

## 2024-08-09 PROCEDURE — 99232 SBSQ HOSP IP/OBS MODERATE 35: CPT | Performed by: INTERNAL MEDICINE

## 2024-08-09 PROCEDURE — 99233 SBSQ HOSP IP/OBS HIGH 50: CPT | Performed by: HOSPITALIST

## 2024-08-09 PROCEDURE — 99233 SBSQ HOSP IP/OBS HIGH 50: CPT | Performed by: INTERNAL MEDICINE

## 2024-08-09 PROCEDURE — 4A023N7 MEASUREMENT OF CARDIAC SAMPLING AND PRESSURE, LEFT HEART, PERCUTANEOUS APPROACH: ICD-10-PCS | Performed by: INTERNAL MEDICINE

## 2024-08-09 PROCEDURE — B2151ZZ FLUOROSCOPY OF LEFT HEART USING LOW OSMOLAR CONTRAST: ICD-10-PCS | Performed by: INTERNAL MEDICINE

## 2024-08-09 PROCEDURE — B2111ZZ FLUOROSCOPY OF MULTIPLE CORONARY ARTERIES USING LOW OSMOLAR CONTRAST: ICD-10-PCS | Performed by: INTERNAL MEDICINE

## 2024-08-09 RX ORDER — MIDAZOLAM HYDROCHLORIDE 1 MG/ML
INJECTION INTRAMUSCULAR; INTRAVENOUS
Status: COMPLETED
Start: 2024-08-09 | End: 2024-08-09

## 2024-08-09 RX ORDER — METOPROLOL SUCCINATE 25 MG/1
25 TABLET, EXTENDED RELEASE ORAL
Status: DISCONTINUED | OUTPATIENT
Start: 2024-08-09 | End: 2024-08-17

## 2024-08-09 RX ORDER — VERAPAMIL HYDROCHLORIDE 2.5 MG/ML
INJECTION, SOLUTION INTRAVENOUS
Status: COMPLETED
Start: 2024-08-09 | End: 2024-08-09

## 2024-08-09 RX ORDER — MIDAZOLAM HYDROCHLORIDE 1 MG/ML
INJECTION INTRAMUSCULAR; INTRAVENOUS
Status: DISCONTINUED
Start: 2024-08-09 | End: 2024-08-09 | Stop reason: WASHOUT

## 2024-08-09 RX ORDER — NITROGLYCERIN 20 MG/100ML
INJECTION INTRAVENOUS
Status: COMPLETED
Start: 2024-08-09 | End: 2024-08-09

## 2024-08-09 RX ORDER — HEPARIN SODIUM 1000 [USP'U]/ML
INJECTION, SOLUTION INTRAVENOUS; SUBCUTANEOUS
Status: COMPLETED
Start: 2024-08-09 | End: 2024-08-09

## 2024-08-09 RX ORDER — LIDOCAINE HYDROCHLORIDE 20 MG/ML
INJECTION, SOLUTION EPIDURAL; INFILTRATION; INTRACAUDAL; PERINEURAL
Status: COMPLETED
Start: 2024-08-09 | End: 2024-08-09

## 2024-08-09 NOTE — PROGRESS NOTES
[facility]  Progress Note     Hortencia Kelley  : 10/2/1945    Status: Inpatient  Day #: 2    Attending: Rico Frost MD  PCP: No primary care provider on file.     Assessment and Plan:    NSTEMI type II - elevated troponin and new cardiomyopathy  Takotsubo cardiomyopathy  -cardiology on consult  -echo EF - 20-25%, akinesis of apical wall  -cardiac cath without CAD suggesting takotsubo  -heparin drip stopped  -cont asa. Entresto started.   -ok for GI w/u per cardiology    Esophageal mass concerning for malignancy  Dysphagia to solids  Wt loss  -GI on consult  -EGD per GI  -CLD    AECOPD  H/o L vocal cord paralysis  -pulm on consult  -cont nebs  -cont steroids and wean    Anemia  -EGD/malignancy w/u  Dietitian Malnutrition Assessment        Evaluation for Malnutrition: Criteria for non-severe malnutrition diagnosis-         acute illness/injury related to Wt loss 5% in 1 month., Body fat mild depletion., Muscle mass mild depletion.         RD Malnutrition Care Plan: Encouraged increased PO intake., Encouraged small frequent meals with emphasis on high calorie/high protein., Intiated ONS (oral nutritional supplements).    Body Fat/Muscle Mass: Mild depletion body fat., Mild depletion muscle mass. triceps region. temple region., clavicle region., shoulder region., dorsal monsalve region., thigh region., calf region.    Physician Assessment    Patient has a diagnosis of moderate malnutrition     DVT Mechanical Prophylaxis:        DVT Pharmacologic Prophylaxis   Medication   None         DVT Pharmacologic prophylaxis: Aspirin 81 mg     Subjective:      Initial Chief Complaint:  cough, chest pain    No CP. No SOB currently. Tolerating CLD.    10 point ROS completed and was negative, except for pertinent positive and negatives stated in subjective.      Objective:      Temp:  [97.3 °F (36.3 °C)-98.2 °F (36.8 °C)] 97.9 °F (36.6 °C)  Pulse:  [44-78] 71  Resp:  [16-18] 18  BP: ()/(51-92) 110/92  SpO2:   [95 %-99 %] 95 %  General:  Alert, no distress  HEENT:  Normocephalic, atraumatic  Cardiac:  Regular rate, regular rhythm  Pulmonary:  Clear to auscultation bilaterally, respirations unlabored  Gastrointestinal:  Soft, non-tender, normal bowel sounds  Musculoskeletal:  No joint swelling  Extremities:  No edema, no cyanosis, no clubbing  Neurologic:  nonfocal  Psychiatric:  Normal affect, calm and appropriate    Intake/Output Summary (Last 24 hours) at 8/9/2024 1410  Last data filed at 8/9/2024 0900  Gross per 24 hour   Intake 530 ml   Output 100 ml   Net 430 ml         Recent Labs   Lab 08/07/24  0916 08/08/24  0522 08/09/24  0528   WBC 14.6* 8.4 16.6*   HGB 11.5* 9.3* 9.7*   HCT 35.8 28.2* 29.4*   .0 283.0 293.0   RBC 3.99 3.20* 3.28*   MCV 89.7 88.1 89.6   MCH 28.8 29.1 29.6   MCHC 32.1 33.0 33.0   RDW 12.7 12.9 13.2   NEPRELIM 11.24* 7.50 14.73*     Recent Labs   Lab 08/07/24  0916 08/07/24  1058 08/08/24  0522 08/08/24  1011 08/08/24  1708 08/09/24  0528   BUN 24*  --  19  --   --  18   CREATSERUM 0.69  --  0.63  --   --  0.67   CA 9.4  --  8.9  --   --  8.9     --  141  --   --  144   K 3.7  --  4.4  --   --  4.6     --  107  --   --  112   CO2 27.0  --  28.0  --   --  27.0   *  --  148*  --   --  151*   PTT  --    < >  --  45.2* 73.0* 154.4*    < > = values in this interval not displayed.       No results found.      Medications:   sacubitril-valsartan  1 tablet Oral BID    metoprolol succinate  25 mg Oral 2x Daily(Beta Blocker)    methylPREDNISolone  40 mg Intravenous Q12H    aspirin  81 mg Oral Daily    pantoprazole  40 mg Intravenous Q12H    revefenacin  175 mcg Nebulization Daily    budesonide  0.25 mg Nebulization BID      PRN Meds:   ipratropium-albuterol    acetaminophen    ondansetron    metoclopramide    temazepam    guaiFENesin    benzonatate    nitroglycerin    ipratropium-albuterol    Supplementary Documentation:        MDM High. Time spent on chart/note review, review of  labs/imaging, discussion with patient, physical exam, discussion with staff, consultants, coordinating care, writing progress note, discussion of plan of care.      Rico Frost MD

## 2024-08-09 NOTE — PROGRESS NOTES
[facility]  part of [facility]     Progress Note    Hortencia Kelley Patient Status:  Inpatient    10/2/1945 MRN [id number]   Location [facility] 3W/SW Attending Rico Frost MD   Hosp Day # 2 PCP No primary care provider on file.       Subjective:   Patient seen and examined.  Dyspnea continues to improve per patient    Objective:   Blood pressure 108/68, pulse 74, temperature 97.8 °F (36.6 °C), temperature source Oral, resp. rate 18, height 5' 7\" (1.702 m), weight 93 lb 11.1 oz (42.5 kg), SpO2 98%, not currently breastfeeding.  Intake/Output:   Last 3 shifts: I/O last 3 completed shifts:  In: 550 [P.O.:520; I.V.:30]  Out: 100 [Urine:100]   This shift: No intake/output data recorded.     Vent Settings:      Hemodynamic parameters (last 24 hours):      Scheduled Meds:   Current Facility-Administered Medications   Medication Dose Route Frequency    methylPREDNISolone sodium succinate (Solu-MEDROL) injection 40 mg  40 mg Intravenous Q12H    aspirin DR tab 81 mg  81 mg Oral Daily    metoprolol tartrate (Lopressor) partial tab 12.5 mg  12.5 mg Oral 2x Daily(Beta Blocker)    chlorhexidine (Hibiclens) 4 % external liquid   Topical On Call    sodium chloride 0.9% infusion   Intravenous On Call    ipratropium-albuterol (Duoneb) 0.5-2.5 (3) MG/3ML inhalation solution 3 mL  3 mL Nebulization Q6H PRN    heparin (Porcine) 31340 units/250mL infusion ACS/AFIB CONTINUOUS  200-3,000 Units/hr Intravenous Continuous    acetaminophen (Tylenol Extra Strength) tab 500 mg  500 mg Oral Q4H PRN    ondansetron (Zofran) 4 MG/2ML injection 4 mg  4 mg Intravenous Q6H PRN    metoclopramide (Reglan) 5 mg/mL injection 5 mg  5 mg Intravenous Q8H PRN    temazepam (Restoril) cap 15 mg  15 mg Oral Nightly PRN    guaiFENesin (Robitussin) 100 MG/5 ML oral liquid 200 mg  200 mg Oral Q4H PRN    benzonatate (Tessalon) cap 200 mg  200 mg Oral TID PRN    nitroglycerin (Nitrostat) SL tab 0.4 mg  0.4 mg Sublingual Q5 Min PRN     ipratropium-albuterol (Duoneb) 0.5-2.5 (3) MG/3ML inhalation solution 3 mL  3 mL Nebulization Q6H PRN    potassium chloride 20 mEq in dextrose 5%-sodium chloride 0.9% 1000mL infusion premix   Intravenous Continuous    pantoprazole (Protonix) 40 mg in sodium chloride 0.9% PF 10 mL IV push  40 mg Intravenous Q12H    revefenacin (Yupelri) 175 MCG/3ML nebulizer solution 175 mcg  175 mcg Nebulization Daily    budesonide (Pulmicort) 0.25 MG/2ML nebulizer suspension 0.25 mg  0.25 mg Nebulization BID       Continuous Infusions:    continuous dose heparin 600 Units/hr (08/09/24 0717)    potassium chloride in dextrose 5%-sodium chloride 0.9% 100 mL/hr at 08/09/24 0847       Physical Exam  Constitutional: no acute distress  Eyes: PERRL  ENT: nares pateint  Neck: supple, no JVD  Cardio: RRR, S1 S2  Respiratory: Faint Rales  GI: abdomen soft, non tender, active bowel sounds, no organomegaly  Extremities: no clubbing, cyanosis, edema  Neurologic: no gross motor deficits  Skin: warm, dry      Results:     Lab Results   Component Value Date    WBC 16.6 08/09/2024    HGB 9.7 08/09/2024    HCT 29.4 08/09/2024    .0 08/09/2024    CREATSERUM 0.67 08/09/2024    BUN 18 08/09/2024     08/09/2024    K 4.6 08/09/2024     08/09/2024    CO2 27.0 08/09/2024     08/09/2024    CA 8.9 08/09/2024    .4 08/09/2024       CT CHEST PE AORTA (IV ONLY) (CPT=71260)    Result Date: 8/7/2024  CONCLUSION:  1.  There is a soft tissue focus involving the upper half of the esophagus measuring approximately 30 x 33 x 56 mm.  It causes upstream esophageal dilatation.  This is concerning for esophageal neoplasm.  Prominent lymph nodes and/or exophytic nodules within the mediastinum concerning for metastasis.  There is effacement and narrowing of the trachea which appears to be related to mass effect caused by esophageal distention/mass. 2.  No pulmonary embolus.  No acute aortic injury. 3.  Coronary atherosclerosis. 4.  Renal  cysts. 5.  14 mm nodule within the right lobe of the thyroid.  Correlate with 6/27/20 thyroid ultrasound.   Dictated by (CST): Dean Short MD on 8/07/2024 at 11:46 AM     Finalized by (CST): Dean Short MD on 8/07/2024 at 11:55 AM          XR CHEST AP PORTABLE  (CPT=71045)    Result Date: 8/7/2024  CONCLUSION:  Stable chest demonstrating sequela remote granulomatous disease without radiographically evident acute intrathoracic process.    Dictated by (CST): Roberto Hilario MD on 8/07/2024 at 10:13 AM     Finalized by (CST): Roberto Hilario MD on 8/07/2024 at 10:15 AM           EKG 12 Lead    Result Date: 8/7/2024  Sinus tachycardia Left axis deviation Low voltage QRS, consider pulmonary disease, pericardial effusion, or normal variant Cannot rule out Anteroseptal infarct (cited on or before 05-JUL-2024) Abnormal ECG When compared with ECG of 07-AUG-2024 09:26, has not changed Confirmed by BLESSING MACIEL, FINA ([id number]) on 8/7/2024 5:07:26 PM     Assessment   1.  Esophageal mass  2.  Left vocal cord paralysis  3.  COPD with acute exacerbation  4.  Non-ST elevation myocardial infarction  5.  Prior nicotine dependence     Plan   -Patient presents with evidence of ongoing cough hoarseness weight loss with some associated chest pain.  -CT chest on presentation with mass seen within the mid/proximal esophagus concerning for esophageal neoplasm.  Effacement and narrowing of the trachea secondary to mass  -Eval by GI.  Plan for EGD with biopsies once cleared from cardiac perspective  -Concern for COPD exacerbation  -Wean steroid therapy  -Nebulizer treatments  -Further pulmonary disease per cardiology.  Patient on heparin gtt.    Prakash Quispe DO  Pulmonary Critical Care Medicine  Formerly [facility]

## 2024-08-09 NOTE — PROGRESS NOTES
[facility]     Gastroenterology Progress Note    Hortencia Kelley Patient Status:  Inpatient    10/2/1945 MRN [id number]   Location [facility] 3W/SW Attending Rico Frost MD   Hosp Day # 2 PCP No primary care provider on file.       Subjective:   No n/v. No abd pain. No melena or hematochezia.   Underwent cath this morning  Objective:   Blood pressure 92/54, pulse 65, temperature 97.3 °F (36.3 °C), temperature source Oral, resp. rate 18, height 5' 7\" (1.702 m), weight 93 lb 11.1 oz (42.5 kg), SpO2 98%, not currently breastfeeding. Body mass index is 14.67 kg/m².    General: awake, alert and oriented, no acute distress  HEENT: moist mucus membranes  PULM: no conversational dyspnea  CARDIOVASCULAR: regular rate and rhythm, the extremities are warm and well perfused  GI: soft, non-tender, non-distended, + BS, no rebound/guarding   EXTREMITIES: no edema, moving all extremities  SKIN: no visible rash  NEURO: appropriate and interactive    Assessment and Plan:   Hortencia Kelley is a a(n) 78 year old female w/ a history of COPD, former smoker with 20 pack year smoking history, depression, ETOH abuse, who presents with shortness of breath, weight loss, and dysphagia. 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    S/p cardiac cath this morning, normal coronaries.    Plan EGD tomorrow.       Ina Huddleston MD  [facility] Gastroenterology      Results:     Lab Results   Component Value Date    WBC 16.6 (H) 2024    HGB 9.7 (L) 2024    HCT 29.4 (L) 2024    .0 2024    CREATSERUM 0.67 2024    BUN 18 2024     2024    K 4.6 2024     2024    CO2 27.0 2024     (H) 2024    CA 8.9 2024    ALB 4.3 2024    ALKPHO 108 2024    BILT 0.4 2024    TP 7.4 2024    AST 13 2024    ALT <7 (L)  07/26/2024    .4 (H) 08/09/2024    INR 1.04 06/23/2020    T4F 1.2 07/26/2024    TSH 0.282 (L) 07/26/2024    MG 1.9 06/28/2020    B12 170 (L) 06/27/2020    ETOH 378 (H) 06/23/2020       No results found.

## 2024-08-09 NOTE — PLAN OF CARE
Reviewed plan of care with patient this am for [facility] today. Agreeable to proceed. Stable from cardiac perspective.     Flory Granados, APRN  8/9/2024  9:51 AM

## 2024-08-09 NOTE — CM/SW NOTE
Social work received a consult to price check the patient's Entresto.    Per the [facility]'s pharmacy, the patient's Entresto is $136.61.    Free 30 day will be provided to the patient at bedside.    DEL/JENELLE to remain available for support and/or discharge planning.     Makayla Merchant MSW, LSW  Discharge Planner [id number]

## 2024-08-09 NOTE — PLAN OF CARE
NPO since midnight for cath today, heparin gtt and IVF continued. PT reports no pain. Call light within reach, fall precautions in place.    Problem: Patient Centered Care  Goal: Patient preferences are identified and integrated in the patient's plan of care  Description: Interventions:  - What would you like us to know as we care for you?   - Provide timely, complete, and accurate information to patient/family  - Incorporate patient and family knowledge, values, beliefs, and cultural backgrounds into the planning and delivery of care  - Encourage patient/family to participate in care and decision-making at the level they choose  - Honor patient and family perspectives and choices  Outcome: Progressing     Problem: Patient/Family Goals  Goal: Patient/Family Long Term Goal  Description: Patient's Long Term Goal:     Interventions:  - - See additional Care Plan goals for specific interventions  Outcome: Progressing  Goal: Patient/Family Short Term Goal  Description: Patient's Short Term Goal:     Interventions:   - - See additional Care Plan goals for specific interventions  Outcome: Progressing     Problem: CARDIOVASCULAR - ADULT  Goal: Maintains optimal cardiac output and hemodynamic stability  Description: INTERVENTIONS:  - Monitor vital signs, rhythm, and trends  - Monitor for bleeding, hypotension and signs of decreased cardiac output  - Evaluate effectiveness of vasoactive medications to optimize hemodynamic stability  - Monitor arterial and/or venous puncture sites for bleeding and/or hematoma  - Assess quality of pulses, skin color and temperature  - Assess for signs of decreased coronary artery perfusion - ex. Angina  - Evaluate fluid balance, assess for edema, trend weights  Outcome: Progressing  Goal: Absence of cardiac arrhythmias or at baseline  Description: INTERVENTIONS:  - Continuous cardiac monitoring, monitor vital signs, obtain 12 lead EKG if indicated  - Evaluate effectiveness of antiarrhythmic and  heart rate control medications as ordered  - Initiate emergency measures for life threatening arrhythmias  - Monitor electrolytes and administer replacement therapy as ordered  Outcome: Progressing     Problem: SAFETY ADULT - FALL  Goal: Free from fall injury  Description: INTERVENTIONS:  - Assess pt frequently for physical needs  - Identify cognitive and physical deficits and behaviors that affect risk of falls.  - [place] fall precautions as indicated by assessment.  - Educate pt/family on patient safety including physical limitations  - Instruct pt to call for assistance with activity based on assessment  - Modify environment to reduce risk of injury  - Provide assistive devices as appropriate  - Consider OT/PT consult to assist with strengthening/mobility  - Encourage toileting schedule  Outcome: Progressing     Problem: GASTROINTESTINAL - ADULT  Goal: Minimal or absence of nausea and vomiting  Description: INTERVENTIONS:  - Maintain adequate hydration with IV or PO as ordered and tolerated  - Nasogastric tube to low intermittent suction as ordered  - Evaluate effectiveness of ordered antiemetic medications  - Provide nonpharmacologic comfort measures as appropriate  - Advance diet as tolerated, if ordered  - Obtain nutritional consult as needed  - Evaluate fluid balance  Outcome: Progressing  Goal: Maintains or returns to baseline bowel function  Description: INTERVENTIONS:  - Assess bowel function  - Maintain adequate hydration with IV or PO as ordered and tolerated  - Evaluate effectiveness of GI medications  - Encourage mobilization and activity  - Obtain nutritional consult as needed  - Establish a toileting routine/schedule  - Consider collaborating with pharmacy to review patient's medication profile  Outcome: Progressing  Goal: Maintains adequate nutritional intake (undernourished)  Description: INTERVENTIONS:  - Monitor percentage of each meal consumed  - Identify factors contributing to decreased  intake, treat as appropriate  - Assist with meals as needed  - Monitor I&O, WT and lab values  - Obtain nutritional consult as needed  - Optimize oral hygiene and moisture  - Encourage food from home; allow for food preferences  - Enhance eating environment  Outcome: Progressing

## 2024-08-09 NOTE — PLAN OF CARE
Patient had cardiac cath. See notes. Patient seen by GI. Plan for EDG tomorrow per Dr. Huddleston  Problem: Patient Centered Care  Goal: Patient preferences are identified and integrated in the patient's plan of care  Description: Interventions:  - What would you like us to know as we care for you? Home with family  - Provide timely, complete, and accurate information to patient/family  - Incorporate patient and family knowledge, values, beliefs, and cultural backgrounds into the planning and delivery of care  - Encourage patient/family to participate in care and decision-making at the level they choose  - Honor patient and family perspectives and choices  Outcome: Progressing     Problem: Patient/Family Goals  Goal: Patient/Family Long Term Goal  Description: Patient's Long Term Goal: improve swallowing    Interventions:  - follow plan per GI  - See additional Care Plan goals for specific interventions  Outcome: Progressing  Goal: Patient/Family Short Term Goal  Description: Patient's Short Term Goal: home    Interventions:   - follow plan of care  - See additional Care Plan goals for specific interventions  Outcome: Progressing     Problem: CARDIOVASCULAR - ADULT  Goal: Maintains optimal cardiac output and hemodynamic stability  Description: INTERVENTIONS:  - Monitor vital signs, rhythm, and trends  - Monitor for bleeding, hypotension and signs of decreased cardiac output  - Evaluate effectiveness of vasoactive medications to optimize hemodynamic stability  - Monitor arterial and/or venous puncture sites for bleeding and/or hematoma  - Assess quality of pulses, skin color and temperature  - Assess for signs of decreased coronary artery perfusion - ex. Angina  - Evaluate fluid balance, assess for edema, trend weights  Outcome: Progressing  Goal: Absence of cardiac arrhythmias or at baseline  Description: INTERVENTIONS:  - Continuous cardiac monitoring, monitor vital signs, obtain 12 lead EKG if indicated  - Evaluate  effectiveness of antiarrhythmic and heart rate control medications as ordered  - Initiate emergency measures for life threatening arrhythmias  - Monitor electrolytes and administer replacement therapy as ordered  Outcome: Progressing     Problem: SAFETY ADULT - FALL  Goal: Free from fall injury  Description: INTERVENTIONS:  - Assess pt frequently for physical needs  - Identify cognitive and physical deficits and behaviors that affect risk of falls.  - [place] fall precautions as indicated by assessment.  - Educate pt/family on patient safety including physical limitations  - Instruct pt to call for assistance with activity based on assessment  - Modify environment to reduce risk of injury  - Provide assistive devices as appropriate  - Consider OT/PT consult to assist with strengthening/mobility  - Encourage toileting schedule  Outcome: Progressing     Problem: GASTROINTESTINAL - ADULT  Goal: Minimal or absence of nausea and vomiting  Description: INTERVENTIONS:  - Maintain adequate hydration with IV or PO as ordered and tolerated  - Nasogastric tube to low intermittent suction as ordered  - Evaluate effectiveness of ordered antiemetic medications  - Provide nonpharmacologic comfort measures as appropriate  - Advance diet as tolerated, if ordered  - Obtain nutritional consult as needed  - Evaluate fluid balance  Outcome: Progressing  Goal: Maintains or returns to baseline bowel function  Description: INTERVENTIONS:  - Assess bowel function  - Maintain adequate hydration with IV or PO as ordered and tolerated  - Evaluate effectiveness of GI medications  - Encourage mobilization and activity  - Obtain nutritional consult as needed  - Establish a toileting routine/schedule  - Consider collaborating with pharmacy to review patient's medication profile  Outcome: Progressing  Goal: Maintains adequate nutritional intake (undernourished)  Description: INTERVENTIONS:  - Monitor percentage of each meal consumed  - Identify  factors contributing to decreased intake, treat as appropriate  - Assist with meals as needed  - Monitor I&O, WT and lab values  - Obtain nutritional consult as needed  - Optimize oral hygiene and moisture  - Encourage food from home; allow for food preferences  - Enhance eating environment  Outcome: Progressing

## 2024-08-09 NOTE — PROCEDURES
[facility]  part of [facility]    Cardiac Cath Procedure Note  Hortencia Kelley Patient Status:  Inpatient    10/2/1945 MRN [id number]   Location [facility] 3W/SW Attending Rico Frost MD   Hosp Day # 2 PCP No primary care provider on file.       Cardiologist: Toni Obrien MD  Primary Proceduralist: Toni Obrien MD  Procedure Performed: LHC and LV  Date of Procedure: 2024   Indication: NSTEMI    Summary of procedure:  Normal coronary anatomy      Assessment:  Type II NSTEMI  Takotsubo cardiomyopathy  Normal filling pressures      Recommendations:  Continue guideline directed medical therapy for nonischemic cardiomyopathy  Start low-dose Entresto twice daily  Switch metoprolol to tartrate to metoprolol succinate 25 mg twice daily  Stop heparin  Continue with GI workup without further cardiac testing      Left Ventriculography and hemodynamics:   LV EF not done  LV EDP 5 mmHg, 1 L fluid bolus given  No gradient across aortic valve        Coronary Angiography  RCA:  Dominant and free of obstructive disease, supplies PDA and PL    Left main:  Free of obstructive disease    Left anterior descending:  Free of obstructive disease, supplies multiple diagonals which are non-obstructive    Circumflex:  Free of obstructive disease, supplies multiple OM branches which are patent        Summary of Case: After written informed consent was obtained from the patient, patient was brought to the cardiac catheterization laboratory.  Patient was prepped and draped in the usual sterile fashion. Lidocaine 1% was used to infiltrate the right radial artery for local anesthesia and a 6 Upper sorbian introducer sheath was inserted into the right radial artery.      Selective coronary angiography performed with JR4 catheter for RCA and JL3.5 catheter for LCA.  Angiography performed in standard projections.      6 [place]wana JR4 catheter placed in LV for hemodynamics.    Specimen sent to: No specimen collected  Estimated  blood loss: 10 cc  Closure:  TR band      IV was maintained by RN and moderate conscious sedation of versed and fentanyl was given.  Patient was assessed and monitoring of oxygen, heart rate and blood pressure by nurse and myself during the exam 35 minutes.      Toni Obrien MD  08/09/24

## 2024-08-09 NOTE — DIETARY NOTE
ADULT NUTRITION INITIAL ASSESSMENT    Pt is at moderate nutrition risk.  Pt meets moderate malnutrition criteria.      CRITERIA FOR MALNUTRITION DIAGNOSIS:  Criteria for non-severe malnutrition diagnosis: acute illness/injury related to wt loss 5% in 1 month, body fat mild depletion, and muscle mass mild depletion.    RECOMMENDATIONS TO MD: See Nutrition Intervention for oral nutritional supplement (ONS) specifics     ADMITTING DIAGNOSIS:  NSTEMI (non-ST elevated myocardial infarction) ([facility]) [I21.4]  PERTINENT PAST MEDICAL HISTORY:   Past Medical History:    Depression    ETOH abuse    Falls frequently    12/27/2017    History of blood transfusion    [facility]      PATIENT STATUS: Initial 08/09/24: Pt admit for NSTEMI (non-St elevated myocardial infarction). PMH sig for COPD, ETOH abuse and others noted above. Pt assessed due to screening at risk for decreased appetite, unintentional weight loss and low BMI (14.67 kg/m2). Pt known to nutrition services from previous admissions, last seen 6/25/2020. Chart reviewed, pt admitted with c/o SOB/wheezing x 1 month. Pt noted with progressive dysphagia over past few months, unable to tolerate solids (liquids ok). CT chest noting mass in mid/proximal esophagus concerning for primary esophageal neoplasm - plan EGD with biopsy pending cardiac clearance. Pt NPO this morning for cardiac cath - diet advanced to full liquids diet (FLD). Plan for EGD tomorrow. Discussion with RN, no concerns. Intakes reviewed 25% x 1 meal since admit. Pt visited, pt reports having difficulty swallowing solids (tolerating liquids) since end of June when gagging started. Pt reports drinking 6-8 Ensures daily (350 calories) + other liquid items but not taking in solids. Pt reports always been thin but never under 100#'s. Pt reports weight loss over same timeframe as difficulty with solids. Current weight 93# 11.1 oz. EMR weight review, last known weight 90# on 7/26/24 Per EMR weight review, pt noted weighing  105# on 7/5/24 - 11.3# or 10.8% weight loss x 1 month (significant) otherwise limited recent weight history - 122# 8/3/2020. Nutrition focused physical exam (NFPE) completed, see below for details. Encouraged adequate energy and protein intake with ONS to help maximize nutrition. +ONS per discussion.    FOOD/NUTRITION RELATED HISTORY:  Appetite:  Pt reports not tolerating solids, only taking in liquids since around end of June when gagging started.  Intake: ~25% x1 meals documented since admit  Intake Meeting Needs: No, but oral nutrition supplements (ONS) to maximize  Percent Meals Eaten (last 3 days)       Date/Time Percent Meals Eaten (%)    08/08/24 1500 25 %           Food Allergies: No Known Food Allergies (NKFA)  Cultural/Ethnic/Taoism Preferences: Not Obtained    GASTROINTESTINAL: +BM 8/6/2024    MEDICATIONS: reviewed KCl in D5-.9NS @ 100 ml/hr (provides ~ 408 calories and 2400 volume)   sacubitril-valsartan  1 tablet Oral BID    metoprolol succinate  25 mg Oral 2x Daily(Beta Blocker)    methylPREDNISolone  40 mg Intravenous Q12H    aspirin  81 mg Oral Daily    pantoprazole  40 mg Intravenous Q12H    revefenacin  175 mcg Nebulization Daily    budesonide  0.25 mg Nebulization BID      potassium chloride in dextrose 5%-sodium chloride 0.9% 100 mL/hr at 08/09/24 1219     LABS: reviewed  Recent Labs     08/07/24  0916 08/08/24  0522 08/09/24  0528   * 148* 151*   BUN 24* 19 18   CREATSERUM 0.69 0.63 0.67   CA 9.4 8.9 8.9    141 144   K 3.7 4.4 4.6    107 112   CO2 27.0 28.0 27.0   OSMOCALC 294 297* 303*     NUTRITION RELATED PHYSICAL FINDINGS:  - Nutrition Focused Physical Exam (NFPE): mild depletion body fat triceps region and mild depletion muscle mass temple region, clavicle region, shoulder region, dorsal monsalve region, thigh region, and calf region   - Fluid Accumulation: none  see RN documentation for details  - Skin Integrity: intact see RN documentation for  details    ANTHROPOMETRICS:  HT: 170.2 cm (5' 7\")  WT: 42.5 kg (93 lb 11.1 oz)   BMI: Body mass index is 14.67 kg/m².  BMI CLASSIFICATION: less than 19 kg/m2 - underweight  IBW: 135 lbs        69% IBW  Usual Body Wt: ~105 lbs per pt      89% UBW    WEIGHT HISTORY: Per EMR weight review, pt noted weighing 122# August 2020  Patient Weight(s) for the past 336 hrs:   Weight   08/09/24 0510 42.5 kg (93 lb 11.1 oz)   08/08/24 0627 42.3 kg (93 lb 3.2 oz)   08/07/24 1358 41.5 kg (91 lb 7.9 oz)   08/07/24 1056 41.5 kg (91 lb 7.9 oz)   08/07/24 0909 40.8 kg (90 lb)     Wt Readings from Last 10 Encounters:   08/09/24 42.5 kg (93 lb 11.1 oz)   07/26/24 40.8 kg (90 lb)   07/05/24 47.6 kg (105 lb)   08/03/20 55.3 kg (122 lb)   07/29/20 54.9 kg (121 lb)   07/27/20 54.9 kg (121 lb)   07/22/20 54.9 kg (121 lb)   07/20/20 55.3 kg (122 lb)   07/15/20 56.2 kg (124 lb)   07/13/20 56.2 kg (124 lb)     NUTRITION DIAGNOSIS/PROBLEM:   Moderate Malnutrition related to Acute - Physiological causes resulting in anorexia or diminished intake as evidenced by wt loss 5% in 1 month, body fat mild depletion, muscle mass mild depletion and low BMI (14.67 kg/m2).    NUTRITION INTERVENTION:   NUTRITION PRESCRIPTION:   Estimated Nutrition needs: --dosing wt of 42.5 kg - wt taken on 8/9/2024  Calories: 6477-5527 calories/day (35-40 calories per kg Dosing wt)  Protein: 64-85 g protein/day (1.5-2.0 g protein/kg Dosing wt)    - Diet:       Procedures    Full liquid diet Is Patient on Accuchecks? No; Misc Restriction: Cardiac      - RD Malnutrition Care Plan: Encouraged increased PO intake, Encouraged small frequent meals with emphasis on high calorie/high protein, and Initiated ONS (oral nutritional supplements)  - Meals and snacks: Encouraged small frequent meals and Encouraged increased PO intake  - Medical Food Supplements-RD added Ensure Enlive (350 calories/ 20 g protein each) QID Chocolate. Rational/use of oral supplements discussed.  - Vitamin and  mineral supplements: none  - Feeding assistance: meal set up  - Nutrition education: Discussed importance of adequate energy and protein intake     - Coordination of nutrition care: collaboration with other providers  - Discharge and transfer of nutrition care to new setting or provider: monitor plans    MONITOR AND EVALUATE/NUTRITION GOALS:  - Food and Nutrient Intake:      Monitor: adequacy of PO intake, tolerance of PO intake, and adequacy of supplement intake  - Food and Nutrient Administration:      Monitor: N/A  - Anthropometric Measurement:    Monitor weight  - Nutrition Goals:      halt wt loss, gradual wt gain as able, PO and supplement greater than 75% of needs, good supplement intake, labs within acceptable limits, euglycemia, minimize lean body mass loss, prevent skin breakdown, support body systems, and improved GI status    DIETITIAN FOLLOW UP: RD to follow and monitor nutrition status    Iman Wills MS, JOSE, RDN, LDN  [id number]

## 2024-08-10 ENCOUNTER — ANESTHESIA EVENT (OUTPATIENT)
Dept: ENDOSCOPY | Facility: HOSPITAL | Age: 79
End: 2024-08-10
Payer: MEDICARE

## 2024-08-10 ENCOUNTER — ANESTHESIA (OUTPATIENT)
Dept: ENDOSCOPY | Facility: HOSPITAL | Age: 79
End: 2024-08-10
Payer: MEDICARE

## 2024-08-10 LAB
ANION GAP SERPL CALC-SCNC: 6 MMOL/L (ref 0–18)
BASOPHILS # BLD AUTO: 0.01 X10(3) UL (ref 0–0.2)
BASOPHILS NFR BLD AUTO: 0.1 %
BUN BLD-MCNC: 14 MG/DL (ref 9–23)
BUN/CREAT SERPL: 21.9 (ref 10–20)
CALCIUM BLD-MCNC: 8.7 MG/DL (ref 8.7–10.4)
CHLORIDE SERPL-SCNC: 114 MMOL/L (ref 98–112)
CO2 SERPL-SCNC: 27 MMOL/L (ref 21–32)
CREAT BLD-MCNC: 0.64 MG/DL
DEPRECATED RDW RBC AUTO: 44.8 FL (ref 35.1–46.3)
EGFRCR SERPLBLD CKD-EPI 2021: 92 ML/MIN/1.73M2 (ref 60–?)
EOSINOPHIL # BLD AUTO: 0 X10(3) UL (ref 0–0.7)
EOSINOPHIL NFR BLD AUTO: 0 %
ERYTHROCYTE [DISTWIDTH] IN BLOOD BY AUTOMATED COUNT: 13.5 % (ref 11–15)
EST. AVERAGE GLUCOSE BLD GHB EST-MCNC: 131 MG/DL (ref 68–126)
GLUCOSE BLD-MCNC: 101 MG/DL (ref 70–99)
HBA1C MFR BLD: 6.2 % (ref ?–5.7)
HCT VFR BLD AUTO: 31.6 %
HGB BLD-MCNC: 9.9 G/DL
IMM GRANULOCYTES # BLD AUTO: 0.08 X10(3) UL (ref 0–1)
IMM GRANULOCYTES NFR BLD: 0.6 %
LYMPHOCYTES # BLD AUTO: 1.01 X10(3) UL (ref 1–4)
LYMPHOCYTES NFR BLD AUTO: 8 %
MCH RBC QN AUTO: 28.6 PG (ref 26–34)
MCHC RBC AUTO-ENTMCNC: 31.3 G/DL (ref 31–37)
MCV RBC AUTO: 91.3 FL
MONOCYTES # BLD AUTO: 0.91 X10(3) UL (ref 0.1–1)
MONOCYTES NFR BLD AUTO: 7.2 %
NEUTROPHILS # BLD AUTO: 10.56 X10 (3) UL (ref 1.5–7.7)
NEUTROPHILS # BLD AUTO: 10.56 X10(3) UL (ref 1.5–7.7)
NEUTROPHILS NFR BLD AUTO: 84.1 %
OSMOLALITY SERPL CALC.SUM OF ELEC: 305 MOSM/KG (ref 275–295)
PLATELET # BLD AUTO: 280 10(3)UL (ref 150–450)
POTASSIUM SERPL-SCNC: 4.1 MMOL/L (ref 3.5–5.1)
RBC # BLD AUTO: 3.46 X10(6)UL
SODIUM SERPL-SCNC: 147 MMOL/L (ref 136–145)
WBC # BLD AUTO: 12.6 X10(3) UL (ref 4–11)

## 2024-08-10 PROCEDURE — 99232 SBSQ HOSP IP/OBS MODERATE 35: CPT | Performed by: INTERNAL MEDICINE

## 2024-08-10 PROCEDURE — 0DB58ZX EXCISION OF ESOPHAGUS, VIA NATURAL OR ARTIFICIAL OPENING ENDOSCOPIC, DIAGNOSTIC: ICD-10-PCS | Performed by: INTERNAL MEDICINE

## 2024-08-10 PROCEDURE — 43202 ESOPHAGOSCOPY FLEX BIOPSY: CPT | Performed by: INTERNAL MEDICINE

## 2024-08-10 PROCEDURE — 99233 SBSQ HOSP IP/OBS HIGH 50: CPT | Performed by: HOSPITALIST

## 2024-08-10 RX ORDER — SODIUM CHLORIDE, SODIUM LACTATE, POTASSIUM CHLORIDE, CALCIUM CHLORIDE 600; 310; 30; 20 MG/100ML; MG/100ML; MG/100ML; MG/100ML
INJECTION, SOLUTION INTRAVENOUS CONTINUOUS
Status: DISCONTINUED | OUTPATIENT
Start: 2024-08-10 | End: 2024-08-11

## 2024-08-10 RX ORDER — SODIUM CHLORIDE, SODIUM LACTATE, POTASSIUM CHLORIDE, CALCIUM CHLORIDE 600; 310; 30; 20 MG/100ML; MG/100ML; MG/100ML; MG/100ML
INJECTION, SOLUTION INTRAVENOUS CONTINUOUS PRN
Status: DISCONTINUED | OUTPATIENT
Start: 2024-08-10 | End: 2024-08-10 | Stop reason: SURG

## 2024-08-10 RX ORDER — LIDOCAINE HYDROCHLORIDE 10 MG/ML
INJECTION, SOLUTION EPIDURAL; INFILTRATION; INTRACAUDAL; PERINEURAL AS NEEDED
Status: DISCONTINUED | OUTPATIENT
Start: 2024-08-10 | End: 2024-08-10 | Stop reason: SURG

## 2024-08-10 RX ORDER — NALOXONE HYDROCHLORIDE 0.4 MG/ML
0.08 INJECTION, SOLUTION INTRAMUSCULAR; INTRAVENOUS; SUBCUTANEOUS ONCE AS NEEDED
Status: ACTIVE | OUTPATIENT
Start: 2024-08-10 | End: 2024-08-10

## 2024-08-10 RX ADMIN — SODIUM CHLORIDE, SODIUM LACTATE, POTASSIUM CHLORIDE, CALCIUM CHLORIDE: 600; 310; 30; 20 INJECTION, SOLUTION INTRAVENOUS at 08:14:00

## 2024-08-10 RX ADMIN — LIDOCAINE HYDROCHLORIDE 50 MG: 10 INJECTION, SOLUTION EPIDURAL; INFILTRATION; INTRACAUDAL; PERINEURAL at 08:17:00

## 2024-08-10 RX ADMIN — SODIUM CHLORIDE, SODIUM LACTATE, POTASSIUM CHLORIDE, CALCIUM CHLORIDE: 600; 310; 30; 20 INJECTION, SOLUTION INTRAVENOUS at 08:34:00

## 2024-08-10 NOTE — PROGRESS NOTES
[facility]  Progress Note     Hortencia Kelley  : 10/2/1945    Status: Inpatient  Day #: 3    Attending: Rico Frost MD  PCP: No primary care provider on file.     Assessment and Plan:    NSTEMI type II - elevated troponin and new cardiomyopathy  Takotsubo cardiomyopathy  -cardiology on consult  -echo EF - 20-25%, akinesis of apical wall  -cardiac cath without CAD suggesting takotsubo  -heparin drip stopped  -cont asa. Entresto started.   -ok for GI w/u per cardiology    Esophageal mass concerning for malignancy  Dysphagia to solids  Wt loss  -GI on consult  -EGD today - obstructing esophageal mass s/p biopsies. Was firm and friable with spontaneous oozing.   -CLD  -surgical oncology consult per GI    AECOPD  H/o L vocal cord paralysis  -pulm on consult  -cont nebs  -cont steroids and wean    Anemia  -malignancy w/u    Dietitian Malnutrition Assessment        Evaluation for Malnutrition: Criteria for non-severe malnutrition diagnosis-         acute illness/injury related to Wt loss 5% in 1 month., Body fat mild depletion., Muscle mass mild depletion.         RD Malnutrition Care Plan: Encouraged increased PO intake., Encouraged small frequent meals with emphasis on high calorie/high protein., Intiated ONS (oral nutritional supplements).    Body Fat/Muscle Mass: Mild depletion body fat., Mild depletion muscle mass. triceps region. temple region., clavicle region., shoulder region., dorsal monsalve region., thigh region., calf region.    Physician Assessment    Patient has a diagnosis of moderate malnutrition     DVT Mechanical Prophylaxis:        DVT Pharmacologic Prophylaxis   Medication   None         DVT Pharmacologic prophylaxis: Aspirin 81 mg     Subjective:      Initial Chief Complaint:  cough, chest pain    No CP. No SOB currently. Tolerating CLD.    10 point ROS completed and was negative, except for pertinent positive and negatives stated in subjective.      Objective:      Temp:  [97.7 °F  (36.5 °C)-98.9 °F (37.2 °C)] 98.9 °F (37.2 °C)  Pulse:  [67-91] 69  Resp:  [15-29] 20  BP: ()/() 118/63  SpO2:  [93 %-99 %] 95 %  General:  Alert, no distress  HEENT:  Normocephalic, atraumatic  Cardiac:  Regular rate, regular rhythm  Pulmonary:  Clear to auscultation bilaterally, respirations unlabored  Gastrointestinal:  Soft, non-tender, normal bowel sounds  Musculoskeletal:  No joint swelling  Extremities:  No edema, no cyanosis, no clubbing  Neurologic:  nonfocal  Psychiatric:  Normal affect, calm and appropriate    Intake/Output Summary (Last 24 hours) at 8/10/2024 1320  Last data filed at 8/10/2024 1238  Gross per 24 hour   Intake 3204.99 ml   Output --   Net 3204.99 ml         Recent Labs   Lab 08/08/24  0522 08/09/24  0528 08/10/24  0717   WBC 8.4 16.6* 12.6*   HGB 9.3* 9.7* 9.9*   HCT 28.2* 29.4* 31.6*   .0 293.0 280.0   RBC 3.20* 3.28* 3.46*   MCV 88.1 89.6 91.3   MCH 29.1 29.6 28.6   MCHC 33.0 33.0 31.3   RDW 12.9 13.2 13.5   NEPRELIM 7.50 14.73* 10.56*     Recent Labs   Lab 08/08/24  0522 08/08/24  1011 08/08/24  1708 08/09/24  0528 08/10/24  0717   BUN 19  --   --  18 14   CREATSERUM 0.63  --   --  0.67 0.64   CA 8.9  --   --  8.9 8.7     --   --  144 147*   K 4.4  --   --  4.6 4.1     --   --  112 114*   CO2 28.0  --   --  27.0 27.0   *  --   --  151* 101*   PTT  --  45.2* 73.0* 154.4*  --        No results found.      Medications:   sacubitril-valsartan  1 tablet Oral BID    metoprolol succinate  25 mg Oral 2x Daily(Beta Blocker)    methylPREDNISolone  40 mg Intravenous Q12H    aspirin  81 mg Oral Daily    pantoprazole  40 mg Intravenous Q12H    revefenacin  175 mcg Nebulization Daily    budesonide  0.25 mg Nebulization BID      PRN Meds:   naloxone    ipratropium-albuterol    acetaminophen    ondansetron    metoclopramide    temazepam    guaiFENesin    benzonatate    nitroglycerin    ipratropium-albuterol    Supplementary Documentation:        MDM High. Time  spent on chart/note review, review of labs/imaging, discussion with patient, physical exam, discussion with staff, consultants, coordinating care, writing progress note, discussion of plan of care.      Rico Frost MD

## 2024-08-10 NOTE — PROGRESS NOTES
Progress Note  Hortencia Kelley Patient Status:  Inpatient    10/2/1945 MRN [id number]   Location [facility] 3W/SW Attending Rico Frost MD   Hosp Day # 3 PCP No primary care provider on file.     SUBJECTIVE:    Denies any complaints.     VITALS:  /63 (BP Location: Left arm)   Pulse 69   Temp 98.9 °F (37.2 °C) (Oral)   Resp 20   Ht 5' 7\" (1.702 m)   Wt 106 lb 14.8 oz (48.5 kg)   SpO2 95%   BMI 16.75 kg/m²     INTAKE/OUTPUT:    Intake/Output Summary (Last 24 hours) at 8/10/2024 1537  Last data filed at 8/10/2024 1238  Gross per 24 hour   Intake 3204.99 ml   Output --   Net 3204.99 ml     Last 3 Weights   08/10/24 0600 106 lb 14.8 oz (48.5 kg)   24 0510 93 lb 11.1 oz (42.5 kg)   24 0627 93 lb 3.2 oz (42.3 kg)   24 1358 91 lb 7.9 oz (41.5 kg)   24 1056 91 lb 7.9 oz (41.5 kg)   24 0909 90 lb (40.8 kg)   24 1046 90 lb (40.8 kg)   24 1604 105 lb (47.6 kg)     LABS:  Recent Labs   Lab 24  0528 08/10/24  0717   * 151* 101*   BUN 19 18 14   CREATSERUM 0.63 0.67 0.64   EGFRCR 92 91 92   CA 8.9 8.9 8.7    144 147*   K 4.4 4.6 4.1    112 114*   CO2 28.0 27.0 27.0     Recent Labs   Lab 24  0528 08/10/24  0717   RBC 3.20* 3.28* 3.46*   HGB 9.3* 9.7* 9.9*   HCT 28.2* 29.4* 31.6*   MCV 88.1 89.6 91.3   MCH 29.1 29.6 28.6   MCHC 33.0 33.0 31.3   RDW 12.9 13.2 13.5   NEPRELIM 7.50 14.73* 10.56*   WBC 8.4 16.6* 12.6*   .0 293.0 280.0     No results for input(s): \"TROP\", \"CK\" in the last 168 hours.    DIAGNOSTICS:    TELEMETRY: SB/SR    ECHO 2024:  Conclusions:   1. Left ventricle: The cavity size was normal. Wall thickness was normal.      Systolic function was markedly reduced. The estimated ejection fraction      was 20-25%, by visual assessment. Akinesis of the entireapical wall.      Doppler parameters are consistent with abnormal left ventricular      relaxation - grade 1 diastolic  dysfunction.   2. Right ventricle: Systolic function was normal.   3. Aortic valve: There was mild to moderate regurgitation. The peak systolic      velocity was 1.01m/sec.     ROS: Negative unless noted above     PHYSICAL EXAM:  General: Alert and oriented x 3. No apparent distress.  HEENT: Normocephalic, sclera are nonicteric. Hearing appropriate bilaterally.  Neck: No JVD or Carotid bruits. Trachea midline.   Cardiac: Regular rate and rhythm. S1, S2 auscultated. No murmurs, rubs, or gallops appreciated.   Lungs: A/P dullness. Chest expansion symmetrical. Regular effort.  Abdomen: Soft, non-tender, +BS. No hepatosplenomegaly or appreciable masses.   Extremities: Without clubbing, cyanosis or edema.  Peripheral pulses are 2+.  Neurologic: Motor and sensory nerves grossly intact.   Psych: Appropriate affect   Skin: Warm and dry. No obvious lesions, wounds, or ulcerations.     MEDICATIONS:   sacubitril-valsartan  1 tablet Oral BID    metoprolol succinate  25 mg Oral 2x Daily(Beta Blocker)    methylPREDNISolone  40 mg Intravenous Q12H    aspirin  81 mg Oral Daily    pantoprazole  40 mg Intravenous Q12H    revefenacin  175 mcg Nebulization Daily    budesonide  0.25 mg Nebulization BID      lactated ringers      potassium chloride in dextrose 5%-sodium chloride 0.9% 100 mL/hr at 08/10/24 0957     ASSESSMENT:    Type II NSTEMI  New NICM, LVEF 20-25%/ Mod AI  - CT without PE  - [facility] 8/9/24 with normal coronaries   - ASA, Entresto, Toprol XL,new and tolerating     Esophageal Mass concerning for malignancy  Acute Anemia  - EGD today s/p biopsy, pending surgical oncology consult   - No overt bleeding, H&H stable, during EGD biopsy mass was friable with spontaneous oozing     COPD w/ Exacerbation- Pulmonary following   HLD- , not historically on statin   Hx ETOH Abuse   Hx Tobacco Abuse     PLAN:  - Surgical Oncology consult pending  - Normal coronary on [facility] will continue to titrate medications for systolic heart  failure, Discussed potential wearable defibrillator on discharge patient declined, risks identified   - Start Aldactone tomorrow if renal function and K remains stable   - Check A1c to aide with risk stratification; would also like to to start SGLT2 for HF but if she is a new diabetic will need endocrine consult first     Plan of care discussed with patient and RN.     Ailyn Gray, APRN  8/10/2024  3:37 PM  [phone] (Shannon)  [phone] (Chandra)

## 2024-08-10 NOTE — ANESTHESIA PREPROCEDURE EVALUATION
Anesthesia PreOp Note    HPI:     Hortencia Kelley is a 78 year old female who presents for preoperative consultation requested by: Ina Huddleston MD    Date of Surgery: 8/10/2024    Procedure(s):  ESOPHAGOGASTRODUODENOSCOPY (EGD)  Indication: esophageal mass    Relevant Problems   No relevant active problems       NPO:                         History Review:  Patient Active Problem List    Diagnosis Date Noted    NSTEMI (non-ST elevated myocardial infarction) (HCC) 2024    Esophageal mass 2024    Pathological fracture of right humerus due to age-related osteoporosis with routine healing 07/10/2020    Fall at home 07/10/2020    Episodic mood disorder (HCC) 2020    Anemia, unspecified type 2020    Alcoholic intoxication without complication (HCC) 2020    Closed 3-part fracture of proximal humerus, right, sequela 2020    Injury of head, initial encounter 2020    Anemia 2017    Osteopenia 2017    Multinodular goiter 2017       Past Medical History:    Depression    ETOH abuse    Falls frequently    2017    History of blood transfusion    [facility]        Past Surgical History:   Procedure Laterality Date    Hip replacement surgery Left        Medications Prior to Admission   Medication Sig Dispense Refill Last Dose    [] albuterol 108 (90 Base) MCG/ACT Inhalation Aero Soln Inhale 2 puffs into the lungs every 4 (four) hours as needed. 18 g 0     [] predniSONE 20 MG Oral Tab Take 3 tablets (60 mg total) by mouth daily for 2 days, THEN 2 tablets (40 mg total) daily for 3 days. 12 tablet 0      Current Facility-Administered Medications Ordered in Epic   Medication Dose Route Frequency Provider Last Rate Last Admin    sacubitril-valsartan (Entresto) 24-26 MG per tab 1 tablet  1 tablet Oral BID Toni Obrien MD   1 tablet at 24 5023    metoprolol succinate ER (Toprol XL) 24 hr tab 25 mg  25 mg Oral 2x Daily(Beta Blocker) Toni Obrien MD   25 mg  at 08/10/24 0606    methylPREDNISolone sodium succinate (Solu-MEDROL) injection 40 mg  40 mg Intravenous Q12H Prakash Quispe DO   40 mg at 08/10/24 0606    aspirin  tab 81 mg  81 mg Oral Daily Flory Granados, APRN   81 mg at 08/09/24 0847    ipratropium-albuterol (Duoneb) 0.5-2.5 (3) MG/3ML inhalation solution 3 mL  3 mL Nebulization Q6H PRN Se Mclain DO        acetaminophen (Tylenol Extra Strength) tab 500 mg  500 mg Oral Q4H PRN Anita Knox MD        ondansetron (Zofran) 4 MG/2ML injection 4 mg  4 mg Intravenous Q6H PRN Anita Knox MD        metoclopramide (Reglan) 5 mg/mL injection 5 mg  5 mg Intravenous Q8H PRN Anita Knox MD        temazepam (Restoril) cap 15 mg  15 mg Oral Nightly PRN Anita Knox MD   15 mg at 08/09/24 2135    guaiFENesin (Robitussin) 100 MG/5 ML oral liquid 200 mg  200 mg Oral Q4H PRN Anita Knox MD        benzonatate (Tessalon) cap 200 mg  200 mg Oral TID PRN Anita Knox MD        nitroglycerin (Nitrostat) SL tab 0.4 mg  0.4 mg Sublingual Q5 Min PRN Anita Knox MD        ipratropium-albuterol (Duoneb) 0.5-2.5 (3) MG/3ML inhalation solution 3 mL  3 mL Nebulization Q6H PRN Anita Knox MD        potassium chloride 20 mEq in dextrose 5%-sodium chloride 0.9% 1000mL infusion premix   Intravenous Continuous Anita Knox  mL/hr at 08/09/24 1959 New Bag at 08/09/24 1959    pantoprazole (Protonix) 40 mg in sodium chloride 0.9% PF 10 mL IV push  40 mg Intravenous Q12H Ina Huddleston MD   40 mg at 08/10/24 0606    revefenacin (Yupelri) 175 MCG/3ML nebulizer solution 175 mcg  175 mcg Nebulization Daily Prakash Quispe DO   175 mcg at 08/09/24 0808    budesonide (Pulmicort) 0.25 MG/2ML nebulizer suspension 0.25 mg  0.25 mg Nebulization BID Prakash Quispe DO   0.25 mg at 08/09/24 1823     Current Outpatient Medications Ordered in Epic   Medication Sig Dispense Refill    sacubitril-valsartan 24-26 MG Oral Tab Take 1 tablet by  mouth 2 (two) times daily. 60 tablet 3       No Known Allergies    Family History   Problem Relation Age of Onset    Heart Disorder Father     Dementia Mother     Dementia Sister      Social History     Socioeconomic History    Marital status:    Tobacco Use    Smoking status: Former     Current packs/day: 0.00     Average packs/day: 0.5 packs/day for 40.0 years (20.0 ttl pk-yrs)     Types: Cigarettes     Start date: 1978     Quit date: 2018     Years since quittin.0    Smokeless tobacco: Never   Vaping Use    Vaping status: Never Used   Substance and Sexual Activity    Alcohol use: Yes     Alcohol/week: 3.0 standard drinks of alcohol     Types: 3 Glasses of wine per week     Comment: socail    Drug use: No       Available pre-op labs reviewed.  Lab Results   Component Value Date    WBC 16.6 (H) 2024    RBC 3.28 (L) 2024    HGB 9.7 (L) 2024    HCT 29.4 (L) 2024    MCV 89.6 2024    MCH 29.6 2024    MCHC 33.0 2024    RDW 13.2 2024    .0 2024     Lab Results   Component Value Date     2024    K 4.6 2024     2024    CO2 27.0 2024    BUN 18 2024    CREATSERUM 0.67 2024     (H) 2024    PGLU 142 (H) 2024    CA 8.9 2024          Vital Signs:  Body mass index is 16.75 kg/m².   height is 1.702 m (5' 7\") and weight is 48.5 kg (106 lb 14.8 oz). Her oral temperature is 97.7 °F (36.5 °C). Her blood pressure is 111/65 and her pulse is 69. Her respiration is 18 and oxygen saturation is 96%.   Vitals:    24 1706 24 2134 08/10/24 0600 08/10/24 0604   BP: 97/56 104/62  111/65   Pulse: 71 67  69   Resp:  18  18   Temp: 97.8 °F (36.6 °C) 98.1 °F (36.7 °C)  97.7 °F (36.5 °C)   TempSrc: Oral Oral  Oral   SpO2: 98% 98%  96%   Weight:   48.5 kg (106 lb 14.8 oz)    Height:            Anesthesia Evaluation      Airway   Mallampati: I  TM distance: >3 FB  Neck ROM: full  Dental       Pulmonary - normal exam   Cardiovascular - normal exam    Neuro/Psych    (+)   depression      GI/Hepatic/Renal      Endo/Other    Abdominal  - normal exam                 Anesthesia Plan:   ASA:  2  Emergent    Plan:   MAC  Informed Consent Plan and Risks Discussed With:  Patient      I have informed Hortencia Kelley and/or legal guardian or family member of the nature of the anesthetic plan, benefits, risks including possible dental damage if relevant, major complications, and any alternative forms of anesthetic management.   All of the patient's questions were answered to the best of my ability. The patient desires the anesthetic management as planned.  TIFFANIE MAXWELL MD  8/10/2024 7:32 AM  Present on Admission:  **None**
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
## 2024-08-10 NOTE — PROGRESS NOTES
[facility]  part of [facility]    Progress Note      Assessment and Plan:   1.  Respiratory impairment-left vocal cord paralysis with tracheal narrowing secondary to esophageal mass in the setting of underlying COPD.  The patient is breathing comfortably.  She is able to swallow including pills.    Recommendations:  1.  Await pathology  2.  Nebulizer therapy  3.  Steroid wean  4.  Will follow clinically.    2.  NSTEMI with severe cardiomyopathy-nonischemic.  Moderate AI.    Recommendations:  1.  As per cardiology, aspirin, Entresto, Toprol and possible SGLT2    3.  Esophageal mass-as above.  Await biopsy.  As per GI.    Subjective:   Hortencia Kelley is a(n) 78 year old female who is breathing comfortably    Objective:   Blood pressure 109/71, pulse 84, temperature 98.5 °F (36.9 °C), temperature source Oral, resp. rate 20, height 5' 7\" (1.702 m), weight 106 lb 14.8 oz (48.5 kg), SpO2 98%, not currently breastfeeding.    Physical Exam alert white female in good spirits  HEENT examination is unremarkable with pupils equal round and reactive to light and accommodation.  Hoarse voice.  Neck without adenopathy, thyromegaly, JVD nor bruit.   Lungs diminished to auscultation and percussion.  Cardiac regular rate and rhythm no murmur.   Abdomen nontender, without hepatosplenomegaly and no mass appreciable.   Extremities without clubbing cyanosis nor edema.   Neurologic grossly intact with symmetric tone and strength and reflex.  Skin without gross abnormality     Results:     Lab Results   Component Value Date    WBC 12.6 08/10/2024    HGB 9.9 08/10/2024    HCT 31.6 08/10/2024    .0 08/10/2024    CREATSERUM 0.64 08/10/2024    BUN 14 08/10/2024     08/10/2024    K 4.1 08/10/2024     08/10/2024    CO2 27.0 08/10/2024     08/10/2024    CA 8.7 08/10/2024       John Olvera MD  Medical Director, Critical Care, [facility]  Medical Director, [facility]  Pager:  [phone]

## 2024-08-10 NOTE — PLAN OF CARE
S/W patient after patients  arrived to unit. Patient aware her chart is confidential and that staff cannot give out her location of her room. Patient informed staff that she did let her  know what room/hospital she is in. Patient agreeable to allow  to speak with her regarding her care and allowed  in room only if door remains open.

## 2024-08-10 NOTE — OPERATIVE REPORT
ESOPHAGOGASTRODUODENOSCOPY (EGD) REPORT    Hortencia Kelley     10/2/1945 Age 78 year old   PCP No primary care provider on file. Endoscopist Ina Huddleston MD     Date of procedure: 08/10/24    Procedure: EGD w/biopsy    Pre-operative diagnosis: abnormal CT esophagus    Post-operative diagnosis: see impression    Medications: MAC    Complications: none    Procedure:  Informed consent was obtained from the patient after the risks of the procedure were discussed, including but not limited to bleeding, perforation, aspiration, infection, or possibility of a missed lesion. After discussions of the risks/benefits and alternatives to this procedure, as well as the planned sedation, the patient was placed in the left lateral decubitus position and begun on continuous blood pressure pulse oximetry and EKG monitoring and this was maintained throughout the procedure. Once an adequate level of sedation was obtained a bite block was placed. Then the lubricated tip of the [id number] diagnostic video upper endoscope was inserted and advanced using direct visualization into the posterior pharynx and ultimately into the esophagus.    Complications: None    Findings:      1. Esophagus: A large circumferential infiltrating malignant appearing ulcerated mass seen beginning at about 18-20 cm from the incisor. It was firm and friable with spontaneous oozing. Few small biopsies obtained with cold forceps for histology. The inner diameter of the lumen was nearly pinpoint (less than 3-4 mm), unable to pass through with endoscope.     The procedure was then aborted and we then withdrew the instrument from the patient who tolerated the procedure well.     Impression:   Obstructing esophageal mass s/p biopsies    Recommend:  1. Follow-up pathology  2. Consult surgical oncology; considerations for surgical placement of feeding tube at some point for nutrition  3. Clear liquids    >>>If tissue was sampled/removed and you have not received  your pathology results either by phone or letter within 2 weeks, please call our office at [phone].    Specimens:  Esophageal mass    Blood loss: <1 ml      Ina Huddleston MD  [facility]

## 2024-08-10 NOTE — PLAN OF CARE
Patient ahd EDG done today. See GI notes. Plan for oncology consult.   Problem: Patient Centered Care  Goal: Patient preferences are identified and integrated in the patient's plan of care  Description: Interventions:  - What would you like us to know as we care for you? Home with family  - Provide timely, complete, and accurate information to patient/family  - Incorporate patient and family knowledge, values, beliefs, and cultural backgrounds into the planning and delivery of care  - Encourage patient/family to participate in care and decision-making at the level they choose  - Honor patient and family perspectives and choices  Outcome: Progressing     Problem: Patient/Family Goals  Goal: Patient/Family Long Term Goal  Description: Patient's Long Term Goal: improve swallowing    Interventions:  - follow plan per GI  - See additional Care Plan goals for specific interventions  Outcome: Progressing  Goal: Patient/Family Short Term Goal  Description: Patient's Short Term Goal: home    Interventions:   - follow plan of care  - See additional Care Plan goals for specific interventions  Outcome: Progressing     Problem: CARDIOVASCULAR - ADULT  Goal: Maintains optimal cardiac output and hemodynamic stability  Description: INTERVENTIONS:  - Monitor vital signs, rhythm, and trends  - Monitor for bleeding, hypotension and signs of decreased cardiac output  - Evaluate effectiveness of vasoactive medications to optimize hemodynamic stability  - Monitor arterial and/or venous puncture sites for bleeding and/or hematoma  - Assess quality of pulses, skin color and temperature  - Assess for signs of decreased coronary artery perfusion - ex. Angina  - Evaluate fluid balance, assess for edema, trend weights  Outcome: Progressing  Goal: Absence of cardiac arrhythmias or at baseline  Description: INTERVENTIONS:  - Continuous cardiac monitoring, monitor vital signs, obtain 12 lead EKG if indicated  - Evaluate effectiveness of  antiarrhythmic and heart rate control medications as ordered  - Initiate emergency measures for life threatening arrhythmias  - Monitor electrolytes and administer replacement therapy as ordered  Outcome: Progressing     Problem: SAFETY ADULT - FALL  Goal: Free from fall injury  Description: INTERVENTIONS:  - Assess pt frequently for physical needs  - Identify cognitive and physical deficits and behaviors that affect risk of falls.  - [place] fall precautions as indicated by assessment.  - Educate pt/family on patient safety including physical limitations  - Instruct pt to call for assistance with activity based on assessment  - Modify environment to reduce risk of injury  - Provide assistive devices as appropriate  - Consider OT/PT consult to assist with strengthening/mobility  - Encourage toileting schedule  Outcome: Progressing     Problem: GASTROINTESTINAL - ADULT  Goal: Minimal or absence of nausea and vomiting  Description: INTERVENTIONS:  - Maintain adequate hydration with IV or PO as ordered and tolerated  - Nasogastric tube to low intermittent suction as ordered  - Evaluate effectiveness of ordered antiemetic medications  - Provide nonpharmacologic comfort measures as appropriate  - Advance diet as tolerated, if ordered  - Obtain nutritional consult as needed  - Evaluate fluid balance  Outcome: Progressing  Goal: Maintains or returns to baseline bowel function  Description: INTERVENTIONS:  - Assess bowel function  - Maintain adequate hydration with IV or PO as ordered and tolerated  - Evaluate effectiveness of GI medications  - Encourage mobilization and activity  - Obtain nutritional consult as needed  - Establish a toileting routine/schedule  - Consider collaborating with pharmacy to review patient's medication profile  Outcome: Progressing  Goal: Maintains adequate nutritional intake (undernourished)  Description: INTERVENTIONS:  - Monitor percentage of each meal consumed  - Identify factors  contributing to decreased intake, treat as appropriate  - Assist with meals as needed  - Monitor I&O, WT and lab values  - Obtain nutritional consult as needed  - Optimize oral hygiene and moisture  - Encourage food from home; allow for food preferences  - Enhance eating environment  Outcome: Progressing

## 2024-08-10 NOTE — PLAN OF CARE
Problem: Patient Centered Care  Goal: Patient preferences are identified and integrated in the patient's plan of care  Description: Interventions:  - What would you like us to know as we care for you? From home wife  - Provide timely, complete, and accurate information to patient/family  - Incorporate patient and family knowledge, values, beliefs, and cultural backgrounds into the planning and delivery of care  - Encourage patient/family to participate in care and decision-making at the level they choose  - Honor patient and family perspectives and choices  Outcome: Progressing     Problem: Patient/Family Goals  Goal: Patient/Family Long Term Goal  Description: Patient's Long Term Goal: to go home    Interventions:  - follow MD orders  - See additional Care Plan goals for specific interventions  Outcome: Progressing  Goal: Patient/Family Short Term Goal  Description: Patient's Short Term Goal: manage dysphagia     Interventions:   - follow MD order  - See additional Care Plan goals for specific interventions  Outcome: Progressing     Problem: CARDIOVASCULAR - ADULT  Goal: Maintains optimal cardiac output and hemodynamic stability  Description: INTERVENTIONS:  - Monitor vital signs, rhythm, and trends  - Monitor for bleeding, hypotension and signs of decreased cardiac output  - Evaluate effectiveness of vasoactive medications to optimize hemodynamic stability  - Monitor arterial and/or venous puncture sites for bleeding and/or hematoma  - Assess quality of pulses, skin color and temperature  - Assess for signs of decreased coronary artery perfusion - ex. Angina  - Evaluate fluid balance, assess for edema, trend weights  Outcome: Progressing  Goal: Absence of cardiac arrhythmias or at baseline  Description: INTERVENTIONS:  - Continuous cardiac monitoring, monitor vital signs, obtain 12 lead EKG if indicated  - Evaluate effectiveness of antiarrhythmic and heart rate control medications as ordered  - Initiate  emergency measures for life threatening arrhythmias  - Monitor electrolytes and administer replacement therapy as ordered  Outcome: Progressing     Problem: SAFETY ADULT - FALL  Goal: Free from fall injury  Description: INTERVENTIONS:  - Assess pt frequently for physical needs  - Identify cognitive and physical deficits and behaviors that affect risk of falls.  - [place] fall precautions as indicated by assessment.  - Educate pt/family on patient safety including physical limitations  - Instruct pt to call for assistance with activity based on assessment  - Modify environment to reduce risk of injury  - Provide assistive devices as appropriate  - Consider OT/PT consult to assist with strengthening/mobility  - Encourage toileting schedule  Outcome: Progressing     Problem: GASTROINTESTINAL - ADULT  Goal: Minimal or absence of nausea and vomiting  Description: INTERVENTIONS:  - Maintain adequate hydration with IV or PO as ordered and tolerated  - Nasogastric tube to low intermittent suction as ordered  - Evaluate effectiveness of ordered antiemetic medications  - Provide nonpharmacologic comfort measures as appropriate  - Advance diet as tolerated, if ordered  - Obtain nutritional consult as needed  - Evaluate fluid balance  Outcome: Progressing  Goal: Maintains or returns to baseline bowel function  Description: INTERVENTIONS:  - Assess bowel function  - Maintain adequate hydration with IV or PO as ordered and tolerated  - Evaluate effectiveness of GI medications  - Encourage mobilization and activity  - Obtain nutritional consult as needed  - Establish a toileting routine/schedule  - Consider collaborating with pharmacy to review patient's medication profile  Outcome: Progressing  Goal: Maintains adequate nutritional intake (undernourished)  Description: INTERVENTIONS:  - Monitor percentage of each meal consumed  - Identify factors contributing to decreased intake, treat as appropriate  - Assist with meals as  needed  - Monitor I&O, WT and lab values  - Obtain nutritional consult as needed  - Optimize oral hygiene and moisture  - Encourage food from home; allow for food preferences  - Enhance eating environment  Outcome: Progressing

## 2024-08-10 NOTE — ANESTHESIA POSTPROCEDURE EVALUATION
Patient: Hortencia Kelley    Procedure Summary       Date: 08/10/24 Room / Location: [facility] ENDOSCOPY 05 / [facility] ENDOSCOPY    Anesthesia Start: 0814 Anesthesia Stop:     Procedure: ESOPHAGOGASTRODUODENOSCOPY (EGD) Diagnosis: (esophageal mass)    Surgeons: Ina Huddleston MD Anesthesiologist: Dario Adam MD    Anesthesia Type: MAC ASA Status: 2 - Emergent            Anesthesia Type: MAC    Vitals Value Taken Time   /102 08/10/24 0835   Temp  08/10/24 0836   Pulse 91 08/10/24 0835   Resp 23 08/10/24 0835   SpO2 98 % 08/10/24 0835   Vitals shown include unfiled device data.    [facility] AN Post Evaluation:   Patient Evaluated in PACU  Patient Participation: complete - patient participated  Level of Consciousness: awake  Pain Management: adequate  Airway Patency:patent  Dental exam unchanged from preop  Yes    Cardiovascular Status: acceptable  Respiratory Status: acceptable  Postoperative Hydration acceptable      DARIO ADAM MD  8/10/2024 8:36 AM

## 2024-08-10 NOTE — INTERVAL H&P NOTE
Pre-op Diagnosis: esophageal mass    The above referenced H&P was reviewed by Ina Webster Ma, MD on 8/10/2024, the patient was examined and no significant changes have occurred in the patient's condition since the H&P was performed.  I discussed with the patient and/or legal representative the potential benefits, risks and side effects of this procedure; the likelihood of the patient achieving goals; and potential problems that might occur during recuperation.  I discussed reasonable alternatives to the procedure, including risks, benefits and side effects related to the alternatives and risks related to not receiving this procedure.  We will proceed with procedure as planned.

## 2024-08-11 LAB
ANION GAP SERPL CALC-SCNC: 6 MMOL/L (ref 0–18)
BASOPHILS # BLD AUTO: 0.01 X10(3) UL (ref 0–0.2)
BASOPHILS NFR BLD AUTO: 0.1 %
BUN BLD-MCNC: 9 MG/DL (ref 9–23)
BUN/CREAT SERPL: 15.3 (ref 10–20)
CALCIUM BLD-MCNC: 8.5 MG/DL (ref 8.7–10.4)
CHLORIDE SERPL-SCNC: 110 MMOL/L (ref 98–112)
CO2 SERPL-SCNC: 28 MMOL/L (ref 21–32)
CREAT BLD-MCNC: 0.59 MG/DL
DEPRECATED RDW RBC AUTO: 44 FL (ref 35.1–46.3)
EGFRCR SERPLBLD CKD-EPI 2021: 93 ML/MIN/1.73M2 (ref 60–?)
EOSINOPHIL # BLD AUTO: 0 X10(3) UL (ref 0–0.7)
EOSINOPHIL NFR BLD AUTO: 0 %
ERYTHROCYTE [DISTWIDTH] IN BLOOD BY AUTOMATED COUNT: 13.4 % (ref 11–15)
GLUCOSE BLD-MCNC: 131 MG/DL (ref 70–99)
HCT VFR BLD AUTO: 32.3 %
HGB BLD-MCNC: 10.6 G/DL
IMM GRANULOCYTES # BLD AUTO: 0.09 X10(3) UL (ref 0–1)
IMM GRANULOCYTES NFR BLD: 0.8 %
LYMPHOCYTES # BLD AUTO: 0.77 X10(3) UL (ref 1–4)
LYMPHOCYTES NFR BLD AUTO: 6.9 %
MCH RBC QN AUTO: 29.4 PG (ref 26–34)
MCHC RBC AUTO-ENTMCNC: 32.8 G/DL (ref 31–37)
MCV RBC AUTO: 89.7 FL
MONOCYTES # BLD AUTO: 0.6 X10(3) UL (ref 0.1–1)
MONOCYTES NFR BLD AUTO: 5.4 %
NEUTROPHILS # BLD AUTO: 9.69 X10 (3) UL (ref 1.5–7.7)
NEUTROPHILS # BLD AUTO: 9.69 X10(3) UL (ref 1.5–7.7)
NEUTROPHILS NFR BLD AUTO: 86.8 %
OSMOLALITY SERPL CALC.SUM OF ELEC: 298 MOSM/KG (ref 275–295)
PLATELET # BLD AUTO: 280 10(3)UL (ref 150–450)
POTASSIUM SERPL-SCNC: 3.8 MMOL/L (ref 3.5–5.1)
RBC # BLD AUTO: 3.6 X10(6)UL
SODIUM SERPL-SCNC: 144 MMOL/L (ref 136–145)
WBC # BLD AUTO: 11.2 X10(3) UL (ref 4–11)

## 2024-08-11 PROCEDURE — 99233 SBSQ HOSP IP/OBS HIGH 50: CPT | Performed by: INTERNAL MEDICINE

## 2024-08-11 PROCEDURE — 99233 SBSQ HOSP IP/OBS HIGH 50: CPT | Performed by: HOSPITALIST

## 2024-08-11 PROCEDURE — 99232 SBSQ HOSP IP/OBS MODERATE 35: CPT | Performed by: INTERNAL MEDICINE

## 2024-08-11 RX ORDER — FUROSEMIDE 40 MG
40 TABLET ORAL DAILY
Status: DISCONTINUED | OUTPATIENT
Start: 2024-08-11 | End: 2024-08-14

## 2024-08-11 RX ORDER — METHYLPREDNISOLONE SODIUM SUCCINATE 40 MG/ML
40 INJECTION, POWDER, LYOPHILIZED, FOR SOLUTION INTRAMUSCULAR; INTRAVENOUS DAILY
Status: DISCONTINUED | OUTPATIENT
Start: 2024-08-12 | End: 2024-08-12

## 2024-08-11 RX ORDER — SPIRONOLACTONE 25 MG/1
12.5 TABLET ORAL DAILY
Status: DISCONTINUED | OUTPATIENT
Start: 2024-08-11 | End: 2024-08-12

## 2024-08-11 NOTE — PROGRESS NOTES
[facility]  Progress Note     Hortencia Kelley  : 10/2/1945    Status: Inpatient  Day #: 4    Attending: Rico Frost MD  PCP: No primary care provider on file.     Assessment and Plan:    NSTEMI type II - elevated troponin and new cardiomyopathy  Takotsubo cardiomyopathy  -cardiology on consult  -echo EF - 20-25%, akinesis of apical wall  -cardiac cath without CAD suggesting takotsubo  -heparin drip stopped  -cont asa. Entresto started.   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 Was firm and friable with spontaneous oozing.   -CLD  -surgical oncology consult    AECOPD  H/o L vocal cord paralysis  -pulm on consult  -cont nebs  -cont steroids and wean    Anemia  -malignancy w/u    Dietitian Malnutrition Assessment        Evaluation for Malnutrition: Criteria for non-severe malnutrition diagnosis-         acute illness/injury related to Wt loss 5% in 1 month., Body fat mild depletion., Muscle mass mild depletion.         RD Malnutrition Care Plan: Encouraged increased PO intake., Encouraged small frequent meals with emphasis on high calorie/high protein., Intiated ONS (oral nutritional supplements).    Body Fat/Muscle Mass: Mild depletion body fat., Mild depletion muscle mass. triceps region. temple region., clavicle region., shoulder region., dorsal monsalve region., thigh region., calf region.    Physician Assessment    Patient has a diagnosis of moderate malnutrition     DVT Mechanical Prophylaxis:        DVT Pharmacologic Prophylaxis   Medication   None         DVT Pharmacologic prophylaxis: Aspirin 81 mg     Subjective:      Initial Chief Complaint:  cough, chest pain    No CP. No SOB currently. Tolerating CLD.    10 point ROS completed and was negative, except for pertinent positive and negatives stated in subjective.      Objective:      Temp:  [98.5 °F (36.9  °C)-98.7 °F (37.1 °C)] 98.5 °F (36.9 °C)  Pulse:  [52-84] 66  Resp:  [20] 20  BP: (106-110)/(66-71) 110/68  SpO2:  [97 %-98 %] 98 %  General:  Alert, no distress  HEENT:  Normocephalic, atraumatic  Cardiac:  Regular rate, regular rhythm  Pulmonary:  Clear to auscultation bilaterally, respirations unlabored  Gastrointestinal:  Soft, non-tender, normal bowel sounds  Musculoskeletal:  No joint swelling  Extremities:  No edema, no cyanosis, no clubbing  Neurologic:  nonfocal  Psychiatric:  Normal affect, calm and appropriate    Intake/Output Summary (Last 24 hours) at 8/11/2024 1028  Last data filed at 8/11/2024 0853  Gross per 24 hour   Intake 2700 ml   Output 0 ml   Net 2700 ml         Recent Labs   Lab 08/09/24  0528 08/10/24  0717 08/11/24  0623   WBC 16.6* 12.6* 11.2*   HGB 9.7* 9.9* 10.6*   HCT 29.4* 31.6* 32.3*   .0 280.0 280.0   RBC 3.28* 3.46* 3.60*   MCV 89.6 91.3 89.7   MCH 29.6 28.6 29.4   MCHC 33.0 31.3 32.8   RDW 13.2 13.5 13.4   NEPRELIM 14.73* 10.56* 9.69*     Recent Labs   Lab 08/08/24  1011 08/08/24  1708 08/09/24  0528 08/10/24  0717 08/11/24  0623   BUN  --   --  18 14 9   CREATSERUM  --   --  0.67 0.64 0.59   CA  --   --  8.9 8.7 8.5*   NA  --   --  144 147* 144   K  --   --  4.6 4.1 3.8   CL  --   --  112 114* 110   CO2  --   --  27.0 27.0 28.0   GLU  --   --  151* 101* 131*   PTT 45.2* 73.0* 154.4*  --   --        No results found.      Medications:   [START ON 8/12/2024] methylPREDNISolone  40 mg Intravenous Daily    furosemide  40 mg Oral Daily    spironolactone  12.5 mg Oral Daily    sacubitril-valsartan  1 tablet Oral BID    metoprolol succinate  25 mg Oral 2x Daily(Beta Blocker)    aspirin  81 mg Oral Daily    pantoprazole  40 mg Intravenous Q12H    revefenacin  175 mcg Nebulization Daily    budesonide  0.25 mg Nebulization BID      PRN Meds:   ipratropium-albuterol    acetaminophen    ondansetron    metoclopramide    temazepam    guaiFENesin    benzonatate    nitroglycerin     ipratropium-albuterol    Supplementary Documentation:        MDM High. Time spent on chart/note review, review of labs/imaging, discussion with patient, physical exam, discussion with staff, consultants, coordinating care, writing progress note, discussion of plan of care.      Rico Frost MD

## 2024-08-11 NOTE — PROGRESS NOTES
[facility] [place]  part of [facility]     Progress Note    Hortencia Kelley Patient Status:  Inpatient    10/2/1945 MRN [id number]   Location [facility] 3W/SW Attending Rico Frost MD   Hosp Day # 4 PCP No primary care provider on file.       Subjective:   Patient seen and examined.  Dyspnea continues to improve per patient    Objective:   Blood pressure 110/68, pulse 66, temperature 98.5 °F (36.9 °C), temperature source Oral, resp. rate 20, height 5' 7\" (1.702 m), weight 101 lb 13.6 oz (46.2 kg), SpO2 98%, not currently breastfeeding.  Intake/Output:   Last 3 shifts: I/O last 3 completed shifts:  In: 2968.3 [P.O.:500; I.V.:2468.3]  Out: -    This shift: I/O this shift:  In: 1211.7 [I.V.:1211.7]  Out: -      Vent Settings:      Hemodynamic parameters (last 24 hours):      Scheduled Meds:   Current Facility-Administered Medications   Medication Dose Route Frequency    lactated ringers infusion   Intravenous Continuous    sacubitril-valsartan (Entresto) 24-26 MG per tab 1 tablet  1 tablet Oral BID    metoprolol succinate ER (Toprol XL) 24 hr tab 25 mg  25 mg Oral 2x Daily(Beta Blocker)    methylPREDNISolone sodium succinate (Solu-MEDROL) injection 40 mg  40 mg Intravenous Q12H    aspirin DR tab 81 mg  81 mg Oral Daily    ipratropium-albuterol (Duoneb) 0.5-2.5 (3) MG/3ML inhalation solution 3 mL  3 mL Nebulization Q6H PRN    acetaminophen (Tylenol Extra Strength) tab 500 mg  500 mg Oral Q4H PRN    ondansetron (Zofran) 4 MG/2ML injection 4 mg  4 mg Intravenous Q6H PRN    metoclopramide (Reglan) 5 mg/mL injection 5 mg  5 mg Intravenous Q8H PRN    temazepam (Restoril) cap 15 mg  15 mg Oral Nightly PRN    guaiFENesin (Robitussin) 100 MG/5 ML oral liquid 200 mg  200 mg Oral Q4H PRN    benzonatate (Tessalon) cap 200 mg  200 mg Oral TID PRN    nitroglycerin (Nitrostat) SL tab 0.4 mg  0.4 mg Sublingual Q5 Min PRN    ipratropium-albuterol (Duoneb) 0.5-2.5 (3) MG/3ML inhalation solution 3 mL  3 mL  Nebulization Q6H PRN    potassium chloride 20 mEq in dextrose 5%-sodium chloride 0.9% 1000mL infusion premix   Intravenous Continuous    pantoprazole (Protonix) 40 mg in sodium chloride 0.9% PF 10 mL IV push  40 mg Intravenous Q12H    revefenacin (Yupelri) 175 MCG/3ML nebulizer solution 175 mcg  175 mcg Nebulization Daily    budesonide (Pulmicort) 0.25 MG/2ML nebulizer suspension 0.25 mg  0.25 mg Nebulization BID       Continuous Infusions:    lactated ringers      potassium chloride in dextrose 5%-sodium chloride 0.9% 100 mL/hr at 08/10/24 0957       Physical Exam  Constitutional: no acute distress  Eyes: PERRL  ENT: nares pateint  Neck: supple, no JVD  Cardio: RRR, S1 S2  Respiratory: Faint Rales  GI: abdomen soft, non tender, active bowel sounds, no organomegaly  Extremities: no clubbing, cyanosis, edema  Neurologic: no gross motor deficits  Skin: warm, dry      Results:     Lab Results   Component Value Date    WBC 11.2 08/11/2024    HGB 10.6 08/11/2024    HCT 32.3 08/11/2024    .0 08/11/2024    CREATSERUM 0.59 08/11/2024    BUN 9 08/11/2024     08/11/2024    K 3.8 08/11/2024     08/11/2024    CO2 28.0 08/11/2024     08/11/2024    CA 8.5 08/11/2024       No results found.          Assessment   1.  Esophageal mass  2.  Left vocal cord paralysis  3.  COPD with acute exacerbation  4.  Non-ST elevation myocardial infarction  5.  Prior nicotine dependence     Plan   -Patient presents with evidence of ongoing cough hoarseness weight loss with some associated chest pain.  -CT chest on presentation with mass seen within the mid/proximal esophagus concerning for esophageal neoplasm.  Effacement and narrowing of the trachea secondary to mass  -Status post EGD with obstructing esophageal mass status post biopsies.  Await pathology results.  -Concern for COPD exacerbation  -Wean steroid therapy  -Nebulizer treatments    Prakash Quispe DO  Pulmonary Critical Care Medicine  [facility]

## 2024-08-11 NOTE — PLAN OF CARE
Problem: Patient Centered Care  Goal: Patient preferences are identified and integrated in the patient's plan of care  Description: Interventions:  - What would you like us to know as we care for you? From home with   - Provide timely, complete, and accurate information to patient/family  - Incorporate patient and family knowledge, values, beliefs, and cultural backgrounds into the planning and delivery of care  - Encourage patient/family to participate in care and decision-making at the level they choose  - Honor patient and family perspectives and choices  Outcome: Progressing     Problem: Patient/Family Goals  Goal: Patient/Family Long Term Goal  Description: Patient's Long Term Goal: to go home    Interventions:  - follow MD orders  - See additional Care Plan goals for specific interventions  Outcome: Progressing  Goal: Patient/Family Short Term Goal  Description: Patient's Short Term Goal: manage dysphagia     Interventions:   - follow MD orders  - See additional Care Plan goals for specific interventions  Outcome: Progressing     Problem: CARDIOVASCULAR - ADULT  Goal: Maintains optimal cardiac output and hemodynamic stability  Description: INTERVENTIONS:  - Monitor vital signs, rhythm, and trends  - Monitor for bleeding, hypotension and signs of decreased cardiac output  - Evaluate effectiveness of vasoactive medications to optimize hemodynamic stability  - Monitor arterial and/or venous puncture sites for bleeding and/or hematoma  - Assess quality of pulses, skin color and temperature  - Assess for signs of decreased coronary artery perfusion - ex. Angina  - Evaluate fluid balance, assess for edema, trend weights  Outcome: Progressing  Goal: Absence of cardiac arrhythmias or at baseline  Description: INTERVENTIONS:  - Continuous cardiac monitoring, monitor vital signs, obtain 12 lead EKG if indicated  - Evaluate effectiveness of antiarrhythmic and heart rate control medications as ordered  - Initiate  emergency measures for life threatening arrhythmias  - Monitor electrolytes and administer replacement therapy as ordered  Outcome: Progressing     Problem: SAFETY ADULT - FALL  Goal: Free from fall injury  Description: INTERVENTIONS:  - Assess pt frequently for physical needs  - Identify cognitive and physical deficits and behaviors that affect risk of falls.  - [place] fall precautions as indicated by assessment.  - Educate pt/family on patient safety including physical limitations  - Instruct pt to call for assistance with activity based on assessment  - Modify environment to reduce risk of injury  - Provide assistive devices as appropriate  - Consider OT/PT consult to assist with strengthening/mobility  - Encourage toileting schedule  Outcome: Progressing     Problem: GASTROINTESTINAL - ADULT  Goal: Minimal or absence of nausea and vomiting  Description: INTERVENTIONS:  - Maintain adequate hydration with IV or PO as ordered and tolerated  - Nasogastric tube to low intermittent suction as ordered  - Evaluate effectiveness of ordered antiemetic medications  - Provide nonpharmacologic comfort measures as appropriate  - Advance diet as tolerated, if ordered  - Obtain nutritional consult as needed  - Evaluate fluid balance  Outcome: Progressing  Goal: Maintains or returns to baseline bowel function  Description: INTERVENTIONS:  - Assess bowel function  - Maintain adequate hydration with IV or PO as ordered and tolerated  - Evaluate effectiveness of GI medications  - Encourage mobilization and activity  - Obtain nutritional consult as needed  - Establish a toileting routine/schedule  - Consider collaborating with pharmacy to review patient's medication profile  Outcome: Progressing  Goal: Maintains adequate nutritional intake (undernourished)  Description: INTERVENTIONS:  - Monitor percentage of each meal consumed  - Identify factors contributing to decreased intake, treat as appropriate  - Assist with meals as  needed  - Monitor I&O, WT and lab values  - Obtain nutritional consult as needed  - Optimize oral hygiene and moisture  - Encourage food from home; allow for food preferences  - Enhance eating environment  Outcome: Progressing

## 2024-08-11 NOTE — PLAN OF CARE
Patient is alert and oriented times 4. On RA. No complains of pain at this time. Awaiting EGD biopsy results. Concern for COPD, continues on IV solumedrol and scheduled neb tx. Per GI plan to continue on clear liquids and possibly be evaluated by surgical oncology for feeding tube assessment.     Problem: Patient Centered Care  Goal: Patient preferences are identified and integrated in the patient's plan of care  Description: Interventions:  - Provide timely, complete, and accurate information to patient/family  - Incorporate patient and family knowledge, values, beliefs, and cultural backgrounds into the planning and delivery of care  - Encourage patient/family to participate in care and decision-making at the level they choose  - Honor patient and family perspectives and choices  Outcome: Progressing     Problem: Patient/Family Goals  Goal: Patient/Family Short Term Goal  Description: Patient's Short Term Goal:     Interventions:     - See additional Care Plan goals for specific interventions  Outcome: Progressing     Problem: CARDIOVASCULAR - ADULT  Goal: Maintains optimal cardiac output and hemodynamic stability  Description: INTERVENTIONS:  - Monitor vital signs, rhythm, and trends  - Monitor for bleeding, hypotension and signs of decreased cardiac output  - Evaluate effectiveness of vasoactive medications to optimize hemodynamic stability  - Monitor arterial and/or venous puncture sites for bleeding and/or hematoma  - Assess quality of pulses, skin color and temperature  - Assess for signs of decreased coronary artery perfusion - ex. Angina  - Evaluate fluid balance, assess for edema, trend weights  Outcome: Progressing     Problem: SAFETY ADULT - FALL  Goal: Free from fall injury  Description: INTERVENTIONS:  - Assess pt frequently for physical needs  - Identify cognitive and physical deficits and behaviors that affect risk of falls.  - [place] fall precautions as indicated by assessment.  - Educate  pt/family on patient safety including physical limitations  - Instruct pt to call for assistance with activity based on assessment  - Modify environment to reduce risk of injury  - Provide assistive devices as appropriate  - Consider OT/PT consult to assist with strengthening/mobility  - Encourage toileting schedule  Outcome: Progressing

## 2024-08-11 NOTE — PROGRESS NOTES
Progress Note  Hortencia Kelley Patient Status:  Inpatient    10/2/1945 MRN [id number]   Location [facility] 3W/SW Attending Rico Frost MD   Hosp Day # 4 PCP No primary care provider on file.     SUBJECTIVE:    Denies any complaints.     VITALS:  /68 (BP Location: Right arm)   Pulse 66   Temp 98.5 °F (36.9 °C) (Oral)   Resp 20   Ht 5' 7\" (1.702 m)   Wt 101 lb 13.6 oz (46.2 kg)   SpO2 98%   BMI 15.95 kg/m²     INTAKE/OUTPUT:    Intake/Output Summary (Last 24 hours) at 2024 0840  Last data filed at 2024 0700  Gross per 24 hour   Intake 2780 ml   Output --   Net 2780 ml     Last 3 Weights   24 0605 101 lb 13.6 oz (46.2 kg)   08/10/24 0600 106 lb 14.8 oz (48.5 kg)   24 0510 93 lb 11.1 oz (42.5 kg)   24 0627 93 lb 3.2 oz (42.3 kg)   24 1358 91 lb 7.9 oz (41.5 kg)   24 1056 91 lb 7.9 oz (41.5 kg)   24 0909 90 lb (40.8 kg)   24 1046 90 lb (40.8 kg)   24 1604 105 lb (47.6 kg)     LABS:  Recent Labs   Lab 08/09/24  0528 08/10/24  0717 24  0623   * 101* 131*   BUN 18 14 9   CREATSERUM 0.67 0.64 0.59   EGFRCR 91 92 93   CA 8.9 8.7 8.5*    147* 144   K 4.6 4.1 3.8    114* 110   CO2 27.0 27.0 28.0     Recent Labs   Lab 08/09/24  0528 08/10/24  0717 24  0623   RBC 3.28* 3.46* 3.60*   HGB 9.7* 9.9* 10.6*   HCT 29.4* 31.6* 32.3*   MCV 89.6 91.3 89.7   MCH 29.6 28.6 29.4   MCHC 33.0 31.3 32.8   RDW 13.2 13.5 13.4   NEPRELIM 14.73* 10.56* 9.69*   WBC 16.6* 12.6* 11.2*   .0 280.0 280.0     No results for input(s): \"TROP\", \"CK\" in the last 168 hours.    DIAGNOSTICS:    TELEMETRY: SB/    ECHO 2024:  Conclusions:   1. Left ventricle: The cavity size was normal. Wall thickness was normal.      Systolic function was markedly reduced. The estimated ejection fraction      was 20-25%, by visual assessment. Akinesis of the entireapical wall.      Doppler parameters are consistent with abnormal left ventricular       relaxation - grade 1 diastolic dysfunction.   2. Right ventricle: Systolic function was normal.   3. Aortic valve: There was mild to moderate regurgitation. The peak systolic      velocity was 1.01m/sec.     ROS: Negative unless noted above     PHYSICAL EXAM:  General: Alert and oriented x 3. No apparent distress.  HEENT: Normocephalic, sclera are nonicteric. Hearing appropriate bilaterally.  Neck: No JVD or Carotid bruits. Trachea midline.   Cardiac: Regular rate and rhythm. S1, S2 auscultated. No murmurs, rubs, or gallops appreciated.   Lungs: A/P dullness. Chest expansion symmetrical. Regular effort.  Abdomen: Soft, non-tender, +BS. No hepatosplenomegaly or appreciable masses.   Extremities: Without clubbing, cyanosis or edema.  Peripheral pulses are 2+.  Neurologic: Motor and sensory nerves grossly intact.   Psych: Appropriate affect   Skin: Warm and dry. No obvious lesions, wounds, or ulcerations.     MEDICATIONS:   sacubitril-valsartan  1 tablet Oral BID    metoprolol succinate  25 mg Oral 2x Daily(Beta Blocker)    methylPREDNISolone  40 mg Intravenous Q12H    aspirin  81 mg Oral Daily    pantoprazole  40 mg Intravenous Q12H    revefenacin  175 mcg Nebulization Daily    budesonide  0.25 mg Nebulization BID      lactated ringers      potassium chloride in dextrose 5%-sodium chloride 0.9% 100 mL/hr at 08/10/24 0957     ASSESSMENT:    Type II NSTEMI  New NICM, LVEF 20-25%/ Mod AI  - CT without PE  - C 8/9/24 with normal coronaries   - ASA, Entresto, Toprol XL,new and tolerating     Esophageal Mass concerning for malignancy  Acute Anemia  - EGD today s/p biopsy, pending surgical oncology consult   - No overt bleeding, H&H stable, during EGD biopsy mass was friable with spontaneous oozing     COPD w/ Exacerbation- Pulmonary following   HLD- , not historically on statin   Hx ETOH Abuse   Hx Tobacco Abuse   Pre-Diabetic- A1c 6.2%    PLAN:  - Surgical Oncology consult pending, per pt she was told they will  see her tomorrow. She may also need PEG tube, thinking about proceeding with this if she needs it   - Normal coronary on [facility] will continue to titrate medications for systolic heart failure, Discussed potential wearable defibrillator on discharge patient declined, risks identified   - Start Aldactone and low dose loop diuretic, Eventual SGLT2    Plan of care discussed with patient and RN.     Ailyn Gray, APRN  8/11/2024  8:42 AM  [phone] ([place])  [phone] ([place])

## 2024-08-11 NOTE — PROGRESS NOTES
[facility]     Gastroenterology Progress Note    Hortencia Kelley Patient Status:  Inpatient    10/2/1945 MRN [id number]   Location [facility] 3W/SW Attending Rico Frost MD   Hosp Day # 4 PCP No primary care provider on file.       Subjective:   No specific complaints this morning, tolerating clears. No abd pain n/v.     Objective:   Blood pressure 110/68, pulse 66, temperature 98.5 °F (36.9 °C), temperature source Oral, resp. rate 20, height 5' 7\" (1.702 m), weight 101 lb 13.6 oz (46.2 kg), SpO2 98%, not currently breastfeeding. Body mass index is 15.95 kg/m².    General: awake, alert and oriented, no acute distress  HEENT: moist mucus membranes  PULM: no conversational dyspnea  CARDIOVASCULAR: regular rate and rhythm, the extremities are warm and well perfused  GI: soft, non-tender, non-distended, + BS, no rebound/guarding   EXTREMITIES: no edema, moving all extremities  SKIN: no visible rash  NEURO: appropriate and interactive    Assessment and Plan:   Hortencia Kelley is a a(n) 78 year old female w/ a history of COPD, former smoker with 20 pack year smoking history, depression, ETOH abuse, who presents with shortness of breath, weight loss, and dysphagia. 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 EGD 8/10 showed an obstructing esophageal mass in the mid-upper esophagus, biopsied.     Recommend:  1. Follow-up pathology  2. Consult surgical oncology; considerations for surgical placement of feeding tube at some point for nutrition  3. Clear liquids    Ina Huddleston MD  [facility] Gastroenterology      Results:     Lab Results   Component Value Date    WBC 11.2 (H) 2024    HGB 10.6 (L) 2024    HCT 32.3 (L) 2024    .0 2024    CREATSERUM 0.59 2024    BUN 9 2024     2024    K 3.8 2024     2024    CO2 28.0  08/11/2024     (H) 08/11/2024    CA 8.5 (L) 08/11/2024    ALB 4.3 07/26/2024    ALKPHO 108 07/26/2024    BILT 0.4 07/26/2024    TP 7.4 07/26/2024    AST 13 07/26/2024    ALT <7 (L) 07/26/2024    .4 (H) 08/09/2024    INR 1.04 06/23/2020    T4F 1.2 07/26/2024    TSH 0.282 (L) 07/26/2024    MG 1.9 06/28/2020    B12 170 (L) 06/27/2020    ETOH 378 (H) 06/23/2020       No results found.

## 2024-08-12 ENCOUNTER — DOCUMENTATION ONLY (OUTPATIENT)
Dept: SURGERY | Facility: CLINIC | Age: 79
End: 2024-08-12

## 2024-08-12 DIAGNOSIS — K22.89 ESOPHAGEAL MASS: Primary | ICD-10-CM

## 2024-08-12 LAB
ANION GAP SERPL CALC-SCNC: 6 MMOL/L (ref 0–18)
BASOPHILS # BLD AUTO: 0.01 X10(3) UL (ref 0–0.2)
BASOPHILS NFR BLD AUTO: 0.1 %
BUN BLD-MCNC: 5 MG/DL (ref 9–23)
BUN/CREAT SERPL: 8.6 (ref 10–20)
CALCIUM BLD-MCNC: 8.3 MG/DL (ref 8.7–10.4)
CHLORIDE SERPL-SCNC: 108 MMOL/L (ref 98–112)
CO2 SERPL-SCNC: 31 MMOL/L (ref 21–32)
CREAT BLD-MCNC: 0.58 MG/DL
DEPRECATED RDW RBC AUTO: 41.4 FL (ref 35.1–46.3)
EGFRCR SERPLBLD CKD-EPI 2021: 94 ML/MIN/1.73M2 (ref 60–?)
EOSINOPHIL # BLD AUTO: 0.08 X10(3) UL (ref 0–0.7)
EOSINOPHIL NFR BLD AUTO: 0.8 %
ERYTHROCYTE [DISTWIDTH] IN BLOOD BY AUTOMATED COUNT: 13.2 % (ref 11–15)
GLUCOSE BLD-MCNC: 82 MG/DL (ref 70–99)
HCT VFR BLD AUTO: 35.5 %
HGB BLD-MCNC: 11.9 G/DL
IMM GRANULOCYTES # BLD AUTO: 0.08 X10(3) UL (ref 0–1)
IMM GRANULOCYTES NFR BLD: 0.8 %
LYMPHOCYTES # BLD AUTO: 1.66 X10(3) UL (ref 1–4)
LYMPHOCYTES NFR BLD AUTO: 15.9 %
MAGNESIUM SERPL-MCNC: 1.4 MG/DL (ref 1.6–2.6)
MCH RBC QN AUTO: 29 PG (ref 26–34)
MCHC RBC AUTO-ENTMCNC: 33.5 G/DL (ref 31–37)
MCV RBC AUTO: 86.6 FL
MONOCYTES # BLD AUTO: 0.71 X10(3) UL (ref 0.1–1)
MONOCYTES NFR BLD AUTO: 6.8 %
NEUTROPHILS # BLD AUTO: 7.88 X10 (3) UL (ref 1.5–7.7)
NEUTROPHILS # BLD AUTO: 7.88 X10(3) UL (ref 1.5–7.7)
NEUTROPHILS NFR BLD AUTO: 75.6 %
OSMOLALITY SERPL CALC.SUM OF ELEC: 296 MOSM/KG (ref 275–295)
PLATELET # BLD AUTO: 290 10(3)UL (ref 150–450)
POTASSIUM SERPL-SCNC: 3.3 MMOL/L (ref 3.5–5.1)
RBC # BLD AUTO: 4.1 X10(6)UL
SODIUM SERPL-SCNC: 145 MMOL/L (ref 136–145)
WBC # BLD AUTO: 10.4 X10(3) UL (ref 4–11)

## 2024-08-12 PROCEDURE — 99232 SBSQ HOSP IP/OBS MODERATE 35: CPT | Performed by: PHYSICIAN ASSISTANT

## 2024-08-12 PROCEDURE — 99223 1ST HOSP IP/OBS HIGH 75: CPT | Performed by: SURGERY

## 2024-08-12 PROCEDURE — 99233 SBSQ HOSP IP/OBS HIGH 50: CPT | Performed by: INTERNAL MEDICINE

## 2024-08-12 PROCEDURE — 99233 SBSQ HOSP IP/OBS HIGH 50: CPT | Performed by: HOSPITALIST

## 2024-08-12 RX ORDER — SPIRONOLACTONE 25 MG/1
25 TABLET ORAL DAILY
Status: DISCONTINUED | OUTPATIENT
Start: 2024-08-13 | End: 2024-08-15

## 2024-08-12 RX ORDER — PREDNISONE 20 MG/1
20 TABLET ORAL
Status: DISCONTINUED | OUTPATIENT
Start: 2024-08-13 | End: 2024-08-14

## 2024-08-12 RX ORDER — MAGNESIUM OXIDE 400 MG/1
800 TABLET ORAL ONCE
Status: COMPLETED | OUTPATIENT
Start: 2024-08-12 | End: 2024-08-12

## 2024-08-12 NOTE — PROGRESS NOTES
[facility]  part of [facility]    Progress Note    Hortencia Kelley Patient Status:  Inpatient    10/2/1945 MRN [id number]   Location [facility] 3W/SW Attending Rico Frost MD   Hosp Day # 5 PCP No primary care provider on file.     Subjective:   Seen and examined while sitting in chair. She has occasional cough with clear phlegm. Voice is hoarse. No dyspnea or wheezing. No fever or chills. On room air.    Objective:   Blood pressure 95/58, pulse 79, temperature 98.3 °F (36.8 °C), temperature source Oral, resp. rate 18, height 5' 7\" (1.702 m), weight 95 lb 14.4 oz (43.5 kg), SpO2 97%, not currently breastfeeding.  Physical Exam  Vitals and nursing note reviewed.   Constitutional:       General: She is not in acute distress.     Appearance: She is ill-appearing.   HENT:      Head: Normocephalic and atraumatic.      Comments: Hoarse voice  Cardiovascular:      Rate and Rhythm: Normal rate and regular rhythm.   Pulmonary:      Effort: No respiratory distress.      Breath sounds: No wheezing, rhonchi or rales.      Comments: Diminished breath sounds bilaterally  Musculoskeletal:      Cervical back: Normal range of motion and neck supple.   Skin:     General: Skin is warm and dry.   Neurological:      General: No focal deficit present.      Mental Status: She is alert and oriented to person, place, and time.   Psychiatric:         Mood and Affect: Mood normal.       Results:   Lab Results   Component Value Date    WBC 10.4 2024    HGB 11.9 (L) 2024    HCT 35.5 2024    .0 2024    CREATSERUM 0.58 2024    BUN 5 (L) 2024     2024    K 3.3 (L) 2024     2024    CO2 31.0 2024    GLU 82 2024    CA 8.3 (L) 2024    ALB 4.3 2024    ALKPHO 108 2024    BILT 0.4 2024    TP 7.4 2024    AST 13 2024    ALT <7 (L) 2024    .4 (H) 2024    INR 1.04 2020    T4F 1.2  07/26/2024    TSH 0.282 (L) 07/26/2024    MG 1.9 06/28/2020    TROPHS 703 (HH) 08/07/2024    B12 170 (L) 06/27/2020    ETOH 378 (H) 06/23/2020       Assessment & Plan:   Esophageal mass  Concerning for malignancy  CT chest with esophageal mass causing narrowing of trachea  EGD 8/10 with obstructing esophageal mass in mid-upper esophagus, path pending  Tolerating clear liquids  Plan:  -Follow up pathology  -Clear liquids as per GI  -Surgical placement of feeding tube likely this week    Respiratory impairment  Left vocal cord paralysis and tracheal narrowing 2/2 esophageal mass  Also underlying COPD  Improving  Plan:  -Stop IV Solu-Medrol; prednisone 20 mg daily  -Continue Pulmicort and Yupelri nebs  -DuoNebs as needed    NSTEMI  Nonischemic cardiomyopathy, new  Echo EF 20-25%  [facility] 8/9 with normal coronaries  Plan:  -As per cardiology  -Aspirin, Entresto, metoprolol, spironolactone, furosemide    Kong Esteves PA-C  Pulmonary Medicine  8/12/2024

## 2024-08-12 NOTE — PROGRESS NOTES
Progress Note  Hortencia Kelley Patient Status:  Inpatient    10/2/1945 MRN [id number]   Location [facility] 3W/SW Attending Rico Frost MD   Hosp Day # 5 PCP No primary care provider on file.     SUBJECTIVE:    Denies any complaints. Nebulizers are somewhat helping her esophageal irritation but not significantly.     VITALS:  BP 95/58 (BP Location: Right arm)   Pulse 79   Temp 98.3 °F (36.8 °C) (Oral)   Resp 18   Ht 5' 7\" (1.702 m)   Wt 95 lb 14.4 oz (43.5 kg)   SpO2 97%   BMI 15.02 kg/m²     INTAKE/OUTPUT:    Intake/Output Summary (Last 24 hours) at 2024 0913  Last data filed at 2024 0300  Gross per 24 hour   Intake 2261.67 ml   Output 2090 ml   Net 171.67 ml     Last 3 Weights   24 0533 95 lb 14.4 oz (43.5 kg)   24 0605 101 lb 13.6 oz (46.2 kg)   08/10/24 0600 106 lb 14.8 oz (48.5 kg)   24 0510 93 lb 11.1 oz (42.5 kg)   24 0627 93 lb 3.2 oz (42.3 kg)   24 1358 91 lb 7.9 oz (41.5 kg)   24 1056 91 lb 7.9 oz (41.5 kg)   24 0909 90 lb (40.8 kg)   24 1046 90 lb (40.8 kg)   24 1604 105 lb (47.6 kg)     LABS:  Recent Labs   Lab 08/10/24  0724  0623 24  0602   * 131* 82   BUN 14 9 5*   CREATSERUM 0.64 0.59 0.58   EGFRCR 92 93 94   CA 8.7 8.5* 8.3*   * 144 145   K 4.1 3.8 3.3*   * 110 108   CO2 27.0 28.0 31.0     Recent Labs   Lab 08/10/24  0724  0623 24  0602   RBC 3.46* 3.60* 4.10   HGB 9.9* 10.6* 11.9*   HCT 31.6* 32.3* 35.5   MCV 91.3 89.7 86.6   MCH 28.6 29.4 29.0   MCHC 31.3 32.8 33.5   RDW 13.5 13.4 13.2   NEPRELIM 10.56* 9.69* 7.88*   WBC 12.6* 11.2* 10.4   .0 280.0 290.0     No results for input(s): \"TROP\", \"CK\" in the last 168 hours.    DIAGNOSTICS:    TELEMETRY: SR, Intermittent ST, and NSVT    ECHO 2024:  Conclusions:   1. Left ventricle: The cavity size was normal. Wall thickness was normal.      Systolic function was markedly reduced. The estimated ejection fraction       was 20-25%, by visual assessment. Akinesis of the entireapical wall.      Doppler parameters are consistent with abnormal left ventricular      relaxation - grade 1 diastolic dysfunction.   2. Right ventricle: Systolic function was normal.   3. Aortic valve: There was mild to moderate regurgitation. The peak systolic      velocity was 1.01m/sec.     ROS: Negative unless noted above     PHYSICAL EXAM:  General: Alert and oriented x 3. No apparent distress.  HEENT: Normocephalic, sclera are nonicteric. Hearing appropriate bilaterally. Hoarse voice  Neck: No JVD or Carotid bruits. Trachea midline.   Cardiac: Regular rate and rhythm. S1, S2 auscultated. No murmurs, rubs, or gallops appreciated.   Lungs: A/P dullness. Chest expansion symmetrical. Regular effort.  Abdomen: Soft, non-tender, +BS. No hepatosplenomegaly or appreciable masses.   Extremities: Without clubbing, cyanosis or edema.  Peripheral pulses are 2+.  Neurologic: Motor and sensory nerves grossly intact.   Psych: Appropriate affect   Skin: Warm and dry. No obvious lesions, wounds, or ulcerations.     MEDICATIONS:   potassium chloride  40 mEq Intravenous Once    methylPREDNISolone  40 mg Intravenous Daily    furosemide  40 mg Oral Daily    spironolactone  12.5 mg Oral Daily    sacubitril-valsartan  1 tablet Oral BID    metoprolol succinate  25 mg Oral 2x Daily(Beta Blocker)    aspirin  81 mg Oral Daily    pantoprazole  40 mg Intravenous Q12H    revefenacin  175 mcg Nebulization Daily    budesonide  0.25 mg Nebulization BID      potassium chloride in dextrose 5%-sodium chloride 0.9% 100 mL/hr at 08/12/24 0900     ASSESSMENT:    Type II NSTEMI  New NICM, LVEF 20-25%/ Mod AI  - CT without PE  - LHC 8/9/24 with normal coronaries   - ASA, Entresto, Toprol XL, Aldactone; New and tolerating     NSVT  - Asx, on BB, hypokalemia, cardiac replacement     Esophageal Mass concerning for malignancy  Left Vocal Cord Paralysis   Acute Anemia  - EGD s/p biopsy, pending  surgical oncology consulted  - No overt bleeding, H&H stable, during EGD biopsy mass was friable with spontaneous oozing     COPD w/ Exacerbation- Pulmonary following   HLD- , not historically on statin   Hx ETOH Abuse   Hx Tobacco Abuse   Pre-Diabetic- A1c 6.2%  Depressive Episode- Consult for psych placed 8/10    PLAN:  - Check Mag with NSVT   replacement per cardiac protocol, also with hypokalemia, will increase Aldactone to 25 mg   - Evaluated by surgical oncology, plans for PEG placement sometime this week   - Normal coronary on [facility] will continue to titrate medications for systolic heart failure, Discussed potential wearable defibrillator on discharge patient declined, risks identified   - Eventual SGLT2  - Psych liaison to see today     Plan of care discussed with patient and RN.     Ailyn Gray, APRN  8/12/2024  9:30 AM  [phone] ([place])  [phone] ([place])      Cardiology addendum:  Pt examined and assessed independently.Agree with above.  Patient without new complaints.  Still with an SVT on telemetry.  Correct electrolytes and monitor.  Check magnesium level.  Continue meds for cardiomyopathy and titrate as outpatient.    Flako Adkins MD FACC L2

## 2024-08-12 NOTE — CONSULTS
[facility]    Patient Name:  Hortencia Kelley   YOB: 1945   Gender:  Female   Appt Date:  8/7/2024   Provider:  No name on file   Insurance:  [facility] MEDICARE     PATIENT PROVIDERS  Referring Provider: No ref. provider found   Address: No referring provider defined for this encounter.   Phone #: N/A    Primary Care Provider:No primary care provider on file.   Address: [unfilled]   Phone #: None       CHIEF COMPLAINT  Chief Complaint   Patient presents with    Difficulty Breathing        PROBLEMS  Reviewed   Patient Active Problem List   Diagnosis    Anemia    Osteopenia    Multinodular goiter    Anemia, unspecified type    Alcoholic intoxication without complication (HCC)    Closed 3-part fracture of proximal humerus, right, sequela    Injury of head, initial encounter    Episodic mood disorder (HCC)    Pathological fracture of right humerus due to age-related osteoporosis with routine healing    Fall at home    NSTEMI (non-ST elevated myocardial infarction) ([facility])    Esophageal mass        History of Present Illness:  Hortencia Kelley is a 78 year old Female with PMHx COPD (not on oxygen or inhalers), former smoker who was admitted for shortness of breath and found to have an obstructing esophageal mass. We are consulted to evaluate this mass and consideration of jejunostomy tube placement to support nutrition.    Presented to ED with shortness of breath, wheezing hoarseness of voice, progressive dysphagia and 10-15 lb weight loss over last month. No longer able to tolerate solids at home or even applesauce in the hospital, says these get stuck and come back up. Clear liquids are okay. Denies abdominal pain, nausea, constipation. Workup included CT Neck outpatient 7/30 shows 4cm soft tissue mass posterior to the trachea consistent with neoplasm and air-fluid level at thoracic inlef of the esophageus consistent with obstruction. CT PE 8/7 reiterates these  findings as well as prominent lymph nodes or exophytic nodules in the mediastinum concerning for metastasis. GI was consulted and EGD performed by Dr. Huddleston 8/10. This showed a large circumferential malignancy appearing mass beginning at 18-20cm form the incisiors. Unable to pass with the scope. Biopsies were taken and are still pending. Of note during this admission, patient had NSTEMI, with workup by cardiology team revealing Takotsubo cardiomyopathy without CAD. Being managed with entresto, metroprolol, no anticoagulation.    Patient is a former nurse. Reported history of 20 pack year smoking. Current EtOH intake 3 drinks/week. Denies prior abdominal surgery. Denies blood thinners     Vital Signs:  BP 95/58 (BP Location: Right arm)   Pulse 79   Temp 98.3 °F (36.8 °C) (Oral)   Resp 18   Ht 1.702 m (5' 7\")   Wt 43.5 kg (95 lb 14.4 oz)   SpO2 97%   BMI 15.02 kg/m²      Medications Reviewed:    Current Outpatient Medications:     sacubitril-valsartan 24-26 MG Oral Tab, Take 1 tablet by mouth 2 (two) times daily., Disp: 60 tablet, Rfl: 3     Allergies Reviewed:  No Known Allergies     History:  Reviewed:  Past Medical History:    Depression    ETOH abuse    Falls frequently    2017    History of blood transfusion    [facility]       Reviewed:  Past Surgical History:   Procedure Laterality Date    Hip replacement surgery Left       Reviewed Social History:  Social History     Socioeconomic History    Marital status:    Tobacco Use    Smoking status: Former     Current packs/day: 0.00     Average packs/day: 0.5 packs/day for 40.0 years (20.0 ttl pk-yrs)     Types: Cigarettes     Start date: 1978     Quit date: 2018     Years since quittin.0    Smokeless tobacco: Never   Vaping Use    Vaping status: Never Used   Substance and Sexual Activity    Alcohol use: Yes     Alcohol/week: 3.0 standard drinks of alcohol     Types: 3 Glasses of wine per week     Comment: socail    Drug use: No       Reviewed:  Family History   Problem Relation Age of Onset    Heart Disorder Father     Dementia Mother     Dementia Sister         Review of Systems:  Review of Systems   Constitutional:  Positive for appetite change and unexpected weight change. Negative for fatigue and fever.   HENT:  Positive for trouble swallowing and voice change.    Eyes: Negative.    Respiratory:  Positive for shortness of breath.    Cardiovascular:  Negative for chest pain.   Gastrointestinal:  Positive for vomiting. Negative for abdominal distention, abdominal pain, diarrhea, nausea and rectal pain.   Genitourinary: Negative.    Musculoskeletal:  Negative for back pain.   Skin:  Negative for rash and wound.   Allergic/Immunologic: Negative.    Neurological:  Negative for weakness.   Hematological:  Negative for adenopathy.   Psychiatric/Behavioral: Negative.          Physical Examination:  Physical Exam  Constitutional:       General: She is not in acute distress.     Appearance: She is well-developed.   HENT:      Head: Normocephalic and atraumatic.   Eyes:      General: No scleral icterus.  Pulmonary:      Effort: Pulmonary effort is normal. No respiratory distress.   Abdominal:      General: There is no distension.      Palpations: Abdomen is soft.      Tenderness: There is no abdominal tenderness. There is no guarding or rebound.      Hernia: No hernia is present.   Musculoskeletal:         General: Normal range of motion.      Cervical back: Normal range of motion.   Skin:     General: Skin is warm and dry.   Neurological:      Mental Status: She is alert and oriented to person, place, and time.     CBC  Recent Labs   Lab 08/10/24  0717 08/11/24  0623 08/12/24  0602   RBC 3.46* 3.60* 4.10   HGB 9.9* 10.6* 11.9*   HCT 31.6* 32.3* 35.5   MCV 91.3 89.7 86.6   MCH 28.6 29.4 29.0   MCHC 31.3 32.8 33.5   RDW 13.5 13.4 13.2   NEPRELIM 10.56* 9.69* 7.88*   WBC 12.6* 11.2* 10.4   .0 280.0 290.0       Basic Metabolic Panel  Recent Labs    Lab 08/10/24  0717 08/11/24  0623 08/12/24  0602   * 131* 82   BUN 14 9 5*   CREATSERUM 0.64 0.59 0.58   CA 8.7 8.5* 8.3*   * 144 145   K 4.1 3.8 3.3*   * 110 108   CO2 27.0 28.0 31.0       Document Review:  CT Neck - 7/30/2024  FINDINGS:  NASO/OROPHARYNX: No mass or significant asymmetry.    PARAPHARANGEAL AREA: No mass or significant asymmetry.    TONGUE: No visible mass or significant asymmetry.    HYPOPHARYNX: Normal.  No mass or other visible lesion.    LARYNX: No apparent vocal cord mass or asymmetry.  NECK GLANDS: The parotid and submandibular glands are unremarkable.  There is a 19 x 14 mm nodule in the right lobe of the thyroid which has calcifications, cystic component, solid mural margin.  Additional are present within the thyroid.  LYMPH NODES: No pathological-appearing or enlarged lymph nodes.    VASCULATURE: Limited views are unremarkable.    SINUSES: Mucous retention cyst versus polyp in the left maxillary sinus.  The paranasal sinuses and mastoids are otherwise grossly clear.  MEDIASTINUM: Within the visualized upper mediastinum there is dilatation of the esophagus at the thoracic inlet which also contains a prominent air-fluid level.  This transitions into a soft tissue heterogeneous enhancing mass within the region of the  esophageal lumen, displacing the trachea anteriorly and extending laterally into the aorta pulmonary window.  This finding is only partially included in the field of imaging and otherwise measures at least 39 x 37 x 42 mm.  No apparent lymphadenopathy in   the visualized upper mediastinum.  Scattered calcified granulomas in the visualized lungs.  BONES: Mild endplate change and disc disease at C4-C6 as well as in the upper thoracic spine.  No suspicious bone lesion.      Impression   CONCLUSION:  1.  Within the visualized upper mediastinum posterior to the trachea there is a 4 cm diameter soft tissue focus suspicious for neoplasm.  It appears to be within the  mediastinum or in-line with the esophagus, with a component bulging into the  aortopulmonary window.  The full craniocaudal extent of this finding is not in the field of imaging.  No lymphadenopathy in the neck.  At the thoracic inlet the esophagus is dilated with an air-fluid level suggesting partial mechanical obstruction and/or   esophageal narrowing caused by the soft tissue focus.  2.  Thyroid nodules measuring up to 14 x 19 mm.  Correlate with 6/27/20 thyroid ultrasound.        CT Chest PE - 8/7/2024  FINDINGS:  CARDIAC: Heart is not enlarged. There are coronary artery calcifications. No pericardial effusion.  MEDIASTINUM/ALLEY: There is prominent distention of the cervical soft with gas-fluid level.  At the thoracic inlet there is a filling defect within the esophagus, which measures approximately 30 x 33 x 56 mm(AP x transverse x CC), which extends to the  level of the dipti.  It is unclear whether this is confined to the esophageal lumen however it otherwise bulges into the aorta pulmonary window  Beyond the dipti the esophagus is collapsed/normal in caliber.  12 x 13 mm precarinal node.  6 mm exophytic   nodule arising from the aforementioned esophageal mass versus lymph node extending into the left paratracheal region.  LUNGS/PLEURA: Trachea is effaced due to posterior compression/mass effect by esophageal dilatation and aforementioned esophageal mass. Minimal dependent subsegmental atelectasis in the lung bases.  Calcified granuloma in the left lower lobe.  No discrete   pulmonary nodule or opacity. No pleural effusion or pneumothorax.  VASCULATURE: Main pulmonary artery is not enlarged.  No pulmonary embolus to the subsegmental pulmonary artery level.  Left-sided aortic arch with atherosclerosis without aneurysm.  No acute aortic injury.  CHEST WALL: No axillary or clavicular lymphadenopathy.       LIMITED ABDOMEN: Cortical based low density foci in the kidneys compatible with cysts.  BONES: Mild  degenerative endplate change and disc disease within the spine.  No suspicious bone lesion.     OTHER: Negative.            Impression   CONCLUSION:  1.  There is a soft tissue focus involving the upper half of the esophagus measuring approximately 30 x 33 x 56 mm.  It causes upstream esophageal dilatation.  This is concerning for esophageal neoplasm.  Prominent lymph nodes and/or exophytic nodules  within the mediastinum concerning for metastasis.  There is effacement and narrowing of the trachea which appears to be related to mass effect caused by esophageal distention/mass.  2.  No pulmonary embolus.  No acute aortic injury.  3.  Coronary atherosclerosis.  4.  Renal cysts.  5.  14 mm nodule within the right lobe of the thyroid.  Correlate with 6/27/20 thyroid ultrasound.       EGD - 8/10/2024  Findings:       1. Esophagus: A large circumferential infiltrating malignant appearing ulcerated mass seen beginning at about 18-20 cm from the incisor. It was firm and friable with spontaneous oozing. Few small biopsies obtained with cold forceps for histology. The inner diameter of the lumen was nearly pinpoint (less than 3-4 mm), unable to pass through with endoscope.      The procedure was then aborted and we then withdrew the instrument from the patient who tolerated the procedure well.      Impression:   Obstructing esophageal mass s/p biopsies        Assessment / Plan:  Obstructing esophageal mass    Await pathology results but high clinical suspicion for malignancy.  We will discuss tomorrow in our multidisciplinary tumor board  Patient only able to tolerate clear liquids at this point. Anticipate patient will need jejunostomy tube placement during this admission.  We will work on arranging a time in the OR this week.  Discussed with cardiology NP. Patient optimized from their standpoint for surgery.    Discussed with Dr. Gomez, addendum to follow.    Vanessa Brown PA-C  Department of Surgical  Oncology  [facility]  [address] [place]  [address]  [facility]  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

## 2024-08-12 NOTE — PROGRESS NOTES
[facility]     Gastroenterology Progress Note    Hortencia Kelley Patient Status:  Inpatient    10/2/1945 MRN [id number]   Location [facility] 3W/SW Attending Rico Frost MD   Hosp Day # 5 PCP No primary care provider on file.       Subjective:   No specific complaints this morning, tolerating clears. No abd pain n/v.     Objective:   Blood pressure 95/58, pulse 79, temperature 98.3 °F (36.8 °C), temperature source Oral, resp. rate 18, height 5' 7\" (1.702 m), weight 95 lb 14.4 oz (43.5 kg), SpO2 97%, not currently breastfeeding. Body mass index is 15.02 kg/m².    General: awake, alert and oriented, no acute distress  HEENT: moist mucus membranes  PULM: no conversational dyspnea  CARDIOVASCULAR: regular rate and rhythm, the extremities are warm and well perfused  GI: soft, non-tender, non-distended, + BS, no rebound/guarding   EXTREMITIES: no edema, moving all extremities  SKIN: no visible rash  NEURO: appropriate and interactive    Assessment and Plan:   Hortencia Kelley is a a(n) 78 year old female w/ a history of COPD, former smoker with 20 pack year smoking history, depression, ETOH abuse, who presents with shortness of breath, weight loss, and dysphagia. 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 EGD 8/10 showed an obstructing esophageal mass in the mid-upper esophagus, biopsied.     Recommend:  1. Follow-up pathology  2. Consult surgical oncology; considerations for surgical placement of feeding tube at some point for nutrition  3. Clear liquids    Ina Huddleston MD  [facility] Gastroenterology      Results:     Lab Results   Component Value Date    WBC 10.4 2024    HGB 11.9 (L) 2024    HCT 35.5 2024    .0 2024    CREATSERUM 0.58 2024    BUN 5 (L) 2024     2024    K 3.3 (L) 2024     2024    CO2 31.0 2024     GLU 82 08/12/2024    CA 8.3 (L) 08/12/2024    ALB 4.3 07/26/2024    ALKPHO 108 07/26/2024    BILT 0.4 07/26/2024    TP 7.4 07/26/2024    AST 13 07/26/2024    ALT <7 (L) 07/26/2024    .4 (H) 08/09/2024    INR 1.04 06/23/2020    T4F 1.2 07/26/2024    TSH 0.282 (L) 07/26/2024    MG 1.9 06/28/2020    B12 170 (L) 06/27/2020    ETOH 378 (H) 06/23/2020       No results found.

## 2024-08-12 NOTE — PROGRESS NOTES
Date of Surgery: 8/14/2024    Diagnosis: Esophageal mass, K22.89    Procedure: Xi Robotic-Assisted, laparoscopic, possible open, placement of jejunostomy feeding tube.     Location: [x] [place] OR  [] [facility]  [] End[facility] Lab    Type: [x] Inpatient  [] Outpatient  [] 23-hour observation    Number of days inpatient: 1 day    Length of Surgery: 30 minutes    Anesthesia: [] Local  [] MAC [x] General  [] Pec Block     Joint case with: [] Yes,   [x] No   Needs SA:  [x] Yes  [] No    Special Equipment:    Penicillin Allergy: [] Yes [x] No   Nickel Allergy: [] Yes [x] No   Latex Allergy: [] Yes [x] No    Orders:  Colon Bundle/Bowel Prep: [] Yes  [x] No    Type and Cross 2 units PRBC: [] Yes [x] No    Type and Screen: [] Yes [x] No    Advanced Oncology Order Set (HIPEC): [] Yes [x] No    Heparin Pre-op (Heparin 5000 units subQ x 1 dose): [x] Yes  [] No    Nuclear Med Injection: [] Yes  [x] No    Wire localization needed: [] Yes [x] No    Midline placement (HIPEC,Whipple, Esophagectomy, Liver): [] Yes [x] No    CHG Cloths (HIPEC, Whipple, Esophagectomy, Liver): [] Yes [x] No    Tylenol administration prior to surgery: [] Yes [x] No    Does patient have a pacemaker: [] Yes [x] No  Sleep Apnea: [] Yes  [x] No      Is patient diabetic: [] Yes, if so, no Ensure/Gatorade [x] No    Antibiotics: [x] /EM prophylactic antibiotic protocol [] No need of antibiotics    PAT Orders: []CBC  []CMP []EKG  []CT Scan []Chest X-ray

## 2024-08-12 NOTE — PLAN OF CARE
Patient is alert and oriented times 4. On RA. Complains of no pain at this time. Concern for COPD on PO steroids now. Surgical Oncology scheduled date for 8/14 in order to place feeding tube . Psych Leasion to see patient today. Esophageal mass biopsy pending.     Problem: Patient Centered Care  Goal: Patient preferences are identified and integrated in the patient's plan of care  Description: Interventions:  - Provide timely, complete, and accurate information to patient/family  - Incorporate patient and family knowledge, values, beliefs, and cultural backgrounds into the planning and delivery of care  - Encourage patient/family to participate in care and decision-making at the level they choose  - Honor patient and family perspectives and choices  Outcome: Progressing     Problem: Patient/Family Goals  Goal: Patient/Family Short Term Goal  Description: Patient's Short Term Goal:     Interventions:   - See additional Care Plan goals for specific interventions  Outcome: Progressing     Problem: CARDIOVASCULAR - ADULT  Goal: Maintains optimal cardiac output and hemodynamic stability  Description: INTERVENTIONS:  - Monitor vital signs, rhythm, and trends  - Monitor for bleeding, hypotension and signs of decreased cardiac output  - Evaluate effectiveness of vasoactive medications to optimize hemodynamic stability  - Monitor arterial and/or venous puncture sites for bleeding and/or hematoma  - Assess quality of pulses, skin color and temperature  - Assess for signs of decreased coronary artery perfusion - ex. Angina  - Evaluate fluid balance, assess for edema, trend weights  Outcome: Progressing     Problem: SAFETY ADULT - FALL  Goal: Free from fall injury  Description: INTERVENTIONS:  - Assess pt frequently for physical needs  - Identify cognitive and physical deficits and behaviors that affect risk of falls.  - [place] fall precautions as indicated by assessment.  - Educate pt/family on patient safety including  physical limitations  - Instruct pt to call for assistance with activity based on assessment  - Modify environment to reduce risk of injury  - Provide assistive devices as appropriate  - Consider OT/PT consult to assist with strengthening/mobility  - Encourage toileting schedule  Outcome: Progressing

## 2024-08-12 NOTE — PLAN OF CARE
Problem: Patient Centered Care  Goal: Patient preferences are identified and integrated in the patient's plan of care  Description: Interventions   - Provide timely, complete, and accurate information to patient/family  - Incorporate patient and family knowledge, values, beliefs, and cultural backgrounds into the planning and delivery of care  - Encourage patient/family to participate in care and decision-making at the level they choose  - Honor patient and family perspectives and choices  Outcome: Progressing     Problem: SAFETY ADULT - FALL  Goal: Free from fall injury  Description: INTERVENTIONS:  - Assess pt frequently for physical needs  - Identify cognitive and physical deficits and behaviors that affect risk of falls.  - [place] fall precautions as indicated by assessment.  - Educate pt/family on patient safety including physical limitations  - Instruct pt to call for assistance with activity based on assessment  - Modify environment to reduce risk of injury  - Provide assistive devices as appropriate  - Consider OT/PT consult to assist with strengthening/mobility  - Encourage toileting schedule  Outcome: Progressing     Problem: GASTROINTESTINAL - ADULT  Goal: Minimal or absence of nausea and vomiting  Description: INTERVENTIONS:  - Maintain adequate hydration with IV or PO as ordered and tolerated  - Evaluate effectiveness of ordered antiemetic medications  - Provide nonpharmacologic comfort measures as appropriate  - Advance diet as tolerated, if ordered  - Obtain nutritional consult as needed  - Evaluate fluid balance  Outcome: Progressing    No acute changes overnight.  Patient aoX4 with no complaints of pain.  VSS on room air, IVF infusing, SR on tele, continent of B/B (uses bedpan), tolerating clear liquids, and gets scheduled neb treatments.  Patient now waiting on biopsy results from EGD and surgical oncology consulted for possible feeding tube placement related to moderate malnutrition.  Patient  can ambulate with one assist and a walker.  Safety  precautions maintained. Call light within reach.

## 2024-08-12 NOTE — PROGRESS NOTES
[facility]  Progress Note     Hortencia Kelley  : 10/2/1945    Status: Inpatient  Day #: 5    Attending: Rico Frost MD  PCP: No primary care provider on file.     Assessment and Plan:    NSTEMI type II - elevated troponin and new cardiomyopathy  Takotsubo cardiomyopathy  -cardiology on consult  -echo EF - 20-25%, akinesis of apical wall  -cardiac cath without CAD suggesting takotsubo  -heparin drip stopped  -cont asa. Entresto started.   -optimized for procedures/surgery per cardiology    Esophageal mass concerning for malignancy  Dysphagia to solids  Wt loss  -GI on consult  -EGD 8/10 - obstructing esophageal mass s/p biopsies. Was firm and friable with spontaneous oozing.   -CLD  -surgical oncology consulted  -f/u pathology  -plan is for J-tube    AECOPD  H/o L vocal cord paralysis  -pulm on consult  -cont nebs  -off steroids    Anemia  -malignancy w/u    Dietitian Malnutrition Assessment        Evaluation for Malnutrition: Criteria for non-severe malnutrition diagnosis-         acute illness/injury related to Wt loss 5% in 1 month., Body fat mild depletion., Muscle mass mild depletion.         RD Malnutrition Care Plan: Encouraged increased PO intake., Encouraged small frequent meals with emphasis on high calorie/high protein., Intiated ONS (oral nutritional supplements).    Body Fat/Muscle Mass: Mild depletion body fat., Mild depletion muscle mass. triceps region. temple region., clavicle region., shoulder region., dorsal monsalve region., thigh region., calf region.    Physician Assessment    Patient has a diagnosis of moderate malnutrition     DVT Mechanical Prophylaxis:        DVT Pharmacologic Prophylaxis   Medication   None         DVT Pharmacologic prophylaxis: Aspirin 81 mg     Subjective:      Initial Chief Complaint:  cough, chest pain    No CP. No SOB currently. Tolerating CLD.    10 point ROS completed and was negative, except for pertinent positive and negatives stated in  subjective.      Objective:      Temp:  [98.3 °F (36.8 °C)-98.9 °F (37.2 °C)] 98.3 °F (36.8 °C)  Pulse:  [65-79] 79  Resp:  [18-20] 18  BP: ()/(58-66) 95/58  SpO2:  [96 %-98 %] 97 %  General:  Alert, no distress  HEENT:  Normocephalic, atraumatic  Cardiac:  Regular rate, regular rhythm  Pulmonary:  Clear to auscultation bilaterally, respirations unlabored  Gastrointestinal:  Soft, non-tender, normal bowel sounds  Musculoskeletal:  No joint swelling  Extremities:  No edema, no cyanosis, no clubbing  Neurologic:  nonfocal  Psychiatric:  Normal affect, calm and appropriate    Intake/Output Summary (Last 24 hours) at 8/12/2024 1211  Last data filed at 8/12/2024 0900  Gross per 24 hour   Intake 2381.67 ml   Output 1750 ml   Net 631.67 ml         Recent Labs   Lab 08/10/24  0717 08/11/24  0623 08/12/24  0602   WBC 12.6* 11.2* 10.4   HGB 9.9* 10.6* 11.9*   HCT 31.6* 32.3* 35.5   .0 280.0 290.0   RBC 3.46* 3.60* 4.10   MCV 91.3 89.7 86.6   MCH 28.6 29.4 29.0   MCHC 31.3 32.8 33.5   RDW 13.5 13.4 13.2   NEPRELIM 10.56* 9.69* 7.88*     Recent Labs   Lab 08/08/24  1011 08/08/24  1708 08/09/24  0528 08/10/24  0717 08/11/24  0623 08/12/24  0602   BUN  --   --  18 14 9 5*   CREATSERUM  --   --  0.67 0.64 0.59 0.58   CA  --   --  8.9 8.7 8.5* 8.3*   NA  --   --  144 147* 144 145   K  --   --  4.6 4.1 3.8 3.3*   CL  --   --  112 114* 110 108   CO2  --   --  27.0 27.0 28.0 31.0   GLU  --   --  151* 101* 131* 82   MG  --   --   --   --   --  1.4*   PTT 45.2* 73.0* 154.4*  --   --   --        No results found.      Medications:   [START ON 8/13/2024] spironolactone  25 mg Oral Daily    methylPREDNISolone  40 mg Intravenous Daily    furosemide  40 mg Oral Daily    sacubitril-valsartan  1 tablet Oral BID    metoprolol succinate  25 mg Oral 2x Daily(Beta Blocker)    aspirin  81 mg Oral Daily    pantoprazole  40 mg Intravenous Q12H    revefenacin  175 mcg Nebulization Daily    budesonide  0.25 mg Nebulization BID      PRN  Meds:   ipratropium-albuterol    acetaminophen    ondansetron    metoclopramide    temazepam    guaiFENesin    benzonatate    nitroglycerin    ipratropium-albuterol    Supplementary Documentation:        MDM High. Time spent on chart/note review, review of labs/imaging, discussion with patient, physical exam, discussion with staff, consultants, coordinating care, writing progress note, discussion of plan of care.      Rico Frost MD

## 2024-08-13 ENCOUNTER — APPOINTMENT (OUTPATIENT)
Dept: CT IMAGING | Facility: HOSPITAL | Age: 79
End: 2024-08-13
Attending: PHYSICIAN ASSISTANT
Payer: MEDICARE

## 2024-08-13 ENCOUNTER — ANESTHESIA EVENT (OUTPATIENT)
Dept: ENDOSCOPY | Facility: HOSPITAL | Age: 79
End: 2024-08-13
Payer: MEDICARE

## 2024-08-13 ENCOUNTER — APPOINTMENT (OUTPATIENT)
Dept: PICC SERVICES | Facility: HOSPITAL | Age: 79
End: 2024-08-13
Attending: HOSPITALIST
Payer: MEDICARE

## 2024-08-13 ENCOUNTER — ANESTHESIA (OUTPATIENT)
Dept: ENDOSCOPY | Facility: HOSPITAL | Age: 79
End: 2024-08-13
Payer: MEDICARE

## 2024-08-13 LAB
ANION GAP SERPL CALC-SCNC: 2 MMOL/L (ref 0–18)
BASOPHILS # BLD AUTO: 0.01 X10(3) UL (ref 0–0.2)
BASOPHILS NFR BLD AUTO: 0.1 %
BUN BLD-MCNC: 5 MG/DL (ref 9–23)
BUN/CREAT SERPL: 8.5 (ref 10–20)
CALCIUM BLD-MCNC: 7.9 MG/DL (ref 8.7–10.4)
CHLORIDE SERPL-SCNC: 109 MMOL/L (ref 98–112)
CO2 SERPL-SCNC: 36 MMOL/L (ref 21–32)
CREAT BLD-MCNC: 0.59 MG/DL
DEPRECATED RDW RBC AUTO: 42.7 FL (ref 35.1–46.3)
EGFRCR SERPLBLD CKD-EPI 2021: 93 ML/MIN/1.73M2 (ref 60–?)
EOSINOPHIL # BLD AUTO: 0.05 X10(3) UL (ref 0–0.7)
EOSINOPHIL NFR BLD AUTO: 0.5 %
ERYTHROCYTE [DISTWIDTH] IN BLOOD BY AUTOMATED COUNT: 13.2 % (ref 11–15)
GLUCOSE BLD-MCNC: 117 MG/DL (ref 70–99)
HCT VFR BLD AUTO: 35.1 %
HGB BLD-MCNC: 11.9 G/DL
IMM GRANULOCYTES # BLD AUTO: 0.08 X10(3) UL (ref 0–1)
IMM GRANULOCYTES NFR BLD: 0.8 %
LYMPHOCYTES # BLD AUTO: 1.51 X10(3) UL (ref 1–4)
LYMPHOCYTES NFR BLD AUTO: 14.3 %
MAGNESIUM SERPL-MCNC: 1.5 MG/DL (ref 1.6–2.6)
MCH RBC QN AUTO: 30 PG (ref 26–34)
MCHC RBC AUTO-ENTMCNC: 33.9 G/DL (ref 31–37)
MCV RBC AUTO: 88.4 FL
MONOCYTES # BLD AUTO: 0.88 X10(3) UL (ref 0.1–1)
MONOCYTES NFR BLD AUTO: 8.3 %
NEUTROPHILS # BLD AUTO: 8.06 X10 (3) UL (ref 1.5–7.7)
NEUTROPHILS # BLD AUTO: 8.06 X10(3) UL (ref 1.5–7.7)
NEUTROPHILS NFR BLD AUTO: 76 %
OSMOLALITY SERPL CALC.SUM OF ELEC: 302 MOSM/KG (ref 275–295)
PLATELET # BLD AUTO: 288 10(3)UL (ref 150–450)
POTASSIUM SERPL-SCNC: 4.2 MMOL/L (ref 3.5–5.1)
POTASSIUM SERPL-SCNC: 4.2 MMOL/L (ref 3.5–5.1)
RBC # BLD AUTO: 3.97 X10(6)UL
SODIUM SERPL-SCNC: 147 MMOL/L (ref 136–145)
WBC # BLD AUTO: 10.6 X10(3) UL (ref 4–11)

## 2024-08-13 PROCEDURE — 99233 SBSQ HOSP IP/OBS HIGH 50: CPT | Performed by: HOSPITALIST

## 2024-08-13 PROCEDURE — 31622 DX BRONCHOSCOPE/WASH: CPT | Performed by: INTERNAL MEDICINE

## 2024-08-13 PROCEDURE — 99233 SBSQ HOSP IP/OBS HIGH 50: CPT | Performed by: INTERNAL MEDICINE

## 2024-08-13 PROCEDURE — 0BJ08ZZ INSPECTION OF TRACHEOBRONCHIAL TREE, VIA NATURAL OR ARTIFICIAL OPENING ENDOSCOPIC: ICD-10-PCS | Performed by: INTERNAL MEDICINE

## 2024-08-13 PROCEDURE — 74177 CT ABD & PELVIS W/CONTRAST: CPT | Performed by: PHYSICIAN ASSISTANT

## 2024-08-13 PROCEDURE — 99232 SBSQ HOSP IP/OBS MODERATE 35: CPT | Performed by: INTERNAL MEDICINE

## 2024-08-13 RX ORDER — SODIUM CHLORIDE, SODIUM LACTATE, POTASSIUM CHLORIDE, CALCIUM CHLORIDE 600; 310; 30; 20 MG/100ML; MG/100ML; MG/100ML; MG/100ML
INJECTION, SOLUTION INTRAVENOUS CONTINUOUS
Status: DISCONTINUED | OUTPATIENT
Start: 2024-08-13 | End: 2024-08-14

## 2024-08-13 RX ORDER — SODIUM CHLORIDE, SODIUM LACTATE, POTASSIUM CHLORIDE, CALCIUM CHLORIDE 600; 310; 30; 20 MG/100ML; MG/100ML; MG/100ML; MG/100ML
INJECTION, SOLUTION INTRAVENOUS CONTINUOUS PRN
Status: DISCONTINUED | OUTPATIENT
Start: 2024-08-13 | End: 2024-08-13 | Stop reason: SURG

## 2024-08-13 RX ORDER — EPHEDRINE SULFATE 50 MG/ML
INJECTION INTRAVENOUS AS NEEDED
Status: DISCONTINUED | OUTPATIENT
Start: 2024-08-13 | End: 2024-08-13 | Stop reason: SURG

## 2024-08-13 RX ORDER — ATORVASTATIN CALCIUM 10 MG/1
10 TABLET, FILM COATED ORAL NIGHTLY
Status: DISCONTINUED | OUTPATIENT
Start: 2024-08-13 | End: 2024-08-20

## 2024-08-13 RX ORDER — LIDOCAINE HYDROCHLORIDE 10 MG/ML
INJECTION, SOLUTION EPIDURAL; INFILTRATION; INTRACAUDAL; PERINEURAL AS NEEDED
Status: DISCONTINUED | OUTPATIENT
Start: 2024-08-13 | End: 2024-08-13 | Stop reason: SURG

## 2024-08-13 RX ORDER — NALOXONE HYDROCHLORIDE 0.4 MG/ML
0.08 INJECTION, SOLUTION INTRAMUSCULAR; INTRAVENOUS; SUBCUTANEOUS ONCE AS NEEDED
Status: DISCONTINUED | OUTPATIENT
Start: 2024-08-13 | End: 2024-08-13 | Stop reason: HOSPADM

## 2024-08-13 RX ADMIN — LIDOCAINE HYDROCHLORIDE 60 MG: 10 INJECTION, SOLUTION EPIDURAL; INFILTRATION; INTRACAUDAL; PERINEURAL at 13:42:00

## 2024-08-13 RX ADMIN — EPHEDRINE SULFATE 10 MG: 50 INJECTION INTRAVENOUS at 13:45:00

## 2024-08-13 RX ADMIN — SODIUM CHLORIDE, SODIUM LACTATE, POTASSIUM CHLORIDE, CALCIUM CHLORIDE: 600; 310; 30; 20 INJECTION, SOLUTION INTRAVENOUS at 13:38:00

## 2024-08-13 NOTE — CONSULTS
[facility]  part of [facility]    Report of Consultation    Hortencia Kelley Patient Status:  Inpatient    10/2/1945 MRN [id number]   Location [facility] [place] 3W/SW Attending Rico Frost MD   Hosp Day # 6 PCP No primary care provider on file.     Reason for Consultation:  Malnutrition    History of Present Illness:  Hortencia Kelley is a a(n) 78 year old female with recently diagnosed esophageal squamous cell cancer.   She presented with weight loss and COPD exacerbation.     History:  Past Medical History:    COPD (chronic obstructive pulmonary disease) (HCC)    Depression    ETOH abuse    Falls frequently    2017    History of blood transfusion    [facility]      Past Surgical History:   Procedure Laterality Date    Hip replacement surgery Left      Family History   Problem Relation Age of Onset    Heart Disorder Father     Dementia Mother     Dementia Sister       reports that she quit smoking about 6 years ago. Her smoking use included cigarettes. She started smoking about 46 years ago. She has a 20 pack-year smoking history. She has never used smokeless tobacco. She reports that she does not currently use alcohol after a past usage of about 3.0 standard drinks of alcohol per week. She reports that she does not use drugs.    Allergies:  No Known Allergies    Medications:    Current Facility-Administered Medications:     atorvastatin (Lipitor) tab 10 mg, 10 mg, Oral, Nightly    lactated ringers infusion, , Intravenous, Continuous    spironolactone (Aldactone) tab 25 mg, 25 mg, Oral, Daily    predniSONE (Deltasone) tab 20 mg, 20 mg, Oral, Daily with breakfast    furosemide (Lasix) tab 40 mg, 40 mg, Oral, Daily    sacubitril-valsartan (Entresto) 24-26 MG per tab 1 tablet, 1 tablet, Oral, BID **AND** [COMPLETED] Social work, , , Once    metoprolol succinate ER (Toprol XL) 24 hr tab 25 mg, 25 mg, Oral, 2x Daily(Beta Blocker)    [Held by provider] aspirin DR tab 81 mg, 81 mg, Oral, Daily     acetaminophen (Tylenol Extra Strength) tab 500 mg, 500 mg, Oral, Q4H PRN    ondansetron (Zofran) 4 MG/2ML injection 4 mg, 4 mg, Intravenous, Q6H PRN    metoclopramide (Reglan) 5 mg/mL injection 5 mg, 5 mg, Intravenous, Q8H PRN    temazepam (Restoril) cap 15 mg, 15 mg, Oral, Nightly PRN    guaiFENesin (Robitussin) 100 MG/5 ML oral liquid 200 mg, 200 mg, Oral, Q4H PRN    benzonatate (Tessalon) cap 200 mg, 200 mg, Oral, TID PRN    nitroglycerin (Nitrostat) SL tab 0.4 mg, 0.4 mg, Sublingual, Q5 Min PRN    ipratropium-albuterol (Duoneb) 0.5-2.5 (3) MG/3ML inhalation solution 3 mL, 3 mL, Nebulization, Q6H PRN    potassium chloride 20 mEq in dextrose 5%-sodium chloride 0.9% 1000mL infusion premix, , Intravenous, Continuous    pantoprazole (Protonix) 40 mg in sodium chloride 0.9% PF 10 mL IV push, 40 mg, Intravenous, Q12H    revefenacin (Yupelri) 175 MCG/3ML nebulizer solution 175 mcg, 175 mcg, Nebulization, Daily    budesonide (Pulmicort) 0.25 MG/2ML nebulizer suspension 0.25 mg, 0.25 mg, Nebulization, BID    Review of Systems:  Pertinent items are noted in HPI.    Physical Exam:   General: Alert, orientated x3.  Cooperative.  No apparent distress.  Vital Signs:  Blood pressure 103/65, pulse 92, temperature 98 °F (36.7 °C), resp. rate (!) 27, height 67\", weight 95 lb 14.4 oz (43.5 kg), SpO2 98%, not currently breastfeeding.  HEENT: Exam is unremarkable.  Neck: Supple.  Lungs: Normal respiratory effort  Cardiac: Regular rate and rhythm.  Abdomen:  Nontender.  Extremities:  No lower extremity edema noted.    Laboratory Data:  Lab Results   Component Value Date    WBC 10.6 08/13/2024    HGB 11.9 08/13/2024    HCT 35.1 08/13/2024    .0 08/13/2024    CREATSERUM 0.59 08/13/2024    BUN 5 08/13/2024     08/13/2024    K 4.2 08/13/2024    K 4.2 08/13/2024     08/13/2024    CO2 36.0 08/13/2024     08/13/2024    CA 7.9 08/13/2024    MG 1.5 08/13/2024       Impression:  Patient Active Problem List   Diagnosis     Anemia    Osteopenia    Multinodular goiter    Anemia, unspecified type    Alcoholic intoxication without complication (HCC)    Closed 3-part fracture of proximal humerus, right, sequela    Injury of head, initial encounter    Episodic mood disorder ([facility])    Pathological fracture of right humerus due to age-related osteoporosis with routine healing    Fall at home    NSTEMI (non-ST elevated myocardial infarction) ([facility])    Esophageal mass     Assessment/Plan:  79yo with esophageal CA, progressive dysphagia COPD.  Plan is for G-J tube insertion.  Hortencia was tearful, and would like to discuss her goals of care with her son.  Should she wish to proceed, procedure is scheduled for 8/14 with conscious sedation.  All questions were answered.       Thank you for allowing me to participate in the care of your patient.    SUSI MOREJON, APRN  8/13/2024  4:15 PM

## 2024-08-13 NOTE — PROGRESS NOTES
[facility]  part of [facility]     Progress Note    Hortencia Kelley Patient Status:  Inpatient    10/2/1945 MRN [id number]   Location [facility] 3W/SW Attending Rico Frost MD   Hosp Day # 6 PCP No primary care provider on file.       Subjective:   Patient seen and examined.  Dyspnea continues to improve per patient    Objective:   Blood pressure 103/65, pulse 92, temperature 98 °F (36.7 °C), resp. rate (!) 27, height 5' 7\" (1.702 m), weight 95 lb 14.4 oz (43.5 kg), SpO2 98%, not currently breastfeeding.  Intake/Output:   Last 3 shifts: I/O last 3 completed shifts:  In: 4440 [P.O.:840; I.V.:3600]  Out: 750 [Urine:750]   This shift: I/O this shift:  In: 1200 [I.V.:1200]  Out: -      Vent Settings:      Hemodynamic parameters (last 24 hours):      Scheduled Meds:   Current Facility-Administered Medications   Medication Dose Route Frequency    atorvastatin (Lipitor) tab 10 mg  10 mg Oral Nightly    lactated ringers infusion   Intravenous Continuous    naloxone (Narcan) 0.4 MG/ML injection 0.08 mg  0.08 mg Intravenous Once PRN    spironolactone (Aldactone) tab 25 mg  25 mg Oral Daily    predniSONE (Deltasone) tab 20 mg  20 mg Oral Daily with breakfast    furosemide (Lasix) tab 40 mg  40 mg Oral Daily    sacubitril-valsartan (Entresto) 24-26 MG per tab 1 tablet  1 tablet Oral BID    metoprolol succinate ER (Toprol XL) 24 hr tab 25 mg  25 mg Oral 2x Daily(Beta Blocker)    [Held by provider] aspirin DR tab 81 mg  81 mg Oral Daily    acetaminophen (Tylenol Extra Strength) tab 500 mg  500 mg Oral Q4H PRN    ondansetron (Zofran) 4 MG/2ML injection 4 mg  4 mg Intravenous Q6H PRN    metoclopramide (Reglan) 5 mg/mL injection 5 mg  5 mg Intravenous Q8H PRN    temazepam (Restoril) cap 15 mg  15 mg Oral Nightly PRN    guaiFENesin (Robitussin) 100 MG/5 ML oral liquid 200 mg  200 mg Oral Q4H PRN    benzonatate (Tessalon) cap 200 mg  200 mg Oral TID PRN    nitroglycerin (Nitrostat) SL tab 0.4 mg  0.4 mg  Sublingual Q5 Min PRN    ipratropium-albuterol (Duoneb) 0.5-2.5 (3) MG/3ML inhalation solution 3 mL  3 mL Nebulization Q6H PRN    potassium chloride 20 mEq in dextrose 5%-sodium chloride 0.9% 1000mL infusion premix   Intravenous Continuous    pantoprazole (Protonix) 40 mg in sodium chloride 0.9% PF 10 mL IV push  40 mg Intravenous Q12H    revefenacin (Yupelri) 175 MCG/3ML nebulizer solution 175 mcg  175 mcg Nebulization Daily    budesonide (Pulmicort) 0.25 MG/2ML nebulizer suspension 0.25 mg  0.25 mg Nebulization BID       Continuous Infusions:    lactated ringers      potassium chloride in dextrose 5%-sodium chloride 0.9% 100 mL/hr at 08/13/24 0957       Physical Exam  Constitutional: no acute distress  Eyes: PERRL  ENT: nares pateint  Neck: supple, no JVD  Cardio: RRR, S1 S2  Respiratory: Faint Rales  GI: abdomen soft, non tender, active bowel sounds, no organomegaly  Extremities: no clubbing, cyanosis, edema  Neurologic: no gross motor deficits  Skin: warm, dry      Results:     Lab Results   Component Value Date    WBC 10.6 08/13/2024    HGB 11.9 08/13/2024    HCT 35.1 08/13/2024    .0 08/13/2024    CREATSERUM 0.59 08/13/2024    BUN 5 08/13/2024     08/13/2024    K 4.2 08/13/2024    K 4.2 08/13/2024     08/13/2024    CO2 36.0 08/13/2024     08/13/2024    CA 7.9 08/13/2024    MG 1.5 08/13/2024       No results found.          Assessment   1.  Esophageal mass  2.  Left vocal cord paralysis  3.  COPD with acute exacerbation  4.  Non-ST elevation myocardial infarction  5.  Prior nicotine dependence     Plan   -Patient presents with evidence of ongoing cough hoarseness weight loss with some associated chest pain.  -CT chest on presentation with mass seen within the mid/proximal esophagus concerning for esophageal neoplasm.  Effacement and narrowing of the trachea secondary to mass  -Status post EGD with obstructing esophageal mass status post biopsies.  Pathology with evidence of poorly  differentiated squamous cell carcinoma.  -Further recommendations per surgical oncologist.  -Status post inspection bronchoscopy on 8/13/2024 with tracheal stenosis seen no evidence of tracheoesophageal fistula.  -Wean steroid therapy  -Nebulizer treatments      Prakash Quispe DO  Pulmonary Critical Care Medicine  [facility]

## 2024-08-13 NOTE — PROGRESS NOTES
Progress Note  Hortencia Kelley Patient Status:  Inpatient    10/2/1945 MRN [id number]   Location [facility] 3W/SW Attending Rico Frost MD   Hosp Day # 6 PCP No primary care provider on file.     SUBJECTIVE:    Denies any complaints. Feels like her voice is improved today.    VITALS:  BP 97/57 (BP Location: Right arm)   Pulse 62   Temp 98 °F (36.7 °C) (Oral)   Resp 20   Ht 5' 7\" (1.702 m)   Wt 95 lb 14.4 oz (43.5 kg)   SpO2 96%   BMI 15.02 kg/m²     INTAKE/OUTPUT:    Intake/Output Summary (Last 24 hours) at 2024 1023  Last data filed at 2024 0700  Gross per 24 hour   Intake 3578.33 ml   Output --   Net 3578.33 ml     Last 3 Weights   24 0623 95 lb 14.4 oz (43.5 kg)   24 0533 95 lb 14.4 oz (43.5 kg)   24 0605 101 lb 13.6 oz (46.2 kg)   08/10/24 0600 106 lb 14.8 oz (48.5 kg)   24 0510 93 lb 11.1 oz (42.5 kg)   24 0627 93 lb 3.2 oz (42.3 kg)   24 1358 91 lb 7.9 oz (41.5 kg)   24 1056 91 lb 7.9 oz (41.5 kg)   24 0909 90 lb (40.8 kg)   24 1046 90 lb (40.8 kg)   24 1604 105 lb (47.6 kg)     LABS:  Recent Labs   Lab 24  0602 24  0454   * 82 117*   BUN 9 5* 5*   CREATSERUM 0.59 0.58 0.59   EGFRCR 93 94 93   CA 8.5* 8.3* 7.9*    145 147*   K 3.8 3.3* 4.2  4.2    108 109   CO2 28.0 31.0 36.0*     Recent Labs   Lab 24  0602 24  0454   RBC 3.60* 4.10 3.97   HGB 10.6* 11.9* 11.9*   HCT 32.3* 35.5 35.1   MCV 89.7 86.6 88.4   MCH 29.4 29.0 30.0   MCHC 32.8 33.5 33.9   RDW 13.4 13.2 13.2   NEPRELIM 9.69* 7.88* 8.06*   WBC 11.2* 10.4 10.6   .0 290.0 288.0     No results for input(s): \"TROP\", \"CK\" in the last 168 hours.    DIAGNOSTICS:    TELEMETRY: Intermittent SB with lowest rate 38 overnight, now 70s    ECHO 2024:  Conclusions:   1. Left ventricle: The cavity size was normal. Wall thickness was normal.      Systolic function was markedly reduced. The  estimated ejection fraction      was 20-25%, by visual assessment. Akinesis of the entireapical wall.      Doppler parameters are consistent with abnormal left ventricular      relaxation - grade 1 diastolic dysfunction.   2. Right ventricle: Systolic function was normal.   3. Aortic valve: There was mild to moderate regurgitation. The peak systolic      velocity was 1.01m/sec.     ROS: Negative unless noted above     PHYSICAL EXAM:  General: Alert and oriented x 3. No apparent distress.  HEENT: Normocephalic, sclera are nonicteric. Hearing appropriate bilaterally. Hoarse voice  Neck: No JVD or Carotid bruits. Trachea midline.   Cardiac: Regular rate and rhythm. S1, S2 auscultated. No murmurs, rubs, or gallops appreciated.   Lungs: A/P dullness. Chest expansion symmetrical. Regular effort.  Abdomen: Soft, non-tender, +BS. No hepatosplenomegaly or appreciable masses.   Extremities: Without clubbing, cyanosis or edema.  Peripheral pulses are 2+.  Neurologic: Motor and sensory nerves grossly intact.   Psych: Appropriate affect   Skin: Warm and dry. No obvious lesions, wounds, or ulcerations.     MEDICATIONS:   magnesium sulfate  4 g Intravenous Once    spironolactone  25 mg Oral Daily    predniSONE  20 mg Oral Daily with breakfast    furosemide  40 mg Oral Daily    sacubitril-valsartan  1 tablet Oral BID    metoprolol succinate  25 mg Oral 2x Daily(Beta Blocker)    aspirin  81 mg Oral Daily    pantoprazole  40 mg Intravenous Q12H    revefenacin  175 mcg Nebulization Daily    budesonide  0.25 mg Nebulization BID      potassium chloride in dextrose 5%-sodium chloride 0.9% 100 mL/hr at 08/13/24 0957     ASSESSMENT:    Type II NSTEMI  New NICM, LVEF 20-25%/ Mod AI  - CT without PE  - C 8/9/24 with normal coronaries   - ASA, Entresto, Toprol XL, Aldactone; New and tolerating   - Appears compensated    NSVT  - None overnight, Mag 1.5, cardiac replacement     Esophageal Mass concerning for malignancy  Left Vocal Cord  Paralysis   Acute Anemia  - EGD s/p biopsy, pending surgical oncology consulted  - No overt bleeding, H&H stable, during EGD biopsy mass was friable with spontaneous oozing     COPD w/ Exacerbation- Pulmonary following   HLD- , not historically on statin   Hx ETOH Abuse   Hx Tobacco Abuse   Pre-Diabetic- A1c 6.2%  Depressive Episode- Consult for psych placed 8/10, pending     PLAN:  - Evaluated by surgical oncology, plans for PEG placement sometime this week   - Plans for bronch this afternoon with pulmonary, appears optimized from a cardiac standpoint to proceed.   - Start Lipitor 20 mg nightly   - Normal coronary on C will continue to titrate medications for systolic heart failure, Discussed potential wearable defibrillator on discharge patient declined, risks identified   - Eventual SGLT2 & low dose loop diuretic      Plan of care discussed with patient and RN.     Ailyn Gray, APRN  8/13/2024  10:23 AM  [phone] ([place])  [phone] ([place])        =======================================================  Note reviewed and labs reviewed.  Agree with above assessment and plan.    I have personally performed the medical decision making in its entirety. My additions include: None.    D/w SUSAN Gray.    Anand Rodriguez MD

## 2024-08-13 NOTE — PROGRESS NOTES
[facility]  Progress Note     Hortencia Kelley  : 10/2/1945    Status: Inpatient  Day #: 6    Attending: iRco Frost MD  PCP: No primary care provider on file.     Assessment and Plan:    NSTEMI type II - elevated troponin and new cardiomyopathy  Takotsubo cardiomyopathy  -cardiology on consult  -echo EF - 20-25%, akinesis of apical wall  -cardiac cath without CAD suggesting takotsubo  -heparin drip stopped  -cont asa. Entresto started.   -optimized for procedures/surgery per cardiology    Esophageal squamous cell carcinoma causing obstruction  Dysphagia to solids  Wt loss  -GI on consult  -EGD 8/10 - obstructing esophageal mass s/p biopsies. Was firm and friable with spontaneous oozing.   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 Gomez. Dr. Quispe to discuss re: bronchoscopy  -plan is for J-tube    AECOPD  H/o L vocal cord paralysis  -pulm on consult  -cont nebs  -off steroids    Anemia  -malignancy w/u    Dietitian Malnutrition Assessment        Evaluation for Malnutrition: Criteria for non-severe malnutrition diagnosis-         acute illness/injury related to Wt loss 5% in 1 month., Body fat mild depletion., Muscle mass mild depletion.         RD Malnutrition Care Plan: Encouraged increased PO intake., Encouraged small frequent meals with emphasis on high calorie/high protein., Intiated ONS (oral nutritional supplements).    Body Fat/Muscle Mass: Mild depletion body fat., Mild depletion muscle mass. triceps region. temple region., clavicle region., shoulder region., dorsal monsalve region., thigh region., calf region.    Physician Assessment    Patient has a diagnosis of moderate malnutrition     DVT Mechanical Prophylaxis:        DVT Pharmacologic Prophylaxis   Medication   None         DVT Pharmacologic prophylaxis: Aspirin 81 mg     Subjective:      Initial Chief Complaint:  cough, chest pain    No CP. No SOB  currently. Tolerating CLD.    10 point ROS completed and was negative, except for pertinent positive and negatives stated in subjective.      Objective:      Temp:  [98 °F (36.7 °C)-98.6 °F (37 °C)] 98 °F (36.7 °C)  Pulse:  [37-97] 62  Resp:  [18-20] 20  BP: ()/(55-68) 97/57  SpO2:  [96 %-99 %] 96 %  General:  Alert, no distress  HEENT:  Normocephalic, atraumatic  Cardiac:  Regular rate, regular rhythm  Pulmonary:  Clear to auscultation bilaterally, respirations unlabored  Gastrointestinal:  Soft, non-tender, normal bowel sounds  Musculoskeletal:  No joint swelling  Extremities:  No edema, no cyanosis, no clubbing  Neurologic:  nonfocal  Psychiatric:  Normal affect, calm and appropriate    Intake/Output Summary (Last 24 hours) at 8/13/2024 1110  Last data filed at 8/13/2024 0700  Gross per 24 hour   Intake 3578.33 ml   Output --   Net 3578.33 ml         Recent Labs   Lab 08/11/24  0623 08/12/24  0602 08/13/24  0454   WBC 11.2* 10.4 10.6   HGB 10.6* 11.9* 11.9*   HCT 32.3* 35.5 35.1   .0 290.0 288.0   RBC 3.60* 4.10 3.97   MCV 89.7 86.6 88.4   MCH 29.4 29.0 30.0   MCHC 32.8 33.5 33.9   RDW 13.4 13.2 13.2   NEPRELIM 9.69* 7.88* 8.06*     Recent Labs   Lab 08/08/24  1011 08/08/24  1708 08/09/24  0528 08/10/24  0717 08/11/24  0623 08/12/24  0602 08/13/24  0454   BUN  --   --  18   < > 9 5* 5*   CREATSERUM  --   --  0.67   < > 0.59 0.58 0.59   CA  --   --  8.9   < > 8.5* 8.3* 7.9*   NA  --   --  144   < > 144 145 147*   K  --   --  4.6   < > 3.8 3.3* 4.2  4.2   CL  --   --  112   < > 110 108 109   CO2  --   --  27.0   < > 28.0 31.0 36.0*   GLU  --   --  151*   < > 131* 82 117*   MG  --   --   --   --   --  1.4* 1.5*   PTT 45.2* 73.0* 154.4*  --   --   --   --     < > = values in this interval not displayed.       No results found.      Medications:   magnesium sulfate  4 g Intravenous Once    atorvastatin  10 mg Oral Nightly    spironolactone  25 mg Oral Daily    predniSONE  20 mg Oral Daily with breakfast     furosemide  40 mg Oral Daily    sacubitril-valsartan  1 tablet Oral BID    metoprolol succinate  25 mg Oral 2x Daily(Beta Blocker)    aspirin  81 mg Oral Daily    pantoprazole  40 mg Intravenous Q12H    revefenacin  175 mcg Nebulization Daily    budesonide  0.25 mg Nebulization BID      PRN Meds:   acetaminophen    ondansetron    metoclopramide    temazepam    guaiFENesin    benzonatate    nitroglycerin    ipratropium-albuterol    Supplementary Documentation:        MDM High. Time spent on chart/note review, review of labs/imaging, discussion with patient, physical exam, discussion with staff, consultants, coordinating care, writing progress note, discussion of plan of care.      Rico Frost MD

## 2024-08-13 NOTE — ANESTHESIA POSTPROCEDURE EVALUATION
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    [facility] AN Post Evaluation:   Patient Evaluated in PACU  Patient Participation: complete - patient participated  Level of Consciousness: sleepy but conscious  Pain Score: 0  Pain Management: adequate  Airway Patency:patent  Dental exam unchanged from preop  Yes    Cardiovascular Status: stable and acceptable  Respiratory Status: acceptable and room air  Postoperative Hydration acceptable      SHARON DAIGLE CRNA  8/13/2024 1:58 PM

## 2024-08-13 NOTE — PLAN OF CARE
Problem: Patient Centered Care  Goal: Patient preferences are identified and integrated in the patient's plan of care  Description: Interventions:  - What would you like us to know as we care for you? Home with   - Provide timely, complete, and accurate information to patient/family  - Incorporate patient and family knowledge, values, beliefs, and cultural backgrounds into the planning and delivery of care  - Encourage patient/family to participate in care and decision-making at the level they choose  - Honor patient and family perspectives and choices  Outcome: Progressing     Problem: Patient/Family Goals  Goal: Patient/Family Long Term Goal  Description: Patient's Long Term Goal: to go home    Interventions:  - follow MD orders  - See additional Care Plan goals for specific interventions  Outcome: Progressing  Goal: Patient/Family Short Term Goal  Description: Patient's Short Term Goal: manage dysphagia     Interventions:   - follow MD orders  - See additional Care Plan goals for specific interventions  Outcome: Progressing     Problem: CARDIOVASCULAR - ADULT  Goal: Maintains optimal cardiac output and hemodynamic stability  Description: INTERVENTIONS:  - Monitor vital signs, rhythm, and trends  - Monitor for bleeding, hypotension and signs of decreased cardiac output  - Evaluate effectiveness of vasoactive medications to optimize hemodynamic stability  - Monitor arterial and/or venous puncture sites for bleeding and/or hematoma  - Assess quality of pulses, skin color and temperature  - Assess for signs of decreased coronary artery perfusion - ex. Angina  - Evaluate fluid balance, assess for edema, trend weights  Outcome: Progressing  Goal: Absence of cardiac arrhythmias or at baseline  Description: INTERVENTIONS:  - Continuous cardiac monitoring, monitor vital signs, obtain 12 lead EKG if indicated  - Evaluate effectiveness of antiarrhythmic and heart rate control medications as ordered  - Initiate  emergency measures for life threatening arrhythmias  - Monitor electrolytes and administer replacement therapy as ordered  Outcome: Progressing     Problem: SAFETY ADULT - FALL  Goal: Free from fall injury  Description: INTERVENTIONS:  - Assess pt frequently for physical needs  - Identify cognitive and physical deficits and behaviors that affect risk of falls.  - [place] fall precautions as indicated by assessment.  - Educate pt/family on patient safety including physical limitations  - Instruct pt to call for assistance with activity based on assessment  - Modify environment to reduce risk of injury  - Provide assistive devices as appropriate  - Consider OT/PT consult to assist with strengthening/mobility  - Encourage toileting schedule  Outcome: Progressing     Problem: GASTROINTESTINAL - ADULT  Goal: Minimal or absence of nausea and vomiting  Description: INTERVENTIONS:  - Maintain adequate hydration with IV or PO as ordered and tolerated  - Nasogastric tube to low intermittent suction as ordered  - Evaluate effectiveness of ordered antiemetic medications  - Provide nonpharmacologic comfort measures as appropriate  - Advance diet as tolerated, if ordered  - Obtain nutritional consult as needed  - Evaluate fluid balance  Outcome: Progressing  Goal: Maintains or returns to baseline bowel function  Description: INTERVENTIONS:  - Assess bowel function  - Maintain adequate hydration with IV or PO as ordered and tolerated  - Evaluate effectiveness of GI medications  - Encourage mobilization and activity  - Obtain nutritional consult as needed  - Establish a toileting routine/schedule  - Consider collaborating with pharmacy to review patient's medication profile  Outcome: Progressing  Goal: Maintains adequate nutritional intake (undernourished)  Description: INTERVENTIONS:  - Monitor percentage of each meal consumed  - Identify factors contributing to decreased intake, treat as appropriate  - Assist with meals as  needed  - Monitor I&O, WT and lab values  - Obtain nutritional consult as needed  - Optimize oral hygiene and moisture  - Encourage food from home; allow for food preferences  - Enhance eating environment  Outcome: Progressing

## 2024-08-13 NOTE — PLAN OF CARE
Problem: Patient Centered Care  Goal: Patient preferences are identified and integrated in the patient's plan of care  Description: Interventions:  - What would you like us to know as we care for you? I would like to return home.  - Provide timely, complete, and accurate information to patient/family  - Incorporate patient and family knowledge, values, beliefs, and cultural backgrounds into the planning and delivery of care  - Encourage patient/family to participate in care and decision-making at the level they choose  - Honor patient and family perspectives and choices  Outcome: Progressing     Problem: Patient/Family Goals  Goal: Patient/Family Long Term Goal  Description: Patient's Long Term Goal: to be able to tolerate solid foods.    Interventions:  - See additional Care Plan goals for specific interventions  Outcome: Progressing  Goal: Patient/Family Short Term Goal  Description: Patient's Short Term Goal: to have G-tube placed    Interventions:   - See additional Care Plan goals for specific interventions  Outcome: Progressing     Problem: CARDIOVASCULAR - ADULT  Goal: Maintains optimal cardiac output and hemodynamic stability  Description: INTERVENTIONS:  - Monitor vital signs, rhythm, and trends  - Monitor for bleeding, hypotension and signs of decreased cardiac output  - Evaluate effectiveness of vasoactive medications to optimize hemodynamic stability  - Monitor arterial and/or venous puncture sites for bleeding and/or hematoma  - Assess quality of pulses, skin color and temperature  - Assess for signs of decreased coronary artery perfusion - ex. Angina  - Evaluate fluid balance, assess for edema, trend weights  Outcome: Progressing  Goal: Absence of cardiac arrhythmias or at baseline  Description: INTERVENTIONS:  - Continuous cardiac monitoring, monitor vital signs, obtain 12 lead EKG if indicated  - Evaluate effectiveness of antiarrhythmic and heart rate control medications as ordered  - Initiate  emergency measures for life threatening arrhythmias  - Monitor electrolytes and administer replacement therapy as ordered  Outcome: Progressing     Problem: SAFETY ADULT - FALL  Goal: Free from fall injury  Description: INTERVENTIONS:  - Assess pt frequently for physical needs  - Identify cognitive and physical deficits and behaviors that affect risk of falls.  - [place] fall precautions as indicated by assessment.  - Educate pt/family on patient safety including physical limitations  - Instruct pt to call for assistance with activity based on assessment  - Modify environment to reduce risk of injury  - Provide assistive devices as appropriate  - Consider OT/PT consult to assist with strengthening/mobility  - Encourage toileting schedule  Outcome: Progressing     Problem: GASTROINTESTINAL - ADULT  Goal: Minimal or absence of nausea and vomiting  Description: INTERVENTIONS:  - Maintain adequate hydration with IV or PO as ordered and tolerated  - Nasogastric tube to low intermittent suction as ordered  - Evaluate effectiveness of ordered antiemetic medications  - Provide nonpharmacologic comfort measures as appropriate  - Advance diet as tolerated, if ordered  - Obtain nutritional consult as needed  - Evaluate fluid balance  Outcome: Progressing  Goal: Maintains or returns to baseline bowel function  Description: INTERVENTIONS:  - Assess bowel function  - Maintain adequate hydration with IV or PO as ordered and tolerated  - Evaluate effectiveness of GI medications  - Encourage mobilization and activity  - Obtain nutritional consult as needed  - Establish a toileting routine/schedule  - Consider collaborating with pharmacy to review patient's medication profile  Outcome: Progressing  Goal: Maintains adequate nutritional intake (undernourished)  Description: INTERVENTIONS:  - Monitor percentage of each meal consumed  - Identify factors contributing to decreased intake, treat as appropriate  - Assist with meals as  needed  - Monitor I&O, WT and lab values  - Obtain nutritional consult as needed  - Optimize oral hygiene and moisture  - Encourage food from home; allow for food preferences  - Enhance eating environment  Outcome: Progressing

## 2024-08-13 NOTE — ANESTHESIA PREPROCEDURE EVALUATION
Anesthesia PreOp Note    HPI:     Hortencia Kelley is a 78 year old female who presents for preoperative consultation requested by: Prakash Quispe DO    Date of Surgery: 2024 - 2024    Procedure(s):  BRONCHOSCOPY  Indication: none given    Relevant Problems   No relevant active problems       NPO:  Last Liquid Consumption Date: 24  Last Liquid Consumption Time: 0700  Last Solid Consumption Date: 24  Last Solid Consumption Time: 2100  Last Liquid Consumption Date: 24          History Review:  Patient Active Problem List    Diagnosis Date Noted    NSTEMI (non-ST elevated myocardial infarction) ([facility]) 2024    Esophageal mass 2024    Pathological fracture of right humerus due to age-related osteoporosis with routine healing 07/10/2020    Fall at home 07/10/2020    Episodic mood disorder ([facility]) 2020    Anemia, unspecified type 2020    Alcoholic intoxication without complication ([facility]) 2020    Closed 3-part fracture of proximal humerus, right, sequela 2020    Injury of head, initial encounter 2020    Anemia 2017    Osteopenia 2017    Multinodular goiter 2017       Past Medical History:    COPD (chronic obstructive pulmonary disease) ([facility])    Depression    ETOH abuse    Falls frequently    2017    History of blood transfusion    [facility]        Past Surgical History:   Procedure Laterality Date    Hip replacement surgery Left        Medications Prior to Admission   Medication Sig Dispense Refill Last Dose    [] albuterol 108 (90 Base) MCG/ACT Inhalation Aero Soln Inhale 2 puffs into the lungs every 4 (four) hours as needed. 18 g 0     [] predniSONE 20 MG Oral Tab Take 3 tablets (60 mg total) by mouth daily for 2 days, THEN 2 tablets (40 mg total) daily for 3 days. 12 tablet 0      Current Facility-Administered Medications Ordered in Epic   Medication Dose Route Frequency Provider Last Rate Last Admin    atorvastatin  (Lipitor) tab 10 mg  10 mg Oral Nightly Ailyn Gray APRN        spironolactone (Aldactone) tab 25 mg  25 mg Oral Daily Ailyn Gray APRN   25 mg at 08/13/24 0854    predniSONE (Deltasone) tab 20 mg  20 mg Oral Daily with breakfast Kong Esteves PA-C   20 mg at 08/13/24 0821    furosemide (Lasix) tab 40 mg  40 mg Oral Daily Ailyn Gray APRN   40 mg at 08/13/24 0821    sacubitril-valsartan (Entresto) 24-26 MG per tab 1 tablet  1 tablet Oral BID Toni Obrien MD   1 tablet at 08/13/24 0956    metoprolol succinate ER (Toprol XL) 24 hr tab 25 mg  25 mg Oral 2x Daily(Beta Blocker) Toni Obrien MD   25 mg at 08/13/24 0603    [Held by provider] aspirin DR tab 81 mg  81 mg Oral Daily Flory Granados APRN   81 mg at 08/13/24 0821    acetaminophen (Tylenol Extra Strength) tab 500 mg  500 mg Oral Q4H PRN Anita Knox MD        ondansetron (Zofran) 4 MG/2ML injection 4 mg  4 mg Intravenous Q6H PRN Anita Knox MD        metoclopramide (Reglan) 5 mg/mL injection 5 mg  5 mg Intravenous Q8H PRN Anita Knox MD        temazepam (Restoril) cap 15 mg  15 mg Oral Nightly PRN Anita Knox MD   15 mg at 08/12/24 2106    guaiFENesin (Robitussin) 100 MG/5 ML oral liquid 200 mg  200 mg Oral Q4H PRN Anita Knox MD        benzonatate (Tessalon) cap 200 mg  200 mg Oral TID PRN Anita Knox MD        nitroglycerin (Nitrostat) SL tab 0.4 mg  0.4 mg Sublingual Q5 Min PRN Anita Knox MD        ipratropium-albuterol (Duoneb) 0.5-2.5 (3) MG/3ML inhalation solution 3 mL  3 mL Nebulization Q6H PRN Anita Knox MD        potassium chloride 20 mEq in dextrose 5%-sodium chloride 0.9% 1000mL infusion premix   Intravenous Continuous BankoslyAnita  mL/hr at 08/13/24 0957 New Bag at 08/13/24 0957    pantoprazole (Protonix) 40 mg in sodium chloride 0.9% PF 10 mL IV push  40 mg Intravenous Q12H Ina Huddleston MD   40 mg at 08/13/24 0603    revefenacin (Yupelri) 175 MCG/3ML  nebulizer solution 175 mcg  175 mcg Nebulization Daily Prakash Quispe DO   175 mcg at 24 0925    budesonide (Pulmicort) 0.25 MG/2ML nebulizer suspension 0.25 mg  0.25 mg Nebulization BID Prakash Quispe DO   0.25 mg at 24 0936     Current Outpatient Medications Ordered in Epic   Medication Sig Dispense Refill    sacubitril-valsartan 24-26 MG Oral Tab Take 1 tablet by mouth 2 (two) times daily. 60 tablet 3       No Known Allergies    Family History   Problem Relation Age of Onset    Heart Disorder Father     Dementia Mother     Dementia Sister      Social History     Socioeconomic History    Marital status:    Tobacco Use    Smoking status: Former     Current packs/day: 0.00     Average packs/day: 0.5 packs/day for 40.0 years (20.0 ttl pk-yrs)     Types: Cigarettes     Start date: 1978     Quit date: 2018     Years since quittin.1    Smokeless tobacco: Never   Vaping Use    Vaping status: Never Used   Substance and Sexual Activity    Alcohol use: Not Currently     Alcohol/week: 3.0 standard drinks of alcohol     Types: 3 Glasses of wine per week     Comment: social    Drug use: No       Available pre-op labs reviewed.  Lab Results   Component Value Date    WBC 10.6 2024    RBC 3.97 2024    HGB 11.9 (L) 2024    HCT 35.1 2024    MCV 88.4 2024    MCH 30.0 2024    MCHC 33.9 2024    RDW 13.2 2024    .0 2024     Lab Results   Component Value Date     (H) 2024    K 4.2 2024    K 4.2 2024     2024    CO2 36.0 (H) 2024    BUN 5 (L) 2024    CREATSERUM 0.59 2024     (H) 2024    PGLU 142 (H) 2024    CA 7.9 (L) 2024          Vital Signs:  Body mass index is 15.02 kg/m².   height is 1.702 m (5' 7\") and weight is 43.5 kg (95 lb 14.4 oz). Her oral temperature is 98 °F (36.7 °C). Her blood pressure is 94/67 and her pulse is 68. Her respiration is 16 and  oxygen saturation is 95%.   Vitals:    08/13/24 0820 08/13/24 0852 08/13/24 0954 08/13/24 1253   BP: 104/56 97/55 97/57 94/67   Pulse: 62   68   Resp: 20   16   Temp: 98 °F (36.7 °C)      TempSrc: Oral      SpO2: 96%   95%   Weight:       Height:            Anesthesia Evaluation     Patient summary reviewed and Nursing notes reviewed    Airway   Mallampati: III  TM distance: >3 FB  Neck ROM: full  Dental - Dentition appears grossly intact     Pulmonary - normal exam    breath sounds clear to auscultation  (+) COPD  Cardiovascular - negative ROS and normal exam  (+) CAD, CHF    ECG reviewed    Neuro/Psych    (+)   depression      GI/Hepatic/Renal - negative ROS     Endo/Other - negative ROS   Abdominal  - normal exam                 Anesthesia Plan:   ASA:  4  Plan:   MAC  Informed Consent Plan and Risks Discussed With:  Patient  Discussed plan with:  Surgeon      I have informed Hortencia Kelley and/or legal guardian or family member of the nature of the anesthetic plan, benefits, risks including possible dental damage if relevant, major complications, and any alternative forms of anesthetic management.   All of the patient's questions were answered to the best of my ability. The patient desires the anesthetic management as planned.  MERISSA DAIGLE CRNA  8/13/2024 1:27 PM  Present on Admission:  **None**

## 2024-08-13 NOTE — PROCEDURES
Video bronchoscopy    After MAC sedation achieved video bronchoscope advanced through oral bite bite and vocal cords visualized with evidence of left vocal cord paralysis seen. Bronchoscope advanced and trachea visualized.  Significant narrowing of trachea with extrinsic compression present.  No obvious tracheoesophageal fistula seen.  Main dipti was sharp and mobile.  The bronchoscope was advanced into the right mainstem bronchus and the right upper, middle and lower lobe segmental and subsegmental anatomy was visualized with appeared normal in appearance without evidence of any lesions, inflammatory changes or significant secretions.  The bronchoscope was next advanced into the left mainstem bronchus and the left upper, lingular and lower lobe segmental and subsegmental anatomy was visualized without any gross abnormalities seen without evidence of any lesions, inflammatory changes or significant secretions.   The patient tolerated the procedure well and saturation without any reported episodes of desaturation.    Postprocedural findings: Tracheal stenosis, no evidence of tracheoesophageal fistula    Prakash Quispe,   Pulmonary Critical Care Medicine  [facility]

## 2024-08-13 NOTE — PROGRESS NOTES
Surgical Oncology Inpatient Progress Note    Subjective:  Bronchoscopy today without concern for fistula    Objective:  Temp:  [98 °F (36.7 °C)-98.6 °F (37 °C)] 98 °F (36.7 °C)  Pulse:  [37-98] 92  Resp:  [15-31] 27  BP: ()/(55-71) 103/65  SpO2:  [94 %-100 %] 98 %    Intake/Output:      Intake/Output Summary (Last 24 hours) at 8/13/2024 1614  Last data filed at 8/13/2024 0700  Gross per 24 hour   Intake 3338.33 ml   Output --   Net 3338.33 ml       Wt Readings from Last 6 Encounters:   08/13/24 43.5 kg (95 lb 14.4 oz)   07/26/24 40.8 kg (90 lb)   07/05/24 47.6 kg (105 lb)   08/03/20 55.3 kg (122 lb)   07/29/20 54.9 kg (121 lb)   07/27/20 54.9 kg (121 lb)       Allergies:    No Known Allergies    Labs:  Recent Labs   Lab 08/11/24 0623 08/12/24 0602 08/13/24  0454   RBC 3.60* 4.10 3.97   HGB 10.6* 11.9* 11.9*   HCT 32.3* 35.5 35.1   MCV 89.7 86.6 88.4   MCH 29.4 29.0 30.0   MCHC 32.8 33.5 33.9   RDW 13.4 13.2 13.2   NEPRELIM 9.69* 7.88* 8.06*   WBC 11.2* 10.4 10.6   .0 290.0 288.0       Recent Labs   Lab 08/11/24 0623 08/12/24  0602 08/13/24  0454   * 82 117*   BUN 9 5* 5*   CREATSERUM 0.59 0.58 0.59   CA 8.5* 8.3* 7.9*    145 147*   K 3.8 3.3* 4.2  4.2    108 109   CO2 28.0 31.0 36.0*         Physical Exam:  General: NAD, hoarse voice  Lungs: tachypnea when speaking  Heart: RRR  Abdomen: Soft, nondistended, nontender  Extremities: Warm, dry, no LE edema bilat  Neurological:  AAOx3, MAEW    Assessment/Plan:  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  If unsuccesful, will plan for surgical j-tube placement instead.  CT A/P to complete metastatic work up  Once nutrition access obtained and patient tolerating tube feeds, can discharge with planned follow up with med/rad oncology to begin treatment for her cancer  Feeding tube may be a temporary intervention, as she may be able to start eating normally again after  started therapies.   Discussed at multidisciplinary tumor board who agrees with the above recommendation    Patient seen and discussed with Dr. Gomez.    Vanessa Brown PA-C  Department of Surgical Oncology  [facility]  [address] [place]  [address]  [facility]  [address] [place] [id number]  T: [phone]  F: [phone]

## 2024-08-13 NOTE — PAYOR COMM NOTE
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 Entresto started.   -optimized for procedures/surgery per cardiology     Esophageal mass concerning for malignancy  Dysphagia to solids  Wt loss  -GI on consult  -EGD 8/10 - obstructing esophageal mass s/p biopsies. Was firm and friable with spontaneous oozing.   -CLD  -surgical oncology consulted  -f/u pathology  -plan is for J-tube     AECOPD  H/o L vocal cord paralysis  -pulm on consult  -cont nebs  -off steroids     Anemia  -malignancy w/u     General:  Alert, no distress  HEENT:  Normocephalic, atraumatic  Cardiac:  Regular rate, regular rhythm  Pulmonary:  Clear to auscultation bilaterally, respirations unlabored  Gastrointestinal:  Soft, non-tender, normal bowel sounds  Musculoskeletal:  No joint swelling  Extremities:  No edema, no cyanosis, no clubbing  Neurologic:  nonfocal  Psychiatric:  Normal affect, calm and appropriate     Intake/Output Summary (Last 24 hours) at 8/12/2024 1211  Last data filed at 8/12/2024 0900      Gross per 24 hour   Intake 2381.67 ml   Output 1750 ml   Net 631.67 ml                Recent Labs   Lab 08/12/24  0602   WBC 10.4   HGB 11.9*   HCT 35.5   .0   RBC 4.10   MCV 86.6   MCH 29.0   MCHC 33.5   RDW 13.2   NEPRELIM 7.88*           Recent Labs   Lab 08/12/24  0602   BUN 5*   CREATSERUM 0.58   CA 8.3*      K 3.3*      CO2 31.0   GLU 82   MG 1.4*   PTT  --              MEDICATIONS ADMINISTERED IN LAST 1 DAY:  aspirin DR tab 81 mg       Date Action Dose Route User    8/13/2024 0821 Given 81 mg Oral Karma Mullen RN    8/12/2024 0900 Given 81 mg Oral Reyes Soto, Oscar, RN          budesonide (Pulmicort) 0.25 MG/2ML nebulizer suspension 0.25 mg       Date Action Dose Route User    8/12/2024 2028 Given 0.25 mg Nebulization Ximena Lancaster RCP    8/12/2024 0925 Given 0.25 mg Nebulization Yulia Yanez S, RCP          furosemide (Lasix) tab 40 mg       Date Action Dose Route User    8/13/2024 0821 Given 40 mg Oral Karma Mullen RN    8/12/2024 0900 Given 40 mg Oral Reyes Soto, Oscar, RN          potassium chloride 20 mEq in dextrose 5%-sodium chloride 0.9% 1000mL infusion premix       Date Action Dose Route User    8/12/2024 2008 New Bag (none) Intravenous Minnie Schmidt RN    8/12/2024 0900 New Bag (none) Intravenous Reyes Soto, Oscar, RN          magnesium oxide (Mag-Ox) tab 800 mg       Date Action Dose Route User    8/12/2024 1300 Given 800 mg Oral Reyes Soto, Oscar, RN          methylPREDNISolone sodium succinate (Solu-MEDROL) injection 40 mg       Date Action Dose Route User    8/12/2024 0900 Given 40 mg Intravenous Reyes Soto, Oscar, RN          metoprolol succinate ER (Toprol XL) 24 hr tab 25 mg       Date Action Dose Route User    8/13/2024 0603 Given 25 mg Oral Minnie Schmidt RN    8/12/2024 1721 Given 25 mg Oral Reyes Soto, Oscar, RN          pantoprazole (Protonix) 40 mg in sodium chloride 0.9% PF 10 mL IV push       Date Action Dose Route User    8/13/2024 0603 Given 40 mg Intravenous Minnie Schmidt RN    8/12/2024 1721 Given 40 mg Intravenous Reyes Shankar, Hudson, RN          predniSONE (Deltasone) tab 20 mg       Date Action Dose Route User    8/13/2024 0821 Given 20 mg Oral Karma Mullen, RN           revefenacin (Yupelri) 175 MCG/3ML nebulizer solution 175 mcg       Date Action Dose Route User    8/12/2024 0915 Given 175 mcg Nebulization AmbattuRichiu S, RCP          sacubitril-valsartan (Entresto) 24-26 MG per tab 1 tablet       Date Action Dose Route User    8/12/2024 2106 Given 1 tablet Oral Minnie Schmidt RN    8/12/2024 0900 Given 1 tablet Oral Reyes Soto, Oscar, RN          spironolactone (Aldactone) partial tab 12.5 mg       Date Action Dose Route User    8/12/2024 0900 Given 12.5 mg Oral Reyes Soto, Oscar, RN          temazepam (Restoril) cap 15 mg       Date Action Dose Route User    8/12/2024 2106 Given 15 mg Oral Minnie Schmidt RN            Vitals (last day)       Date/Time Temp Pulse Resp BP SpO2 Weight O2 Device O2 Flow Rate (L/min) [facility]    08/13/24 0820 98 °F (36.7 °C) 62 20 104/56 96 % -- None (Room air) --     08/13/24 0623 -- -- -- -- -- 95 lb 14.4 oz (43.5 kg) -- --     08/13/24 0559 98.6 °F (37 °C) 61 18 110/68 98 % -- None (Room air) --     08/13/24 0548 -- 37 -- -- -- -- -- --     08/12/24 2104 98.5 °F (36.9 °C) 97 18 101/65 98 % -- None (Room air) --     08/12/24 1719 98 °F (36.7 °C) 82 18 91/56 98 % -- None (Room air) -- OR    08/12/24 1259 98.1 °F (36.7 °C) 82 18 88/59 99 % -- None (Room air) -- OR    08/12/24 0857 98.3 °F (36.8 °C) 79 18 95/58 97 % -- None (Room air) -- OR    08/12/24 0533 98.4 °F (36.9 °C) 78 18 100/66 96 % -- None (Room air) -- TW    08/12/24 0533 -- -- -- -- -- 95 lb 14.4 oz (43.5 kg) -- -- BP          CIWA Scores (since admission)       None

## 2024-08-13 NOTE — PROGRESS NOTES
[facility]     Gastroenterology Progress Note    Hortencia Kelley Patient Status:  Inpatient    10/2/1945 MRN [id number]   Location [facility] 3W/SW Attending Rico Frost MD   Hosp Day # 6 PCP No primary care provider on file.       Subjective:   No specific complaints this morning, tolerated clears yesterday. No abd pain, n/v. Undergoing bronch today.     Objective:   Blood pressure 94/67, pulse 68, temperature 98 °F (36.7 °C), temperature source Oral, resp. rate 16, height 5' 7\" (1.702 m), weight 95 lb 14.4 oz (43.5 kg), SpO2 95%, not currently breastfeeding. Body mass index is 15.02 kg/m².    General: awake, alert and oriented, no acute distress  HEENT: moist mucus membranes  PULM: no conversational dyspnea  CARDIOVASCULAR: regular rate and rhythm, the extremities are warm and well perfused  GI: soft, non-tender, non-distended, + BS, no rebound/guarding   EXTREMITIES: no edema, moving all extremities  SKIN: no visible rash  NEURO: appropriate and interactive    Assessment and Plan:   Hortencia Kelley is a a(n) 78 year old female w/ a history of COPD, former smoker with 20 pack year smoking history, depression, ETOH abuse, who presents with shortness of breath, weight loss, and dysphagia. 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 EGD 8/10 showed an obstructing esophageal mass in the mid-upper esophagus, biopsies c/w SCC with basoloid features.    Appreciate surg onc input, to be discussed in tumor board. Bronch today to assess for BE fistula. Surgical J tube placement.    GI will be available as needed, please call with questions    Ina Huddleston MD  [facility] [address] [facility] Gastroenterology      Results:     Lab Results   Component Value Date    WBC 10.6 2024    HGB 11.9 (L) 2024    HCT 35.1 2024    .0 2024    CREATSERUM 0.59 2024    BUN 5 (L) 2024    NA  History of Present Illness





- History of Present Illness


This is a pleasant 53 years old lady with past medical history of bipolar who 

presents with a fall, as per patient she was doing some cleaning and 

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 

and fell without losing consciousness no chest pain or palpitation, patient 

hurt her left leg


Patient is found to have left ankle fracture when she is planned for ORIF by 

orthopedic team on 04/25/2018


Patient denies history of cardiac disease, no history of wall disease, pacemaker

, congenital heart disease, no history of CHF, CAD, CVA, DM


Patient states she smokes 1-2 cigarettes a day and last time she had a 

cigarette was 34 days ago she is occasional drinking alcohol, no illicit tracts

, she is pretty functional and she can easily go up 34 styes without problems 

like shortness of breath or chest pain, and she can walk 10 miles without 

difficulty as patient tolerated





Review of Systems





14 point systematic review were negative except was mentioned in HPI





Past Medical History


Past Medical History: Hypertension


History of Any Multi-Drug Resistant Organisms: None Reported


Additional Past Surgical History / Comment(s): umblical surgery repair.


Past Anesthesia/Blood Transfusion Reactions: No Reported Reaction


Past Psychological History: Bipolar


Smoking Status: Current some day smoker


Past Alcohol Use History: Occasional


Past Drug Use History: None Reported





Medications and Allergies


 Home Medications











 Medication  Instructions  Recorded  Confirmed  Type


 


Omeprazole [PriLOSEC] 20 mg PO AC-BID PRN 01/06/18 04/24/18 History


 


QUEtiapine FUMARATE [SEROquel] 200 mg PO HS 04/24/18 04/24/18 History


 


Sertraline [Zoloft] 150 mg PO DAILY 04/24/18 04/24/18 History


 


hydrOXYzine PAMOATE [Vistaril] 25 mg PO BID 04/24/18 04/24/18 History











 Allergies











Allergy/AdvReac Type Severity Reaction Status Date / Time


 


Penicillins AdvReac  Rash/Hives Verified 04/24/18 23:20














Physical Exam


Vitals: 


 Vital Signs











  Temp Pulse Pulse Resp BP BP Pulse Ox


 


 04/25/18 07:00  97.3 F L   70  14   143/66  97


 


 04/25/18 02:00    65  17   


 


 04/25/18 01:31  97.9 F   65    153/90  98


 


 04/25/18 01:07   62   17  145/91   99


 


 04/25/18 00:47   64   16  169/99   99


 


 04/25/18 00:17   64   16  165/91   99


 


 04/24/18 23:57   73   18  190/97   100


 


 04/24/18 23:48   81   18  181/113   100


 


 04/24/18 23:39   87   20  210/121   100


 


 04/24/18 23:36   72   18  174/98   100


 


 04/24/18 23:33   60   18  176/81   100


 


 04/24/18 23:27   60   18  192/91   100


 


 04/24/18 23:19   63   18  175/84   100


 


 04/24/18 23:07   86   18  193/103   100


 


 04/24/18 23:03   79   18  180/95   100


 


 04/24/18 22:59   73   18  187/100   100


 


 04/24/18 22:55   67   18  162/95   100


 


 04/24/18 22:20  97.5 F L  63   18  177/91   100








 Intake and Output











 04/24/18 04/25/18 04/25/18





 22:59 06:59 14:59


 


Other:   


 


  # Voids  2 1


 


  Weight 68.039 kg  





Constitutional:       No acute distress, conversant, pleasant


Eyes:         Anicteric sclerae, moist conjunctiva, no lid-lag


         PERRLA


         ENMT:          NC/AT


         Oropharynx clear, no erythema, exudates


Neck:         Supple, FROM, no masses, or JVD


         No carotid bruits


         No thyromegaly


Lungs:           Clear to auscultation


         Clear to percussion


         Normal respiratory effort, no accessory muscle use 


Cardiovascular:      Heart regular in rate and rhythm, 


         No murmurs, gallops, or rubs


         No peripheral edema


Abdominal:       Soft


         Nontender, no guarding, rebound or rigidity


         Abdomen moving with respiration


         Normoactive bowel sounds


         No hepatomegaly, No splenomegaly


   No palpable mass 


         No abdominal wall hernia noted 


Skin:          Normal temperature, tone, texture, turgor


         No induration


No subcutaneous nodules 


         No rash, lesions


No ulcers


Extremities:      No digital cyanosis 


         No clubbing


         Pedal pulses intact and symmetrical


         Radial pulses intact and symmetrical 


         Normal gait and station


         No calf tenderness 


-       Patient's left leg is in dressing, she feels her toes and she can move 

them, rest of the examination is deferred to the orthopedic team


Psychiatric:      Alert and oriented to person, place and time


         Appropriate affect


         Intact judgement         


Neuro:         Muscles Strength 5/5 in all 4 extremities


         Sensation to light touch grossly present throughout


         Cranial nerves II-XII grossly intact


         No focal sensory deficits











Results


CBC & Chem 7: 


 04/24/18 22:53





 04/24/18 22:53





Assessment and Plan


Assessment: 





-Left ankle fracture


-History of bipolar disease, not active issue now


- Smoker


Plan: 





Patient presents with left ankle fracture acutely and she is planned to go for 

orthopedic procedures/surgery today, she doesn't have history of cardiac disease

, she is not diabetic, she is occasional smoker, her METS more than 4


She is not currently on heparin for DVT prophylaxis because she might be going 

for surgery today, discussed with staff and they stated usually the orthopedic 

team will address that after surgery, deferred to the orthopedic team,


Patient is average risk for this moderate risk procedure/surgery





we recommend patient follow up with PCP within one week of discharge





Thank you for allowing us to take care of this patient, please feel free to 

contact us for any question or concern 147 (H) 08/13/2024    K 4.2 08/13/2024    K 4.2 08/13/2024     08/13/2024    CO2 36.0 (H) 08/13/2024     (H) 08/13/2024    CA 7.9 (L) 08/13/2024    ALB 4.3 07/26/2024    ALKPHO 108 07/26/2024    BILT 0.4 07/26/2024    TP 7.4 07/26/2024    AST 13 07/26/2024    ALT <7 (L) 07/26/2024    .4 (H) 08/09/2024    INR 1.04 06/23/2020    T4F 1.2 07/26/2024    TSH 0.282 (L) 07/26/2024    MG 1.5 (L) 08/13/2024    B12 170 (L) 06/27/2020    ETOH 378 (H) 06/23/2020       No results found.

## 2024-08-14 ENCOUNTER — APPOINTMENT (OUTPATIENT)
Dept: INTERVENTIONAL RADIOLOGY/VASCULAR | Facility: HOSPITAL | Age: 79
End: 2024-08-14
Attending: PHYSICIAN ASSISTANT
Payer: MEDICARE

## 2024-08-14 DIAGNOSIS — K22.89 ESOPHAGEAL MASS: Primary | ICD-10-CM

## 2024-08-14 LAB
ANION GAP SERPL CALC-SCNC: 2 MMOL/L (ref 0–18)
BUN BLD-MCNC: <5 MG/DL (ref 9–23)
CALCIUM BLD-MCNC: 8 MG/DL (ref 8.7–10.4)
CHLORIDE SERPL-SCNC: 105 MMOL/L (ref 98–112)
CO2 SERPL-SCNC: 35 MMOL/L (ref 21–32)
CREAT BLD-MCNC: 0.59 MG/DL
DEPRECATED RDW RBC AUTO: 44.2 FL (ref 35.1–46.3)
EGFRCR SERPLBLD CKD-EPI 2021: 93 ML/MIN/1.73M2 (ref 60–?)
ERYTHROCYTE [DISTWIDTH] IN BLOOD BY AUTOMATED COUNT: 13.9 % (ref 11–15)
GLUCOSE BLD-MCNC: 102 MG/DL (ref 70–99)
HCT VFR BLD AUTO: 34.2 %
HGB BLD-MCNC: 11 G/DL
MAGNESIUM SERPL-MCNC: 1.9 MG/DL (ref 1.6–2.6)
MCH RBC QN AUTO: 28.5 PG (ref 26–34)
MCHC RBC AUTO-ENTMCNC: 32.2 G/DL (ref 31–37)
MCV RBC AUTO: 88.6 FL
PLATELET # BLD AUTO: 299 10(3)UL (ref 150–450)
POTASSIUM SERPL-SCNC: 3.8 MMOL/L (ref 3.5–5.1)
RBC # BLD AUTO: 3.86 X10(6)UL
SODIUM SERPL-SCNC: 142 MMOL/L (ref 136–145)
WBC # BLD AUTO: 10.8 X10(3) UL (ref 4–11)

## 2024-08-14 PROCEDURE — 99233 SBSQ HOSP IP/OBS HIGH 50: CPT | Performed by: HOSPITALIST

## 2024-08-14 PROCEDURE — BD12ZZZ FLUOROSCOPY OF STOMACH: ICD-10-PCS | Performed by: STUDENT IN AN ORGANIZED HEALTH CARE EDUCATION/TRAINING PROGRAM

## 2024-08-14 PROCEDURE — 99232 SBSQ HOSP IP/OBS MODERATE 35: CPT | Performed by: INTERNAL MEDICINE

## 2024-08-14 PROCEDURE — 0DH63UZ INSERTION OF FEEDING DEVICE INTO STOMACH, PERCUTANEOUS APPROACH: ICD-10-PCS | Performed by: STUDENT IN AN ORGANIZED HEALTH CARE EDUCATION/TRAINING PROGRAM

## 2024-08-14 RX ORDER — HYDROCODONE BITARTRATE AND ACETAMINOPHEN 5; 325 MG/1; MG/1
1 TABLET ORAL EVERY 6 HOURS PRN
Status: DISCONTINUED | OUTPATIENT
Start: 2024-08-14 | End: 2024-08-20

## 2024-08-14 RX ORDER — ONDANSETRON 2 MG/ML
INJECTION INTRAMUSCULAR; INTRAVENOUS
Status: DISCONTINUED
Start: 2024-08-14 | End: 2024-08-14 | Stop reason: WASHOUT

## 2024-08-14 RX ORDER — MIDAZOLAM HYDROCHLORIDE 1 MG/ML
INJECTION INTRAMUSCULAR; INTRAVENOUS
Status: COMPLETED
Start: 2024-08-14 | End: 2024-08-14

## 2024-08-14 RX ORDER — POTASSIUM CHLORIDE 14.9 MG/ML
20 INJECTION INTRAVENOUS ONCE
Status: DISCONTINUED | OUTPATIENT
Start: 2024-08-14 | End: 2024-08-20

## 2024-08-14 RX ORDER — LIDOCAINE HYDROCHLORIDE 20 MG/ML
INJECTION, SOLUTION EPIDURAL; INFILTRATION; INTRACAUDAL; PERINEURAL
Status: COMPLETED
Start: 2024-08-14 | End: 2024-08-14

## 2024-08-14 NOTE — PLAN OF CARE
Patient went down to IR for successful g tube placement. Patient tolerated procedure,  g tube not to be used for 24 hours. Patient clear to resume clear liquid diet by IR today. Patient c/o LLQ pain 4/10, Physician notified. Prn Norco 5 given for relief with minimal results now 5/10. Will endorse to NOC RN for F/U with Attending.     Problem: Patient Centered Care  Goal: Patient preferences are identified and integrated in the patient's plan of care  Description: Interventions:  - What would you like us to know as we care for you? I live at home with .   - Provide timely, complete, and accurate information to patient/family  - Incorporate patient and family knowledge, values, beliefs, and cultural backgrounds into the planning and delivery of care  - Encourage patient/family to participate in care and decision-making at the level they choose  - Honor patient and family perspectives and choices  Outcome: Progressing        Problem: CARDIOVASCULAR - ADULT  Goal: Maintains optimal cardiac output and hemodynamic stability  Description: INTERVENTIONS:  - Monitor vital signs, rhythm, and trends  - Monitor for bleeding, hypotension and signs of decreased cardiac output  - Evaluate effectiveness of vasoactive medications to optimize hemodynamic stability  - Monitor arterial and/or venous puncture sites for bleeding and/or hematoma  - Assess quality of pulses, skin color and temperature  - Assess for signs of decreased coronary artery perfusion - ex. Angina  - Evaluate fluid balance, assess for edema, trend weights  Outcome: Progressing  Goal: Absence of cardiac arrhythmias or at baseline  Description: INTERVENTIONS:  - Continuous cardiac monitoring, monitor vital signs, obtain 12 lead EKG if indicated  - Evaluate effectiveness of antiarrhythmic and heart rate control medications as ordered  - Initiate emergency measures for life threatening arrhythmias  - Monitor electrolytes and administer replacement therapy as  ordered  Outcome: Progressing     Problem: SAFETY ADULT - FALL  Goal: Free from fall injury  Description: INTERVENTIONS:  - Assess pt frequently for physical needs  - Identify cognitive and physical deficits and behaviors that affect risk of falls.  - [place] fall precautions as indicated by assessment.  - Educate pt/family on patient safety including physical limitations  - Instruct pt to call for assistance with activity based on assessment  - Modify environment to reduce risk of injury  - Provide assistive devices as appropriate  - Consider OT/PT consult to assist with strengthening/mobility  - Encourage toileting schedule  Outcome: Progressing     Problem: GASTROINTESTINAL - ADULT  Goal: Minimal or absence of nausea and vomiting  Description: INTERVENTIONS:  - Maintain adequate hydration with IV or PO as ordered and tolerated  - Nasogastric tube to low intermittent suction as ordered  - Evaluate effectiveness of ordered antiemetic medications  - Provide nonpharmacologic comfort measures as appropriate  - Advance diet as tolerated, if ordered  - Obtain nutritional consult as needed  - Evaluate fluid balance  Outcome: Progressing  Goal: Maintains or returns to baseline bowel function  Description: INTERVENTIONS:  - Assess bowel function  - Maintain adequate hydration with IV or PO as ordered and tolerated  - Evaluate effectiveness of GI medications  - Encourage mobilization and activity  - Obtain nutritional consult as needed  - Establish a toileting routine/schedule  - Consider collaborating with pharmacy to review patient's medication profile  Outcome: Progressing  Goal: Maintains adequate nutritional intake (undernourished)  Description: INTERVENTIONS:  - Monitor percentage of each meal consumed  - Identify factors contributing to decreased intake, treat as appropriate  - Assist with meals as needed  - Monitor I&O, WT and lab values  - Obtain nutritional consult as needed  - Optimize oral hygiene and  moisture  - Encourage food from home; allow for food preferences  - Enhance eating environment  Outcome: Progressing

## 2024-08-14 NOTE — PLAN OF CARE
IVFs continued. No chest pain or SOB overnight. Kept NPO for G-tube or possibly J-tube insertion today. Maintained on fall risk precaution.     Problem: Patient Centered Care  Goal: Patient preferences are identified and integrated in the patient's plan of care  Description: Interventions:  - What would you like us to know as we care for you?   - Provide timely, complete, and accurate information to patient/family  - Incorporate patient and family knowledge, values, beliefs, and cultural backgrounds into the planning and delivery of care  - Encourage patient/family to participate in care and decision-making at the level they choose  - Honor patient and family perspectives and choices  Outcome: Progressing     Problem: CARDIOVASCULAR - ADULT  Goal: Maintains optimal cardiac output and hemodynamic stability  Description: INTERVENTIONS:  - Monitor vital signs, rhythm, and trends  - Monitor for bleeding, hypotension and signs of decreased cardiac output  - Evaluate effectiveness of vasoactive medications to optimize hemodynamic stability  - Monitor arterial and/or venous puncture sites for bleeding and/or hematoma  - Assess quality of pulses, skin color and temperature  - Assess for signs of decreased coronary artery perfusion - ex. Angina  - Evaluate fluid balance, assess for edema, trend weights  Outcome: Progressing  Goal: Absence of cardiac arrhythmias or at baseline  Description: INTERVENTIONS:  - Continuous cardiac monitoring, monitor vital signs, obtain 12 lead EKG if indicated  - Evaluate effectiveness of antiarrhythmic and heart rate control medications as ordered  - Initiate emergency measures for life threatening arrhythmias  - Monitor electrolytes and administer replacement therapy as ordered  Outcome: Progressing     Problem: SAFETY ADULT - FALL  Goal: Free from fall injury  Description: INTERVENTIONS:  - Assess pt frequently for physical needs  - Identify cognitive and physical deficits and behaviors that  affect risk of falls.  - [place] fall precautions as indicated by assessment.  - Educate pt/family on patient safety including physical limitations  - Instruct pt to call for assistance with activity based on assessment  - Modify environment to reduce risk of injury  - Provide assistive devices as appropriate  - Consider OT/PT consult to assist with strengthening/mobility  - Encourage toileting schedule  Outcome: Progressing     Problem: GASTROINTESTINAL - ADULT  Goal: Minimal or absence of nausea and vomiting  Description: INTERVENTIONS:  - Maintain adequate hydration with IV or PO as ordered and tolerated  - Nasogastric tube to low intermittent suction as ordered  - Evaluate effectiveness of ordered antiemetic medications  - Provide nonpharmacologic comfort measures as appropriate  - Advance diet as tolerated, if ordered  - Obtain nutritional consult as needed  - Evaluate fluid balance  Outcome: Progressing  Goal: Maintains or returns to baseline bowel function  Description: INTERVENTIONS:  - Assess bowel function  - Maintain adequate hydration with IV or PO as ordered and tolerated  - Evaluate effectiveness of GI medications  - Encourage mobilization and activity  - Obtain nutritional consult as needed  - Establish a toileting routine/schedule  - Consider collaborating with pharmacy to review patient's medication profile  Outcome: Progressing  Goal: Maintains adequate nutritional intake (undernourished)  Description: INTERVENTIONS:  - Monitor percentage of each meal consumed  - Identify factors contributing to decreased intake, treat as appropriate  - Assist with meals as needed  - Monitor I&O, WT and lab values  - Obtain nutritional consult as needed  - Optimize oral hygiene and moisture  - Encourage food from home; allow for food preferences  - Enhance eating environment  Outcome: Progressing

## 2024-08-14 NOTE — PROGRESS NOTES
[facility]  part of [facility]     Progress Note    Hortencia Kelley Patient Status:  Inpatient    10/2/1945 MRN [id number]   Location [facility] 3W/SW Attending Rico Frost MD   Hosp Day # 7 PCP No primary care provider on file.       Subjective:   Patient seen and examined.  Denies significant dyspnea at rest.  Occasional cough present    Objective:   Blood pressure 100/51, pulse 63, temperature 99 °F (37.2 °C), temperature source Oral, resp. rate 18, height 5' 7\" (1.702 m), weight 94 lb 12.8 oz (43 kg), SpO2 97%, not currently breastfeeding.  Intake/Output:   Last 3 shifts: I/O last 3 completed shifts:  In: 4920 [P.O.:240; I.V.:4680]  Out: -    This shift: I/O this shift:  In: 275 [I.V.:275]  Out: -      Vent Settings:      Hemodynamic parameters (last 24 hours):      Scheduled Meds:   Current Facility-Administered Medications   Medication Dose Route Frequency    potassium chloride 20 mEq/100mL IVPB premix 20 mEq  20 mEq Intravenous Once    atorvastatin (Lipitor) tab 10 mg  10 mg Oral Nightly    lactated ringers infusion   Intravenous Continuous    spironolactone (Aldactone) tab 25 mg  25 mg Oral Daily    predniSONE (Deltasone) tab 20 mg  20 mg Oral Daily with breakfast    furosemide (Lasix) tab 40 mg  40 mg Oral Daily    sacubitril-valsartan (Entresto) 24-26 MG per tab 1 tablet  1 tablet Oral BID    metoprolol succinate ER (Toprol XL) 24 hr tab 25 mg  25 mg Oral 2x Daily(Beta Blocker)    [Held by provider] aspirin DR tab 81 mg  81 mg Oral Daily    acetaminophen (Tylenol Extra Strength) tab 500 mg  500 mg Oral Q4H PRN    ondansetron (Zofran) 4 MG/2ML injection 4 mg  4 mg Intravenous Q6H PRN    metoclopramide (Reglan) 5 mg/mL injection 5 mg  5 mg Intravenous Q8H PRN    temazepam (Restoril) cap 15 mg  15 mg Oral Nightly PRN    guaiFENesin (Robitussin) 100 MG/5 ML oral liquid 200 mg  200 mg Oral Q4H PRN    benzonatate (Tessalon) cap 200 mg  200 mg Oral TID PRN    nitroglycerin  (Nitrostat) SL tab 0.4 mg  0.4 mg Sublingual Q5 Min PRN    ipratropium-albuterol (Duoneb) 0.5-2.5 (3) MG/3ML inhalation solution 3 mL  3 mL Nebulization Q6H PRN    potassium chloride 20 mEq in dextrose 5%-sodium chloride 0.9% 1000mL infusion premix   Intravenous Continuous    pantoprazole (Protonix) 40 mg in sodium chloride 0.9% PF 10 mL IV push  40 mg Intravenous Q12H    revefenacin (Yupelri) 175 MCG/3ML nebulizer solution 175 mcg  175 mcg Nebulization Daily    budesonide (Pulmicort) 0.25 MG/2ML nebulizer suspension 0.25 mg  0.25 mg Nebulization BID       Continuous Infusions:    lactated ringers      potassium chloride in dextrose 5%-sodium chloride 0.9% 1,000 mL (08/14/24 0953)       Physical Exam  Constitutional: no acute distress  Eyes: PERRL  ENT: nares pateint  Neck: supple, no JVD  Cardio: RRR, S1 S2  Respiratory: Faint Rales  GI: abdomen soft, non tender, active bowel sounds, no organomegaly  Extremities: no clubbing, cyanosis, edema  Neurologic: no gross motor deficits  Skin: warm, dry      Results:     Lab Results   Component Value Date    WBC 10.8 08/14/2024    HGB 11.0 08/14/2024    HCT 34.2 08/14/2024    .0 08/14/2024    CREATSERUM 0.59 08/14/2024    BUN <5 08/14/2024     08/14/2024    K 3.8 08/14/2024     08/14/2024    CO2 35.0 08/14/2024     08/14/2024    CA 8.0 08/14/2024    MG 1.9 08/14/2024       CT ABDOMEN+PELVIS(CONTRAST ONLY)(CPT=74177)    Result Date: 8/13/2024  CONCLUSION:  1. No evidence of metastatic disease in the abdomen or pelvis. 2. A 1 cm incompletely characterized left upper pole renal lesion may represent a complex cyst or a small solid lesion.  This could be re-evaluated on surveillance CTs during treatment for esophageal CA. 3. Small right and trace left pleural effusion. 4. Incompletely characterized 1.3 x 1 cm left upper breast seen at the edge of the field of view.  Correlation with mammography and ultrasound recommended.     Dictated by (CST): Mike  Giovanni OLIVIER MD on 8/13/2024 at 5:21 PM     Finalized by (CST): Giovanni Mckeon MD on 8/13/2024 at 5:34 PM                 Assessment   1.  Esophageal mass  2.  Left vocal cord paralysis  3.  COPD with acute exacerbation  4.  Non-ST elevation myocardial infarction  5.  Prior nicotine dependence     Plan   -Patient presents with evidence of ongoing cough hoarseness weight loss with some associated chest pain.  -CT chest on presentation with mass seen within the mid/proximal esophagus concerning for esophageal neoplasm.  Effacement and narrowing of the trachea secondary to mass  -Status post EGD with obstructing esophageal mass status post biopsies.  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  -Further recommendations per surgical oncologist.  -Status post inspection bronchoscopy on 8/13/2024 with tracheal stenosis seen no evidence of tracheoesophageal fistula.  -Plan for surgical feeding tube later today  -Steroid therapy discontinued  -Nebulizer treatments      Prakash Quispe, DO  Pulmonary Critical Care Medicine  [facility]

## 2024-08-14 NOTE — PROCEDURES
Interventional Radiology  Brief Post-Procedure Note    Procedure(s): gastrostomy tube insertion    Indication: esophageal cancer, dysphagia    (s): Jaunito    Anesthesia: Sedation    Findings:    -fluoroscopic insertion of nasogastric tube (glide catheter) past esophageal mass  -insertion of 16 Kinyarwanda gastrostomy tube    Blood loss: <5 mL      Complications: None    Plan:   -DO NOT use gastrostomy tube until cleared by IR tomorrow AM  -NPO except for sips and meds until IR clears gastrostomy tube   -IR will remove T fasteners around tube    Please refer to full dictation under the \"Imaging\" tab in Epic.    Toni Solomon MD  8/14/2024  Interventional Radiology  [facility]

## 2024-08-14 NOTE — PROGRESS NOTES
[facility]  part of [facility]    Progress Note    Hortencia Kelley Patient Status:  Inpatient    10/2/1945 MRN [id number]   Location [facility] 3W/SW Attending Wilton Rosa MD   Hosp Day # 7 PCP No primary care provider on file.       Subjective:     No CP or SOB.  Just had G-tube placed without incident.    Objective:   Blood pressure 90/56, pulse 63, temperature 98.8 °F (37.1 °C), temperature source Oral, resp. rate 18, height 5' 7\" (1.702 m), weight 94 lb 12.8 oz (43 kg), SpO2 97%, not currently breastfeeding.    Gen:   NAD.  A and O x 3  CV:   RRR, no m/g/r  Pulm:   CTA bilat  Abd:   +bs, soft, tender as expected after surgery, wounds c/d/i, ND, G-tube in place.  LE:   No c/c/e  Neuro:   nonfocal    Results:     Lab Results   Component Value Date    WBC 10.8 2024    HGB 11.0 (L) 2024    HCT 34.2 (L) 2024    .0 2024    CREATSERUM 0.59 2024    BUN <5 (L) 2024     2024    K 3.8 2024     2024    CO2 35.0 (H) 2024     (H) 2024    CA 8.0 (L) 2024    ALB 4.3 2024    ALKPHO 108 2024    BILT 0.4 2024    TP 7.4 2024    AST 13 2024    ALT <7 (L) 2024    .4 (H) 2024    INR 1.04 2020    T4F 1.2 2024    TSH 0.282 (L) 2024    MG 1.9 2024    B12 170 (L) 2020    ETOH 378 (H) 2020       CT ABDOMEN+PELVIS(CONTRAST ONLY)(CPT=74177)    Result Date: 2024  CONCLUSION:  1. No evidence of metastatic disease in the abdomen or pelvis. 2. A 1 cm incompletely characterized left upper pole renal lesion may represent a complex cyst or a small solid lesion.  This could be re-evaluated on surveillance CTs during treatment for esophageal CA. 3. Small right and trace left pleural effusion. 4. Incompletely characterized 1.3 x 1 cm left upper breast seen at the edge of the field of view.  Correlation with mammography and  ultrasound recommended.     Dictated by (CST): Giovanni Mckeon MD on 8/13/2024 at 5:21 PM     Finalized by (CST): Giovanni Mckeon MD on 8/13/2024 at 5:34 PM               Assessment and Plan:     NSTEMI type II - elevated troponin and new cardiomyopathy  Takotsubo cardiomyopathy  - cardiology on consult  - echo EF - 20-25%, akinesis of apical wall  - cardiac cath without CAD suggesting takotsubo  - heparin drip stopped  - cont asa. Entresto started.   - optimized for procedures/surgery per cardiology  - on po lasix per cards     Esophageal squamous cell carcinoma causing obstruction  Dysphagia to solids  Wt loss  - GI on consult  - EGD 8/10 - obstructing esophageal mass s/p biopsies. Was firm and friable with spontaneous oozing.   - CLD  - surgical oncology consulted  - pathology - +squamous cell ca  - No bronchoesophageal fistula per bronch per Dr. Quispe    - POD #0 s/p G-tube placement by IR today  - IVF  - dc planning     AECOPD  H/o L vocal cord paralysis  - pulm on consult  - cont nebs  - off steroids     Anemia  - malignancy w/u as above    dvt proph:    aspirin    Code status:    Full       MDM:    High Wilton Burleson MD  8/14/2024    [employer] Pregnancy And Lactation Text: The risk during pregnancy and breastfeeding is uncertain with this medication.

## 2024-08-14 NOTE — PROGRESS NOTES
[facility] - CARDIOLOGY PROGRESS NOTE      Hortencia Kelley Patient Status:  Inpatient    10/2/1945 MRN [id number]   Location [facility] 3W/SW Attending Wilton Rosa MD   Hosp Day # 7 PCP No primary care provider on file.         Assessment and Plan:   Type II NSTEMI  New NICM, LVEF 20-25%/ Mod AI  - CT without PE  - LHC 24 with normal coronaries   - ASA, Entresto, Toprol XL, Aldactone; New and tolerating   - Appears compensated     NSVT  - None overnight, Mag 1.5, cardiac replacement      Esophageal Mass concerning for malignancy  Left Vocal Cord Paralysis   Acute Anemia  - EGD s/p biopsy, pending surgical oncology consulted  - No overt bleeding, H&H stable, during EGD biopsy mass was friable with spontaneous oozing      COPD w/ Exacerbation- Pulmonary following   HLD- , not historically on statin   Hx ETOH Abuse   Hx Tobacco Abuse   Pre-Diabetic- A1c 6.2%  Depressive Episode- Consult for psych placed 8/10, pending      PLAN:  -Plan PEG placement today   -On meds for dilated cardiomyopathy.  Continue as tolerated and monitor BMP.  Blood pressure presently low normal so will not increase meds at this time and can reassess as outpatient.  May eventually need lower dose diuretics.  - Start Lipitor 20 mg nightly check cholesterol labs in 2 months  - Eventual SGLT2 consider     Subjective:   Hortencia Kelley is a(n) 78 year old female with no new c/o today    Objective:   Blood pressure 100/51, pulse 63, temperature 99 °F (37.2 °C), temperature source Oral, resp. rate 18, height 170.2 cm (5' 7\"), weight 94 lb 12.8 oz (43 kg), SpO2 97%, not currently breastfeeding.  Intake/Output:        Last 24 hours:   Intake/Output Summary (Last 24 hours) at 2024 1109  Last data filed at 2024 1005  Gross per 24 hour   Intake 1856.67 ml   Output 750 ml   Net 1106.67 ml      This shift: I/O this shift:  In: 275 [I.V.:275]  Out: 750  [Urine:750]    Exam  Gen: Comfortable, in no acute distress,    Psych.alert and oriented x3  HEENT: atraumatic, normal conjunctiva, moist mucosa  Neck:supple,no JVD, no bruits  Lungs: clear to ascultation, normal respiratory effort  Cardiac: regular rate and rhythm, nl S1,S2, no pathologic murmur  Abd: Abdomen soft, nontender, nondistended,  bowel sounds present  Ext:  no clubbing, no cyanosis,no edema  Neuro: no focal deficits  Skin: no rashes or lesions        Scheduled Meds:    potassium chloride  20 mEq Intravenous Once    atorvastatin  10 mg Oral Nightly    spironolactone  25 mg Oral Daily    furosemide  40 mg Oral Daily    sacubitril-valsartan  1 tablet Oral BID    metoprolol succinate  25 mg Oral 2x Daily(Beta Blocker)    [Held by provider] aspirin  81 mg Oral Daily    pantoprazole  40 mg Intravenous Q12H    revefenacin  175 mcg Nebulization Daily    budesonide  0.25 mg Nebulization BID       Results:     Lab Results   Component Value Date    WBC 10.8 08/14/2024    HGB 11.0 (L) 08/14/2024    HCT 34.2 (L) 08/14/2024    .0 08/14/2024    CREATSERUM 0.59 08/14/2024    BUN <5 (L) 08/14/2024     08/14/2024    K 3.8 08/14/2024     08/14/2024    CO2 35.0 (H) 08/14/2024     (H) 08/14/2024    CA 8.0 (L) 08/14/2024    ALB 4.3 07/26/2024    ALKPHO 108 07/26/2024    BILT 0.4 07/26/2024    TP 7.4 07/26/2024    AST 13 07/26/2024    ALT <7 (L) 07/26/2024    .4 (H) 08/09/2024    INR 1.04 06/23/2020    T4F 1.2 07/26/2024    TSH 0.282 (L) 07/26/2024    MG 1.9 08/14/2024    B12 170 (L) 06/27/2020    ETOH 378 (H) 06/23/2020       CT ABDOMEN+PELVIS(CONTRAST ONLY)(CPT=74177)    Result Date: 8/13/2024  CONCLUSION:  1. No evidence of metastatic disease in the abdomen or pelvis. 2. A 1 cm incompletely characterized left upper pole renal lesion may represent a complex cyst or a small solid lesion.  This could be re-evaluated on surveillance CTs during treatment for esophageal CA. 3. Small right and  trace left pleural effusion. 4. Incompletely characterized 1.3 x 1 cm left upper breast seen at the edge of the field of view.  Correlation with mammography and ultrasound recommended.     Dictated by (CST): Giovanni Mckeon MD on 8/13/2024 at 5:21 PM     Finalized by (CST): Giovanni Mckeon MD on 8/13/2024 at 5:34 PM                 Flako Adkins MD  [facility]  8/14/2024
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
## 2024-08-15 ENCOUNTER — APPOINTMENT (OUTPATIENT)
Dept: GENERAL RADIOLOGY | Facility: HOSPITAL | Age: 79
End: 2024-08-15
Attending: CLINICAL NURSE SPECIALIST
Payer: MEDICARE

## 2024-08-15 LAB
ANION GAP SERPL CALC-SCNC: 4 MMOL/L (ref 0–18)
BUN BLD-MCNC: 8 MG/DL (ref 9–23)
BUN/CREAT SERPL: 12.7 (ref 10–20)
CALCIUM BLD-MCNC: 8.4 MG/DL (ref 8.7–10.4)
CHLORIDE SERPL-SCNC: 104 MMOL/L (ref 98–112)
CO2 SERPL-SCNC: 34 MMOL/L (ref 21–32)
CREAT BLD-MCNC: 0.63 MG/DL
DEPRECATED RDW RBC AUTO: 43.9 FL (ref 35.1–46.3)
EGFRCR SERPLBLD CKD-EPI 2021: 92 ML/MIN/1.73M2 (ref 60–?)
ERYTHROCYTE [DISTWIDTH] IN BLOOD BY AUTOMATED COUNT: 13.8 % (ref 11–15)
GLUCOSE BLD-MCNC: 98 MG/DL (ref 70–99)
GLUCOSE BLDC GLUCOMTR-MCNC: 129 MG/DL (ref 70–99)
HCT VFR BLD AUTO: 33.3 %
HGB BLD-MCNC: 11 G/DL
MAGNESIUM SERPL-MCNC: 1.8 MG/DL (ref 1.6–2.6)
MCH RBC QN AUTO: 29.3 PG (ref 26–34)
MCHC RBC AUTO-ENTMCNC: 33 G/DL (ref 31–37)
MCV RBC AUTO: 88.6 FL
OSMOLALITY SERPL CALC.SUM OF ELEC: 292 MOSM/KG (ref 275–295)
PLATELET # BLD AUTO: 270 10(3)UL (ref 150–450)
POTASSIUM SERPL-SCNC: 3.8 MMOL/L (ref 3.5–5.1)
POTASSIUM SERPL-SCNC: 3.8 MMOL/L (ref 3.5–5.1)
RBC # BLD AUTO: 3.76 X10(6)UL
SODIUM SERPL-SCNC: 142 MMOL/L (ref 136–145)
WBC # BLD AUTO: 13.3 X10(3) UL (ref 4–11)

## 2024-08-15 PROCEDURE — 74021 RADEX ABDOMEN 3+ VIEWS: CPT | Performed by: CLINICAL NURSE SPECIALIST

## 2024-08-15 PROCEDURE — 99233 SBSQ HOSP IP/OBS HIGH 50: CPT | Performed by: HOSPITALIST

## 2024-08-15 PROCEDURE — 99232 SBSQ HOSP IP/OBS MODERATE 35: CPT | Performed by: INTERNAL MEDICINE

## 2024-08-15 RX ORDER — POTASSIUM CHLORIDE 14.9 MG/ML
20 INJECTION INTRAVENOUS ONCE
Status: COMPLETED | OUTPATIENT
Start: 2024-08-15 | End: 2024-08-15

## 2024-08-15 RX ORDER — SODIUM BICARBONATE 650 MG/1
325 TABLET ORAL AS NEEDED
Status: DISCONTINUED | OUTPATIENT
Start: 2024-08-15 | End: 2024-08-20

## 2024-08-15 RX ORDER — MAGNESIUM SULFATE HEPTAHYDRATE 40 MG/ML
2 INJECTION, SOLUTION INTRAVENOUS ONCE
Status: COMPLETED | OUTPATIENT
Start: 2024-08-15 | End: 2024-08-15

## 2024-08-15 NOTE — PLAN OF CARE
Problem: Patient Centered Care  Goal: Patient preferences are identified and integrated in the patient's plan of care  Description: Interventions:  - What would you like us to know as we care for you? Lives home with family  - Provide timely, complete, and accurate information to patient/family  - Incorporate patient and family knowledge, values, beliefs, and cultural backgrounds into the planning and delivery of care  - Encourage patient/family to participate in care and decision-making at the level they choose  - Honor patient and family perspectives and choices  Outcome: Progressing     Problem: Patient/Family Goals  Goal: Patient/Family Long Term Goal  Description: Patient's Long Term Goal: get better and go home    Interventions:  - start tube feeding  - See additional Care Plan goals for specific interventions  Outcome: Progressing  Goal: Patient/Family Short Term Goal  Description: Patient's Short Term Goal: will remain without injury    Interventions:   - call light and personal belongings are within reach  -pain controlled   -bathroom assistance  -hourly rounds done  - See additional Care Plan goals for specific interventions  Outcome: Progressing     Problem: CARDIOVASCULAR - ADULT  Goal: Maintains optimal cardiac output and hemodynamic stability  Description: INTERVENTIONS:  - Monitor vital signs, rhythm, and trends  - Monitor for bleeding, hypotension and signs of decreased cardiac output  - Evaluate effectiveness of vasoactive medications to optimize hemodynamic stability  - Monitor arterial and/or venous puncture sites for bleeding and/or hematoma  - Assess quality of pulses, skin color and temperature  - Assess for signs of decreased coronary artery perfusion - ex. Angina  - Evaluate fluid balance, assess for edema, trend weights  Outcome: Progressing  Goal: Absence of cardiac arrhythmias or at baseline  Description: INTERVENTIONS:  - Continuous cardiac monitoring, monitor vital signs, obtain 12  lead EKG if indicated  - Evaluate effectiveness of antiarrhythmic and heart rate control medications as ordered  - Initiate emergency measures for life threatening arrhythmias  - Monitor electrolytes and administer replacement therapy as ordered  Outcome: Progressing     Problem: SAFETY ADULT - FALL  Goal: Free from fall injury  Description: INTERVENTIONS:  - Assess pt frequently for physical needs  - Identify cognitive and physical deficits and behaviors that affect risk of falls.  - Neponset fall precautions as indicated by assessment.  - Educate pt/family on patient safety including physical limitations  - Instruct pt to call for assistance with activity based on assessment  - Modify environment to reduce risk of injury  - Provide assistive devices as appropriate  - Consider OT/PT consult to assist with strengthening/mobility  - Encourage toileting schedule  Outcome: Progressing     Problem: GASTROINTESTINAL - ADULT  Goal: Minimal or absence of nausea and vomiting  Description: INTERVENTIONS:  - Maintain adequate hydration with IV or PO as ordered and tolerated  - Nasogastric tube to low intermittent suction as ordered  - Evaluate effectiveness of ordered antiemetic medications  - Provide nonpharmacologic comfort measures as appropriate  - Advance diet as tolerated, if ordered  - Obtain nutritional consult as needed  - Evaluate fluid balance  Outcome: Progressing  Goal: Maintains or returns to baseline bowel function  Description: INTERVENTIONS:  - Assess bowel function  - Maintain adequate hydration with IV or PO as ordered and tolerated  - Evaluate effectiveness of GI medications  - Encourage mobilization and activity  - Obtain nutritional consult as needed  - Establish a toileting routine/schedule  - Consider collaborating with pharmacy to review patient's medication profile  Outcome: Progressing  Goal: Maintains adequate nutritional intake (undernourished)  Description: INTERVENTIONS:  - Monitor percentage  of each meal consumed  - Identify factors contributing to decreased intake, treat as appropriate  - Assist with meals as needed  - Monitor I&O, WT and lab values  - Obtain nutritional consult as needed  - Optimize oral hygiene and moisture  - Encourage food from home; allow for food preferences  - Enhance eating environment  Outcome: Progressing

## 2024-08-15 NOTE — PROGRESS NOTES
[facility]  part of [facility]  Progress Note    Hortencia Kelley Patient Status:  Inpatient    10/2/1945 MRN [id number]   Location [facility] 3W/SW Attending Wilton Rosa MD   Hosp Day # 8 PCP No primary care provider on file.       Subjective:   Getting accustomed to G-tube.     Objective:   Physical Exam  Abdominal:      Tenderness: There is abdominal tenderness.   Neurological:      Mental Status: She is alert.        G-tube site intact.  No leaking, T-fasteners in place.     Blood pressure 96/56, pulse 61, temperature 98.6 °F (37 °C), temperature source Oral, resp. rate 18, height 67\", weight 99 lb 3.3 oz (45 kg), SpO2 97%, not currently breastfeeding.    Results:   Labs:  Lab Results   Component Value Date    WBC 13.3 08/15/2024    RBC 3.76 08/15/2024    HGB 11.0 08/15/2024    HCT 33.3 08/15/2024    MCV 88.6 08/15/2024    MCH 29.3 08/15/2024    MCHC 33.0 08/15/2024    RDW 13.8 08/15/2024    .0 08/15/2024       Microbiology:  No results for input(s): \"URINE\", \"CULTI\", \"BLDSMR\" in the last 168 hours.    IR G-J TUBE PROCEDURE    Result Date: 2024  CONCLUSION:  Fluoroscopic placement of nasogastric tube and 16 Kiswahili push gastrostomy tube.  The nasogastric tube was removed at the end of the procedure.  Gastrostomy tube should not be used until cleared by IR tomorrow.    Dictated by (CST): Toni Solomon MD on 2024 at 12:50 PM     Finalized by (CST): Toni Solomon MD on 2024 at 1:16 PM          CT ABDOMEN+PELVIS(CONTRAST ONLY)(CPT=74177)    Result Date: 2024  CONCLUSION:  1. No evidence of metastatic disease in the abdomen or pelvis. 2. A 1 cm incompletely characterized left upper pole renal lesion may represent a complex cyst or a small solid lesion.  This could be re-evaluated on surveillance CTs during treatment for esophageal CA. 3. Small right and trace left pleural effusion. 4. Incompletely characterized 1.3 x 1 cm left upper breast  seen at the edge of the field of view.  Correlation with mammography and ultrasound recommended.     Dictated by (CST): Giovanni Mckeon MD on 8/13/2024 at 5:21 PM     Finalized by (CST): Giovanni Mckeon MD on 8/13/2024 at 5:34 PM            Assessment and Plan:   S/P G-J tube placement.  Site intact, clean and dry.  Ok to use.       SUSI MOREJON, APRN  8/15/2024

## 2024-08-15 NOTE — PROGRESS NOTES
[facility]  part of [facility]    Progress Note    Hortencia Kelley Patient Status:  Inpatient    10/2/1945 MRN [id number]   Location [facility] 3W/SW Attending Wilton Rosa MD   Hosp Day # 8 PCP No primary care provider on file.       Subjective:     No complaints.  No CP or SOB.      Objective:   Blood pressure 100/55, pulse 68, temperature 98.6 °F (37 °C), temperature source Oral, resp. rate 20, height 5' 7\" (1.702 m), weight 99 lb 3.3 oz (45 kg), SpO2 98%, not currently breastfeeding.    Gen:   NAD.  A and O x 3  CV:   RRR, no m/g/r  Pulm:   CTA bilat  Abd:   +bs, soft, tender as expected after surgery, wounds c/d/i, ND, G-tube in place.  LE:   No c/c/e  Neuro:   nonfocal    Results:     Lab Results   Component Value Date    WBC 13.3 (H) 08/15/2024    HGB 11.0 (L) 08/15/2024    HCT 33.3 (L) 08/15/2024    .0 08/15/2024    CREATSERUM 0.63 08/15/2024    BUN 8 (L) 08/15/2024     08/15/2024    K 3.8 08/15/2024    K 3.8 08/15/2024     08/15/2024    CO2 34.0 (H) 08/15/2024    GLU 98 08/15/2024    CA 8.4 (L) 08/15/2024    ALB 4.3 2024    ALKPHO 108 2024    BILT 0.4 2024    TP 7.4 2024    AST 13 2024    ALT <7 (L) 2024    .4 (H) 2024    INR 1.04 2020    T4F 1.2 2024    TSH 0.282 (L) 2024    MG 1.8 08/15/2024    B12 170 (L) 2020    ETOH 378 (H) 2020       XR ABDOMEN 3 OR MORE VIEWS (CPT=74021)    Result Date: 8/15/2024  CONCLUSION:   Well-positioned gastrostomy tube.  No evidence of extraluminal contrast extravasation.  Lesser incidental findings described above.    Dictated by (CST): Jcarlos Galvez MD on 8/15/2024 at 2:01 PM     Finalized by (CST): Jcarlos Galvez MD on 8/15/2024 at 2:04 PM          IR G-J TUBE PROCEDURE    Result Date: 2024  CONCLUSION:  Fluoroscopic placement of nasogastric tube and 16 Romanian push gastrostomy tube.  The nasogastric tube was removed at the end of  the procedure.  Gastrostomy tube should not be used until cleared by IR tomorrow.    Dictated by (CST): Toni Solomon MD on 8/14/2024 at 12:50 PM     Finalized by (CST): Toni Solomon MD on 8/14/2024 at 1:16 PM          CT ABDOMEN+PELVIS(CONTRAST ONLY)(CPT=74177)    Result Date: 8/13/2024  CONCLUSION:  1. No evidence of metastatic disease in the abdomen or pelvis. 2. A 1 cm incompletely characterized left upper pole renal lesion may represent a complex cyst or a small solid lesion.  This could be re-evaluated on surveillance CTs during treatment for esophageal CA. 3. Small right and trace left pleural effusion. 4. Incompletely characterized 1.3 x 1 cm left upper breast seen at the edge of the field of view.  Correlation with mammography and ultrasound recommended.     Dictated by (CST): Giovanni Mckeon MD on 8/13/2024 at 5:21 PM     Finalized by (CST): Giovanni Mckeon MD on 8/13/2024 at 5:34 PM               Assessment and Plan:     NSTEMI type II - elevated troponin and new cardiomyopathy  Takotsubo cardiomyopathy  - cardiology on consult - signed off  - echo EF - 20-25%, akinesis of apical wall  - cardiac cath without CAD suggesting takotsubo  - heparin drip stopped  - cont asa. Entresto started.   - optimized for procedures/surgery per cardiology  - does not need diuretic     Esophageal squamous cell carcinoma causing obstruction  Dysphagia to solids  Wt loss  - GI on consult  - EGD 8/10 - obstructing esophageal mass s/p biopsies. Was firm and friable with spontaneous oozing.   - CLD  - surgical oncology consulted  - pathology - +squamous cell ca  - No bronchoesophageal fistula per bronch per Dr. Quispe    - POD #1 s/p G-tube placement by IR today  - IVF  - nutrition consult for TFs     AECOPD  H/o Left vocal cord paralysis  - pulm on consult  - cont nebs  - off steroids     Anemia  - malignancy w/u as above     dvt proph:    aspirin     Code status:    Full       MDM:    High Wilton Burleson  MD  8/15/2024

## 2024-08-15 NOTE — PROGRESS NOTES
[facility] - CARDIOLOGY PROGRESS NOTE      Hortencia Kelley Patient Status:  Inpatient    10/2/1945 MRN [id number]   Location [facility] 3W/SW Attending Wilton Rosa MD   Hosp Day # 8 PCP No primary care provider on file.         Assessment and Plan:     Type II NSTEMI  New NICM, LVEF 20-25%/ Mod AI  - CT without PE  - LHC 24 with normal coronaries   - ASA, Entresto, Toprol XL, Aldactone; New and tolerating   - Appears compensated     NSVT  - None overnight, Mag 1.5, cardiac replacement      Esophageal Mass concerning for malignancy  Left Vocal Cord Paralysis   Acute Anemia  - EGD s/p biopsy, pending surgical oncology consulted  - No overt bleeding, H&H stable, during EGD biopsy mass was friable with spontaneous oozing      COPD w/ Exacerbation- Pulmonary following   HLD- , not historically on statin   Hx ETOH Abuse   Hx Tobacco Abuse   Pre-Diabetic- A1c 6.2%  Depressive Episode- Consult for psych placed 8/10, pending      PLAN:  -Patient tolerating cardiac meds well.  Electrolytes stable.  Presently does not need diuretic. Check BMP in a week.  Can reassess meds in 1 to 2 weeks to see if meds can be titrated.    At that time can also assess if candidate for SGLT2 inhibitor.    -- Start Lipitor 20 mg nightly check cholesterol labs in 2 months  We will sign off and follow peripherally.    Subjective:   Hortencia Kelley is a(n) 78 year old female is feeling okay with no new complaints.  Patient had G-tube placed yesterday.  Objective:   Blood pressure 96/56, pulse 61, temperature 98.6 °F (37 °C), temperature source Oral, resp. rate 18, height 170.2 cm (5' 7\"), weight 99 lb 3.3 oz (45 kg), SpO2 97%, not currently breastfeeding.  Intake/Output:        Last 24 hours:   Intake/Output Summary (Last 24 hours) at 8/15/2024 1309  Last data filed at 8/15/2024 0637  Gross per 24 hour   Intake 1130 ml   Output 300 ml   Net 830 ml      This shift:  No intake/output data recorded.    Exam  Gen: Comfortable, in no acute distress,    Psych.alert and oriented x3  HEENT: atraumatic, normal conjunctiva, moist mucosa  Neck:supple,no JVD, no bruits  Lungs: clear to ascultation, normal respiratory effort  Cardiac: regular rate and rhythm, nl S1,S2, no pathologic murmur  Abd: Abdomen soft, nontender, nondistended,  bowel sounds present  Ext:  no clubbing, no cyanosis,no edema  Neuro: no focal deficits  Skin: no rashes or lesions        Scheduled Meds:    sacubitril-valsartan  1 tablet Oral BID    potassium chloride  20 mEq Intravenous Once    atorvastatin  10 mg Oral Nightly    spironolactone  25 mg Oral Daily    metoprolol succinate  25 mg Oral 2x Daily(Beta Blocker)    [Held by provider] aspirin  81 mg Oral Daily    pantoprazole  40 mg Intravenous Q12H    revefenacin  175 mcg Nebulization Daily    budesonide  0.25 mg Nebulization BID       Results:     Lab Results   Component Value Date    WBC 13.3 (H) 08/15/2024    HGB 11.0 (L) 08/15/2024    HCT 33.3 (L) 08/15/2024    .0 08/15/2024    CREATSERUM 0.63 08/15/2024    BUN 8 (L) 08/15/2024     08/15/2024    K 3.8 08/15/2024    K 3.8 08/15/2024     08/15/2024    CO2 34.0 (H) 08/15/2024    GLU 98 08/15/2024    CA 8.4 (L) 08/15/2024    ALB 4.3 07/26/2024    ALKPHO 108 07/26/2024    BILT 0.4 07/26/2024    TP 7.4 07/26/2024    AST 13 07/26/2024    ALT <7 (L) 07/26/2024    .4 (H) 08/09/2024    INR 1.04 06/23/2020    T4F 1.2 07/26/2024    TSH 0.282 (L) 07/26/2024    MG 1.8 08/15/2024    B12 170 (L) 06/27/2020    ETOH 378 (H) 06/23/2020       IR G-J TUBE PROCEDURE    Result Date: 8/14/2024  CONCLUSION:  Fluoroscopic placement of nasogastric tube and 16 Spanish push gastrostomy tube.  The nasogastric tube was removed at the end of the procedure.  Gastrostomy tube should not be used until cleared by IR tomorrow.    Dictated by (CST): Toni Solomon MD on 8/14/2024 at 12:50 PM     Finalized by (CST):  Toni Solomon MD on 8/14/2024 at 1:16 PM          CT ABDOMEN+PELVIS(CONTRAST ONLY)(CPT=74177)    Result Date: 8/13/2024  CONCLUSION:  1. No evidence of metastatic disease in the abdomen or pelvis. 2. A 1 cm incompletely characterized left upper pole renal lesion may represent a complex cyst or a small solid lesion.  This could be re-evaluated on surveillance CTs during treatment for esophageal CA. 3. Small right and trace left pleural effusion. 4. Incompletely characterized 1.3 x 1 cm left upper breast seen at the edge of the field of view.  Correlation with mammography and ultrasound recommended.     Dictated by (CST): Giovanni Mckeon MD on 8/13/2024 at 5:21 PM     Finalized by (CST): Giovanni Mckeon MD on 8/13/2024 at 5:34 PM                 Flako Adkins MD  [facility] L2 off  8/15/2024

## 2024-08-15 NOTE — DIETARY NOTE
ADULT NUTRITION INITIAL ASSESSMENT    Pt is at high nutrition risk.  Pt meets moderate malnutrition criteria.      CRITERIA FOR MALNUTRITION DIAGNOSIS:  Criteria for non-severe malnutrition diagnosis: acute illness/injury related to wt loss 5% in 1 month, body fat mild depletion, and muscle mass mild depletion.    RECOMMENDATIONS TO MD: See Nutrition Intervention for oral nutritional supplement (ONS) specifics     ADMITTING DIAGNOSIS:  NSTEMI (non-ST elevated myocardial infarction) ([facility]) [I21.4]  PERTINENT PAST MEDICAL HISTORY:   Past Medical History:    COPD (chronic obstructive pulmonary disease) ([facility])    Depression    ETOH abuse    Falls frequently    12/27/2017    History of blood transfusion    [facility]      PATIENT STATUS: Initial 08/09/24: Pt admit for NSTEMI (non-St elevated myocardial infarction). PMH sig for COPD, ETOH abuse and others noted above. Pt assessed due to screening at risk for decreased appetite, unintentional weight loss and low BMI (14.67 kg/m2). Pt known to nutrition services from previous admissions, last seen 6/25/2020. Chart reviewed, pt admitted with c/o SOB/wheezing x 1 month. Pt noted with progressive dysphagia over past few months, unable to tolerate solids (liquids ok). CT chest noting mass in mid/proximal esophagus concerning for primary esophageal neoplasm - plan EGD with biopsy pending cardiac clearance. Pt NPO this morning for cardiac cath - diet advanced to full liquids diet (FLD). Plan for EGD tomorrow. Discussion with RN, no concerns. Intakes reviewed 25% x 1 meal since admit. Pt visited, pt reports having difficulty swallowing solids (tolerating liquids) since end of June when gagging started. Pt reports drinking 6-8 Ensures daily (350 calories) + other liquid items but not taking in solids. Pt reports always been thin but never under 100#'s. Pt reports weight loss over same timeframe as difficulty with solids. Current weight 93# 11.1 oz. EMR weight review, last known weight 90#  on 7/26/24 Per EMR weight review, pt noted weighing 105# on 7/5/24 - 11.3# or 10.8% weight loss x 1 month (significant) otherwise limited recent weight history - 122# 8/3/2020. Nutrition focused physical exam (NFPE) completed, see below for details. Encouraged adequate energy and protein intake with ONS to help maximize nutrition. +ONS per discussion.    Update 8/15/24: Pt had bronch done on 8/13 with findings of tracheal stenosis. Gtube was placed on 8/14. RD received consult to start feedings today. Orders entered. Touched base with RN. IVF will stop when TF starts. Monitor tolerance. Advancing moderately slow. Dc'd ONS as TF will meet 100% of energy and protein needs. Pt allows clear liquids.     FOOD/NUTRITION RELATED HISTORY:  Appetite:  Pt reports not tolerating solids, only taking in liquids since around end of June when gagging started.  Intake: NPO  Intake Meeting Needs: TF will meet needs once at goal rate  Percent Meals Eaten (last 3 days)       Date/Time Percent Meals Eaten (%)    08/12/24 1900 100 %    08/13/24 0900 50 %    08/14/24 0837 --     Percent Meals Eaten (%): patient npo at 08/14/24 0837    08/14/24 1424 --     Percent Meals Eaten (%): patient on clear liquids at 08/14/24 1424    08/15/24 0916 100 %           Food Allergies: No Known Food Allergies (NKFA)  Cultural/Ethnic/Latter-day Preferences: Not Obtained    GASTROINTESTINAL: +BM 8/6/2024    MEDICATIONS: reviewed KCl in D5-.9NS @ 100 ml/hr (provides ~ 408 calories and 2400 volume)   sacubitril-valsartan  1 tablet Oral BID    magnesium sulfate  2 g Intravenous Once    potassium chloride  20 mEq Intravenous Once    potassium chloride  20 mEq Intravenous Once    atorvastatin  10 mg Oral Nightly    metoprolol succinate  25 mg Oral 2x Daily(Beta Blocker)    [Held by provider] aspirin  81 mg Oral Daily    pantoprazole  40 mg Intravenous Q12H    revefenacin  175 mcg Nebulization Daily    budesonide  0.25 mg Nebulization BID      dextrose 10%       potassium chloride in dextrose 5%-sodium chloride 0.9% 60 mL/hr at 08/15/24 0051     LABS: reviewed  Recent Labs     08/12/24  0602 08/13/24  0454 08/14/24  0705 08/15/24  0530   GLU 82 117* 102* 98   BUN 5* 5* <5* 8*   CREATSERUM 0.58 0.59 0.59 0.63   CA 8.3* 7.9* 8.0* 8.4*   MG 1.4* 1.5* 1.9 1.8    147* 142 142   K 3.3* 4.2  4.2 3.8 3.8  3.8    109 105 104   CO2 31.0 36.0* 35.0* 34.0*   OSMOCALC 296* 302*  --  292     NUTRITION RELATED PHYSICAL FINDINGS:  - Nutrition Focused Physical Exam (NFPE): mild depletion body fat triceps region and mild depletion muscle mass temple region, clavicle region, shoulder region, dorsal monsalve region, thigh region, and calf region   - Fluid Accumulation: none  see RN documentation for details  - Skin Integrity: intact see RN documentation for details    ANTHROPOMETRICS:  HT: 170.2 cm (5' 7\")  WT: 45 kg (99 lb 3.3 oz)   BMI: Body mass index is 15.54 kg/m².  BMI CLASSIFICATION: less than 19 kg/m2 - underweight  IBW: 135 lbs        69% IBW  Usual Body Wt: ~105 lbs per pt      89% UBW    WEIGHT HISTORY: Per EMR weight review, pt noted weighing 122# August 2020  Patient Weight(s) for the past 336 hrs:   Weight   08/15/24 0637 45 kg (99 lb 3.3 oz)   08/14/24 0637 43 kg (94 lb 12.8 oz)   08/13/24 0623 43.5 kg (95 lb 14.4 oz)   08/12/24 0533 43.5 kg (95 lb 14.4 oz)   08/11/24 0605 46.2 kg (101 lb 13.6 oz)   08/10/24 0600 48.5 kg (106 lb 14.8 oz)   08/09/24 0510 42.5 kg (93 lb 11.1 oz)   08/08/24 0627 42.3 kg (93 lb 3.2 oz)   08/07/24 1358 41.5 kg (91 lb 7.9 oz)   08/07/24 1056 41.5 kg (91 lb 7.9 oz)   08/07/24 0909 40.8 kg (90 lb)     Wt Readings from Last 10 Encounters:   08/15/24 45 kg (99 lb 3.3 oz)   07/26/24 40.8 kg (90 lb)   07/05/24 47.6 kg (105 lb)   08/03/20 55.3 kg (122 lb)   07/29/20 54.9 kg (121 lb)   07/27/20 54.9 kg (121 lb)   07/22/20 54.9 kg (121 lb)   07/20/20 55.3 kg (122 lb)   07/15/20 56.2 kg (124 lb)   07/13/20 56.2 kg (124 lb)     NUTRITION  DIAGNOSIS/PROBLEM:   Moderate Malnutrition related to Acute - Physiological causes resulting in anorexia or diminished intake as evidenced by wt loss 5% in 1 month, body fat mild depletion, muscle mass mild depletion and low BMI (14.67 kg/m2).    Progress: Improvement, continue. - Tracheal stenosis, new Gtube placed yesterday. TF started today.     NUTRITION INTERVENTION:   NUTRITION PRESCRIPTION:   Estimated Nutrition needs: --dosing wt of 42.5 kg - wt taken on 8/9/2024  Calories: 5177-2334 calories/day (35-40 calories per kg Dosing wt)  Protein: 64-85 g protein/day (1.5-2.0 g protein/kg Dosing wt)  Fluids: 1875 ml/day (Mequon Segar)     - Diet:       Procedures    Clear liquid diet Is Patient on Accuchecks? No        - Enteral Nutrition: Jevity 1.2 at 20 ml/hr advance by 10 to goal rate of 60 via Gtube. Based on average 22 hour infusion time. Goal rate provides 1584 kcal, 73 grams protein, 1069 ml total free water, and 100% RDI's. Water flushes 75 ml q 4 hours.  Total free fluid daily = 1519 ml/hr     Provides ~80% of daily fluid needs. Combination of FWF and PO fluid intakes should meet 100% of needs. If PO fluid intakes decline, would recommend increasing FWF to 135ml q 4 hours.    - RD Malnutrition Care Plan:  initiated TF  - Medical Food Supplements-RD added None at this time. Rational/use of oral supplements discussed.  - Vitamin and mineral supplements: none  - Feeding assistance: meal set up  - Nutrition education: Discussed importance of adequate energy and protein intake     - Coordination of nutrition care: collaboration with other providers  - Discharge and transfer of nutrition care to new setting or provider: monitor plans    MONITOR AND EVALUATE/NUTRITION GOALS:  - Food and Nutrient Intake:      Monitor:  clear liquid intake  - Food and Nutrient Administration:      Monitor: enteral nutrition initiation, tolerance to enteral nutrition, and for enteral nutrition adjustment  - Anthropometric  Measurement:    Monitor weight  - Nutrition Goals:      halt wt loss, gradual wt gain as able, tube feed meets greater than 80% of needs, labs within acceptable limits, euglycemia, minimize lean body mass loss, prevent skin breakdown, support body systems, and improved GI status    DIETITIAN FOLLOW UP: RD to follow and monitor nutrition status      Sparkle Tom RD, LDN  Clinical Dietitian  P: [phone]

## 2024-08-15 NOTE — PLAN OF CARE
Pt able to tolerate meds and clear liquid diet overnight. On aspiration precaution. Gtube site intact. Norco prn and ice pack on the insertion site. No reports of  or CP overnight.     Problem: Patient Centered Care  Goal: Patient preferences are identified and integrated in the patient's plan of care  Description: Interventions:  - What would you like us to know as we care for you?   - Provide timely, complete, and accurate information to patient/family  - Incorporate patient and family knowledge, values, beliefs, and cultural backgrounds into the planning and delivery of care  - Encourage patient/family to participate in care and decision-making at the level they choose  - Honor patient and family perspectives and choices  Outcome: Progressing     Problem: CARDIOVASCULAR - ADULT  Goal: Maintains optimal cardiac output and hemodynamic stability  Description: INTERVENTIONS:  - Monitor vital signs, rhythm, and trends  - Monitor for bleeding, hypotension and signs of decreased cardiac output  - Evaluate effectiveness of vasoactive medications to optimize hemodynamic stability  - Monitor arterial and/or venous puncture sites for bleeding and/or hematoma  - Assess quality of pulses, skin color and temperature  - Assess for signs of decreased coronary artery perfusion - ex. Angina  - Evaluate fluid balance, assess for edema, trend weights  Outcome: Progressing  Goal: Absence of cardiac arrhythmias or at baseline  Description: INTERVENTIONS:  - Continuous cardiac monitoring, monitor vital signs, obtain 12 lead EKG if indicated  - Evaluate effectiveness of antiarrhythmic and heart rate control medications as ordered  - Initiate emergency measures for life threatening arrhythmias  - Monitor electrolytes and administer replacement therapy as ordered  Outcome: Progressing     Problem: SAFETY ADULT - FALL  Goal: Free from fall injury  Description: INTERVENTIONS:  - Assess pt frequently for physical needs  - Identify  cognitive and physical deficits and behaviors that affect risk of falls.  - [place] fall precautions as indicated by assessment.  - Educate pt/family on patient safety including physical limitations  - Instruct pt to call for assistance with activity based on assessment  - Modify environment to reduce risk of injury  - Provide assistive devices as appropriate  - Consider OT/PT consult to assist with strengthening/mobility  - Encourage toileting schedule  Outcome: Progressing     Problem: GASTROINTESTINAL - ADULT  Goal: Minimal or absence of nausea and vomiting  Description: INTERVENTIONS:  - Maintain adequate hydration with IV or PO as ordered and tolerated  - Nasogastric tube to low intermittent suction as ordered  - Evaluate effectiveness of ordered antiemetic medications  - Provide nonpharmacologic comfort measures as appropriate  - Advance diet as tolerated, if ordered  - Obtain nutritional consult as needed  - Evaluate fluid balance  Outcome: Progressing  Goal: Maintains or returns to baseline bowel function  Description: INTERVENTIONS:  - Assess bowel function  - Maintain adequate hydration with IV or PO as ordered and tolerated  - Evaluate effectiveness of GI medications  - Encourage mobilization and activity  - Obtain nutritional consult as needed  - Establish a toileting routine/schedule  - Consider collaborating with pharmacy to review patient's medication profile  Outcome: Progressing  Goal: Maintains adequate nutritional intake (undernourished)  Description: INTERVENTIONS:  - Monitor percentage of each meal consumed  - Identify factors contributing to decreased intake, treat as appropriate  - Assist with meals as needed  - Monitor I&O, WT and lab values  - Obtain nutritional consult as needed  - Optimize oral hygiene and moisture  - Encourage food from home; allow for food preferences  - Enhance eating environment  Outcome: Progressing

## 2024-08-15 NOTE — PROGRESS NOTES
[facility]  part of [facility]     Progress Note    Hortencia Kelley Patient Status:  Inpatient    10/2/1945 MRN [id number]   Location [facility] 3W/SW Attending Rico Frost MD   Hosp Day # 8 PCP No primary care provider on file.       Subjective:   Patient seen and examined.  Denies significant dyspnea at rest.  Received G-tube yesterday    Objective:   Blood pressure 95/58, pulse 65, temperature 98.6 °F (37 °C), temperature source Oral, resp. rate 18, height 5' 7\" (1.702 m), weight 99 lb 3.3 oz (45 kg), SpO2 97%, not currently breastfeeding.  Intake/Output:   Last 3 shifts: I/O last 3 completed shifts:  In: 2586.7 [P.O.:480; I.V.:2106.7]  Out: 2250 [Urine:2250]   This shift: No intake/output data recorded.     Vent Settings:      Hemodynamic parameters (last 24 hours):      Scheduled Meds:   Current Facility-Administered Medications   Medication Dose Route Frequency    potassium chloride 20 mEq/100mL IVPB premix 20 mEq  20 mEq Intravenous Once    HYDROcodone-acetaminophen (Norco) 5-325 MG per tab 1 tablet  1 tablet Oral Q6H PRN    atorvastatin (Lipitor) tab 10 mg  10 mg Oral Nightly    spironolactone (Aldactone) tab 25 mg  25 mg Oral Daily    sacubitril-valsartan (Entresto) 24-26 MG per tab 1 tablet  1 tablet Oral BID    metoprolol succinate ER (Toprol XL) 24 hr tab 25 mg  25 mg Oral 2x Daily(Beta Blocker)    [Held by provider] aspirin DR tab 81 mg  81 mg Oral Daily    acetaminophen (Tylenol Extra Strength) tab 500 mg  500 mg Oral Q4H PRN    ondansetron (Zofran) 4 MG/2ML injection 4 mg  4 mg Intravenous Q6H PRN    metoclopramide (Reglan) 5 mg/mL injection 5 mg  5 mg Intravenous Q8H PRN    temazepam (Restoril) cap 15 mg  15 mg Oral Nightly PRN    guaiFENesin (Robitussin) 100 MG/5 ML oral liquid 200 mg  200 mg Oral Q4H PRN    benzonatate (Tessalon) cap 200 mg  200 mg Oral TID PRN    nitroglycerin (Nitrostat) SL tab 0.4 mg  0.4 mg Sublingual Q5 Min PRN    ipratropium-albuterol (Duoneb)  0.5-2.5 (3) MG/3ML inhalation solution 3 mL  3 mL Nebulization Q6H PRN    potassium chloride 20 mEq in dextrose 5%-sodium chloride 0.9% 1000mL infusion premix   Intravenous Continuous    pantoprazole (Protonix) 40 mg in sodium chloride 0.9% PF 10 mL IV push  40 mg Intravenous Q12H    revefenacin (Yupelri) 175 MCG/3ML nebulizer solution 175 mcg  175 mcg Nebulization Daily    budesonide (Pulmicort) 0.25 MG/2ML nebulizer suspension 0.25 mg  0.25 mg Nebulization BID       Continuous Infusions:    potassium chloride in dextrose 5%-sodium chloride 0.9% 60 mL/hr at 08/15/24 0051       Physical Exam  Constitutional: no acute distress  Eyes: PERRL  ENT: nares pateint  Neck: supple, no JVD  Cardio: RRR, S1 S2  Respiratory: Faint Rales  GI: abdomen soft, non tender, active bowel sounds, no organomegaly  Extremities: no clubbing, cyanosis, edema  Neurologic: no gross motor deficits  Skin: warm, dry      Results:     Lab Results   Component Value Date    WBC 13.3 08/15/2024    HGB 11.0 08/15/2024    HCT 33.3 08/15/2024    .0 08/15/2024    CREATSERUM 0.63 08/15/2024    BUN 8 08/15/2024     08/15/2024    K 3.8 08/15/2024    K 3.8 08/15/2024     08/15/2024    CO2 34.0 08/15/2024    GLU 98 08/15/2024    CA 8.4 08/15/2024    MG 1.8 08/15/2024       IR G-J TUBE PROCEDURE    Result Date: 8/14/2024  CONCLUSION:  Fluoroscopic placement of nasogastric tube and 16 Welsh push gastrostomy tube.  The nasogastric tube was removed at the end of the procedure.  Gastrostomy tube should not be used until cleared by IR tomorrow.    Dictated by (CST): Toni Solomon MD on 8/14/2024 at 12:50 PM     Finalized by (CST): Toni Solomon MD on 8/14/2024 at 1:16 PM          CT ABDOMEN+PELVIS(CONTRAST ONLY)(CPT=74177)    Result Date: 8/13/2024  CONCLUSION:  1. No evidence of metastatic disease in the abdomen or pelvis. 2. A 1 cm incompletely characterized left upper pole renal lesion may represent a complex cyst or a small  solid lesion.  This could be re-evaluated on surveillance CTs during treatment for esophageal CA. 3. Small right and trace left pleural effusion. 4. Incompletely characterized 1.3 x 1 cm left upper breast seen at the edge of the field of view.  Correlation with mammography and ultrasound recommended.     Dictated by (CST): Giovanni Mckeon MD on 8/13/2024 at 5:21 PM     Finalized by (CST): Giovanni Mckeon MD on 8/13/2024 at 5:34 PM                 Assessment   1.  Esophageal mass  2.  Left vocal cord paralysis  3.  COPD with acute exacerbation  4.  Non-ST elevation myocardial infarction  5.  Prior nicotine dependence     Plan   -Patient presents with evidence of ongoing cough hoarseness weight loss with some associated chest pain.  -CT chest on presentation with mass seen within the mid/proximal esophagus concerning for esophageal neoplasm.  Effacement and narrowing of the trachea secondary to mass  -Status post EGD with obstructing esophageal mass status post biopsies.  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  -Further recommendations per surgical oncologist.  -Status post inspection bronchoscopy on 8/13/2024 with tracheal stenosis seen no evidence of tracheoesophageal fistula.  -Status post G-tube insertion with IR on 8/14/2020  -Steroid therapy discontinued  -Nebulizer treatments      Prakash Quispe DO  Pulmonary Critical Care Medicine  [facility]

## 2024-08-16 LAB
ALBUMIN SERPL-MCNC: 3 G/DL (ref 3.2–4.8)
ANION GAP SERPL CALC-SCNC: 4 MMOL/L (ref 0–18)
ANION GAP SERPL CALC-SCNC: 6 MMOL/L (ref 0–18)
BUN BLD-MCNC: 7 MG/DL (ref 9–23)
BUN BLD-MCNC: 7 MG/DL (ref 9–23)
BUN/CREAT SERPL: 10.6 (ref 10–20)
BUN/CREAT SERPL: 11.7 (ref 10–20)
CALCIUM BLD-MCNC: 5.7 MG/DL (ref 8.7–10.4)
CALCIUM BLD-MCNC: 9.5 MG/DL (ref 8.7–10.4)
CHLORIDE SERPL-SCNC: 101 MMOL/L (ref 98–112)
CHLORIDE SERPL-SCNC: 102 MMOL/L (ref 98–112)
CO2 SERPL-SCNC: 31 MMOL/L (ref 21–32)
CO2 SERPL-SCNC: 32 MMOL/L (ref 21–32)
CREAT BLD-MCNC: 0.6 MG/DL
CREAT BLD-MCNC: 0.66 MG/DL
DEPRECATED RDW RBC AUTO: 45.6 FL (ref 35.1–46.3)
EGFRCR SERPLBLD CKD-EPI 2021: 91 ML/MIN/1.73M2 (ref 60–?)
EGFRCR SERPLBLD CKD-EPI 2021: 93 ML/MIN/1.73M2 (ref 60–?)
ERYTHROCYTE [DISTWIDTH] IN BLOOD BY AUTOMATED COUNT: 13.8 % (ref 11–15)
GLUCOSE BLD-MCNC: 113 MG/DL (ref 70–99)
GLUCOSE BLD-MCNC: 129 MG/DL (ref 70–99)
GLUCOSE BLDC GLUCOMTR-MCNC: 126 MG/DL (ref 70–99)
GLUCOSE BLDC GLUCOMTR-MCNC: 140 MG/DL (ref 70–99)
GLUCOSE BLDC GLUCOMTR-MCNC: 149 MG/DL (ref 70–99)
GLUCOSE BLDC GLUCOMTR-MCNC: 153 MG/DL (ref 70–99)
HCT VFR BLD AUTO: 34.6 %
HGB BLD-MCNC: 10.9 G/DL
MAGNESIUM SERPL-MCNC: 1.8 MG/DL (ref 1.6–2.6)
MCH RBC QN AUTO: 29 PG (ref 26–34)
MCHC RBC AUTO-ENTMCNC: 31.5 G/DL (ref 31–37)
MCV RBC AUTO: 92 FL
OSMOLALITY SERPL CALC.SUM OF ELEC: 284 MOSM/KG (ref 275–295)
OSMOLALITY SERPL CALC.SUM OF ELEC: 287 MOSM/KG (ref 275–295)
PLATELET # BLD AUTO: 285 10(3)UL (ref 150–450)
POTASSIUM SERPL-SCNC: 4.4 MMOL/L (ref 3.5–5.1)
RBC # BLD AUTO: 3.76 X10(6)UL
SODIUM SERPL-SCNC: 137 MMOL/L (ref 136–145)
SODIUM SERPL-SCNC: 139 MMOL/L (ref 136–145)
WBC # BLD AUTO: 10.8 X10(3) UL (ref 4–11)

## 2024-08-16 PROCEDURE — 99232 SBSQ HOSP IP/OBS MODERATE 35: CPT | Performed by: INTERNAL MEDICINE

## 2024-08-16 PROCEDURE — 99233 SBSQ HOSP IP/OBS HIGH 50: CPT | Performed by: HOSPITALIST

## 2024-08-16 RX ORDER — CALCIUM GLUCONATE 20 MG/ML
2 INJECTION, SOLUTION INTRAVENOUS ONCE
Status: COMPLETED | OUTPATIENT
Start: 2024-08-16 | End: 2024-08-16

## 2024-08-16 RX ORDER — MAGNESIUM SULFATE HEPTAHYDRATE 40 MG/ML
2 INJECTION, SOLUTION INTRAVENOUS ONCE
Status: COMPLETED | OUTPATIENT
Start: 2024-08-16 | End: 2024-08-16

## 2024-08-16 NOTE — PROGRESS NOTES
[facility]  part of [facility]     Progress Note    Hortencia Kelley Patient Status:  Inpatient    10/2/1945 MRN [id number]   Location [facility] 3W/SW Attending Rico Frost MD   Hosp Day # 9 PCP No primary care provider on file.       Subjective:   Patient seen and examined.  Denies significant dyspnea at rest.  Tolerating tube feedings    Objective:   Blood pressure 96/48, pulse 72, temperature 98.5 °F (36.9 °C), temperature source Oral, resp. rate 18, height 5' 7\" (1.702 m), weight 94 lb 9.2 oz (42.9 kg), SpO2 97%, not currently breastfeeding.  Intake/Output:   Last 3 shifts: I/O last 3 completed shifts:  In: 2447 [I.V.:1873; NG/GT:424; IV PIGGYBACK:150]  Out: 975 [Urine:975]   This shift: I/O this shift:  In: 164 [NG/GT:164]  Out: 0      Vent Settings:      Hemodynamic parameters (last 24 hours):      Scheduled Meds:   Current Facility-Administered Medications   Medication Dose Route Frequency    magnesium sulfate in sterile water for injection 2 g/50mL IVPB premix 2 g  2 g Intravenous Once    sacubitril-valsartan (Entresto) 24-26 MG per tab 1 tablet  1 tablet Oral BID    dextrose 10% infusion (TPN no rate)   Intravenous Continuous PRN    pancrelipase (Lip-Prot-Amyl) (Zenpep) DR particles cap 10,000 Units  10,000 Units Per G Tube PRN    And    sodium bicarbonate tab 325 mg  325 mg Oral PRN    potassium chloride 20 mEq/100mL IVPB premix 20 mEq  20 mEq Intravenous Once    HYDROcodone-acetaminophen (Norco) 5-325 MG per tab 1 tablet  1 tablet Oral Q6H PRN    atorvastatin (Lipitor) tab 10 mg  10 mg Oral Nightly    metoprolol succinate ER (Toprol XL) 24 hr tab 25 mg  25 mg Oral 2x Daily(Beta Blocker)    [Held by provider] aspirin  tab 81 mg  81 mg Oral Daily    acetaminophen (Tylenol Extra Strength) tab 500 mg  500 mg Oral Q4H PRN    ondansetron (Zofran) 4 MG/2ML injection 4 mg  4 mg Intravenous Q6H PRN    metoclopramide (Reglan) 5 mg/mL injection 5 mg  5 mg Intravenous Q8H PRN     temazepam (Restoril) cap 15 mg  15 mg Oral Nightly PRN    guaiFENesin (Robitussin) 100 MG/5 ML oral liquid 200 mg  200 mg Oral Q4H PRN    benzonatate (Tessalon) cap 200 mg  200 mg Oral TID PRN    nitroglycerin (Nitrostat) SL tab 0.4 mg  0.4 mg Sublingual Q5 Min PRN    ipratropium-albuterol (Duoneb) 0.5-2.5 (3) MG/3ML inhalation solution 3 mL  3 mL Nebulization Q6H PRN    potassium chloride 20 mEq in dextrose 5%-sodium chloride 0.9% 1000mL infusion premix   Intravenous Continuous    pantoprazole (Protonix) 40 mg in sodium chloride 0.9% PF 10 mL IV push  40 mg Intravenous Q12H    revefenacin (Yupelri) 175 MCG/3ML nebulizer solution 175 mcg  175 mcg Nebulization Daily    budesonide (Pulmicort) 0.25 MG/2ML nebulizer suspension 0.25 mg  0.25 mg Nebulization BID       Continuous Infusions:    dextrose 10%      potassium chloride in dextrose 5%-sodium chloride 0.9% 50 mL/hr at 08/15/24 2106       Physical Exam  Constitutional: no acute distress  Eyes: PERRL  ENT: nares pateint  Neck: supple, no JVD  Cardio: RRR, S1 S2  Respiratory: Faint Rales  GI: abdomen soft, non tender, active bowel sounds, no organomegaly  Extremities: no clubbing, cyanosis, edema  Neurologic: no gross motor deficits  Skin: warm, dry      Results:     Lab Results   Component Value Date    WBC 10.8 08/16/2024    HGB 10.9 08/16/2024    HCT 34.6 08/16/2024    .0 08/16/2024    CREATSERUM 0.60 08/16/2024    BUN 7 08/16/2024     08/16/2024    K 4.4 08/16/2024    K 4.4 08/16/2024     08/16/2024    CO2 31.0 08/16/2024     08/16/2024    CA 5.7 08/16/2024    MG 1.8 08/16/2024       XR ABDOMEN 3 OR MORE VIEWS (CPT=74021)    Result Date: 8/15/2024  CONCLUSION:   Well-positioned gastrostomy tube.  No evidence of extraluminal contrast extravasation.  Lesser incidental findings described above.    Dictated by (CST): Jcarlos Galvez MD on 8/15/2024 at 2:01 PM     Finalized by (CST): Jcarlos Galvez MD on 8/15/2024 at 2:04 PM          VLAD WALL  TUBE PROCEDURE    Result Date: 8/14/2024  CONCLUSION:  Fluoroscopic placement of nasogastric tube and 16 Syri[place] push gastrostomy tube.  The nasogastric tube was removed at the end of the procedure.  Gastrostomy tube should not be used until cleared by IR tomorrow.    Dictated by (CST): Toni Solomon MD on 8/14/2024 at 12:50 PM     Finalized by (CST): Toni Solomon MD on 8/14/2024 at 1:16 PM                 Assessment   1.  Esophageal mass  2.  Left vocal cord paralysis  3.  COPD with acute exacerbation  4.  Non-ST elevation myocardial infarction  5.  Prior nicotine dependence     Plan   -Patient presents with evidence of ongoing cough hoarseness weight loss with some associated chest pain.  -CT chest on presentation with mass seen within the mid/proximal esophagus concerning for esophageal neoplasm.  Effacement and narrowing of the trachea secondary to mass  -Status post EGD with obstructing esophageal mass status post biopsies.  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  -Further recommendations per surgical oncologist.  -Status post inspection bronchoscopy on 8/13/2024 with tracheal stenosis seen no evidence of tracheoesophageal fistula.  -Status post G-tube insertion with IR on 8/14/2020.  Tolerating feedings  -Steroid therapy discontinued  -Nebulizer treatments  -Okay for discharge from pulmonary perspective      Prakash Quispe DO  Pulmonary Critical Care Medicine  Located within [facility]

## 2024-08-16 NOTE — PROGRESS NOTES
[facility]  part of [facility]    Progress Note    Hortencia Kelley Patient Status:  Inpatient    10/2/1945 MRN [id number]   Location [facility] 3W/SW Attending Wilton Rosa MD   Hosp Day # 9 PCP No primary care provider on file.       Subjective:     No complaints.  Tolerating TFs.    Objective:   Blood pressure 96/48, pulse 72, temperature 98.5 °F (36.9 °C), temperature source Oral, resp. rate 18, height 5' 7\" (1.702 m), weight 94 lb 9.2 oz (42.9 kg), SpO2 97%, not currently breastfeeding.    Gen:   NAD.  A and O x 3  CV:   RRR, no m/g/r  Pulm:   CTA bilat  Abd:   +bs, soft, tender as expected after surgery, wounds c/d/i, ND, G-tube in place.  LE:   No c/c/e  Neuro:   nonfocal    Results:     Lab Results   Component Value Date    WBC 10.8 2024    HGB 10.9 (L) 2024    HCT 34.6 (L) 2024    .0 2024    CREATSERUM 0.60 2024    BUN 7 (L) 2024     2024    K 4.4 2024    K 4.4 2024     2024    CO2 31.0 2024     (H) 2024    CA 5.7 (LL) 2024    ALB 3.0 (L) 2024    ALKPHO 108 2024    BILT 0.4 2024    TP 7.4 2024    AST 13 2024    ALT <7 (L) 2024    .4 (H) 2024    INR 1.04 2020    T4F 1.2 2024    TSH 0.282 (L) 2024    MG 1.8 2024    B12 170 (L) 2020    ETOH 378 (H) 2020       XR ABDOMEN 3 OR MORE VIEWS (CPT=74021)    Result Date: 8/15/2024  CONCLUSION:   Well-positioned gastrostomy tube.  No evidence of extraluminal contrast extravasation.  Lesser incidental findings described above.    Dictated by (CST): Jcarlos Galvez MD on 8/15/2024 at 2:01 PM     Finalized by (CST): Jcarlos Galvez MD on 8/15/2024 at 2:04 PM               Assessment and Plan:     NSTEMI type II - elevated troponin and new cardiomyopathy  Takotsubo cardiomyopathy  - cardiology on consult - signed off  - echo EF - 20-25%, akinesis of  apical wall  - cardiac cath without CAD suggesting takotsubo  - heparin drip stopped  - cont asa. Entresto started.   - optimized for procedures/surgery per cardiology  - does not need diuretic     Esophageal squamous cell carcinoma causing obstruction  Dysphagia to solids  Wt loss  - GI on consult  - EGD 8/10 - obstructing esophageal mass s/p biopsies. Was firm and friable with spontaneous oozing.   - CLD  - surgical oncology consulted  - pathology - +squamous cell ca  - No bronchoesophageal fistula per bronch per Dr. Quispe    - POD #2 s/p G-tube placement by IR today  - IVF  - nutrition consult for TFs    Hypocalcemia  - give calcium glucontate  - recheck  - dietary on consult  - stop IVF     AECOPD  H/o Left vocal cord paralysis  - pulm on consult  - cont nebs  - off steroids     Anemia  - malignancy w/u as above     dvt proph:    aspirin     Code status:    Full        MDM:    [place] Burleson MD  8/16/2024

## 2024-08-16 NOTE — CM/SW NOTE
Social work received an update that the patient will need tube feedings for home.    Social work sent Tube Feed/Infusion referral to [facility].    [facility] has accepted and they are checking the cost of required feeding.    Social work will update patient once information is received.     DEL/CM to remain available for support and/or discharge planning.     Makayla Merchant MSW, LSW  Discharge Planner [id number]

## 2024-08-16 NOTE — PLAN OF CARE
Problem: Patient Centered Care  Goal: Patient preferences are identified and integrated in the patient's plan of care  Description: Interventions:  - Provide timely, complete, and accurate information to patient/family  - Incorporate patient and family knowledge, values, beliefs, and cultural backgrounds into the planning and delivery of care  - Encourage patient/family to participate in care and decision-making at the level they choose  - Honor patient and family perspectives and choices  Outcome: Progressing     Problem: Patient/Family Goals  Goal: Patient/Family Long Term Goal  Description: Patient's Long Term Goal: To eat better    Interventions:  - Malignancy work-up with Oncologist managing treatment; Nutritional support/supplement; Cardiac clearance  - See additional Care Plan goals for specific interventions  Outcome: Progressing  Goal: Patient/Family Short Term Goal  Description: Patient's Short Term Goal: To be able to swallow better    Interventions:   - Swallow test/Speech therapy, malignancy work-up, GI, Oncology and Cardio to eval and treat  - See additional Care Plan goals for specific interventions  Outcome: Progressing     Problem: CARDIOVASCULAR - ADULT  Goal: Maintains optimal cardiac output and hemodynamic stability  Description: INTERVENTIONS:  - Monitor vital signs, rhythm, and trends  - Monitor for bleeding, hypotension and signs of decreased cardiac output  - Evaluate effectiveness of vasoactive medications to optimize hemodynamic stability  - Monitor arterial and/or venous puncture sites for bleeding and/or hematoma  - Assess quality of pulses, skin color and temperature  - Assess for signs of decreased coronary artery perfusion - ex. Angina  - Evaluate fluid balance, assess for edema, trend weights  Outcome: Progressing  Goal: Absence of cardiac arrhythmias or at baseline  Description: INTERVENTIONS:  - Continuous cardiac monitoring, monitor vital signs, obtain 12 lead EKG if indicated  -  Evaluate effectiveness of antiarrhythmic and heart rate control medications as ordered  - Initiate emergency measures for life threatening arrhythmias  - Monitor electrolytes and administer replacement therapy as ordered  Outcome: Progressing     Problem: SAFETY ADULT - FALL  Goal: Free from fall injury  Description: INTERVENTIONS:  - Assess pt frequently for physical needs  - Identify cognitive and physical deficits and behaviors that affect risk of falls.  - [place] fall precautions as indicated by assessment.  - Educate pt/family on patient safety including physical limitations  - Instruct pt to call for assistance with activity based on assessment  - Modify environment to reduce risk of injury  - Provide assistive devices as appropriate  - Consider OT/PT consult to assist with strengthening/mobility  - Encourage toileting schedule  Outcome: Progressing     Problem: GASTROINTESTINAL - ADULT  Goal: Minimal or absence of nausea and vomiting  Description: INTERVENTIONS:  - Maintain adequate hydration with IV or PO as ordered and tolerated  - Nasogastric tube to low intermittent suction as ordered  - Evaluate effectiveness of ordered antiemetic medications  - Provide nonpharmacologic comfort measures as appropriate  - Advance diet as tolerated, if ordered  - Obtain nutritional consult as needed  - Evaluate fluid balance  Outcome: Progressing  Goal: Maintains or returns to baseline bowel function  Description: INTERVENTIONS:  - Assess bowel function  - Maintain adequate hydration with IV or PO as ordered and tolerated  - Evaluate effectiveness of GI medications  - Encourage mobilization and activity  - Obtain nutritional consult as needed  - Establish a toileting routine/schedule  - Consider collaborating with pharmacy to review patient's medication profile  Outcome: Progressing  Goal: Maintains adequate nutritional intake (undernourished)  Description: INTERVENTIONS:  - Monitor percentage of each meal consumed  -  Identify factors contributing to decreased intake, treat as appropriate  - Assist with meals as needed  - Monitor I&O, WT and lab values  - Obtain nutritional consult as needed  - Optimize oral hygiene and moisture  - Encourage food from home; allow for food preferences  - Enhance eating environment  Outcome: Progressing

## 2024-08-16 NOTE — PLAN OF CARE
G-tube intact and tolerated feeding well. Increased rate to 30 ml/hr to a goal of 60 ml/hr. No nausea, vomiting or abdominal distention reported. IVFs continued. No significant pain on the insertion site of the g-tube.     Problem: Patient Centered Care  Goal: Patient preferences are identified and integrated in the patient's plan of care  Description: Interventions:  - What would you like us to know as we care for you?   - Provide timely, complete, and accurate information to patient/family  - Incorporate patient and family knowledge, values, beliefs, and cultural backgrounds into the planning and delivery of care  - Encourage patient/family to participate in care and decision-making at the level they choose  - Honor patient and family perspectives and choices  Outcome: Progressing     Problem: Patient/Family Goals  Goal: Patient/Family Long Term Goal  Description: Patient's Long Term Goal: To eat better    Interventions:  - Malignancy work-up with Oncologist managing treatment; Nutritional support/supplement; Cardiac clearance  - See additional Care Plan goals for specific interventions  Outcome: Progressing  Goal: Patient/Family Short Term Goal  Description: Patient's Short Term Goal: To be able to swallow better    Interventions:   - Swallow test/Speech therapy, malignancy work-up, GI, Oncology and Cardio to eval and treat  - See additional Care Plan goals for specific interventions  Outcome: Progressing     Problem: CARDIOVASCULAR - ADULT  Goal: Maintains optimal cardiac output and hemodynamic stability  Description: INTERVENTIONS:  - Monitor vital signs, rhythm, and trends  - Monitor for bleeding, hypotension and signs of decreased cardiac output  - Evaluate effectiveness of vasoactive medications to optimize hemodynamic stability  - Monitor arterial and/or venous puncture sites for bleeding and/or hematoma  - Assess quality of pulses, skin color and temperature  - Assess for signs of decreased coronary artery  perfusion - ex. Angina  - Evaluate fluid balance, assess for edema, trend weights  Outcome: Progressing  Goal: Absence of cardiac arrhythmias or at baseline  Description: INTERVENTIONS:  - Continuous cardiac monitoring, monitor vital signs, obtain 12 lead EKG if indicated  - Evaluate effectiveness of antiarrhythmic and heart rate control medications as ordered  - Initiate emergency measures for life threatening arrhythmias  - Monitor electrolytes and administer replacement therapy as ordered  Outcome: Progressing     Problem: SAFETY ADULT - FALL  Goal: Free from fall injury  Description: INTERVENTIONS:  - Assess pt frequently for physical needs  - Identify cognitive and physical deficits and behaviors that affect risk of falls.  - [place] fall precautions as indicated by assessment.  - Educate pt/family on patient safety including physical limitations  - Instruct pt to call for assistance with activity based on assessment  - Modify environment to reduce risk of injury  - Provide assistive devices as appropriate  - Consider OT/PT consult to assist with strengthening/mobility  - Encourage toileting schedule  Outcome: Progressing     Problem: GASTROINTESTINAL - ADULT  Goal: Minimal or absence of nausea and vomiting  Description: INTERVENTIONS:  - Maintain adequate hydration with IV or PO as ordered and tolerated  - Nasogastric tube to low intermittent suction as ordered  - Evaluate effectiveness of ordered antiemetic medications  - Provide nonpharmacologic comfort measures as appropriate  - Advance diet as tolerated, if ordered  - Obtain nutritional consult as needed  - Evaluate fluid balance  Outcome: Progressing  Goal: Maintains or returns to baseline bowel function  Description: INTERVENTIONS:  - Assess bowel function  - Maintain adequate hydration with IV or PO as ordered and tolerated  - Evaluate effectiveness of GI medications  - Encourage mobilization and activity  - Obtain nutritional consult as needed  -  Establish a toileting routine/schedule  - Consider collaborating with pharmacy to review patient's medication profile  Outcome: Progressing  Goal: Maintains adequate nutritional intake (undernourished)  Description: INTERVENTIONS:  - Monitor percentage of each meal consumed  - Identify factors contributing to decreased intake, treat as appropriate  - Assist with meals as needed  - Monitor I&O, WT and lab values  - Obtain nutritional consult as needed  - Optimize oral hygiene and moisture  - Encourage food from home; allow for food preferences  - Enhance eating environment  Outcome: Progressing

## 2024-08-17 LAB
ANION GAP SERPL CALC-SCNC: 1 MMOL/L (ref 0–18)
BUN BLD-MCNC: 11 MG/DL (ref 9–23)
BUN/CREAT SERPL: 17.2 (ref 10–20)
CALCIUM BLD-MCNC: 8.6 MG/DL (ref 8.7–10.4)
CHLORIDE SERPL-SCNC: 101 MMOL/L (ref 98–112)
CO2 SERPL-SCNC: 35 MMOL/L (ref 21–32)
CREAT BLD-MCNC: 0.64 MG/DL
EGFRCR SERPLBLD CKD-EPI 2021: 92 ML/MIN/1.73M2 (ref 60–?)
GLUCOSE BLD-MCNC: 127 MG/DL (ref 70–99)
GLUCOSE BLDC GLUCOMTR-MCNC: 135 MG/DL (ref 70–99)
GLUCOSE BLDC GLUCOMTR-MCNC: 141 MG/DL (ref 70–99)
GLUCOSE BLDC GLUCOMTR-MCNC: 151 MG/DL (ref 70–99)
GLUCOSE BLDC GLUCOMTR-MCNC: 165 MG/DL (ref 70–99)
MAGNESIUM SERPL-MCNC: 1.9 MG/DL (ref 1.6–2.6)
OSMOLALITY SERPL CALC.SUM OF ELEC: 285 MOSM/KG (ref 275–295)
POTASSIUM SERPL-SCNC: 4.4 MMOL/L (ref 3.5–5.1)
SODIUM SERPL-SCNC: 137 MMOL/L (ref 136–145)

## 2024-08-17 PROCEDURE — 99232 SBSQ HOSP IP/OBS MODERATE 35: CPT | Performed by: INTERNAL MEDICINE

## 2024-08-17 PROCEDURE — 99233 SBSQ HOSP IP/OBS HIGH 50: CPT | Performed by: HOSPITALIST

## 2024-08-17 RX ORDER — SODIUM CHLORIDE 9 MG/ML
INJECTION, SOLUTION INTRAVENOUS CONTINUOUS
Status: DISCONTINUED | OUTPATIENT
Start: 2024-08-17 | End: 2024-08-19

## 2024-08-17 NOTE — OCCUPATIONAL THERAPY NOTE
OCCUPATIONAL THERAPY EVALUATION - INPATIENT     Room Number: 344/344-A  Evaluation Date: 2024  Type of Evaluation: Initial  Presenting Problem: NSTEMI    Physician Order: IP Consult to Occupational Therapy  Reason for Therapy: ADL/IADL Dysfunction and Discharge Planning    OCCUPATIONAL THERAPY ASSESSMENT   Patient is a 78 year old female admitted 2024 for NSTEMI, esophageal mass; feeding tube placed during this admission.  Prior to admission, patient's baseline is home with family and typicaly independent with ADLS; ambulatory with and without and assistive device.  Patient is currently functioning near baseline with self care and functional mobility.  Patient is requiring up to CGA/SBA with functional mobility; SBA overall with ADLs as a result of the following impairments: activity tolerance, deconditioning. Occupational Therapy will continue to follow for duration of hospitalization.    Patient will benefit from continued skilled OT Services at discharge to promote prior level of function and safety with additional support and return home with home health OT    PLAN  OT Treatment Plan: ADL training;Functional transfer training;Endurance training;Patient/Family education;Patient/Family training;Compensatory technique education  OT Device Recommendations: None    OCCUPATIONAL THERAPY MEDICAL/SOCIAL HISTORY   Problem List  Principal Problem:    NSTEMI (non-ST elevated myocardial infarction) (HCC)  Active Problems:    Esophageal mass    HOME SITUATION  Type of Home: House  Home Layout: Two level  Lives With: Spouse  Toilet and Equipment: Standard height toilet  Shower/Tub and Equipment: Walk-in shower; Shower chair    SUBJECTIVE  I just want to know when I can go home!      OCCUPATIONAL THERAPY EXAMINATION    OBJECTIVE  Precautions: -- (feeding tube)  Fall Risk: Standard fall risk    PAIN ASSESSMENT  Ratin    ACTIVITY TOLERANCE  Pulse: 70                      O2 SATURATIONS  Oxygen Therapy  SPO2% on Room  Air at Rest: 97    COGNITION  Overall Cognitive Status:  WFL - within functional limits      Behavioral/Emotional/Social: Cooperative, pleasant     RANGE OF MOTION   Upper extremity ROM is within functional limits     STRENGTH ASSESSMENT  Upper extremity strength is within functional limits     COORDINATION  Gross Motor: WFL   Fine Motor: WFL     ACTIVITIES OF DAILY LIVING ASSESSMENT  AM-PAC ‘6-Clicks’ Inpatient Daily Activity Short Form  How much help from another person does the patient currently need…  -   Putting on and taking off regular lower body clothing?: A Little  -   Bathing (including washing, rinsing, drying)?: A Little  -   Toileting, which includes using toilet, bedpan or urinal? : A Little  -   Putting on and taking off regular upper body clothing?: A Little  -   Taking care of personal grooming such as brushing teeth?: None  -   Eating meals?: Total    AM-PAC Score:  Score: 17  Approx Degree of Impairment: 50.11%  Standardized Score (AM-PAC Scale): 37.26  CMS Modifier (G-Code): CK    FUNCTIONAL TRANSFER ASSESSMENT  Sit to Stand: Chair  Edge of Bed: Stand-by Assist  Chair: Stand-by Assist  Toilet Transfer: Stand-by Assist    BED MOBILITY  Supine to Sit : Stand-by Assist  Sit to Supine (OT): Stand-by Assist  Scooting: SBA    BALANCE ASSESSMENT  Static Sitting: Stand-by Assist  Static Standing: Stand-by Assist  Comment: Patient ambulatory >household distances and progressing to reasonable community distances with RW; completed stairs with PT.   FUNCTIONAL ADL ASSESSMENT  Eating: -- (feeding tube)  Grooming Seated: Stand-by Assist  Bathing Seated: Stand-by Assist  UB Dressing Standing: Stand-by Assist  LB Dressing Seated: Stand-by Assist  Toileting Seated: Stand-by Assist  Comment: pt up and ambulatory to bathroom with staff assist; setup for ADLs tasks; supervision/SBA for safety       EDUCATION PROVIDED  Patient: Role of Occupational Therapy; Plan of Care; Discharge Recommendations; Functional  Transfer Techniques; Surgical Precautions; Energy Conservation  Patient's Response to Education: Verbalized Understanding    The patient's Approx Degree of Impairment: 50.11% has been calculated based on documentation in the [facility] '6 clicks' Inpatient Daily Activity Short Form.  Research supports that patients with this level of impairment may benefit from HH and family support.  Final disposition will be made by interdisciplinary medical team.     Patient End of Session: Up in chair;Needs met;Call light within reach;RN aware of session/findings;All patient questions and concerns addressed    OT Goals  Patients self stated goal is: to return home     Patient will complete functional transfer with Mod I   Comment:     Patient will complete toileting with Mod I   Comment:     Patient will tolerate standing for 3-5 minutes in prep for adls with supervision   Comment:    Patient will complete item retrieval with supervision   Comment:          Goals  on: 9/10  Frequency: 1-2 more sessions    Patient Evaluation Complexity Level:   Occupational Profile/Medical History LOW - Brief history including review of medical or therapy records    Specific performance deficits impacting engagement in ADL/IADL LOW  1 - 3 performance deficits    Client Assessment/Performance Deficits LOW - No comorbidities nor modifications of tasks    Clinical Decision Making LOW - Analysis of occupational profile, problem-focused assessments, limited treatment options    Overall Complexity LOW     OT Session Time: 15 minutes  Self-Care Home Management: 0 minutes  Therapeutic Activity: 15 minutes    Gagan Pal, Occupational Therapist, OTR/L ext [phone]

## 2024-08-17 NOTE — PROGRESS NOTES
[facility]  part of [facility]    Progress Note    Hortencia Kelley Patient Status:  Inpatient    10/2/1945 MRN [id number]   Location [facility] 3W/SW Attending Wilton Rosa MD   Hosp Day # 10 PCP No primary care provider on file.       Subjective:     No CP or SOB.  Low BP today.    Objective:   Blood pressure 90/52, pulse 74, temperature 98.9 °F (37.2 °C), temperature source Oral, resp. rate 16, height 5' 7\" (1.702 m), weight 90 lb 9.7 oz (41.1 kg), SpO2 97%, not currently breastfeeding.    Gen:   NAD.  A and O x 3  CV:   RRR, no m/g/r  Pulm:   CTA bilat  Abd:   +bs, soft, tender as expected after surgery, wounds c/d/i, ND, G-tube in place.  LE:   No c/c/e  Neuro:   nonfocal    Results:     Lab Results   Component Value Date    WBC 10.8 2024    HGB 10.9 (L) 2024    HCT 34.6 (L) 2024    .0 2024    CREATSERUM 0.64 2024    BUN 11 2024     2024    K 4.4 2024     2024    CO2 35.0 (H) 2024     (H) 2024    CA 8.6 (L) 2024    ALB 3.0 (L) 2024    ALKPHO 108 2024    BILT 0.4 2024    TP 7.4 2024    AST 13 2024    ALT <7 (L) 2024    .4 (H) 2024    INR 1.04 2020    T4F 1.2 2024    TSH 0.282 (L) 2024    MG 1.9 2024    B12 170 (L) 2020    ETOH 378 (H) 2020       No results found.        Assessment and Plan:     NSTEMI type II - elevated troponin and new cardiomyopathy  Takotsubo cardiomyopathy  Low BP today  - cardiology on consult - signed off  - echo EF - 20-25%, akinesis of apical wall  - cardiac cath without CAD suggesting takotsubo  - heparin drip stopped  - cont asa.   - hold entresto and toprol due to BP  - optimized for procedures/surgery per cardiology  - does not need diuretic     Esophageal squamous cell carcinoma causing obstruction  Dysphagia to solids  Wt loss  - GI on consult  - EGD 8/10 -  obstructing esophageal mass s/p biopsies. Was firm and friable with spontaneous oozing.   - CLD  - surgical oncology consulted  - pathology - +squamous cell ca  - No bronchoesophageal fistula per bronch per Dr. Quispe    - POD #3 s/p G-tube placement by IR today  - cont IVF  - nutrition consult for TFs  May substitute tube feeds.      Pt may need the TFs indefinitely.        Hypocalcemia - resolved     AECOPD  H/o Left vocal cord paralysis  - pulm was on consult  - cont nebs  - off steroids     Anemia  - malignancy w/u as above     dvt proph:    aspirin     Code status:    Full       MDM:    High Wilton Burleson MD  8/17/2024

## 2024-08-17 NOTE — PLAN OF CARE
Pt alert and oriented x4. Pt on room air. 2 500ml bolus per MD. Pt ambulating 1 assist with walker. IVF started per MD.   Problem: Patient Centered Care  Goal: Patient preferences are identified and integrated in the patient's plan of care  Description: Interventions:  - What would you like us to know as we care for you? I am an ICU nurse  - Provide timely, complete, and accurate information to patient/family  - Incorporate patient and family knowledge, values, beliefs, and cultural backgrounds into the planning and delivery of care  - Encourage patient/family to participate in care and decision-making at the level they choose  - Honor patient and family perspectives and choices  Outcome: Progressing     Problem: Patient/Family Goals  Goal: Patient/Family Long Term Goal  Description: Patient's Long Term Goal: To eat better    Interventions:  - Malignancy work-up with Oncologist managing treatment; Nutritional support/supplement; Cardiac clearance  - See additional Care Plan goals for specific interventions  Outcome: Progressing  Goal: Patient/Family Short Term Goal  Description: Patient's Short Term Goal: To be able to swallow better    Interventions:   - Swallow test/Speech therapy, malignancy work-up, GI, Oncology and Cardio to eval and treat  - See additional Care Plan goals for specific interventions  Outcome: Progressing     Problem: CARDIOVASCULAR - ADULT  Goal: Maintains optimal cardiac output and hemodynamic stability  Description: INTERVENTIONS:  - Monitor vital signs, rhythm, and trends  - Monitor for bleeding, hypotension and signs of decreased cardiac output  - Evaluate effectiveness of vasoactive medications to optimize hemodynamic stability  - Monitor arterial and/or venous puncture sites for bleeding and/or hematoma  - Assess quality of pulses, skin color and temperature  - Assess for signs of decreased coronary artery perfusion - ex. Angina  - Evaluate fluid balance, assess for edema, trend  weights  Outcome: Progressing  Goal: Absence of cardiac arrhythmias or at baseline  Description: INTERVENTIONS:  - Continuous cardiac monitoring, monitor vital signs, obtain 12 lead EKG if indicated  - Evaluate effectiveness of antiarrhythmic and heart rate control medications as ordered  - Initiate emergency measures for life threatening arrhythmias  - Monitor electrolytes and administer replacement therapy as ordered  Outcome: Progressing     Problem: SAFETY ADULT - FALL  Goal: Free from fall injury  Description: INTERVENTIONS:  - Assess pt frequently for physical needs  - Identify cognitive and physical deficits and behaviors that affect risk of falls.  - [place] fall precautions as indicated by assessment.  - Educate pt/family on patient safety including physical limitations  - Instruct pt to call for assistance with activity based on assessment  - Modify environment to reduce risk of injury  - Provide assistive devices as appropriate  - Consider OT/PT consult to assist with strengthening/mobility  - Encourage toileting schedule  Outcome: Progressing     Problem: GASTROINTESTINAL - ADULT  Goal: Minimal or absence of nausea and vomiting  Description: INTERVENTIONS:  - Maintain adequate hydration with IV or PO as ordered and tolerated  - Nasogastric tube to low intermittent suction as ordered  - Evaluate effectiveness of ordered antiemetic medications  - Provide nonpharmacologic comfort measures as appropriate  - Advance diet as tolerated, if ordered  - Obtain nutritional consult as needed  - Evaluate fluid balance  Outcome: Progressing  Goal: Maintains or returns to baseline bowel function  Description: INTERVENTIONS:  - Assess bowel function  - Maintain adequate hydration with IV or PO as ordered and tolerated  - Evaluate effectiveness of GI medications  - Encourage mobilization and activity  - Obtain nutritional consult as needed  - Establish a toileting routine/schedule  - Consider collaborating with  pharmacy to review patient's medication profile  Outcome: Progressing  Goal: Maintains adequate nutritional intake (undernourished)  Description: INTERVENTIONS:  - Monitor percentage of each meal consumed  - Identify factors contributing to decreased intake, treat as appropriate  - Assist with meals as needed  - Monitor I&O, WT and lab values  - Obtain nutritional consult as needed  - Optimize oral hygiene and moisture  - Encourage food from home; allow for food preferences  - Enhance eating environment  Outcome: Progressing

## 2024-08-17 NOTE — PROGRESS NOTES
[facility]  part of [facility]     Progress Note    Hortencia Kelley Patient Status:  Inpatient    10/2/1945 MRN [id number]   Location [facility] 3W/SW Attending Rico Frost MD   Hosp Day # 10 PCP No primary care provider on file.       Subjective:   Patient seen and examined.  Denies significant dyspnea at rest.  Tolerating tube feedings    Objective:   Blood pressure 92/50, pulse 70, temperature 98 °F (36.7 °C), temperature source Oral, resp. rate 18, height 5' 7\" (1.702 m), weight 90 lb 9.7 oz (41.1 kg), SpO2 97%, not currently breastfeeding.  Intake/Output:   Last 3 shifts: I/O last 3 completed shifts:  In: 2912.2 [P.O.:360; I.V.:624.2; NG/GT:1928]  Out: 685 [Urine:675; Emesis/NG output:10]   This shift: No intake/output data recorded.     Vent Settings:      Hemodynamic parameters (last 24 hours):      Scheduled Meds:   Current Facility-Administered Medications   Medication Dose Route Frequency    sacubitril-valsartan (Entresto) 24-26 MG per tab 1 tablet  1 tablet Oral BID    dextrose 10% infusion (TPN no rate)   Intravenous Continuous PRN    pancrelipase (Lip-Prot-Amyl) (Zenpep) DR particles cap 10,000 Units  10,000 Units Per G Tube PRN    And    sodium bicarbonate tab 325 mg  325 mg Oral PRN    potassium chloride 20 mEq/100mL IVPB premix 20 mEq  20 mEq Intravenous Once    HYDROcodone-acetaminophen (Norco) 5-325 MG per tab 1 tablet  1 tablet Oral Q6H PRN    atorvastatin (Lipitor) tab 10 mg  10 mg Oral Nightly    metoprolol succinate ER (Toprol XL) 24 hr tab 25 mg  25 mg Oral 2x Daily(Beta Blocker)    [Held by provider] aspirin DR tab 81 mg  81 mg Oral Daily    acetaminophen (Tylenol Extra Strength) tab 500 mg  500 mg Oral Q4H PRN    ondansetron (Zofran) 4 MG/2ML injection 4 mg  4 mg Intravenous Q6H PRN    metoclopramide (Reglan) 5 mg/mL injection 5 mg  5 mg Intravenous Q8H PRN    temazepam (Restoril) cap 15 mg  15 mg Oral Nightly PRN    guaiFENesin (Robitussin) 100 MG/5 ML oral  liquid 200 mg  200 mg Oral Q4H PRN    benzonatate (Tessalon) cap 200 mg  200 mg Oral TID PRN    nitroglycerin (Nitrostat) SL tab 0.4 mg  0.4 mg Sublingual Q5 Min PRN    ipratropium-albuterol (Duoneb) 0.5-2.5 (3) MG/3ML inhalation solution 3 mL  3 mL Nebulization Q6H PRN    pantoprazole (Protonix) 40 mg in sodium chloride 0.9% PF 10 mL IV push  40 mg Intravenous Q12H    revefenacin (Yupelri) 175 MCG/3ML nebulizer solution 175 mcg  175 mcg Nebulization Daily    budesonide (Pulmicort) 0.25 MG/2ML nebulizer suspension 0.25 mg  0.25 mg Nebulization BID       Continuous Infusions:    dextrose 10%         Physical Exam  Constitutional: no acute distress  Eyes: PERRL  ENT: nares pateint  Neck: supple, no JVD  Cardio: RRR, S1 S2  Respiratory: Faint Rales  GI: abdomen soft, non tender, active bowel sounds, no organomegaly  Extremities: no clubbing, cyanosis, edema  Neurologic: no gross motor deficits  Skin: warm, dry      Results:     Lab Results   Component Value Date    CREATSERUM 0.64 08/17/2024    BUN 11 08/17/2024     08/17/2024    K 4.4 08/17/2024     08/17/2024    CO2 35.0 08/17/2024     08/17/2024    CA 8.6 08/17/2024    MG 1.9 08/17/2024       XR ABDOMEN 3 OR MORE VIEWS (CPT=74021)    Result Date: 8/15/2024  CONCLUSION:   Well-positioned gastrostomy tube.  No evidence of extraluminal contrast extravasation.  Lesser incidental findings described above.    Dictated by (CST): Jcarlos Galvez MD on 8/15/2024 at 2:01 PM     Finalized by (CST): Jcarlos Galvez MD on 8/15/2024 at 2:04 PM                 Assessment   1.  Esophageal mass  2.  Left vocal cord paralysis  3.  COPD with acute exacerbation  4.  Non-ST elevation myocardial infarction  5.  Prior nicotine dependence     Plan   -Patient presents with evidence of ongoing cough hoarseness weight loss with some associated chest pain.  -CT chest on presentation with mass seen within the mid/proximal esophagus concerning for esophageal neoplasm.   Effacement and narrowing of the trachea secondary to mass  -Status post EGD with obstructing esophageal mass status post biopsies.  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  -Further recommendations per surgical oncologist.  -Status post inspection bronchoscopy on 8/13/2024 with tracheal stenosis seen no evidence of tracheoesophageal fistula.  -Status post G-tube insertion with IR on 8/14/2020.  Tolerating feedings  -Steroid therapy discontinued  -Nebulizer treatments  -Okay for discharge from pulmonary perspective.  Will sign off.      Prakash Quispe,   Pulmonary Critical Care Medicine  [facility]

## 2024-08-17 NOTE — CM/SW NOTE
Social work met with the patient at bedside to discuss tube feed cost.    Per [facility]: \"She has already met her deductible of $110 through her insurance plan. She has not met her OOP of $3000 through her insurance plan, she has met $906 so far- she will likely meet OOP after hospitalization. Once her OOP is met, she is covered at 100% for the year through her insurance. We do not collect for commercial/medicare replacement plans. We will submit a claim to insurance, and they will determine pay out if anything additional is owed. Patient does not have any additional co-pay, so there is no upfront payment needed\"    The patient is agreeable to the above.    Social work requested Length of Need and connection type but attending is not sure of SUE or connection type. Social work will have to contact dietary,     Social also discussed the Anticipated therapy need: Home with Home Healthcare.    The patient is in agreement to Home Health services.    Social work will provide the patient with the HH list once options are available.    DEL/CM to remain available for support and/or discharge planning.     Makayla Merchant MSW, LSW  Discharge Planner [phone]

## 2024-08-17 NOTE — PHYSICAL THERAPY NOTE
PHYSICAL THERAPY EVALUATION - INPATIENT     Room Number: 344/344-A  Evaluation Date: 2024  Type of Evaluation: Initial   Physician Order: PT Eval and Treat    Presenting Problem: NSTEMI, esohpageal mass (feeding tube)  Co-Morbidities : esophageal mass, s/p feeding tube placement while admitted  Reason for Therapy: Mobility Dysfunction and Discharge Planning    PHYSICAL THERAPY ASSESSMENT   Patient is a 78 year old female admitted 2024.  Prior to admission, patient's baseline is mod ind at home.  Patient is currently functioning near baseline with bed mobility, transfers, and gait.  Patient is requiring contact guard assist as a result of the following impairments: decreased endurance/aerobic capacity, impaired   balance, and medical status.  Physical Therapy will continue to follow for duration of hospitalization.    Patient will benefit from continued skilled PT Services at discharge to promote prior level of function and safety with additional support and return home with home health PT.    PLAN  PT Treatment Plan: Bed mobility;Patient education;Family education;Gait training;Transfer training  Rehab Potential : Fair  Frequency (Obs): 5x/week    PHYSICAL THERAPY MEDICAL/SOCIAL HISTORY   History related to current admission:      Problem List  Principal Problem:    NSTEMI (non-ST elevated myocardial infarction) (HCC)  Active Problems:    Esophageal mass      HOME SITUATION  Home Situation  Type of Home: House  Home Layout: Two level  Lives With: Spouse     Prior Level of [place]: mod ind    SUBJECTIVE  \"I need to walk\"    PHYSICAL THERAPY EXAMINATION   OBJECTIVE  Precautions:  (feeding tube)  Fall Risk: Standard fall risk    WEIGHT BEARING RESTRICTION  Weight Bearing Restriction: None                PAIN ASSESSMENT  Ratin          COGNITION  Overall Cognitive Status:  WFL - within functional limits    RANGE OF MOTION AND STRENGTH ASSESSMENT  Upper extremity ROM and strength are within functional  limits   Lower extremity ROM is within functional limits   Lower extremity strength is within functional limits     BALANCE  Static Sitting: Fair +  Dynamic Sitting: Fair +  Static Standing: Fair -  Dynamic Standing: Fair -    ADDITIONAL TESTS                                    NEUROLOGICAL FINDINGS                      ACTIVITY TOLERANCE           BP: (!) 88/45             O2 WALK       AM-PAC '6-Clicks' INPATIENT SHORT FORM - BASIC MOBILITY  How much difficulty does the patient currently have...  Patient Difficulty: Turning over in bed (including adjusting bedclothes, sheets and blankets)?: A Little   Patient Difficulty: Sitting down on and standing up from a chair with arms (e.g., wheelchair, bedside commode, etc.): A Little   Patient Difficulty: Moving from lying on back to sitting on the side of the bed?: A Little   How much help from another person does the patient currently need...   Help from Another: Moving to and from a bed to a chair (including a wheelchair)?: A Little   Help from Another: Need to walk in hospital room?: A Little   Help from Another: Climbing 3-5 steps with a railing?: A Little     AM-PAC Score:  Raw Score: 18   Approx Degree of Impairment: 46.58%   Standardized Score (AM-PAC Scale): 43.63   CMS Modifier (G-Code): CK    FUNCTIONAL ABILITY STATUS  Functional Mobility/Gait Assessment  Gait Assistance: Supervision  Distance (ft): 150  Assistive Device: Rolling walker  Stairs: Stairs  How Many Stairs:  (4)  Device: 1 Rail  Assist: Contact guard assist  Pattern: Ascend and Descend  Ascend and Descend : Step to  Rolling: stand-by assist  Supine to Sit: supervision  Sit to Supine: contact guard assist  Sit to Stand: contact guard assist    Exercise/Education Provided:  Bed mobility  Gait training  Transfer training    Skilled Therapy Provided: gait & stair training    The patient's Approx Degree of Impairment: 46.58% has been calculated based on documentation in the [facility] '6 clicks' Inpatient  Basic Mobility Short Form.  Research supports that patients with this level of impairment may benefit from HH. Final disposition will be made by interdisciplinary medical team.    Patient End of Session: Up in chair    CURRENT GOALS  Goals to be met by:   Patient Goal Patient's self-stated goal is: to go home   Goal #1 Patient is able to demonstrate supine - sit EOB @ level: supervision     Goal #1   Current Status    Goal #2 Patient is able to demonstrate transfers Sit to/from Stand at assistance level: supervision with walker - rolling     Goal #2  Current Status    Goal #3 Patient is able to ambulate 150 feet with assist device: walker - rolling at assistance level: supervision   Goal #3   Current Status    Goal #4 Patient will negotiate 3 stairs/one curb w/ assistive device and supervision   Goal #4   Current Status    Goal #5 Patient to demonstrate independence with home activity/exercise instructions provided to patient in preparation for discharge.   Goal #5   Current Status    Goal #6    Goal #6  Current Status      Patient Evaluation Complexity Level:  History Low - no personal factors and/or co-morbidities   Examination of body systems Low -  addressing 1-2 elements   Clinical Presentation Low- Stable   Clinical Decision Making  Low Complexity     Gait Trainin minutes

## 2024-08-17 NOTE — PLAN OF CARE
G-tube intact and tolerated feeding well. Increased rate to  goal of 60 ml/hr. No nausea, vomiting or abdominal distention reported. No acute changes         Problem: Patient Centered Care  Goal: Patient preferences are identified and integrated in the patient's plan of care  Description: Interventions:  - What would you like us to know as we care for you?   - Provide timely, complete, and accurate information to patient/family  - Incorporate patient and family knowledge, values, beliefs, and cultural backgrounds into the planning and delivery of care  - Encourage patient/family to participate in care and decision-making at the level they choose  - Honor patient and family perspectives and choices  Outcome: Progressing     Problem: Patient/Family Goals  Goal: Patient/Family Long Term Goal  Description: Patient's Long Term Goal: To eat better    Interventions:  - Malignancy work-up with Oncologist managing treatment; Nutritional support/supplement; Cardiac clearance  - See additional Care Plan goals for specific interventions  Outcome: Progressing  Goal: Patient/Family Short Term Goal  Description: Patient's Short Term Goal: To be able to swallow better    Interventions:   - Swallow test/Speech therapy, malignancy work-up, GI, Oncology and Cardio to eval and treat  - See additional Care Plan goals for specific interventions  Outcome: Progressing     Problem: CARDIOVASCULAR - ADULT  Goal: Maintains optimal cardiac output and hemodynamic stability  Description: INTERVENTIONS:  - Monitor vital signs, rhythm, and trends  - Monitor for bleeding, hypotension and signs of decreased cardiac output  - Evaluate effectiveness of vasoactive medications to optimize hemodynamic stability  - Monitor arterial and/or venous puncture sites for bleeding and/or hematoma  - Assess quality of pulses, skin color and temperature  - Assess for signs of decreased coronary artery perfusion - ex. Angina  - Evaluate fluid balance, assess  for edema, trend weights  Outcome: Progressing  Goal: Absence of cardiac arrhythmias or at baseline  Description: INTERVENTIONS:  - Continuous cardiac monitoring, monitor vital signs, obtain 12 lead EKG if indicated  - Evaluate effectiveness of antiarrhythmic and heart rate control medications as ordered  - Initiate emergency measures for life threatening arrhythmias  - Monitor electrolytes and administer replacement therapy as ordered  Outcome: Progressing     Problem: SAFETY ADULT - FALL  Goal: Free from fall injury  Description: INTERVENTIONS:  - Assess pt frequently for physical needs  - Identify cognitive and physical deficits and behaviors that affect risk of falls.  - [place] fall precautions as indicated by assessment.  - Educate pt/family on patient safety including physical limitations  - Instruct pt to call for assistance with activity based on assessment  - Modify environment to reduce risk of injury  - Provide assistive devices as appropriate  - Consider OT/PT consult to assist with strengthening/mobility  - Encourage toileting schedule  Outcome: Progressing     Problem: GASTROINTESTINAL - ADULT  Goal: Minimal or absence of nausea and vomiting  Description: INTERVENTIONS:  - Maintain adequate hydration with IV or PO as ordered and tolerated  - Nasogastric tube to low intermittent suction as ordered  - Evaluate effectiveness of ordered antiemetic medications  - Provide nonpharmacologic comfort measures as appropriate  - Advance diet as tolerated, if ordered  - Obtain nutritional consult as needed  - Evaluate fluid balance  Outcome: Progressing  Goal: Maintains or returns to baseline bowel function  Description: INTERVENTIONS:  - Assess bowel function  - Maintain adequate hydration with IV or PO as ordered and tolerated  - Evaluate effectiveness of GI medications  - Encourage mobilization and activity  - Obtain nutritional consult as needed  - Establish a toileting routine/schedule  - Consider  collaborating with pharmacy to review patient's medication profile  Outcome: Progressing  Goal: Maintains adequate nutritional intake (undernourished)  Description: INTERVENTIONS:  - Monitor percentage of each meal consumed  - Identify factors contributing to decreased intake, treat as appropriate  - Assist with meals as needed  - Monitor I&O, WT and lab values  - Obtain nutritional consult as needed  - Optimize oral hygiene and moisture  - Encourage food from home; allow for food preferences  - Enhance eating environment  Outcome: Progressing

## 2024-08-18 LAB
ANION GAP SERPL CALC-SCNC: 2 MMOL/L (ref 0–18)
BUN BLD-MCNC: 11 MG/DL (ref 9–23)
BUN/CREAT SERPL: 18.3 (ref 10–20)
CALCIUM BLD-MCNC: 8.1 MG/DL (ref 8.7–10.4)
CHLORIDE SERPL-SCNC: 105 MMOL/L (ref 98–112)
CO2 SERPL-SCNC: 33 MMOL/L (ref 21–32)
CREAT BLD-MCNC: 0.6 MG/DL
EGFRCR SERPLBLD CKD-EPI 2021: 93 ML/MIN/1.73M2 (ref 60–?)
GLUCOSE BLD-MCNC: 132 MG/DL (ref 70–99)
GLUCOSE BLDC GLUCOMTR-MCNC: 108 MG/DL (ref 70–99)
GLUCOSE BLDC GLUCOMTR-MCNC: 128 MG/DL (ref 70–99)
GLUCOSE BLDC GLUCOMTR-MCNC: 135 MG/DL (ref 70–99)
GLUCOSE BLDC GLUCOMTR-MCNC: 142 MG/DL (ref 70–99)
OSMOLALITY SERPL CALC.SUM OF ELEC: 291 MOSM/KG (ref 275–295)
POTASSIUM SERPL-SCNC: 4.6 MMOL/L (ref 3.5–5.1)
SODIUM SERPL-SCNC: 140 MMOL/L (ref 136–145)

## 2024-08-18 PROCEDURE — 99233 SBSQ HOSP IP/OBS HIGH 50: CPT | Performed by: HOSPITALIST

## 2024-08-18 NOTE — PLAN OF CARE
Pt alert and oriented x4. Pt on room air. IVF modified per MD. Pt advanced to full liquid diet. Pt ambulating 1 assist with walker.   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  - Provide timely, complete, and accurate information to patient/family  - Incorporate patient and family knowledge, values, beliefs, and cultural backgrounds into the planning and delivery of care  - Encourage patient/family to participate in care and decision-making at the level they choose  - Honor patient and family perspectives and choices  Outcome: Progressing     Problem: Patient/Family Goals  Goal: Patient/Family Long Term Goal  Description: Patient's Long Term Goal: To eat better    Interventions:  - Malignancy work-up with Oncologist managing treatment; Nutritional support/supplement; Cardiac clearance  - See additional Care Plan goals for specific interventions  Outcome: Progressing  Goal: Patient/Family Short Term Goal  Description: Patient's Short Term Goal: To be able to swallow better    Interventions:   - Swallow test/Speech therapy, malignancy work-up, GI, Oncology and Cardio to eval and treat  - See additional Care Plan goals for specific interventions  Outcome: Progressing     Problem: CARDIOVASCULAR - ADULT  Goal: Maintains optimal cardiac output and hemodynamic stability  Description: INTERVENTIONS:  - Monitor vital signs, rhythm, and trends  - Monitor for bleeding, hypotension and signs of decreased cardiac output  - Evaluate effectiveness of vasoactive medications to optimize hemodynamic stability  - Monitor arterial and/or venous puncture sites for bleeding and/or hematoma  - Assess quality of pulses, skin color and temperature  - Assess for signs of decreased coronary artery perfusion - ex. Angina  - Evaluate fluid balance, assess for edema, trend  weights  Outcome: Progressing  Goal: Absence of cardiac arrhythmias or at baseline  Description: INTERVENTIONS:  - Continuous cardiac monitoring, monitor vital signs, obtain 12 lead EKG if indicated  - Evaluate effectiveness of antiarrhythmic and heart rate control medications as ordered  - Initiate emergency measures for life threatening arrhythmias  - Monitor electrolytes and administer replacement therapy as ordered  Outcome: Progressing     Problem: SAFETY ADULT - FALL  Goal: Free from fall injury  Description: INTERVENTIONS:  - Assess pt frequently for physical needs  - Identify cognitive and physical deficits and behaviors that affect risk of falls.  - [place] fall precautions as indicated by assessment.  - Educate pt/family on patient safety including physical limitations  - Instruct pt to call for assistance with activity based on assessment  - Modify environment to reduce risk of injury  - Provide assistive devices as appropriate  - Consider OT/PT consult to assist with strengthening/mobility  - Encourage toileting schedule  Outcome: Progressing     Problem: GASTROINTESTINAL - ADULT  Goal: Minimal or absence of nausea and vomiting  Description: INTERVENTIONS:  - Maintain adequate hydration with IV or PO as ordered and tolerated  - Nasogastric tube to low intermittent suction as ordered  - Evaluate effectiveness of ordered antiemetic medications  - Provide nonpharmacologic comfort measures as appropriate  - Advance diet as tolerated, if ordered  - Obtain nutritional consult as needed  - Evaluate fluid balance  Outcome: Progressing  Goal: Maintains or returns to baseline bowel function  Description: INTERVENTIONS:  - Assess bowel function  - Maintain adequate hydration with IV or PO as ordered and tolerated  - Evaluate effectiveness of GI medications  - Encourage mobilization and activity  - Obtain nutritional consult as needed  - Establish a toileting routine/schedule  - Consider collaborating with  pharmacy to review patient's medication profile  Outcome: Progressing  Goal: Maintains adequate nutritional intake (undernourished)  Description: INTERVENTIONS:  - Monitor percentage of each meal consumed  - Identify factors contributing to decreased intake, treat as appropriate  - Assist with meals as needed  - Monitor I&O, WT and lab values  - Obtain nutritional consult as needed  - Optimize oral hygiene and moisture  - Encourage food from home; allow for food preferences  - Enhance eating environment  Outcome: Progressing

## 2024-08-18 NOTE — CM/SW NOTE
08/18/24 1200   Choice of Post-Acute Provider   Informed patient of right to choose their preferred provider Yes   List of appropriate post-acute services provided to patient/family with quality data Yes   Information given to Patient     Social work was able to meet with the patient at bedside to provide the HH list.    Social work provided the HH list at bedside and will follow up tomorrow 8/19 for choice.    [facility] also has all needed documentation for tube feeds and will be able to deliver tomorrow if medically cleared.     SW/CM to remain available for support and/or discharge planning.     Makayla Merchant MSW, LSW  Discharge Planner [id number]

## 2024-08-18 NOTE — PLAN OF CARE
Aox4. Room air. Standby assist with walker. TF at goal, PEG dressing changed. IVP protonix. IV fluids for BP support, still soft this AM.     Problem: GASTROINTESTINAL - ADULT  Goal: Maintains or returns to baseline bowel function  Description: INTERVENTIONS:  - Assess bowel function  - Maintain adequate hydration with IV or PO as ordered and tolerated  - Evaluate effectiveness of GI medications  - Encourage mobilization and activity  - Obtain nutritional consult as needed  - Establish a toileting routine/schedule  - Consider collaborating with pharmacy to review patient's medication profile  Outcome: Progressing     Problem: GASTROINTESTINAL - ADULT  Goal: Maintains adequate nutritional intake (undernourished)  Description: INTERVENTIONS:  - Monitor percentage of each meal consumed  - Identify factors contributing to decreased intake, treat as appropriate  - Assist with meals as needed  - Monitor I&O, WT and lab values  - Obtain nutritional consult as needed  - Optimize oral hygiene and moisture  - Encourage food from home; allow for food preferences  - Enhance eating environment  Outcome: Progressing     Problem: CARDIOVASCULAR - ADULT  Goal: Maintains optimal cardiac output and hemodynamic stability  Description: INTERVENTIONS:  - Monitor vital signs, rhythm, and trends  - Monitor for bleeding, hypotension and signs of decreased cardiac output  - Evaluate effectiveness of vasoactive medications to optimize hemodynamic stability  - Monitor arterial and/or venous puncture sites for bleeding and/or hematoma  - Assess quality of pulses, skin color and temperature  - Assess for signs of decreased coronary artery perfusion - ex. Angina  - Evaluate fluid balance, assess for edema, trend weights  Outcome: Progressing

## 2024-08-18 NOTE — PROGRESS NOTES
[facility]  part of [facility]    Progress Note    Hortencia Kelley Patient Status:  Inpatient    10/2/1945 MRN [id number]   Location [facility] 3W/SW Attending Wilton Rosa MD   Hosp Day # 11 PCP No primary care provider on file.       Subjective:     Pt wanting more substantial food.  Tolerated FLD.    Objective:   Blood pressure 97/50, pulse 79, temperature 98.3 °F (36.8 °C), temperature source Tympanic, resp. rate 16, height 5' 7\" (1.702 m), weight 96 lb 9 oz (43.8 kg), SpO2 98%, not currently breastfeeding.    Gen:   NAD.  A and O x 3  CV:   RRR, no m/g/r  Pulm:   CTA bilat  Abd:   +bs, soft, NT, ND  LE:   No c/c/e  Neuro:   nonfocal    Results:     Lab Results   Component Value Date    WBC 10.8 2024    HGB 10.9 (L) 2024    HCT 34.6 (L) 2024    .0 2024    CREATSERUM 0.60 2024    BUN 11 2024     2024    K 4.6 2024     2024    CO2 33.0 (H) 2024     (H) 2024    CA 8.1 (L) 2024    ALB 3.0 (L) 2024    ALKPHO 108 2024    BILT 0.4 2024    TP 7.4 2024    AST 13 2024    ALT <7 (L) 2024    .4 (H) 2024    INR 1.04 2020    T4F 1.2 2024    TSH 0.282 (L) 2024    MG 1.9 2024    B12 170 (L) 2020    ETOH 378 (H) 2020       No results found.        Assessment and Plan:     NSTEMI type II - elevated troponin and new cardiomyopathy  Takotsubo cardiomyopathy  Low BP today  - cardiology on consult - signed off  - echo EF - 20-25%, akinesis of apical wall  - cardiac cath without CAD suggesting takotsubo  - heparin drip stopped  - cont asa.   - hold entresto and toprol due to BP  - optimized for procedures/surgery per cardiology  - does not need diuretic     Esophageal squamous cell carcinoma causing obstruction  Dysphagia to solids  Wt loss  - GI on consult  - EGD 8/10 - obstructing esophageal mass s/p biopsies. Was  firm and friable with spontaneous oozing.   - advance to soft diet  - surgical oncology consulted  - pathology - +squamous cell ca  - No bronchoesophageal fistula per bronch per Dr. Quispe    - POD #4 s/p G-tube placement by IR    - reduce IVF  - nutrition consult for TFs  May substitute tube feeds.      Pt may need the TFs indefinitely.        Hypocalcemia - resolved     AECOPD  H/o Left vocal cord paralysis  - pulm was on consult  - cont nebs  - off steroids     Anemia  - malignancy w/u as above     dvt proph:    aspirin     Code status:    Full       MDM:    High Wilton Burleson MD  8/18/2024

## 2024-08-19 LAB
ANION GAP SERPL CALC-SCNC: 5 MMOL/L (ref 0–18)
BUN BLD-MCNC: 13 MG/DL (ref 9–23)
BUN/CREAT SERPL: 21.7 (ref 10–20)
CALCIUM BLD-MCNC: 8.6 MG/DL (ref 8.7–10.4)
CHLORIDE SERPL-SCNC: 102 MMOL/L (ref 98–112)
CO2 SERPL-SCNC: 31 MMOL/L (ref 21–32)
CREAT BLD-MCNC: 0.6 MG/DL
EGFRCR SERPLBLD CKD-EPI 2021: 93 ML/MIN/1.73M2 (ref 60–?)
GLUCOSE BLD-MCNC: 138 MG/DL (ref 70–99)
GLUCOSE BLDC GLUCOMTR-MCNC: 102 MG/DL (ref 70–99)
GLUCOSE BLDC GLUCOMTR-MCNC: 140 MG/DL (ref 70–99)
GLUCOSE BLDC GLUCOMTR-MCNC: 169 MG/DL (ref 70–99)
GLUCOSE BLDC GLUCOMTR-MCNC: 177 MG/DL (ref 70–99)
OSMOLALITY SERPL CALC.SUM OF ELEC: 288 MOSM/KG (ref 275–295)
POTASSIUM SERPL-SCNC: 4.1 MMOL/L (ref 3.5–5.1)
SODIUM SERPL-SCNC: 138 MMOL/L (ref 136–145)

## 2024-08-19 PROCEDURE — 99233 SBSQ HOSP IP/OBS HIGH 50: CPT | Performed by: HOSPITALIST

## 2024-08-19 RX ORDER — MIDODRINE HYDROCHLORIDE 5 MG/1
5 TABLET ORAL 3 TIMES DAILY
Status: DISCONTINUED | OUTPATIENT
Start: 2024-08-19 | End: 2024-08-20

## 2024-08-19 RX ORDER — FUROSEMIDE 20 MG
20 TABLET ORAL ONCE
Status: COMPLETED | OUTPATIENT
Start: 2024-08-19 | End: 2024-08-19

## 2024-08-19 NOTE — CM/SW NOTE
Reservation made to [facility] per pt request.    [facility]  [address] [place] [address] [address] [place] [address]  Phone: [phone]  Fax: [phone]    SW updated AVS to reflect choice.    SW/CM to remain available for support and/or discharge planning.      Gabriela CHACON, LSW  Social Work/Case Management

## 2024-08-19 NOTE — DIETARY NOTE
ADULT NUTRITION REASSESSMENT    Pt is at high nutrition risk.  Pt meets moderate malnutrition criteria.      CRITERIA FOR MALNUTRITION DIAGNOSIS:  Criteria for non-severe malnutrition diagnosis: acute illness/injury related to wt loss 5% in 1 month, body fat mild depletion, and muscle mass mild depletion.    RECOMMENDATIONS TO MD: See Nutrition Intervention for bolus TF specifics     ADMITTING DIAGNOSIS:  NSTEMI (non-ST elevated myocardial infarction) ([facility]) [I21.4]  PERTINENT PAST MEDICAL HISTORY:   Past Medical History:    COPD (chronic obstructive pulmonary disease) ([facility])    Depression    ETOH abuse    Falls frequently    12/27/2017    History of blood transfusion    [facility]      PATIENT STATUS: Initial 08/09/24: Pt admit for NSTEMI (non-St elevated myocardial infarction). PMH sig for COPD, ETOH abuse and others noted above. Pt assessed due to screening at risk for decreased appetite, unintentional weight loss and low BMI (14.67 kg/m2). Pt known to nutrition services from previous admissions, last seen 6/25/2020. Chart reviewed, pt admitted with c/o SOB/wheezing x 1 month. Pt noted with progressive dysphagia over past few months, unable to tolerate solids (liquids ok). CT chest noting mass in mid/proximal esophagus concerning for primary esophageal neoplasm - plan EGD with biopsy pending cardiac clearance. Pt NPO this morning for cardiac cath - diet advanced to full liquids diet (FLD). Plan for EGD tomorrow. Discussion with RN, no concerns. Intakes reviewed 25% x 1 meal since admit. Pt visited, pt reports having difficulty swallowing solids (tolerating liquids) since end of June when gagging started. Pt reports drinking 6-8 Ensures daily (350 calories) + other liquid items but not taking in solids. Pt reports always been thin but never under 100#'s. Pt reports weight loss over same timeframe as difficulty with solids. Current weight 93# 11.1 oz. EMR weight review, last known weight 90# on 7/26/24 Per EMR weight  review, pt noted weighing 105# on 7/5/24 - 11.3# or 10.8% weight loss x 1 month (significant) otherwise limited recent weight history - 122# 8/3/2020. Nutrition focused physical exam (NFPE) completed, see below for details. Encouraged adequate energy and protein intake with ONS to help maximize nutrition. +ONS per discussion.  Update 8/15/24: Pt had bronch done on 8/13 with findings of tracheal stenosis. Gtube was placed on 8/14. RD received consult to start feedings today. Orders entered. Touched base with RN. IVF will stop when TF starts. Monitor tolerance. Advancing moderately slow. Dc'd ONS as TF will meet 100% of energy and protein needs. Pt allows clear liquids.     Update 08/19/24: RA completed per protocol. Chart reviewed, diet advanced to full liquids. Discussion with RN, pt tolerating TF at goal rate (60 ml/hr - continuous). Pt visited, wanting to go home. Pt reports no nausea or abdominal pain. Pt reports tolerating full liquids, could not tolerate soft foods - difficulty swallowing. Discussed changing to bolus feedings, pt expressed understand. Adjusted order - discussed with RN.    FOOD/NUTRITION RELATED HISTORY:  Appetite:  Tolerating FLD, did not tolerate low fiber/soft  Intake: ~100% x4 meals documented since last visit  Intake Meeting Needs: TF meeting needs + FLD  Percent Meals Eaten (last 6 days)       Date/Time Percent Meals Eaten (%)    08/13/24 0900 50 %    08/14/24 0837 --     Percent Meals Eaten (%): patient npo at 08/14/24 0837    08/14/24 1424 --     Percent Meals Eaten (%): patient on clear liquids at 08/14/24 1424    08/15/24 0916 100 %    08/16/24 1200 100 %    08/16/24 1845 100 %    08/18/24 1100 100 %    08/18/24 1900 100 %          Food Allergies: No Known Food Allergies (NKFA)  Cultural/Ethnic/Church Preferences: Not Obtained    GASTROINTESTINAL: +BM 8/17/24- small;soft, dysphagia, and PEG/G-tube    MEDICATIONS: reviewed Electrolyte replacement per protocol (cardiac)   midodrine   5 mg Oral TID    potassium chloride  20 mEq Intravenous Once    atorvastatin  10 mg Oral Nightly    aspirin  81 mg Oral Daily    pantoprazole  40 mg Intravenous Q12H    revefenacin  175 mcg Nebulization Daily    budesonide  0.25 mg Nebulization BID     LABS: reviewed A1c 6.2% on 8/10/24  POC Glucose reviewed  Recent Labs     08/16/24  0639 08/16/24  1357 08/17/24  0553 08/18/24  0515 08/19/24  1258   *   < > 127* 132* 138*   BUN 7*   < > 11 11 13   CREATSERUM 0.60   < > 0.64 0.60 0.60   CA 5.7*   < > 8.6* 8.1* 8.6*   MG 1.8  --  1.9  --   --       < > 137 140 138   K 4.4  4.4   < > 4.4 4.6 4.1      < > 101 105 102   CO2 31.0   < > 35.0* 33.0* 31.0   OSMOCALC 284   < > 285 291 288    < > = values in this interval not displayed.     NUTRITION RELATED PHYSICAL FINDINGS:  - Nutrition Focused Physical Exam (NFPE): mild depletion body fat triceps region and mild depletion muscle mass temple region, clavicle region, shoulder region, dorsal monsalve region, thigh region, and calf region   - Fluid Accumulation: none  see RN documentation for details  - Skin Integrity: intact see RN documentation for details    ANTHROPOMETRICS:  HT: 170.2 cm (5' 7\")  WT: 44 kg (97 lb 1.6 oz) - wt gain since admit  Last Visit Wt: 99# 3 oz on 8/15/24  Admit Wt: 91# 8 oz on 8/7/24  BMI: Body mass index is 15.21 kg/m².  BMI CLASSIFICATION: less than 19 kg/m2 - underweight  IBW: 135 lbs        72% IBW  Usual Body Wt: ~105 lbs per pt      92% UBW    WEIGHT HISTORY: Per EMR weight review, pt noted weighing 122# August 2020  Patient Weight(s) for the past 336 hrs:   Weight   08/19/24 0537 44 kg (97 lb 1.6 oz)   08/18/24 0015 43.8 kg (96 lb 9 oz)   08/17/24 0309 41.1 kg (90 lb 9.7 oz)   08/16/24 0454 42.9 kg (94 lb 9.2 oz)   08/15/24 0637 45 kg (99 lb 3.3 oz)   08/14/24 0637 43 kg (94 lb 12.8 oz)   08/13/24 0623 43.5 kg (95 lb 14.4 oz)   08/12/24 0533 43.5 kg (95 lb 14.4 oz)   08/11/24 0605 46.2 kg (101 lb 13.6 oz)   08/10/24 0600  48.5 kg (106 lb 14.8 oz)   08/09/24 0510 42.5 kg (93 lb 11.1 oz)   08/08/24 0627 42.3 kg (93 lb 3.2 oz)   08/07/24 1358 41.5 kg (91 lb 7.9 oz)   08/07/24 1056 41.5 kg (91 lb 7.9 oz)   08/07/24 0909 40.8 kg (90 lb)     Wt Readings from Last 10 Encounters:   08/19/24 44 kg (97 lb 1.6 oz)   07/26/24 40.8 kg (90 lb)   07/05/24 47.6 kg (105 lb)   08/03/20 55.3 kg (122 lb)   07/29/20 54.9 kg (121 lb)   07/27/20 54.9 kg (121 lb)   07/22/20 54.9 kg (121 lb)   07/20/20 55.3 kg (122 lb)   07/15/20 56.2 kg (124 lb)   07/13/20 56.2 kg (124 lb)     NUTRITION DIAGNOSIS/PROBLEM:   Moderate Malnutrition related to Acute - Physiological causes resulting in anorexia or diminished intake as evidenced by wt loss 5% in 1 month, body fat mild depletion, muscle mass mild depletion and low BMI (14.67 kg/m2).    Progress: Improvement, continue. - Tracheal stenosis, tolerating continuous TF, switch to bolus today    NUTRITION INTERVENTION:   NUTRITION PRESCRIPTION:   Estimated Nutrition needs: --dosing wt of 42.5 kg - wt taken on 8/9/2024  Calories: 0328-1581 calories/day (35-40 calories per kg Dosing wt)  Protein: 64-85 g protein/day (1.5-2.0 g protein/kg Dosing wt)  Fluids: 1875 ml/day ([place] [facility])     - Diet:       Procedures    Full liquid diet Is Patient on Accuchecks? Yes; Misc Restriction: Low Fiber/Soft      - Enteral Nutrition: Jevity 1.2 bolus: 1.5 cans @ 0800, 1200, 1600 and 1 can at 2000 for total of 5.5 cans daily. Provides 1568 kcal, 73 grams protein, 1051 ml total free water.  Water flushes 60 ml before and after each bolus (480 ml). Total water: 1531 ml daily.    Provides ~80% of daily fluid needs. Combination of FWF and PO fluid intakes should meet 100% of needs. If PO fluid intakes decline, would recommend increasing FWF to 100 ml before and after each bolus.     - RD Malnutrition Care Plan:  adjusted TF to bolus feedings  - Medical Food Supplements- None at this time. Rational/use of oral supplements discussed.  -  Vitamin and mineral supplements: none  - Feeding assistance: meal set up  - Nutrition education: Discussed importance of adequate energy and protein intake     - Coordination of nutrition care: collaboration with other providers  - Discharge and transfer of nutrition care to new setting or provider: monitor plans Plan to discharge home with HH pending medical clearance    MONITOR AND EVALUATE/NUTRITION GOALS:  - Food and Nutrient Intake:      Monitor:  full liquid intake  - Food and Nutrient Administration:      Monitor: tolerance to enteral nutrition, adequacy of enteral nutrition, and for enteral nutrition adjustment  - Anthropometric Measurement:    Monitor weight  - Nutrition Goals:      halt wt loss, gradual wt gain as able, tube feed meets greater than 80% of needs, labs within acceptable limits, euglycemia, minimize lean body mass loss, prevent skin breakdown, support body systems, and improved GI status    DIETITIAN FOLLOW UP: RD to follow and monitor nutrition status    Iman Wills MS, JOSE, RDN, LDN  Clinical Dietitian  P: [phone]

## 2024-08-19 NOTE — CM/SW NOTE
Parris from [facility] to come do T&T tomorrow 8/20 for tube feeds.    SW/CM to remain available for support and/or discharge planning.      Gabriela MARISCAL MSW, LSW  Social Work/Case Management

## 2024-08-19 NOTE — PROGRESS NOTES
[facility]  part of [facility]    Progress Note    Hortencia Kelley Patient Status:  Inpatient    10/2/1945 MRN [id number]   Location [facility] 3W/SW Attending Wilton Rosa MD   Hosp Day # 12 PCP No primary care provider on file.       Subjective:     BP low.  Not lightheaded.  Not SOB.    Objective:   Blood pressure (!) 85/47, pulse 105, temperature 98.4 °F (36.9 °C), temperature source Oral, resp. rate 18, height 5' 7\" (1.702 m), weight 97 lb 1.6 oz (44 kg), SpO2 98%, not currently breastfeeding.    Gen:   NAD.  A and O x 3  Body mass index is 15.21 kg/m².   CV:   RRR, no m/g/r  Pulm:   CTA bilat  Abd:   +bs, soft, NT, ND  LE:   No c/c/e  Neuro:   nonfocal    Results:     Lab Results   Component Value Date    WBC 10.8 2024    HGB 10.9 (L) 2024    HCT 34.6 (L) 2024    .0 2024    CREATSERUM 0.60 2024    BUN 13 2024     2024    K 4.1 2024     2024    CO2 31.0 2024     (H) 2024    CA 8.6 (L) 2024    ALB 3.0 (L) 2024    ALKPHO 108 2024    BILT 0.4 2024    TP 7.4 2024    AST 13 2024    ALT <7 (L) 2024    .4 (H) 2024    INR 1.04 2020    T4F 1.2 2024    TSH 0.282 (L) 2024    MG 1.9 2024    B12 170 (L) 2020    ETOH 378 (H) 2020       No results found.        Assessment and Plan:     NSTEMI type II - elevated troponin and new cardiomyopathy  Takotsubo cardiomyopathy  Low BP today  - cardiology on consult - signed off  - echo EF - 20-25%, akinesis of apical wall  - cardiac cath without CAD suggesting takotsubo  - heparin drip stopped  - cont asa.   - stop entresto and toprol due to BP  - optimized for procedures/surgery per cardiology  - does not need diuretic     Esophageal squamous cell carcinoma causing obstruction  Dysphagia to solids  Wt loss  - GI on consult  - EGD 8/10 - obstructing esophageal mass s/p  biopsies. Was firm and friable with spontaneous oozing.   - full liquid diet  - surgical oncology consulted  - pathology - +squamous cell ca  - No bronchoesophageal fistula per bronch per Dr. Quispe    - POD #5 s/p G-tube placement by IR    - stop IVF  - nutrition consult for TFs  May substitute tube feeds.      Pt anticipated tube feeds greater than 90 dys.       Low BP  Persists despite TFs, IVF, po intake.  - start midodrine 5 mg TID     Hypocalcemia - resolved     AECOPD  H/o Left vocal cord paralysis  - pulm was on consult  - cont nebs  - off steroids     Anemia  - malignancy w/u as above     dvt proph:    aspirin     Code status:    Full       MDM:    High Wilton Burleson MD  8/19/2024

## 2024-08-19 NOTE — CM/SW NOTE
08/19/24 1000   Discharge disposition   Expected discharge disposition Home-Health   Post Acute Care Provider   ([facility])   DME/Infusion Providers [facility]   Discharge transportation Private car      informed  provider of pt discharge and requested follow up with pt/family for SOC in the community.     [facility] to deliver tube feed supplies 8/19.    Gabriela MARISCAL MSW, LSW  Social Work/Case Management

## 2024-08-19 NOTE — PLAN OF CARE
Aox4. Room air. Standby assist. Increased WOB overnight and audible wheezing, PRN neb given with temporary relief. Wheezing continued into AM, IV fluids held and x1 PO lasix ordered. Discharge with C on TF when medically cleared.     Problem: RESPIRATORY - ADULT  Goal: Achieves optimal ventilation and oxygenation  Description: INTERVENTIONS:  - Assess for changes in respiratory status  - Assess for changes in mentation and behavior  - Position to facilitate oxygenation and minimize respiratory effort  - Oxygen supplementation based on oxygen saturation or ABGs  - Provide Smoking Cessation handout, if applicable  - Encourage broncho-pulmonary hygiene including cough, deep breathe, Incentive Spirometry  - Assess the need for suctioning and perform as needed  - Assess and instruct to report SOB or any respiratory difficulty  - Respiratory Therapy support as indicated  - Manage/alleviate anxiety  - Monitor for signs/symptoms of CO2 retention  Outcome: Progressing     Problem: GASTROINTESTINAL - ADULT  Goal: Maintains or returns to baseline bowel function  Description: INTERVENTIONS:  - Assess bowel function  - Maintain adequate hydration with IV or PO as ordered and tolerated  - Evaluate effectiveness of GI medications  - Encourage mobilization and activity  - Obtain nutritional consult as needed  - Establish a toileting routine/schedule  - Consider collaborating with pharmacy to review patient's medication profile  Outcome: Progressing     Problem: CARDIOVASCULAR - ADULT  Goal: Maintains optimal cardiac output and hemodynamic stability  Description: INTERVENTIONS:  - Monitor vital signs, rhythm, and trends  - Monitor for bleeding, hypotension and signs of decreased cardiac output  - Evaluate effectiveness of vasoactive medications to optimize hemodynamic stability  - Monitor arterial and/or venous puncture sites for bleeding and/or hematoma  - Assess quality of pulses, skin color and temperature  - Assess for signs  of decreased coronary artery perfusion - ex. Angina  - Evaluate fluid balance, assess for edema, trend weights  Outcome: Progressing

## 2024-08-19 NOTE — PHYSICAL THERAPY NOTE
PHYSICAL THERAPY TREATMENT NOTE - INPATIENT     Room Number: 344/344-A       Presenting Problem: NSTEMI, esohpageal mass (feeding tube)  Co-Morbidities : esophageal mass, s/p feeding tube placement while admitted    Problem List  Principal Problem:    NSTEMI (non-ST elevated myocardial infarction) (HCC)  Active Problems:    Esophageal mass      PHYSICAL THERAPY ASSESSMENT   Patient demonstrates good  progress this session, goals  remain in progress.      Patient is requiring stand-by assist and contact guard assist as a result of the following impairments: decreased functional strength, decreased endurance/aerobic capacity, impaired standing balance, decreased muscular endurance, and medical status.     Patient continues to function below baseline with bed mobility, transfers, gait, stair negotiation, standing prolonged periods, and performing household tasks.  Next session anticipate patient to progress bed mobility, transfers, gait, and stair negotiation.  Physical Therapy will continue to follow patient for duration of hospitalization.    Patient continues to benefit from continued skilled PT services: at discharge to promote prior level of function and safety with additional support and return home with home health PT.    PLAN  PT Treatment Plan: Bed mobility;Patient education;Family education;Gait training;Transfer training  Frequency (Obs): 5x/week    SUBJECTIVE  \"I thought I was going home today\"    OBJECTIVE  Precautions:  (feeding tube)    PAIN ASSESSMENT   Ratin    BALANCE  Static Sitting: Good  Dynamic Sitting: Fair +  Static Standing: Fair  Dynamic Standing: Fair -    ACTIVITY TOLERANCE  Pulse: 105 (up to 121 bpm with activity)  Heart Rate Source: Monitor     BP: (!) 84/52 (73/49 mmHg (R arm) standing, 85/47 mmHg (L arm) standing, 88/51 mmHg with 20 ft ambulation, 82/68 mmHg with additional 60 ft ambulation, 101/77 mmHg with 2nd trial of 60 ft ambulation)  BP Location: Right arm  BP Method:  Automatic  Patient Position: Sitting     O2 WALK  Oxygen Therapy  SPO2% Ambulation on Room Air: 99    AM-PAC '6-Clicks' INPATIENT SHORT FORM - BASIC MOBILITY  How much difficulty does the patient currently have...  Patient Difficulty: Turning over in bed (including adjusting bedclothes, sheets and blankets)?: A Little   Patient Difficulty: Sitting down on and standing up from a chair with arms (e.g., wheelchair, bedside commode, etc.): A Little   Patient Difficulty: Moving from lying on back to sitting on the side of the bed?: A Little   How much help from another person does the patient currently need...   Help from Another: Moving to and from a bed to a chair (including a wheelchair)?: A Little   Help from Another: Need to walk in hospital room?: A Little   Help from Another: Climbing 3-5 steps with a railing?: A Little     AM-PAC Score:  Raw Score: 18   Approx Degree of Impairment: 46.58%   Standardized Score (AM-PAC Scale): 43.63   CMS Modifier (G-Code): CK    FUNCTIONAL ABILITY STATUS  Functional Mobility/Gait Assessment  Gait Assistance: Contact guard assist (close SBA)  Distance (ft): 20 ft x 2 and 60 ft x 2  Assistive Device: Rolling walker  Pattern: Shuffle (decreased mago speed, decreased step length)  Sit to Stand: stand-by assist from bedside chair with RW    Skilled Therapy Provided: Patient sitting in bedside chair upon arrival. RN approved activity. Educated patient on POC and benefits of mobilization. Agreeable to participate. Patient reporting no pain. Patient hypotensive (asymptomatic throughout) - RN aware. BP noted to increase with activity. Patient denies any lightheadedness/dizziness/fatigue with activity. Patient benefits from increased time in order to complete functional mobility tasks but is agreeable to all ambulation trials.    The patient's Approx Degree of Impairment: 46.58% has been calculated based on documentation in the [facility] '6 clicks' Inpatient Daily Activity Short Form.   Research supports that patients with this level of impairment may benefit from HHPT.  Final disposition will be made by interdisciplinary medical team.    Patient End of Session: Up in chair;Needs met;Call light within reach;RN aware of session/findings;All patient questions and concerns addressed    CURRENT GOALS   Goals to be met by: 8/31  Patient Goal Patient's self-stated goal is: to go home   Goal #1 Patient is able to demonstrate supine - sit EOB @ level: supervision      Goal #1   Current Status  NT   Goal #2 Patient is able to demonstrate transfers Sit to/from Stand at assistance level: supervision with walker - rolling      Goal #2  Current Status  SBA with RW   Goal #3 Patient is able to ambulate 150 feet with assist device: walker - rolling at assistance level: supervision   Goal #3   Current Status  CGA>SBA 20 ft x 2 and 60 ft x 2 with RW   Goal #4 Patient will negotiate 3 stairs/one curb w/ assistive device and supervision   Goal #4   Current Status  NT   Goal #5 Patient to demonstrate independence with home activity/exercise instructions provided to patient in preparation for discharge.   Goal #5   Current Status  Ongoing     Gait Training: 15 minutes

## 2024-08-19 NOTE — OCCUPATIONAL THERAPY NOTE
OCCUPATIONAL THERAPY TREATMENT NOTE - INPATIENT    Room Number: 344/344-A     Presenting Problem: NSTEMI     Problem List  Principal Problem:    NSTEMI (non-ST elevated myocardial infarction) (HCC)  Active Problems:    Esophageal mass      OCCUPATIONAL THERAPY ASSESSMENT   Patient demonstrates excellent progress this session, goals remain in progress.    Patient is requiring up to supervision/SBA as a result of the following impairments: activity tolerance.    Patient continues to function near baseline with self care and functional mobility.   Next session anticipate patient to progress with OT POC for goals of Mod I.  Occupational Therapy will continue to follow patient for duration of hospitalization.    Patient continues to benefit from continued skilled OT services in prep to return home; pt is up and ambulatory but very hypotensive; not symptomatic- tolerating >household distances x2 with RW; pt reports feeling good up and walking and eager to return home.     PLAN  OT Treatment Plan: ADL training;Functional transfer training;Endurance training;Patient/Family education;Patient/Family training;Compensatory technique education  OT Device Recommendations: None    SUBJECTIVE  I guess I need to walk more     OBJECTIVE  Precautions:  (feeding tube)    WEIGHT BEARING RESTRICTION     PAIN ASSESSMENT  Ratin    ACTIVITY TOLERANCE  Pulse: 105 (121 with activiteis)        BP: (!) 85/47 (Sittin/52; standing 73/49 (R) arm; 85/47 standing (L) arm; after walking ~15 feet 88/51- after ambulating around nurses station 92/68; 101/77)             O2 SATURATIONS       ACTIVITIES OF DAILY LIVING ASSESSMENT  AM-PAC ‘6-Clicks’ Inpatient Daily Activity Short Form  How much help from another person does the patient currently need…  -   Putting on and taking off regular lower body clothing?: A Little  -   Bathing (including washing, rinsing, drying)?: A Little  -   Toileting, which includes using toilet, bedpan or urinal? : A  Little  -   Putting on and taking off regular upper body clothing?: A Little  -   Taking care of personal grooming such as brushing teeth?: None  -   Eating meals?: Total    AM-PAC Score:  Score: 17  Approx Degree of Impairment: 50.11%  Standardized Score (AM-PAC Scale): 37.26  CMS Modifier (G-Code): CK    FUNCTIONAL TRANSFER ASSESSMENT  Sit to Stand: Chair  Edge of Bed: Stand-by Assist  Chair: Stand-by Assist  Toilet Transfer: Stand-by Assist    BED MOBILITY  Supine to Sit : Stand-by Assist  Sit to Supine (OT): Stand-by Assist  Scooting: SBA    BALANCE ASSESSMENT  Static Sitting: Stand-by Assist  Static Standing: Stand-by Assist    FUNCTIONAL ADL ASSESSMENT  Eating: -- (feeding tube)  Grooming Seated: Stand-by Assist  Bathing Seated: Stand-by Assist  UB Dressing Standing: Stand-by Assist  LB Dressing Seated: Stand-by Assist  Toileting Seated: Stand-by Assist    EDUCATION PROVIDED  Patient: Role of Occupational Therapy; Plan of Care; Discharge Recommendations; Functional Transfer Techniques; Surgical Precautions; Energy Conservation  Patient's Response to Education: Verbalized Understanding    The patient's Approx Degree of Impairment: 50.11% has been calculated based on documentation in the [facility] '6 clicks' Inpatient Daily Activity Short Form.  Research supports that patients with this level of impairment may benefit from return home with support.  Final disposition will be made by interdisciplinary medical team.    Patient End of Session: Up in chair    OT Goals:      OT Goals  Patients self stated goal is: to return home     Patient will complete functional transfer with Mod I   Comment: SPV/SBA    Patient will complete toileting with Mod I   Comment: ongoing    Patient will tolerate standing for 3-5 minutes in prep for adls with supervision   Comment:ongoing    Patient will complete item retrieval with supervision   Comment:ongoing          Goals  on: 9/10  Frequency: 1-2 more sessions      OT Session  Time: 15 minutes  Self-Care Home Management: 0 minutes  Therapeutic Activity: 15 minutes

## 2024-08-19 NOTE — PLAN OF CARE
Hortencia is resting comfortably in the chair, alert and oriented x 4, on room air, medically stable for discharge pending home health education to take place tomorrow, bolus feedings via PEG tube, ambulating with walker, safety precautions in place, call light within easy reach.    Problem: Patient Centered Care  Goal: Patient preferences are identified and integrated in the patient's plan of care  Description: Interventions:  - What would you like us to know as we care for you? I would like go home.  - Provide timely, complete, and accurate information to patient/family  - Incorporate patient and family knowledge, values, beliefs, and cultural backgrounds into the planning and delivery of care  - Encourage patient/family to participate in care and decision-making at the level they choose  - Honor patient and family perspectives and choices  8/19/2024 1856 by Isa Man RN  Outcome: Adequate for Discharge  8/19/2024 1855 by Isa Man RN  Outcome: Not Progressing  8/19/2024 1855 by Isa Man RN  Outcome: Progressing     Problem: Patient/Family Goals  Goal: Patient/Family Long Term Goal  Description: Patient's Long Term Goal: To eat better    Interventions:  - Malignancy work-up with Oncologist managing treatment; Nutritional support/supplement; Cardiac clearance  - See additional Care Plan goals for specific interventions  8/19/2024 1856 by Isa Man RN  Outcome: Adequate for Discharge  8/19/2024 1855 by Isa Man RN  Outcome: Not Progressing  8/19/2024 1855 by Isa Man RN  Outcome: Progressing  Goal: Patient/Family Short Term Goal  Description: Patient's Short Term Goal: To be able to swallow better    Interventions:   - Swallow test/Speech therapy, malignancy work-up, GI, Oncology and Cardio to eval and treat  - See additional Care Plan goals for specific interventions  8/19/2024 1856 by Isa Man RN  Outcome: Adequate for Discharge  8/19/2024 1855 by  Isa Man RN  Outcome: Not Progressing  8/19/2024 1855 by Isa Man RN  Outcome: Progressing     Problem: CARDIOVASCULAR - ADULT  Goal: Maintains optimal cardiac output and hemodynamic stability  Description: INTERVENTIONS:  - Monitor vital signs, rhythm, and trends  - Monitor for bleeding, hypotension and signs of decreased cardiac output  - Evaluate effectiveness of vasoactive medications to optimize hemodynamic stability  - Monitor arterial and/or venous puncture sites for bleeding and/or hematoma  - Assess quality of pulses, skin color and temperature  - Assess for signs of decreased coronary artery perfusion - ex. Angina  - Evaluate fluid balance, assess for edema, trend weights  8/19/2024 1856 by Isa Man RN  Outcome: Adequate for Discharge  8/19/2024 1855 by Isa Man RN  Outcome: Not Progressing  8/19/2024 1855 by Isa Man RN  Outcome: Progressing  Goal: Absence of cardiac arrhythmias or at baseline  Description: INTERVENTIONS:  - Continuous cardiac monitoring, monitor vital signs, obtain 12 lead EKG if indicated  - Evaluate effectiveness of antiarrhythmic and heart rate control medications as ordered  - Initiate emergency measures for life threatening arrhythmias  - Monitor electrolytes and administer replacement therapy as ordered  8/19/2024 1856 by Isa Man RN  Outcome: Adequate for Discharge  8/19/2024 1855 by Isa Man RN  Outcome: Not Progressing  8/19/2024 1855 by Isa Man RN  Outcome: Progressing     Problem: SAFETY ADULT - FALL  Goal: Free from fall injury  Description: INTERVENTIONS:  - Assess pt frequently for physical needs  - Identify cognitive and physical deficits and behaviors that affect risk of falls.  - [place] fall precautions as indicated by assessment.  - Educate pt/family on patient safety including physical limitations  - Instruct pt to call for assistance with activity based on assessment  - Modify  environment to reduce risk of injury  - Provide assistive devices as appropriate  - Consider OT/PT consult to assist with strengthening/mobility  - Encourage toileting schedule  8/19/2024 1856 by Isa Man RN  Outcome: Adequate for Discharge  8/19/2024 1855 by Isa Man RN  Outcome: Not Progressing  8/19/2024 1855 by Isa Man RN  Outcome: Progressing     Problem: GASTROINTESTINAL - ADULT  Goal: Minimal or absence of nausea and vomiting  Description: INTERVENTIONS:  - Maintain adequate hydration with IV or PO as ordered and tolerated  - Nasogastric tube to low intermittent suction as ordered  - Evaluate effectiveness of ordered antiemetic medications  - Provide nonpharmacologic comfort measures as appropriate  - Advance diet as tolerated, if ordered  - Obtain nutritional consult as needed  - Evaluate fluid balance  8/19/2024 1856 by Isa Man RN  Outcome: Adequate for Discharge  8/19/2024 1855 by Isa Man RN  Outcome: Not Progressing  8/19/2024 1855 by Isa Man RN  Outcome: Progressing  Goal: Maintains or returns to baseline bowel function  Description: INTERVENTIONS:  - Assess bowel function  - Maintain adequate hydration with IV or PO as ordered and tolerated  - Evaluate effectiveness of GI medications  - Encourage mobilization and activity  - Obtain nutritional consult as needed  - Establish a toileting routine/schedule  - Consider collaborating with pharmacy to review patient's medication profile  8/19/2024 1856 by Isa Man RN  Outcome: Adequate for Discharge  8/19/2024 1855 by Isa Man RN  Outcome: Not Progressing  8/19/2024 1855 by Isa Man RN  Outcome: Progressing  Goal: Maintains adequate nutritional intake (undernourished)  Description: INTERVENTIONS:  - Monitor percentage of each meal consumed  - Identify factors contributing to decreased intake, treat as appropriate  - Assist with meals as needed  - Monitor I&O, WT and  lab values  - Obtain nutritional consult as needed  - Optimize oral hygiene and moisture  - Encourage food from home; allow for food preferences  - Enhance eating environment  8/19/2024 1856 by Isa Man RN  Outcome: Adequate for Discharge  8/19/2024 1855 by Isa Man RN  Outcome: Not Progressing  8/19/2024 1855 by Isa Man RN  Outcome: Progressing     Problem: RESPIRATORY - ADULT  Goal: Achieves optimal ventilation and oxygenation  Description: INTERVENTIONS:  - Assess for changes in respiratory status  - Assess for changes in mentation and behavior  - Position to facilitate oxygenation and minimize respiratory effort  - Oxygen supplementation based on oxygen saturation or ABGs  - Provide Smoking Cessation handout, if applicable  - Encourage broncho-pulmonary hygiene including cough, deep breathe, Incentive Spirometry  - Assess the need for suctioning and perform as needed  - Assess and instruct to report SOB or any respiratory difficulty  - Respiratory Therapy support as indicated  - Manage/alleviate anxiety  - Monitor for signs/symptoms of CO2 retention  8/19/2024 1856 by Isa Man RN  Outcome: Adequate for Discharge  8/19/2024 1855 by Isa Man RN  Outcome: Not Progressing  8/19/2024 1855 by Isa Man RN  Outcome: Progressing

## 2024-08-20 VITALS
WEIGHT: 91.06 LBS | BODY MASS INDEX: 14.29 KG/M2 | TEMPERATURE: 98 F | RESPIRATION RATE: 16 BRPM | HEIGHT: 67 IN | OXYGEN SATURATION: 95 % | DIASTOLIC BLOOD PRESSURE: 47 MMHG | HEART RATE: 89 BPM | SYSTOLIC BLOOD PRESSURE: 96 MMHG

## 2024-08-20 LAB
GLUCOSE BLDC GLUCOMTR-MCNC: 109 MG/DL (ref 70–99)
GLUCOSE BLDC GLUCOMTR-MCNC: 121 MG/DL (ref 70–99)
GLUCOSE BLDC GLUCOMTR-MCNC: 142 MG/DL (ref 70–99)

## 2024-08-20 PROCEDURE — 99239 HOSP IP/OBS DSCHRG MGMT >30: CPT | Performed by: HOSPITALIST

## 2024-08-20 RX ORDER — ASPIRIN 81 MG/1
81 TABLET ORAL DAILY
Qty: 30 TABLET | Refills: 1 | Status: ON HOLD | OUTPATIENT
Start: 2024-08-21

## 2024-08-20 RX ORDER — ATORVASTATIN CALCIUM 10 MG/1
10 TABLET, FILM COATED ORAL NIGHTLY
Qty: 30 TABLET | Refills: 1 | Status: ON HOLD | OUTPATIENT
Start: 2024-08-20

## 2024-08-20 RX ORDER — MIDODRINE HYDROCHLORIDE 5 MG/1
5 TABLET ORAL 3 TIMES DAILY
Qty: 90 TABLET | Refills: 1 | Status: ON HOLD | OUTPATIENT
Start: 2024-08-20

## 2024-08-20 RX ORDER — PANTOPRAZOLE SODIUM 40 MG/1
40 TABLET, DELAYED RELEASE ORAL DAILY
Qty: 30 TABLET | Refills: 1 | Status: ON HOLD | OUTPATIENT
Start: 2024-08-20

## 2024-08-20 NOTE — CM/SW NOTE
08/20/24 1500   Discharge disposition   Expected discharge disposition Home-Health   Post Acute Care Provider Other  ([facility])   DME/Infusion Providers [facility]   Discharge transportation Private car     The patient received a MDO for discharge.    The patient is reserved with [facility] for HH.    The patient is also reserved wit [facility] for bolus tube feedings.  Per [facility] at [facility] The patient's bolus tube feed supplies and formula was delivered today 8/20.  The patient also states that a delivery was made today.    The patient will be transported home via private car.    The patient has no questions or concerns at this time.    SW/CM to remain available for support and/or discharge planning.     Makayla Merchant MSW, LSW  Discharge Planner [id number]

## 2024-08-20 NOTE — DISCHARGE SUMMARY
[facility]  part of [facility]    Discharge Summary    Hortencia Kelley Patient Status:  Inpatient    10/2/1945 MRN [id number]   Location [facility] 3W/SW Attending Wilton Rosa MD   Hosp Day # 13 PCP No primary care provider on file.     Date of Admission: 2024 Disposition:   Home with [facility]     Date of Discharge:   2024     Admitting Diagnosis:   NSTEMI (non-ST elevated myocardial infarction) (Formerly [facility]) [I21.4]    Hospital Discharge Diagnoses:    NSTEMI type II   Takotsubo cardiomyopathy  Hypotension  Esophageal squamous cell carcinoma causing obstruction  Dysphagia to solids  Wt loss  Obstructing esophageal mass    Squamous cell ca  Hypocalcemia   AECOPD  H/o Left vocal cord paralysis  Anemia       Problem List:     Patient Active Problem List   Diagnosis    Anemia    Osteopenia    Multinodular goiter    Anemia, unspecified type    Alcoholic intoxication without complication (HCC)    Closed 3-part fracture of proximal humerus, right, sequela    Injury of head, initial encounter    Episodic mood disorder (Formerly [facility])    Pathological fracture of right humerus due to age-related osteoporosis with routine healing    Fall at home    NSTEMI (non-ST elevated myocardial infarction) (Formerly [facility])    Esophageal mass       Physical Exam:      BP 96/47 (BP Location: Right arm)   Pulse 78   Temp 98.4 °F (36.9 °C) (Oral)   Resp 16   Ht 5' 7\" (1.702 m)   Wt 91 lb 0.8 oz (41.3 kg)   SpO2 95%   BMI 14.26 kg/m²     Gen:   NAD.  A and O x 3  Body mass index is 15.21 kg/m².   CV:   RRR, no m/g/r  Pulm:   CTA bilat  Abd:   +bs, soft, NT, ND  LE:   No c/c/e  Neuro:   nonfocal      Reason for Admission:   Cough, chest pain    History of Present Illness:   Patient is a 78-year-old  female who came in to the emergency department for evaluation of persistent cough associated with chest pain. EKG initially showed ST ischemic changes in the inferior leads. Repeat EKG changes have resolved. Troponin  was 1223. CBC showed white blood cell count of 14.6 with left shift. Chemistry was unremarkable. Chest x-ray showed granulomatous disease without radiographic evidence, acute intrathoracic process. CT scan of the chest. PE protocol still pending. Patient was initiated on IV heparin in the emergency room, and given aspirin. She will be admitted to the hospital for further management.     Hospital Course:     NSTEMI type II - elevated troponin and new cardiomyopathy  Takotsubo cardiomyopathy  Hypotension  - cardiology on consult - signed off  - cards f/u  - echo EF - 20-25%, akinesis of apical wall  - cardiac cath without CAD suggesting takotsubo  - was on heparin drip stopped  - cont asa.   - started and then stopped entresto and toprol due to BP  - optimized for procedures/surgery per cardiology  - does not need diuretic     Esophageal squamous cell carcinoma causing obstruction  Dysphagia to solids  Wt loss  - GI on consult  - EGD 8/10 - obstructing esophageal mass s/p biopsies. Was firm and friable with spontaneous oozing.   - full liquid diet  - surgical oncology was consulted - outpt f/u  - pathology - +squamous cell ca  - No bronchoesophageal fistula per bronch per Dr. Quispe    - POD #6 s/p G-tube placement by IR    - was given IVF  - nutrition consult for TFs  TFs at home     Low BP  Persists despite TFs, IVF, po intake.  - started midodrine 5 mg TID     Hypocalcemia - resolved     AECOPD  H/o Left vocal cord paralysis  - pulm was on consult  - cont nebs  - off steroids     Anemia  - malignancy w/u as above     dvt proph:    aspirin     Code status:    Full       Consultations:   IR, GI, pulmonology, cardiology, surgical oncology    Discharge Condition:  Good    Discharge Medications:      Discharge Medications        START taking these medications        Instructions Prescription details   aspirin 81 MG Tbec  Start taking on: August 21, 2024      Take 1 tablet (81 mg total) by mouth daily.   Quantity: 30  tablet  Refills: 1     atorvastatin 10 MG Tabs  Commonly known as: Lipitor      Take 1 tablet (10 mg total) by mouth nightly.   Quantity: 30 tablet  Refills: 1     midodrine 5 MG Tabs  Commonly known as: ProAmatine      Take 1 tablet (5 mg total) by mouth in the morning and 1 tablet (5 mg total) at noon and 1 tablet (5 mg total) in the evening.   Quantity: 90 tablet  Refills: 1     pantoprazole 40 MG Tbec  Commonly known as: Protonix      Take 1 tablet (40 mg total) by mouth daily.   Quantity: 30 tablet  Refills: 1            STOP taking these medications      albuterol 108 (90 Base) MCG/ACT Aers  Commonly known as: Ventolin HFA        predniSONE 20 MG Tabs  Commonly known as: Deltasone                  Where to Get Your Medications        These medications were sent to [id number] - [place], [place] - [address] AT [facility] [address], [phone], [phone] [address], [facility] [address]      Phone: [phone]   aspirin 81 MG Tbec  atorvastatin 10 MG Tabs  midodrine 5 MG Tabs  pantoprazole 40 MG Tbec         Follow up Visits:     Follow-up Information       Fady Blanca DO Follow up in 1 week(s).    Specialty: Interventional, Cardiology  Why: Office will call you to schedule follow up  Contact information:  [address]  [facility] [address]  [phone]               Nabor Gomez MD. Schedule an appointment as soon as possible for a visit in 1 week(s).    Specialty: Surgical Oncology  Contact information:  [address] [facility] [address]  [phone]               Prakash Quispe DO. Schedule an appointment as soon as possible for a visit in 2 week(s).    Specialty: PULMONARY DISEASES  Contact information:  [address] [facility]  [address]  [facility] [address]  [phone]                             Hospital Discharge Diagnoses:  Esophageal mass    Lace+ Score: 72  59-90 High Risk  29-58 Medium Risk  0-28   Low Risk.    TCM Follow-Up Recommendation:   LACE > 58: High Risk of readmission after discharge from the hospital.    >35 minutes spent preparing this discharge.    Wilton Burleson MD  8/20/2024  3:58 PM

## 2024-08-20 NOTE — PLAN OF CARE
Aox4. Room air. Self. Bolus feeds per PEG tube. PO midodrine. Discharge home with [facility]HC after tube feed education.

## 2024-08-20 NOTE — PLAN OF CARE
PT alert and oriented x4. Pt on room air. Primary RN educated pt on bolus feedings. Pt ambulating SBA. Plan for discharge. Discharge RN educated pt on discharge instructions.

## 2024-08-20 NOTE — PLAN OF CARE
Patient was provided with discharge instructions, education, and follow up information. Prescriptions were already sent electronically to patient's pharmacy. Patient verbalizes understanding of follow up information, specifically Cardiology, Hematology/Oncology, Pulmonology, and PCP. Patient has no questions after reviewing all instructions and will be going home.     Les CORNEJO, Discharge Leader [id number]

## 2024-08-21 NOTE — PAYOR COMM NOTE
Discharge Notification    Patient Name: BENJAMIN BONDS  Payor: [employer]  Subscriber #: [id number]  Authorization Number: [id number]  Admit Date/Time: 8/7/2024 9:07 AM  Discharge Date/Time: 8/20/2024 4:45 PM

## 2024-08-22 ENCOUNTER — HOSPITAL ENCOUNTER (INPATIENT)
Facility: HOSPITAL | Age: 79
LOS: 2 days | Discharge: HOSPICE | End: 2024-08-25
Attending: STUDENT IN AN ORGANIZED HEALTH CARE EDUCATION/TRAINING PROGRAM | Admitting: FAMILY MEDICINE
Payer: MEDICARE

## 2024-08-22 ENCOUNTER — TELEPHONE (OUTPATIENT)
Dept: HEMATOLOGY/ONCOLOGY | Facility: HOSPITAL | Age: 79
End: 2024-08-22

## 2024-08-22 ENCOUNTER — APPOINTMENT (OUTPATIENT)
Dept: CT IMAGING | Facility: HOSPITAL | Age: 79
End: 2024-08-22
Attending: STUDENT IN AN ORGANIZED HEALTH CARE EDUCATION/TRAINING PROGRAM
Payer: MEDICARE

## 2024-08-22 ENCOUNTER — APPOINTMENT (OUTPATIENT)
Dept: GENERAL RADIOLOGY | Facility: HOSPITAL | Age: 79
End: 2024-08-22
Attending: STUDENT IN AN ORGANIZED HEALTH CARE EDUCATION/TRAINING PROGRAM
Payer: MEDICARE

## 2024-08-22 ENCOUNTER — TELEPHONE (OUTPATIENT)
Dept: SURGERY | Facility: CLINIC | Age: 79
End: 2024-08-22

## 2024-08-22 DIAGNOSIS — J96.01 ACUTE RESPIRATORY FAILURE WITH HYPOXIA (HCC): Primary | ICD-10-CM

## 2024-08-22 DIAGNOSIS — J18.9 COMMUNITY ACQUIRED PNEUMONIA, UNSPECIFIED LATERALITY: ICD-10-CM

## 2024-08-22 DIAGNOSIS — J44.1 COPD EXACERBATION (HCC): ICD-10-CM

## 2024-08-22 DIAGNOSIS — I42.9 CARDIOMYOPATHY, UNSPECIFIED TYPE (HCC): ICD-10-CM

## 2024-08-22 DIAGNOSIS — J81.0 ACUTE PULMONARY EDEMA (HCC): ICD-10-CM

## 2024-08-22 LAB
ANION GAP SERPL CALC-SCNC: 3 MMOL/L (ref 0–18)
APTT PPP: 24.6 SECONDS (ref 23–36)
BASOPHILS # BLD AUTO: 0.04 X10(3) UL (ref 0–0.2)
BASOPHILS NFR BLD AUTO: 0.2 %
BUN BLD-MCNC: 21 MG/DL (ref 9–23)
BUN/CREAT SERPL: 29.6 (ref 10–20)
CALCIUM BLD-MCNC: 9.2 MG/DL (ref 8.7–10.4)
CHLORIDE SERPL-SCNC: 102 MMOL/L (ref 98–112)
CO2 SERPL-SCNC: 31 MMOL/L (ref 21–32)
CREAT BLD-MCNC: 0.71 MG/DL
DEPRECATED RDW RBC AUTO: 46 FL (ref 35.1–46.3)
EGFRCR SERPLBLD CKD-EPI 2021: 88 ML/MIN/1.73M2 (ref 60–?)
EOSINOPHIL # BLD AUTO: 0.1 X10(3) UL (ref 0–0.7)
EOSINOPHIL NFR BLD AUTO: 0.6 %
ERYTHROCYTE [DISTWIDTH] IN BLOOD BY AUTOMATED COUNT: 14 % (ref 11–15)
GLUCOSE BLD-MCNC: 202 MG/DL (ref 70–99)
HCT VFR BLD AUTO: 31.6 %
HGB BLD-MCNC: 10.2 G/DL
IMM GRANULOCYTES # BLD AUTO: 0.09 X10(3) UL (ref 0–1)
IMM GRANULOCYTES NFR BLD: 0.5 %
INR BLD: 0.95 (ref 0.8–1.2)
LYMPHOCYTES # BLD AUTO: 4.67 X10(3) UL (ref 1–4)
LYMPHOCYTES NFR BLD AUTO: 27.4 %
MCH RBC QN AUTO: 29.3 PG (ref 26–34)
MCHC RBC AUTO-ENTMCNC: 32.3 G/DL (ref 31–37)
MCV RBC AUTO: 90.8 FL
MONOCYTES # BLD AUTO: 1.67 X10(3) UL (ref 0.1–1)
MONOCYTES NFR BLD AUTO: 9.8 %
NEUTROPHILS # BLD AUTO: 10.47 X10 (3) UL (ref 1.5–7.7)
NEUTROPHILS # BLD AUTO: 10.47 X10(3) UL (ref 1.5–7.7)
NEUTROPHILS NFR BLD AUTO: 61.5 %
NT-PROBNP SERPL-MCNC: 1083 PG/ML (ref ?–450)
OSMOLALITY SERPL CALC.SUM OF ELEC: 291 MOSM/KG (ref 275–295)
PLATELET # BLD AUTO: 374 10(3)UL (ref 150–450)
POTASSIUM SERPL-SCNC: 4.4 MMOL/L (ref 3.5–5.1)
PROTHROMBIN TIME: 13.3 SECONDS (ref 11.6–14.8)
RBC # BLD AUTO: 3.48 X10(6)UL
SODIUM SERPL-SCNC: 136 MMOL/L (ref 136–145)
TROPONIN I SERPL HS-MCNC: 19 NG/L
WBC # BLD AUTO: 17 X10(3) UL (ref 4–11)

## 2024-08-22 PROCEDURE — 0BH17EZ INSERTION OF ENDOTRACHEAL AIRWAY INTO TRACHEA, VIA NATURAL OR ARTIFICIAL OPENING: ICD-10-PCS | Performed by: STUDENT IN AN ORGANIZED HEALTH CARE EDUCATION/TRAINING PROGRAM

## 2024-08-22 PROCEDURE — 5A09357 ASSISTANCE WITH RESPIRATORY VENTILATION, LESS THAN 24 CONSECUTIVE HOURS, CONTINUOUS POSITIVE AIRWAY PRESSURE: ICD-10-PCS | Performed by: STUDENT IN AN ORGANIZED HEALTH CARE EDUCATION/TRAINING PROGRAM

## 2024-08-22 PROCEDURE — 99223 1ST HOSP IP/OBS HIGH 75: CPT | Performed by: FAMILY MEDICINE

## 2024-08-22 PROCEDURE — 71045 X-RAY EXAM CHEST 1 VIEW: CPT | Performed by: STUDENT IN AN ORGANIZED HEALTH CARE EDUCATION/TRAINING PROGRAM

## 2024-08-22 RX ORDER — ALBUTEROL SULFATE 5 MG/ML
10 SOLUTION RESPIRATORY (INHALATION) ONCE
Status: COMPLETED | OUTPATIENT
Start: 2024-08-22 | End: 2024-08-22

## 2024-08-22 RX ORDER — METHYLPREDNISOLONE SODIUM SUCCINATE 125 MG/2ML
125 INJECTION, POWDER, LYOPHILIZED, FOR SOLUTION INTRAMUSCULAR; INTRAVENOUS ONCE
Status: COMPLETED | OUTPATIENT
Start: 2024-08-22 | End: 2024-08-22

## 2024-08-22 RX ORDER — FUROSEMIDE 10 MG/ML
40 INJECTION INTRAMUSCULAR; INTRAVENOUS ONCE
Status: COMPLETED | OUTPATIENT
Start: 2024-08-22 | End: 2024-08-22

## 2024-08-22 NOTE — TELEPHONE ENCOUNTER
Spoke to patient and patient's son regarding hospital follow-up. Reviewed with Dr. Gomez and recommends patient to follow-up with oncology. Dr. Ortiz's information given. Patient's son states they will call and schedule appointment. All questions answered, advised to call back for any future questions or concerns. Patient verbalized understanding.

## 2024-08-22 NOTE — TELEPHONE ENCOUNTER
Pt is calling to schedule a new consult appt.    New Consult-   Referred by-Hospital doctor at [place]  Reason-Esophagus cancer  Insurance-Medicare Advantage    Please give pt a call back [phone]. Thank you.

## 2024-08-23 ENCOUNTER — TELEPHONE (OUTPATIENT)
Dept: HEMATOLOGY/ONCOLOGY | Facility: HOSPITAL | Age: 79
End: 2024-08-23

## 2024-08-23 ENCOUNTER — APPOINTMENT (OUTPATIENT)
Dept: CV DIAGNOSTICS | Facility: HOSPITAL | Age: 79
End: 2024-08-23
Attending: NURSE PRACTITIONER
Payer: MEDICARE

## 2024-08-23 ENCOUNTER — APPOINTMENT (OUTPATIENT)
Dept: GENERAL RADIOLOGY | Facility: HOSPITAL | Age: 79
End: 2024-08-23
Attending: STUDENT IN AN ORGANIZED HEALTH CARE EDUCATION/TRAINING PROGRAM
Payer: MEDICARE

## 2024-08-23 ENCOUNTER — APPOINTMENT (OUTPATIENT)
Dept: CT IMAGING | Facility: HOSPITAL | Age: 79
End: 2024-08-23
Attending: STUDENT IN AN ORGANIZED HEALTH CARE EDUCATION/TRAINING PROGRAM
Payer: MEDICARE

## 2024-08-23 PROBLEM — J96.01 ACUTE RESPIRATORY FAILURE WITH HYPOXIA (HCC): Status: ACTIVE | Noted: 2024-08-23

## 2024-08-23 PROBLEM — J18.9 COMMUNITY ACQUIRED PNEUMONIA, UNSPECIFIED LATERALITY: Status: ACTIVE | Noted: 2024-08-23

## 2024-08-23 PROBLEM — J44.1 COPD EXACERBATION (HCC): Status: ACTIVE | Noted: 2024-08-23

## 2024-08-23 PROBLEM — I42.9 CARDIOMYOPATHY, UNSPECIFIED TYPE (HCC): Status: ACTIVE | Noted: 2024-08-23

## 2024-08-23 PROBLEM — J81.0 ACUTE PULMONARY EDEMA (HCC): Status: ACTIVE | Noted: 2024-08-23

## 2024-08-23 LAB
ADENOVIRUS PCR:: NOT DETECTED
ATRIAL RATE: 117 BPM
B PARAPERT DNA SPEC QL NAA+PROBE: NOT DETECTED
B PERT DNA SPEC QL NAA+PROBE: NOT DETECTED
C PNEUM DNA SPEC QL NAA+PROBE: NOT DETECTED
CORONAVIRUS 229E PCR:: NOT DETECTED
CORONAVIRUS HKU1 PCR:: NOT DETECTED
CORONAVIRUS NL63 PCR:: NOT DETECTED
CORONAVIRUS OC43 PCR:: NOT DETECTED
FLUAV RNA SPEC QL NAA+PROBE: NOT DETECTED
FLUBV RNA SPEC QL NAA+PROBE: NOT DETECTED
GLUCOSE BLDC GLUCOMTR-MCNC: 159 MG/DL (ref 70–99)
GLUCOSE BLDC GLUCOMTR-MCNC: 169 MG/DL (ref 70–99)
GLUCOSE BLDC GLUCOMTR-MCNC: 180 MG/DL (ref 70–99)
GLUCOSE BLDC GLUCOMTR-MCNC: 304 MG/DL (ref 70–99)
L PNEUMO AG UR QL: NEGATIVE
LACTATE SERPL-SCNC: 1.4 MMOL/L (ref 0.5–2)
MAGNESIUM SERPL-MCNC: 2.2 MG/DL (ref 1.6–2.6)
METAPNEUMOVIRUS PCR:: NOT DETECTED
MRSA DNA SPEC QL NAA+PROBE: NEGATIVE
MYCOPLASMA PNEUMONIA PCR:: NOT DETECTED
P AXIS: 85 DEGREES
P-R INTERVAL: 180 MS
PARAINFLUENZA 1 PCR:: NOT DETECTED
PARAINFLUENZA 2 PCR:: NOT DETECTED
PARAINFLUENZA 3 PCR:: NOT DETECTED
PARAINFLUENZA 4 PCR:: NOT DETECTED
Q-T INTERVAL: 306 MS
QRS DURATION: 76 MS
QTC CALCULATION (BEZET): 426 MS
R AXIS: -4 DEGREES
RHINOVIRUS/ENTERO PCR:: NOT DETECTED
RSV RNA SPEC QL NAA+PROBE: NOT DETECTED
SARS-COV-2 RNA NPH QL NAA+NON-PROBE: DETECTED
T AXIS: 81 DEGREES
TRIGL SERPL-MCNC: 103 MG/DL (ref 30–149)
VENTRICULAR RATE: 117 BPM

## 2024-08-23 PROCEDURE — 5A1945Z RESPIRATORY VENTILATION, 24-96 CONSECUTIVE HOURS: ICD-10-PCS | Performed by: HOSPITALIST

## 2024-08-23 PROCEDURE — XW033E5 INTRODUCTION OF REMDESIVIR ANTI-INFECTIVE INTO PERIPHERAL VEIN, PERCUTANEOUS APPROACH, NEW TECHNOLOGY GROUP 5: ICD-10-PCS | Performed by: HOSPITALIST

## 2024-08-23 PROCEDURE — 93306 TTE W/DOPPLER COMPLETE: CPT | Performed by: NURSE PRACTITIONER

## 2024-08-23 PROCEDURE — 71045 X-RAY EXAM CHEST 1 VIEW: CPT | Performed by: STUDENT IN AN ORGANIZED HEALTH CARE EDUCATION/TRAINING PROGRAM

## 2024-08-23 PROCEDURE — 99291 CRITICAL CARE FIRST HOUR: CPT | Performed by: INTERNAL MEDICINE

## 2024-08-23 PROCEDURE — 71260 CT THORAX DX C+: CPT | Performed by: STUDENT IN AN ORGANIZED HEALTH CARE EDUCATION/TRAINING PROGRAM

## 2024-08-23 PROCEDURE — 99233 SBSQ HOSP IP/OBS HIGH 50: CPT | Performed by: HOSPITALIST

## 2024-08-23 RX ORDER — KETAMINE HYDROCHLORIDE 50 MG/ML
1 INJECTION, SOLUTION INTRAMUSCULAR; INTRAVENOUS ONCE
Status: COMPLETED | OUTPATIENT
Start: 2024-08-23 | End: 2024-08-23

## 2024-08-23 RX ORDER — SENNOSIDES 8.8 MG/5ML
10 LIQUID ORAL NIGHTLY PRN
Status: DISCONTINUED | OUTPATIENT
Start: 2024-08-23 | End: 2024-08-25

## 2024-08-23 RX ORDER — CHLORHEXIDINE GLUCONATE ORAL RINSE 1.2 MG/ML
15 SOLUTION DENTAL
Status: DISCONTINUED | OUTPATIENT
Start: 2024-08-23 | End: 2024-08-24

## 2024-08-23 RX ORDER — MIDAZOLAM HYDROCHLORIDE 5 MG/ML
5 INJECTION, SOLUTION INTRAMUSCULAR; INTRAVENOUS ONCE
Status: COMPLETED | OUTPATIENT
Start: 2024-08-23 | End: 2024-08-23

## 2024-08-23 RX ORDER — NICOTINE POLACRILEX 4 MG
15 LOZENGE BUCCAL
Status: DISCONTINUED | OUTPATIENT
Start: 2024-08-23 | End: 2024-08-25

## 2024-08-23 RX ORDER — DEXTROSE MONOHYDRATE 25 G/50ML
50 INJECTION, SOLUTION INTRAVENOUS
Status: DISCONTINUED | OUTPATIENT
Start: 2024-08-23 | End: 2024-08-25

## 2024-08-23 RX ORDER — BISACODYL 10 MG
10 SUPPOSITORY, RECTAL RECTAL
Status: DISCONTINUED | OUTPATIENT
Start: 2024-08-23 | End: 2024-08-25

## 2024-08-23 RX ORDER — SODIUM CHLORIDE 9 MG/ML
75 INJECTION, SOLUTION INTRAVENOUS CONTINUOUS
Status: DISCONTINUED | OUTPATIENT
Start: 2024-08-23 | End: 2024-08-23

## 2024-08-23 RX ORDER — ONDANSETRON 2 MG/ML
4 INJECTION INTRAMUSCULAR; INTRAVENOUS EVERY 6 HOURS PRN
Status: DISCONTINUED | OUTPATIENT
Start: 2024-08-23 | End: 2024-08-25

## 2024-08-23 RX ORDER — DOBUTAMINE HYDROCHLORIDE 200 MG/100ML
INJECTION INTRAVENOUS CONTINUOUS
Status: DISCONTINUED | OUTPATIENT
Start: 2024-08-23 | End: 2024-08-25

## 2024-08-23 RX ORDER — ACETAMINOPHEN 160 MG/5ML
650 SOLUTION ORAL EVERY 4 HOURS PRN
Status: DISCONTINUED | OUTPATIENT
Start: 2024-08-23 | End: 2024-08-25

## 2024-08-23 RX ORDER — MIDAZOLAM HYDROCHLORIDE 1 MG/ML
5 INJECTION INTRAMUSCULAR; INTRAVENOUS ONCE
Status: DISCONTINUED | OUTPATIENT
Start: 2024-08-23 | End: 2024-08-23

## 2024-08-23 RX ORDER — ACETAMINOPHEN 10 MG/ML
15 INJECTION, SOLUTION INTRAVENOUS EVERY 6 HOURS PRN
Status: DISCONTINUED | OUTPATIENT
Start: 2024-08-23 | End: 2024-08-25

## 2024-08-23 RX ORDER — ACETAMINOPHEN 650 MG/1
650 SUPPOSITORY RECTAL EVERY 4 HOURS PRN
Status: DISCONTINUED | OUTPATIENT
Start: 2024-08-23 | End: 2024-08-25

## 2024-08-23 RX ORDER — NITROGLYCERIN 20 MG/100ML
INJECTION INTRAVENOUS CONTINUOUS
Status: DISCONTINUED | OUTPATIENT
Start: 2024-08-23 | End: 2024-08-23

## 2024-08-23 RX ORDER — SENNOSIDES 8.6 MG
17.2 TABLET ORAL NIGHTLY PRN
Status: DISCONTINUED | OUTPATIENT
Start: 2024-08-23 | End: 2024-08-25

## 2024-08-23 RX ORDER — HEPARIN SODIUM 5000 [USP'U]/ML
5000 INJECTION, SOLUTION INTRAVENOUS; SUBCUTANEOUS EVERY 8 HOURS SCHEDULED
Status: DISCONTINUED | OUTPATIENT
Start: 2024-08-23 | End: 2024-08-25

## 2024-08-23 RX ORDER — BENZONATATE 200 MG/1
200 CAPSULE ORAL 3 TIMES DAILY PRN
Status: DISCONTINUED | OUTPATIENT
Start: 2024-08-23 | End: 2024-08-25

## 2024-08-23 RX ORDER — ACETAMINOPHEN 325 MG/1
650 TABLET ORAL EVERY 4 HOURS PRN
Status: DISCONTINUED | OUTPATIENT
Start: 2024-08-23 | End: 2024-08-25

## 2024-08-23 RX ORDER — KETAMINE HYDROCHLORIDE 50 MG/ML
INJECTION, SOLUTION INTRAMUSCULAR; INTRAVENOUS
Status: COMPLETED
Start: 2024-08-23 | End: 2024-08-23

## 2024-08-23 RX ORDER — FUROSEMIDE 10 MG/ML
40 INJECTION INTRAMUSCULAR; INTRAVENOUS
Status: DISCONTINUED | OUTPATIENT
Start: 2024-08-23 | End: 2024-08-24

## 2024-08-23 RX ORDER — NICOTINE POLACRILEX 4 MG
30 LOZENGE BUCCAL
Status: DISCONTINUED | OUTPATIENT
Start: 2024-08-23 | End: 2024-08-25

## 2024-08-23 RX ORDER — SODIUM PHOSPHATE, DIBASIC AND SODIUM PHOSPHATE, MONOBASIC 7; 19 G/230ML; G/230ML
1 ENEMA RECTAL ONCE AS NEEDED
Status: DISCONTINUED | OUTPATIENT
Start: 2024-08-23 | End: 2024-08-25

## 2024-08-23 RX ORDER — POLYETHYLENE GLYCOL 3350 17 G/17G
17 POWDER, FOR SOLUTION ORAL DAILY PRN
Status: DISCONTINUED | OUTPATIENT
Start: 2024-08-23 | End: 2024-08-25

## 2024-08-23 RX ORDER — ACETAMINOPHEN 500 MG
500 TABLET ORAL EVERY 4 HOURS PRN
Status: DISCONTINUED | OUTPATIENT
Start: 2024-08-23 | End: 2024-08-23 | Stop reason: ALTCHOICE

## 2024-08-23 RX ORDER — MIDAZOLAM HYDROCHLORIDE 5 MG/ML
INJECTION, SOLUTION INTRAMUSCULAR; INTRAVENOUS
Status: COMPLETED
Start: 2024-08-23 | End: 2024-08-23

## 2024-08-23 RX ORDER — POLYETHYLENE GLYCOL 3350 17 G/17G
17 POWDER, FOR SOLUTION ORAL DAILY PRN
Status: DISCONTINUED | OUTPATIENT
Start: 2024-08-23 | End: 2024-08-23

## 2024-08-23 RX ORDER — METOCLOPRAMIDE HYDROCHLORIDE 5 MG/ML
10 INJECTION INTRAMUSCULAR; INTRAVENOUS EVERY 8 HOURS PRN
Status: DISCONTINUED | OUTPATIENT
Start: 2024-08-23 | End: 2024-08-24

## 2024-08-23 RX ORDER — SODIUM BICARBONATE 650 MG/1
325 TABLET ORAL AS NEEDED
Status: DISCONTINUED | OUTPATIENT
Start: 2024-08-23 | End: 2024-08-25

## 2024-08-23 NOTE — ED INITIAL ASSESSMENT (HPI)
Pt arrives via EMS w/co difficulty breathing. Pt given NEB en route via EMS with some relief, upon arrival pt noted to be in respiratory distress with notable WOB, and use of accessory muscles, pt 87% on RA and placed on 6L of 02 via NC. Pt noted to have expiratory and inspiratory wheezing in all upon auscultation, pt's SPO2 increased to 100% on 6L 02. Pt endorsed she started to have increased difficulty breathing x 2 hrs PTA, pt denies any RAD and does not need home 02. Pt endorses she was recently dx with throat CA, currently not receiving treatment. Pt endorses she was recently seen in the ED with same c/c.   ED provider at bedside upon pt's arrival for assessment.

## 2024-08-23 NOTE — RESPIRATORY THERAPY NOTE
Patient intubated and received from ER. Suction provided as needed, bilateral bs, no acute events or changes made overnight, RT will continue to monitor.         08/23/24 7899   Vent Information   Interface Invasive   Vent Type O   Vent plugged into main power? Yes   Vent Mode VC/AC   Settings   FiO2 (%) 40 %   Resp Rate (Set) 16   Vt (Set, mL) 450 mL   Waveform Decelerating ramp   PEEP/CPAP (cm H2O) 5 cm H20   Insp Time (sec) 1 sec   Trigger Sensitivity Flow (L/min) 2 L/min   Humidification Heat and moisture exchanger   Readings   Total RR 16   Minute Ventilation (L/min) 6.6 L/min   Expiratory Tidal Volume 416 mL   PIP Observed (cm H2O) 26 cm H2O   MAP (cm H2O) 10   I/E Ratio 1:2.7   Plateau Pressure (cm H2O) 20 cm H2O   Static Compliance (L/cm H2O) 31

## 2024-08-23 NOTE — CONSULTS
[facility]  part of [facility]    Report of Consultation    Hortencia Kelley Patient Status:  Inpatient    10/2/1945 MRN [id number]   Location [facility] 2W/SW Attending Landen Akins MD   Hosp Day # 0 PCP No primary care provider on file.     Date of Admission:  2024    Reason for Consultation:   Acute respiratory failure    History of Present Illness:   Patient is a 78-year-old female with history of COPD, recently diagnosed esophageal squamous cell carcinoma who presented with chief complaint of respiratory distress sudden onset in nature.  Placed on BiPAP during ED course without significant improvement.  Intubated.  Received steroids and Lasix without improvement.  Worsening mental status noted.  Currently intubated, sedated.  On pressor support with Zeb-Synephrine.  Unable to obtain 12 point review of system secondary patient's current clinical condition    Past Medical History  Past Medical History:    Cancer (HCC)    COPD (chronic obstructive pulmonary disease) (HCC)    Depression    ETOH abuse    Falls frequently    2017    History of blood transfusion    [facility]        Past Surgical History  Past Surgical History:   Procedure Laterality Date    Hip replacement surgery Left        Family History  Family History   Problem Relation Age of Onset    Heart Disorder Father     Dementia Mother     Dementia Sister        Social History  Social History     Socioeconomic History    Marital status:    Tobacco Use    Smoking status: Former     Current packs/day: 0.00     Average packs/day: 0.5 packs/day for 40.0 years (20.0 ttl pk-yrs)     Types: Cigarettes     Start date: 1978     Quit date: 2018     Years since quittin.1    Smokeless tobacco: Never   Vaping Use    Vaping status: Never Used   Substance and Sexual Activity    Alcohol use: Not Currently     Alcohol/week: 3.0 standard drinks of alcohol     Types: 3 Glasses of wine per week     Comment: social     Drug use: No           Current Medications:  Current Facility-Administered Medications   Medication Dose Route Frequency    piperacillin-tazobactam (Zosyn) 3.375 g in dextrose 5% 100 mL IVPB-ADDV  3.375 g Intravenous Q8H    acetaminophen (Tylenol Extra Strength) tab 500 mg  500 mg Oral Q4H PRN    sennosides (Senokot) tab 17.2 mg  17.2 mg Oral Nightly PRN    benzonatate (Tessalon) cap 200 mg  200 mg Oral TID PRN    heparin (Porcine) 5000 UNIT/ML injection 5,000 Units  5,000 Units Subcutaneous Q8H DEB    ondansetron (Zofran) 4 MG/2ML injection 4 mg  4 mg Intravenous Q6H PRN    metoclopramide (Reglan) 5 mg/mL injection 10 mg  10 mg Intravenous Q8H PRN    propofol (Diprivan) 10 mg/mL infusion premix  5-50 mcg/kg/min Intravenous Continuous    DOBUTamine in dextrose 5% (Dobutrex) 500 mg/250mL infusion premix  2-20 mcg/kg/min (Dosing Weight) Intravenous Continuous    phenylephrine (Zeb-Synephrine) 50 mg in sodium chloride 0.9% 250 mL infusion   mcg/min Intravenous Continuous    fentaNYL (Sublimaze) 50 mcg/mL injection 25 mcg  25 mcg Intravenous Q30 Min PRN    Or    fentaNYL (Sublimaze) 50 mcg/mL injection 50 mcg  50 mcg Intravenous Q30 Min PRN    acetaminophen (Tylenol) tab 650 mg  650 mg Oral Q4H PRN    Or    acetaminophen (Tylenol) 160 MG/5ML oral liquid 650 mg  650 mg Oral Q4H PRN    Or    acetaminophen (Tylenol) rectal suppository 650 mg  650 mg Rectal Q4H PRN    Or    acetaminophen (Ofirmev) 10 mg/mL infusion premix 600 mg  15 mg/kg Intravenous Q6H PRN    polyethylene glycol (PEG 3350) (Miralax) 17 g oral packet 17 g  17 g Oral Daily PRN    senna (Senokot) 8.8 MG/5ML oral syrup 17.6 mg  10 mL Oral Nightly PRN    bisacodyl (Dulcolax) 10 MG rectal suppository 10 mg  10 mg Rectal Daily PRN    fleet enema (Fleet) 7-19 GM/118ML rectal enema 133 mL  1 enema Rectal Once PRN    chlorhexidine gluconate (Peridex) 0.12 % oral solution 15 mL  15 mL Mouth/Throat BID@0800,2000    pantoprazole (Protonix) 40 mg in sodium  chloride 0.9% PF 10 mL IV push  40 mg Intravenous QAM AC    furosemide (Lasix) 10 mg/mL injection 40 mg  40 mg Intravenous BID (Diuretic)     Medications Prior to Admission   Medication Sig    aspirin 81 MG Oral Tab EC Take 1 tablet (81 mg total) by mouth daily.    atorvastatin 10 MG Oral Tab Take 1 tablet (10 mg total) by mouth nightly.    midodrine 5 MG Oral Tab Take 1 tablet (5 mg total) by mouth in the morning and 1 tablet (5 mg total) at noon and 1 tablet (5 mg total) in the evening.    pantoprazole 40 MG Oral Tab EC Take 1 tablet (40 mg total) by mouth daily.       Allergies  No Known Allergies    Review of Systems:   Unable to obtain secondary to patient's current clinical condition      Physical Exam:   Blood pressure 113/64, pulse 66, temperature (!) 96.2 °F (35.7 °C), temperature source Temporal, resp. rate 16, weight 91 lb 0.8 oz (41.3 kg), SpO2 100%, not currently breastfeeding.    Constitutional: no acute distress intubated, sedated  Eyes: PERRL  ENT: nares patent  Neck: neck supple, no JVD  Cardio: RRR, S1 S2  Respiratory: Basilar Rales  GI: abdomen soft, non tender, active bowel souds, no organomegaly  Extremities: no clubbing, cyanosis, edema  Neurologic: no gross motor deficits  Skin: warm, dry    Results:   Laboratory Data  Lab Results   Component Value Date    WBC 17.0 (H) 08/22/2024    HGB 10.2 (L) 08/22/2024    HCT 31.6 (L) 08/22/2024    .0 08/22/2024    CREATSERUM 0.71 08/22/2024    BUN 21 08/22/2024     08/22/2024    K 4.4 08/22/2024     08/22/2024    CO2 31.0 08/22/2024     (H) 08/22/2024    CA 9.2 08/22/2024    ALB 3.0 (L) 08/16/2024    ALKPHO 108 07/26/2024    TP 7.4 07/26/2024    AST 13 07/26/2024    ALT <7 (L) 07/26/2024    PTT 24.6 08/22/2024    INR 0.95 08/22/2024    PTP 13.3 08/22/2024    T4F 1.2 07/26/2024    TSH 0.282 (L) 07/26/2024    MG 2.2 08/22/2024    B12 170 (L) 06/27/2020    ETOH 378 (H) 06/23/2020         Imaging  XR CHEST AP PORTABLE   (CPT=71045)    Result Date: 8/23/2024  CONCLUSION:  1. The nasogastric tube appears to be coiled in the inferior hypopharynx; retraction repositioning are advised.  2. Endotracheal tube in satisfactory position.  3. Small right pleural effusion associated basilar atelectasis, with or without superimposed pneumonia.  4. Pulmonary interstitial edema with a relative basilar predominance, potentially developing alveolar edema.  5. Cardiomegaly with mild pulmonary vascular congestion.   6. Sequela of remote granulomatous disease.    A preliminary report was issued by the [facility] teleradiology service. There are no major discrepancies.   Dictated by (CST): Roberto Hilario MD on 8/23/2024 at 7:44 AM     Finalized by (CST): Roberto Hilario MD on 8/23/2024 at 7:46 AM          XR CHEST AP PORTABLE  (CPT=71045)    Result Date: 8/23/2024  CONCLUSION:  1. Interstitial opacities with development of bibasilar ground-glass airspace disease. These findings may reflect pulmonary interstitial and developing alveolar edema, with or without superimposed infectious process.  2. Cardiomegaly with mild pulmonary vascular congestion.  3. Sequela of remote granulomatous disease.   A preliminary report was issued by the [facility] teleradiology service. There are no major discrepancies.   Dictated by (CST): Roberto Hilario MD on 8/23/2024 at 7:03 AM     Finalized by (CST): Roberto Hilario MD on 8/23/2024 at 7:07 AM           Assessment   1.  Acute hypoxemic respiratory failure  2.  Esophageal squamous cell carcinoma  3.  Left vocal cord paralysis  4.  Possible pneumonia  5.  COPD  6.  Tracheal stenosis  7.  Shock state  8.  Leukocytosis  9.  Takotsubo cardiomyopathy    Plan   -Patient presents with sudden onset respiratory distress worsening hypoxia altered mental status despite steroid and diuresis in emergency department.  Intubated emergency department on 8/23/2024  -CT chest on 8/23/2024 with evidence of esophageal mass  with significant tracheal narrowing seen.  Lower lobe groundglass basilar opacities and consolidation.  Concern for possible edema and/or infectious process.  -Attempt to wean pressor support with Zeb-Synephrine  -Agreeable with empiric IV antibiotic therapy at this time  -Full ventilator support at this time.  Very careful manipulation of endotracheal tube positioning appears to be adequate at this time.  Patient with significant tracheal narrowing from compression of esophageal mass.  -Eval by cardiology.  Await 2D echo results  -Diuresis as tolerated  -IV steroids and gradually wean  -DVT prophylaxis: Heparin  -Updated patient's son on her clinical condition.  Son states he has not had any specific discussions about goals of care with patient recently but family has been preparing for worsening medical decline.  Remains full code for now.    Critical care time spent 35 minutes    Prakash Quispe DO  Pulmonary Critical Care Medicine  [facility]  8/23/2024  9:38 AM

## 2024-08-23 NOTE — ED QUICK NOTES
Report called to Yamilka CORNEJO on CCU  Pt transported to CT and to CCU from CT, with RT and ER TECH, pt stable at this time.
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
## 2024-08-23 NOTE — PAYOR COMM NOTE
--------------  ADMISSION REVIEW     Payor: [employer]  Subscriber #:  [id number]  Authorization Number: [id number]    Admit date: 8/23/24  Admit time:  2:13 AM       REVIEW DOCUMENTATION:     ED Provider Notes            HPI    78 year old female presents by EMS with respiratory distress.  Reported tonight she developed sudden onset trouble breathing, increased work of breathing.  EMS noted patient be hypoxic and wheezing, given neb.  She has a history of COPD, prior smoker, esophageal squamous cell carcinoma, recent admission for NSTEMI and Takotsubo's cardiomyopathy.  She does not take any diuretics.  She has a slight cough but denies fevers or congestion.  No chest pain at this time.      Physical Exam     ED Triage Vitals [08/22/24 2201]   BP (!) 161/110   Pulse 118   Resp (!) 32   Temp 98.7 °F (37.1 °C)   Temp src Temporal   SpO2 (!) 87 %   O2 Device None (Room air)       Physical Exam  Constitutional:       General: She is in acute distress.   Eyes:      Extraocular Movements: Extraocular movements intact.   Neck:      Comments: Hoarse voice  Cardiovascular:      Rate and Rhythm: Tachycardia present.      Pulses: Normal pulses.   Pulmonary:      Effort: Respiratory distress present.      Breath sounds: Wheezing and rales present.   Abdominal:      Comments: gt   Musculoskeletal:         General: No swelling or deformity.      Cervical back: Normal range of motion.   Skin:     General: Skin is warm.   Neurological:      General: No focal deficit present.      Mental Status: She is alert.              ED Course     Labs Reviewed   BASIC METABOLIC PANEL (8) - Abnormal; Notable for the following components:       Result Value    Glucose 202 (*)     BUN/CREA Ratio 29.6 (*)     All other components within normal limits   CBC WITH DIFFERENTIAL WITH PLATELET - Abnormal; Notable for the following components:    WBC 17.0 (*)     RBC 3.48 (*)     HGB 10.2 (*)     HCT 31.6 (*)     Neutrophil Absolute Prelim 10.47  (*)     Neutrophil Absolute 10.47 (*)     Lymphocyte Absolute 4.67 (*)     Monocyte Absolute 1.67 (*)     All other components within normal limits   PRO BETA NATRIURETIC PEPTIDE - Abnormal; Notable for the following components:    Pro-Beta Natriuretic Peptide 1,083 (*)     All other components within normal limits         MDM     Vitals:    08/22/24 2245 08/22/24 2330 08/23/24 0059 08/23/24 0100   BP: 149/80 (!) 160/98 (!) 78/50 (!) 77/50   Pulse: 108 (!) 127 110 117   Resp: (!) 29 (!) 29 16 16   Temp:       TempSrc:       SpO2: 100% 94% 99% 98%   Weight:           Significantly increased work of breathing, respiratory distress.  Sudden onset a bit concerning for PE.  Patient does have underlying COPD and recent cardiomyopathy with low EF.  She has wheezes and crackles.  Mixed picture possible COPD/heart failure exacerbation  Trial hour-long nebulization and steroids.    ED Course as of 08/23/24 0103  ------------------------------------------------------------  Time: 08/22 2210  Comment: EKG interpretation by me: EKG sinus rhythm at a rate of 117, axis nomal, no concerning acute ischemic ST changes, septal infarct changes appears similar to prior EKGs    ------------------------------------------------------------  Time: 08/22 2258  Comment: My interpretation of x-ray with lower lobe opacities concerning for PNA/edema.  Labs with leukocytosis, elevated BNP.  Troponin negative  ------------------------------------------------------------  Time: 08/22 2323  Comment: Reassessment after nebulizer therapy, wheezing is improving, patient subjectively does not appear improved, she still tachypneic with increased work of breathing.  Will send for CT angiogram, start antibiotics for pneumonia coverage and placed on BiPAP.  ------------------------------------------------------------  Time: 08/22 8822  Comment: IMPRESSION:  1. Interval development of mild to moderate pulmonary edema.  2. Interval development of mild to  moderate groundglass airspace disease in bilateral lower lobes, right greater than left, nonspecific, may be related to pulmonary edema and/or pneumonia.    ------------------------------------------------------------  Time: 08/22 2332  Comment: Diuresis started. Bp reassuring.  ------------------------------------------------------------  Time: 08/22 2348  Comment: Consulted with pulmonary and cardiology.  Patient will be admitted to PCU.  ------------------------------------------------------------  Time: 08/23 0020  Comment: Patient's work of breathing continues to decline despite BiPAP use.  Patient becoming more altered and less responsive.  Decision made to intubate.  ------------------------------------------------------------  Time: 08/23 0020  Comment: Significant frothy secretions noted upon intubation, patient's blood pressure is not substantially elevated but will start patient on nitroglycerin drip for pulmonary edema.  ------------------------------------------------------------  Time: 08/23 0030  Comment: Given nitroglycerin drip will sedate with midazolam to help preserve blood pressure at this time.  Pulmonary/critical care updated.   ------------------------------------------------------------  Time: 08/23 0102  Comment: Patient's blood pressure quite low soon after starting nitroglycerin.  Nitro stopped.  Patient continues to have low blood pressure.  Will start phenylephrine given she remains tachycardic.     Cath 8/9:  Coronary Angiography  RCA:  Dominant and free of obstructive disease, supplies PDA and PL     Left main:  Free of obstructive disease     Left anterior descending:  Free of obstructive disease, supplies multiple diagonals which are non-obstructive     Circumflex:  Free of obstructive disease, supplies multiple OM branches which are patent    Tte 8/7:  Left ventricle:  The cavity size was normal. Wall thickness was normal.   Systolic function was markedly reduced. The estimated  ejection fraction was   20-25%       Critical Care Note  65 minutes of my time was spent engaged in work directly related to patient care, exclusive of other procedures, to prevent further deterioration of patient's condition.  Addressed impending deterioration including: airway, respiratory, cardiovascular, metabolic  Interpretation of CXR, cardiac output, EKG, BP  Response to treatments and reassessment of patient  Speaking with consultants  Performed by self      PROCEDURE: Endotracheal Intubation  Performed by self.    PROCEDURE SUMMARY:      A time out was performed confirming correct patient. The patient was placed on a cardiac monitor including continuous pulse oximetry. Rapid Sequence Intubation was conducted. RSI meds per MAR. O2 was given up until introduction of the laryngoscope.   Using a glidescope and a size 7.5 endotracheal tube with stylet, the patient was intubated on the 1st attempt. The stylet was removed and cuff balloon was inflated. Appropriate endotracheal tube position was confirmed by direct visualization of vocal cord passage, fogging of the tube, CO2 colometric indicator and symmetric breath sounds. The tube was secured. The patient tolerated the procedure without any hemodynamic compromise. Post intubation chest x-ray is pending at this time.      Disposition and Plan     Clinical Impression:  1. Acute respiratory failure with hypoxia (HCC)    2. Acute pulmonary edema (HCC)    3. Cardiomyopathy, unspecified type (HCC)    4. COPD exacerbation (HCC)    5. Community acquired pneumonia, unspecified laterality        8/23 PULMONARY:    CCU Acceptance Note:     78F hx Esophageal Ca/Dysphagia sp PEG Tube, Stress Cardiomyopathy w/ last EF 20-25%, Hypotension on midodrine, COPD, Vocal Cord Paralysis who presented from home via EMS due to respiratory distress. Sudden onset tonight associated with cough. In the ER, was started on BiPAP d/t increased WOB. Hour-long breathing tx, steroids, and 40 mg  IV Lasix were administered. Despite these interventions, mental status worsened and pt eventually intubated. Had some post-intubation hypotension and was started on low dose peripheral phenylephrine.      She arrives sedated, mechanically ventilated, and unable to give history. There is no family at the bedside.      Objective:  VS reviewed  Gen: Intubated, sedated, no distress  HEENT: pupils mid-range, reactive to light, dried blood rt nare d/t NGT insertion attempt  Neck: No JVD  CV: SR, regular, no murmur  Pulm: ventilated, diminished bs b/l, no wheeze, basilar rales  Abd: PEG tube projecting from LUQ, nondistended  Ext: moves all four ext:  Neuro: sedated, awakens easily RAAS -1 Neuro exam grossly nonfocal     A/P  78F admitted to CCU for sudden respiratory distress, failing BiPAP d/t worsening mental status, requiring endotracheal intubation.               -Suspect primarily d/t Pulmonary Edema in the setting of HFrEF d/t takotsubo cardiomyopathy, in light of elevated BNP, Rales, pulmonary edema, and no fever. Difficult to excluded associated PNA and certainly high risk for aspiration, and history of sudden onset dyspnea difficult to corrobate given no family available. PE in differential - tolerating positive pressure quite well so large central PE somewhat less likely - CTPE performed just prior to transfer to CCU. Labs remarkable for BNP 1,083, normal troponin, WBC 17k     Neuro:   -On versed gtt for sedation  -Fentanyl pushes prn for pain     CV  #Hypotension  #Takotsubo cardiomyopathy  #Pulmonary Edema  -Continue peripheral vasopressors - cards to switch phenylephrine for dobutamine  -S/p 40mg IV lasix x1 in ER, will diurese as pressure allows  -Did not tolerate nitroglycerin gtt d/t hypotension  -Consider restarting midodrine when able  -Hold GDMT in light of hypotension     Pulm:  #Acute hypoxic/hypercapneic respiratory failure  #COPD   #Possible Nosocomial pneumonia  #Left Vocal Cord Paralysis                - Continue full vent-support               -CTPE with pulmonary edema, suspected associated aspiration PNA, no PE               -Received zoysn in ER, continue zosyn q8h for now               -Nebulizers PRN                 ID:  #Healthcare associated PNA:  -Continue zoysn q8h  -Follow up Bcx   -Septic but normal LA, hypotensive state likely 2/2 chronic hypotension and positive pressure, not a good candidate for large volume IVF bolus d/t severe cardiomyopathy        ENT:   #Esophageal SQCC  #Extrinsic Tracheal Compression               -With associated esophageal obstruction, additionally notable is significant extrinsic compression of the trachea due to mass. The ETT presently terminates just above the level of the narrowing. Presently, patient is tolerating vent well, so will observe this closely overnight, and consider further ENT or surgical intervention if necessary. Concern that advancing ETT blindly and resultant airway trauma could precipitate airway obstruction.      On call [employer] 08/23/24  Ricky Palacios MD                 MEDICATIONS ADMINISTERED IN LAST 1 DAY:  albuterol (Ventolin) (5 MG/ML) 0.5% nebulizer solution 10 mg       Date Action Dose Route User    8/22/2024 2219 Given 10 mg Nebulization Ximena Lancaster RCP          DOBUTamine in dextrose 5% (Dobutrex) 500 mg/250mL infusion premix       Date Action Dose Route User    8/23/2024 0316 New Bag 2.5 mcg/kg/min × 41.3 kg (Dosing Weight) Intravenous Beatriz Gallardo RN          fentaNYL (Sublimaze) 50 mcg/mL injection 25 mcg       Date Action Dose Route User    8/23/2024 0504 Given 25 mcg Intravenous Beatriz Gallardo RN    8/23/2024 0303 Given 25 mcg Intravenous Beatriz Gallardo RN          furosemide (Lasix) 10 mg/mL injection 40 mg       Date Action Dose Route User    8/22/2024 2341 Given 40 mg Intravenous Yoly Lujan RN          heparin (Porcine) 5000 UNIT/ML injection 5,000 Units       Date Action Dose Route User    8/23/2024  0532 Given 5,000 Units Subcutaneous (Right Lower Abdomen) Beatriz Gallardo RN          iopamidol 76% (ISOVUE-370) injection for power injector       Date Action Dose Route User    8/23/2024 0152 Given 70 mL Intravenous IttKarson lopez M          ipratropium (Atrovent) 0.02 % nebulizer solution 1 mg       Date Action Dose Route User    8/22/2024 2219 Given 1 mg Nebulization Ximena Lancaster RCP          ketamine (Ketalar) 50 MG/ML injection 41.5 mg       Date Action Dose Route User    8/23/2024 0010 Given 41.5 mg Intravenous Yoly Lujan RN          ketamine (Ketalar) 50 MG/ML injection       Date Action Dose Route User    8/23/2024 0010 Given 41.5 mg Intravenous Yoly Lujan RN          methylPREDNISolone sodium succinate (Solu-MEDROL) injection 125 mg       Date Action Dose Route User    8/22/2024 2340 Given 125 mg Intravenous Yoly Lujan RN          midazolam (PF) (Versed) 10 mg/2mL injection       Date Action Dose Route User    8/23/2024 0032 Given 5 mg Intravenous Yoly Lujan RN          midazolam (PF) (Versed) 10 mg/2mL injection 5 mg       Date Action Dose Route User    8/23/2024 0032 Given 5 mg Intravenous Yoly Lujan RN          midazolam (Versed) 50 mg in sodium chloride 0.9% 100 mL infusion       Date Action Dose Route User    8/23/2024 0227 Rate/Dose Change 0.1 mg/kg/hr × 41.3 kg Intravenous Beatriz Gallardo RN    8/23/2024 0121 Rate/Dose Change 0.145 mg/kg/hr × 41.3 kg Intravenous Yoly Lujan RN    8/23/2024 0020 New Bag 0.036 mg/kg/hr × 41.3 kg Intravenous Yoly Lujan RN          nitroGLYCERIN in dextrose 5% 50 mg/250mL infusion premix       Date Action Dose Route User    8/23/2024 0018 New Bag 20 mcg/min Intravenous Yoly Lujan RN          phenylephrine (Zeb-Synephrine) 50 mg in sodium chloride 0.9% 250 mL infusion       Date Action Dose Route User    8/23/2024 0120 Rate/Dose Change 100 mcg/min Intravenous Yoly Lujan, RN    8/23/2024 0115 New Bag  50 mcg/min Intravenous Yoly Lujan RN          phenylephrine (Zeb-Synephrine) 50 mg in sodium chloride 0.9% 250 mL infusion       Date Action Dose Route User    8/23/2024 0430 Rate/Dose Change 100 mcg/min Intravenous Beatriz Gallardo RN          piperacillin-tazobactam (Zosyn) 3.375 g in dextrose 5% 100 mL IVPB-ADDV       Date Action Dose Route User    8/23/2024 0426 New Bag 3.375 g Intravenous Beatriz Gallardo RN          piperacillin-tazobactam (Zosyn) 4.5 g in dextrose 5% 100 mL IVPB-ADDV       Date Action Dose Route User    8/22/2024 2341 New Bag 4.5 g Intravenous Yoly Lujan RN          propofol (Diprivan) 10 mg/mL infusion premix       Date Action Dose Route User    8/23/2024 0311 New Bag 10 mcg/kg/min × 41.3 kg Intravenous Beatriz Gallardo RN          succinylcholine (Anectine) 20 MG/ML injection 41.4 mg       Date Action Dose Route User    8/23/2024 0010 Given 41.4 mg Intravenous Yoly Lujan RN            Vitals (last day)       Date/Time Temp Pulse Resp BP SpO2 Weight O2 Device O2 Flow Rate (L/min) [facility]    08/23/24 0800 -- 66 16 113/64 100 % -- Ventilator --     08/23/24 0630 -- 69 16 91/64 100 % -- -- --     08/23/24 0600 -- 74 16 97/68 100 % -- -- --     08/23/24 0530 -- 72 16 121/72 100 % -- -- --     08/23/24 0500 -- 77 16 108/74 100 % -- -- --     08/23/24 0430 -- 93 16 95/55 100 % -- -- --     08/23/24 0400 96.2 °F (35.7 °C) 97 16 86/55 100 % -- Ventilator --     08/23/24 0330 -- 73 16 103/70 100 % -- -- --     08/23/24 0300 -- 78 16 102/67 100 % -- -- --     08/23/24 0230 -- 81 16 90/62 100 % -- -- -- MJ    08/23/24 0222 -- -- -- -- -- 91 lb 0.8 oz (41.3 kg) -- -- MJ    08/23/24 0215 -- 84 16 83/61 100 % -- -- -- MJ    08/23/24 0200 96 °F (35.6 °C) 83 16 99/56 100 % -- Ventilator -- MJ    08/23/24 0133 -- 106 16 104/56 100 % -- -- -- AB    08/23/24 0130 -- 102 16 84/57 100 % -- -- -- AB    08/23/24 0115 -- 115 16 74/42 99 % -- -- -- AB    08/23/24 0100 -- 117 16  77/50 98 % -- -- -- AB    08/23/24 0059 -- 110 16 78/50 99 % -- -- -- AB    08/22/24 2330 -- 127 29 160/98 94 % -- -- -- AB    08/22/24 2245 -- 108 29 149/80 100 % -- Nasal cannula 6 L/min AB    08/22/24 2215 -- 116 36 168/91 99 % -- -- -- AB    08/22/24 2214 -- -- -- -- -- -- Nasal cannula 6 L/min AB    08/22/24 2201 98.7 °F (37.1 °C) 118 32 161/110 87 % 91 lb 0.8 oz (41.3 kg) None (Room air) -- AB

## 2024-08-23 NOTE — ED PROVIDER NOTES
History     Chief Complaint   Patient presents with    Difficulty Breathing       HPI    78 year old female presents by EMS with respiratory distress.  Reported tonight she developed sudden onset trouble breathing, increased work of breathing.  EMS noted patient be hypoxic and wheezing, given neb.  She has a history of COPD, prior smoker, esophageal squamous cell carcinoma, recent admission for NSTEMI and Takotsubo's cardiomyopathy.  She does not take any diuretics.  She has a slight cough but denies fevers or congestion.  No chest pain at this time.          Past Medical History:    Cancer (HCC)    COPD (chronic obstructive pulmonary disease) (HCC)    Depression    ETOH abuse    Falls frequently    2017    History of blood transfusion    [facility]        Past Surgical History:   Procedure Laterality Date    Hip replacement surgery Left        Social History     Socioeconomic History    Marital status:    Tobacco Use    Smoking status: Former     Current packs/day: 0.00     Average packs/day: 0.5 packs/day for 40.0 years (20.0 ttl pk-yrs)     Types: Cigarettes     Start date: 1978     Quit date: 2018     Years since quittin.1    Smokeless tobacco: Never   Vaping Use    Vaping status: Never Used   Substance and Sexual Activity    Alcohol use: Not Currently     Alcohol/week: 3.0 standard drinks of alcohol     Types: 3 Glasses of wine per week     Comment: social    Drug use: No     Social Determinants of Health     Food Insecurity: No Food Insecurity (2024)    Food Insecurity     Food Insecurity: Never true   Transportation Needs: Unknown (2024)    Transportation Needs     Lack of Transportation: Patient declined    Received from [facility]    Social Connections   Housing Stability: Unknown (2024)    Housing Stability     Housing Instability: Patient declined                   Physical Exam     ED Triage Vitals [24 2201]   BP (!) 161/110   Pulse 118   Resp  (!) 32   Temp 98.7 °F (37.1 °C)   Temp src Temporal   SpO2 (!) 87 %   O2 Device None (Room air)       Physical Exam  Constitutional:       General: She is in acute distress.   Eyes:      Extraocular Movements: Extraocular movements intact.   Neck:      Comments: Hoarse voice  Cardiovascular:      Rate and Rhythm: Tachycardia present.      Pulses: Normal pulses.   Pulmonary:      Effort: Respiratory distress present.      Breath sounds: Wheezing and rales present.   Abdominal:      Comments: gt   Musculoskeletal:         General: No swelling or deformity.      Cervical back: Normal range of motion.   Skin:     General: Skin is warm.   Neurological:      General: No focal deficit present.      Mental Status: She is alert.              ED Course     Labs Reviewed   BASIC METABOLIC PANEL (8) - Abnormal; Notable for the following components:       Result Value    Glucose 202 (*)     BUN/CREA Ratio 29.6 (*)     All other components within normal limits   CBC WITH DIFFERENTIAL WITH PLATELET - Abnormal; Notable for the following components:    WBC 17.0 (*)     RBC 3.48 (*)     HGB 10.2 (*)     HCT 31.6 (*)     Neutrophil Absolute Prelim 10.47 (*)     Neutrophil Absolute 10.47 (*)     Lymphocyte Absolute 4.67 (*)     Monocyte Absolute 1.67 (*)     All other components within normal limits   PRO BETA NATRIURETIC PEPTIDE - Abnormal; Notable for the following components:    Pro-Beta Natriuretic Peptide 1,083 (*)     All other components within normal limits   TROPONIN I HIGH SENSITIVITY - Normal   PROTHROMBIN TIME (PT) - Normal   PTT, ACTIVATED - Normal   LACTIC ACID, PLASMA   VENOUS BLOOD GAS   RAINBOW DRAW LAVENDER   RAINBOW DRAW LIGHT GREEN   RAINBOW DRAW GOLD   RAINBOW DRAW BLUE   BLOOD CULTURE   BLOOD CULTURE     No results found.        MDM     Vitals:    08/22/24 2245 08/22/24 2330 08/23/24 0059 08/23/24 0100   BP: 149/80 (!) 160/98 (!) 78/50 (!) 77/50   Pulse: 108 (!) 127 110 117   Resp: (!) 29 (!) 29 16 16   Temp:        [facility]:       SpO2: 100% 94% 99% 98%   Weight:           Significantly increased work of breathing, respiratory distress.  Sudden onset a bit concerning for PE.  Patient does have underlying COPD and recent cardiomyopathy with low EF.  She has wheezes and crackles.  Mixed picture possible COPD/heart failure exacerbation  Trial hour-long nebulization and steroids.    ED Course as of 08/23/24 0103  ------------------------------------------------------------  Time: 08/22 2210  Comment: EKG interpretation by me: EKG sinus rhythm at a rate of 117, axis nomal, no concerning acute ischemic ST changes, septal infarct changes appears similar to prior EKGs    ------------------------------------------------------------  Time: 08/22 8625  Comment: My interpretation of x-ray with lower lobe opacities concerning for PNA/edema.  Labs with leukocytosis, elevated BNP.  Troponin negative  ------------------------------------------------------------  Time: 08/22 5836  Comment: Reassessment after nebulizer therapy, wheezing is improving, patient subjectively does not appear improved, she still tachypneic with increased work of breathing.  Will send for CT angiogram, start antibiotics for pneumonia coverage and placed on BiPAP.  ------------------------------------------------------------  Time: 08/22 9014  Comment: IMPRESSION:  1. Interval development of mild to moderate pulmonary edema.  2. Interval development of mild to moderate groundglass airspace disease in bilateral lower lobes, right greater than left, nonspecific, may be related to pulmonary edema and/or pneumonia.    ------------------------------------------------------------  Time: 08/22 2332  Comment: Diuresis started. Bp reassuring.  ------------------------------------------------------------  Time: 08/22 2348  Comment: Consulted with pulmonary and cardiology.  Patient will be admitted to PCU.  ------------------------------------------------------------  Time:  08/23 0020  Comment: Patient's work of breathing continues to decline despite BiPAP use.  Patient becoming more altered and less responsive.  Decision made to intubate.  ------------------------------------------------------------  Time: 08/23 0020  Comment: Significant frothy secretions noted upon intubation, patient's blood pressure is not substantially elevated but will start patient on nitroglycerin drip for pulmonary edema.  ------------------------------------------------------------  Time: 08/23 0030  Comment: Given nitroglycerin drip will sedate with midazolam to help preserve blood pressure at this time.  Pulmonary/critical care updated.   ------------------------------------------------------------  Time: 08/23 0102  Comment: Patient's blood pressure quite low soon after starting nitroglycerin.  Nitro stopped.  Patient continues to have low blood pressure.  Will start phenylephrine given she remains tachycardic.     Cath 8/9:  Coronary Angiography  RCA:  Dominant and free of obstructive disease, supplies PDA and PL     Left main:  Free of obstructive disease     Left anterior descending:  Free of obstructive disease, supplies multiple diagonals which are non-obstructive     Circumflex:  Free of obstructive disease, supplies multiple OM branches which are patent    Tte 8/7:  Left ventricle:  The cavity size was normal. Wall thickness was normal.   Systolic function was markedly reduced. The estimated ejection fraction was   20-25%       Critical Care Note  65 minutes of my time was spent engaged in work directly related to patient care, exclusive of other procedures, to prevent further deterioration of patient's condition.  Addressed impending deterioration including: airway, respiratory, cardiovascular, metabolic  Interpretation of CXR, cardiac output, EKG, BP  Response to treatments and reassessment of patient  Speaking with consultants  Performed by self      PROCEDURE: Endotracheal Intubation  Performed  by self.    PROCEDURE SUMMARY:      A time out was performed confirming correct patient. The patient was placed on a cardiac monitor including continuous pulse oximetry. Rapid Sequence Intubation was conducted. RSI meds per MAR. O2 was given up until introduction of the laryngoscope.   Using a glidescope and a size 7.5 endotracheal tube with stylet, the patient was intubated on the 1st attempt. The stylet was removed and cuff balloon was inflated. Appropriate endotracheal tube position was confirmed by direct visualization of vocal cord passage, fogging of the tube, CO2 colometric indicator and symmetric breath sounds. The tube was secured. The patient tolerated the procedure without any hemodynamic compromise. Post intubation chest x-ray is pending at this time.      Disposition and Plan     Clinical Impression:  1. Acute respiratory failure with hypoxia (HCC)    2. Acute pulmonary edema (HCC)    3. Cardiomyopathy, unspecified type (HCC)    4. COPD exacerbation (HCC)    5. Community acquired pneumonia, unspecified laterality        Disposition:  Admit    Follow-up:  No follow-up provider specified.    Medications Prescribed:  Current Discharge Medication List          Hospital Problems       Present on Admission  Date Reviewed: 8/10/2024            ICD-10-CM Noted POA    * (Principal) Acute respiratory failure with hypoxia (HCC) J96.01 8/23/2024 Yes

## 2024-08-23 NOTE — PLAN OF CARE
Hortencia Kelley Patient Status:  Inpatient    10/2/1945 MRN [id number]   Location [facility] 2W/SW Attending Mary Elena MD   Hosp Day # 0 PCP No primary care provider on file.       Cardiology Nocturnal APN Note    Page Received: Dr. Michelle, ED Physician    HPI:     Patient is a 78 year old female with PMH of Takotsubo cardiomyopthy, COPD, combined systolic and diastolic dysfunction, depression,esophageal carcinoma and ETOH abuse who presented to the ED dyspnea and increased work of breathing. Per EMS, pt was noted to by hypoxic and wheezing. Pt was just discharged from [facility] for new NICM and Type II NSTEMI. Pt denied chest pain or any other symptoms. Work up in ED suggestive of fluid overload/pulmonary edema, pneumonia, and COPD exacerbation. [facility] consulted for pulmonary edema/acute on chronic CHF exacerbation. Echocardiogram completed on 2024 showed normal left ventricular cavity size and wall thickness. Markedly reduced systolic function. EF 20-25%. Grade I diastolic dysfunction. Coronary angiogram from 2024 showed no significant coronary artery disease. Takotsubo cardiomyopathy, and normal filling pressures. HPI obtained from chart review and information provided by ED physician.       Per primary RN in ICU, while in ED pt's respiratory status continued to deteriorate. Pt was subsequently intubated. Pt also became hypotensive and was started on pressors.         ED Clinical Course    EKG: Sinus tachycardia-. No acute ischemic changes    Labs: Troponin negative, BNP 1,083, WBC 17.0, Hgb 10.2    Imaging: CXR and CTA chest results unavailable for review.     Medications: Neb treatment, lasix, Solumedrol    Assessment/Plan:    - Titrate pressors as appropriate  - Continue diuresis  - Monitor I/O  - Continue to monitor overnight  - Formal cardiology consult to follow in AM.       MARQUIS Caldwell  [facility]  2024  4:01  AM

## 2024-08-23 NOTE — PLAN OF CARE
Pt received on ventilator. Versed and juan josé synephrine infusing. Pt moving non purposefully but unable to command. Vent per respiratory. Dr. Palacios at bedside to assess at pt arrival. Sedation switched to propofol w/ fentanyl pushes to manage pt pain. Hypotensive, juan josé gtt infusing. Cardiology APN paged and updated, dobutamine gtt started but with inc PVCs, juan josé gtt restarted w/ improvement in vitals.  PEG tube in place, site C/D/I and healing. Reyes in place for accurate UOP. Bilateral wrist restraints in place for maintenance of equipment and pt safety.     Pt unable to answer admission questions at this time. This RN attempted twice to contact both  and son per documented phone numbers in the chart with no response. All safety measures in place, frequent nursing rounds made.     Problem: Patient Centered Care  Goal: Patient preferences are identified and integrated in the patient's plan of care  Description: Interventions:  - What would you like us to know as we care for you?   - Provide timely, complete, and accurate information to patient/family  - Incorporate patient and family knowledge, values, beliefs, and cultural backgrounds into the planning and delivery of care  - Encourage patient/family to participate in care and decision-making at the level they choose  - Honor patient and family perspectives and choices  Outcome: Progressing     Problem: Safety Risk - Non-Violent Restraints  Goal: Patient will remain free from self-harm  Description: INTERVENTIONS:  - Apply the least restrictive restraint to prevent harm  - Notify patient and family of reasons restraints applied  - Assess for any contributing factors to confusion (electrolyte disturbances, delirium, medications)  - Discontinue any unnecessary medical devices as soon as possible  - Assess the patient's physical comfort, circulation, skin condition, hydration, nutrition and elimination needs   - Reorient and redirection as needed  - Assess for  the need to continue restraints  Outcome: Progressing     Problem: CARDIOVASCULAR - ADULT  Goal: Maintains optimal cardiac output and hemodynamic stability  Description: INTERVENTIONS:  - Monitor vital signs, rhythm, and trends  - Monitor for bleeding, hypotension and signs of decreased cardiac output  - Evaluate effectiveness of vasoactive medications to optimize hemodynamic stability  - Monitor arterial and/or venous puncture sites for bleeding and/or hematoma  - Assess quality of pulses, skin color and temperature  - Assess for signs of decreased coronary artery perfusion - ex. Angina  - Evaluate fluid balance, assess for edema, trend weights  Outcome: Progressing  Goal: Absence of cardiac arrhythmias or at baseline  Description: INTERVENTIONS:  - Continuous cardiac monitoring, monitor vital signs, obtain 12 lead EKG if indicated  - Evaluate effectiveness of antiarrhythmic and heart rate control medications as ordered  - Initiate emergency measures for life threatening arrhythmias  - Monitor electrolytes and administer replacement therapy as ordered  Outcome: Progressing     Problem: RESPIRATORY - ADULT  Goal: Achieves optimal ventilation and oxygenation  Description: INTERVENTIONS:  - Assess for changes in respiratory status  - Assess for changes in mentation and behavior  - Position to facilitate oxygenation and minimize respiratory effort  - Oxygen supplementation based on oxygen saturation or ABGs  - Provide Smoking Cessation handout, if applicable  - Encourage broncho-pulmonary hygiene including cough, deep breathe, Incentive Spirometry  - Assess the need for suctioning and perform as needed  - Assess and instruct to report SOB or any respiratory difficulty  - Respiratory Therapy support as indicated  - Manage/alleviate anxiety  - Monitor for signs/symptoms of CO2 retention  Outcome: Progressing

## 2024-08-23 NOTE — ED QUICK NOTES
Recent message history  View All Joe as unread  Status: Unread by pharmacy  Reason: Refill Request  Message: Please send to ED tube #[id number]- pt intubated need for sedation  Sent: Today 0024 by Yoly Lujan RN

## 2024-08-23 NOTE — CONSULTS
[facility]  Cardiology Consultation    Hortencia Kelley Patient Status:  Inpatient    10/2/1945 MRN [id number]   Location [facility] 2W/SW Attending Landen Akins MD   Hosp Day # 0 PCP No primary care provider on file.     Date of Admission:  2024  Date of Consult:  2024  Reason for Consultation:   This of breath    History of Present Illness:   Patient is a 78-year-old female with a history of recently diagnosed esophageal squamous cell carcinoma, Takotsubo cardiomyopathy with EF of 20-25% on 2024, COPD, alcohol abuse, who presents with respiratory distress.  Patient was admitted to the hospital few weeks ago where she was found to have esophageal squamous cell carcinoma, echocardiogram noted EF of 20-25% with diffuse apical hypokinesis; socially had a coronary angiogram which noted normal coronary arteries, findings consistent with possible Takotsubo cardiomyopathy.  She was started on GDMT including Entresto, metoprolol, and spironolactone, however unfortunately prior to discharge her blood pressure trended downward and was discharged off of her cardiac medications and on low-dose midodrine.  She was not on a diuretic at that time as she was clinically compensated.  Apparently EMS was called when she was found to be hypoxic and wheezing, on arrival she was hypertensive requiring IV nitroglycerin.  Subsequently intubated, sedated on propofol, now hypotensive on phenylephrine.    Lactate 1.4  Creatinine 0.71  proBNP 1083  WBC 17  Hemoglobin 10.2    CXR-cardiomegaly with pulmonary vascular congestion  ECG-sinus tachycardia, septal infarct, 117 bpm    Cardiac history:  Takotsubo cardiomyopathy, EF 20-25%  Esophageal squamous cell carcinoma  Alcohol abuse  COPD  HLD    Past Medical History  Past Medical History:    Cancer (HCC)    COPD (chronic obstructive pulmonary disease) (HCC)    Depression    ETOH abuse    Falls frequently    2017    History of blood transfusion     [facility]      Past Surgical History  Past Surgical History:   Procedure Laterality Date    Hip replacement surgery Left      Family History  Unable to obtain as patient intubated/sedated.  Family History   Problem Relation Age of Onset    Heart Disorder Father     Dementia Mother     Dementia Sister      Social History  Unable to obtain as patient intubated/sedated.  Pediatric History   Patient Parents    Not on file     Other Topics Concern    Not on file   Social History Narrative    Not on file         Current Medications:  Current Facility-Administered Medications   Medication Dose Route Frequency    nitroGLYCERIN in dextrose 5% 50 mg/250mL infusion premix  5-400 mcg/min Intravenous Continuous    midazolam (Versed) 50 mg in sodium chloride 0.9% 100 mL infusion  0.02-0.1 mg/kg/hr Intravenous Continuous    piperacillin-tazobactam (Zosyn) 3.375 g in dextrose 5% 100 mL IVPB-ADDV  3.375 g Intravenous Q8H    acetaminophen (Tylenol Extra Strength) tab 500 mg  500 mg Oral Q4H PRN    sennosides (Senokot) tab 17.2 mg  17.2 mg Oral Nightly PRN    benzonatate (Tessalon) cap 200 mg  200 mg Oral TID PRN    heparin (Porcine) 5000 UNIT/ML injection 5,000 Units  5,000 Units Subcutaneous Q8H DEB    ondansetron (Zofran) 4 MG/2ML injection 4 mg  4 mg Intravenous Q6H PRN    metoclopramide (Reglan) 5 mg/mL injection 10 mg  10 mg Intravenous Q8H PRN    propofol (Diprivan) 10 mg/mL infusion premix  5-50 mcg/kg/min Intravenous Continuous    DOBUTamine in dextrose 5% (Dobutrex) 500 mg/250mL infusion premix  2-20 mcg/kg/min (Dosing Weight) Intravenous Continuous    phenylephrine (Zeb-Synephrine) 50 mg in sodium chloride 0.9% 250 mL infusion   mcg/min Intravenous Continuous    fentaNYL (Sublimaze) 50 mcg/mL injection 25 mcg  25 mcg Intravenous Q30 Min PRN    Or    fentaNYL (Sublimaze) 50 mcg/mL injection 50 mcg  50 mcg Intravenous Q30 Min PRN    acetaminophen (Tylenol) tab 650 mg  650 mg Oral Q4H PRN    Or    acetaminophen (Tylenol)  160 MG/5ML oral liquid 650 mg  650 mg Oral Q4H PRN    Or    acetaminophen (Tylenol) rectal suppository 650 mg  650 mg Rectal Q4H PRN    Or    acetaminophen (Ofirmev) 10 mg/mL infusion premix 600 mg  15 mg/kg Intravenous Q6H PRN    polyethylene glycol (PEG 3350) (Miralax) 17 g oral packet 17 g  17 g Oral Daily PRN    senna (Senokot) 8.8 MG/5ML oral syrup 17.6 mg  10 mL Oral Nightly PRN    bisacodyl (Dulcolax) 10 MG rectal suppository 10 mg  10 mg Rectal Daily PRN    fleet enema (Fleet) 7-19 GM/118ML rectal enema 133 mL  1 enema Rectal Once PRN    chlorhexidine gluconate (Peridex) 0.12 % oral solution 15 mL  15 mL Mouth/Throat BID@0800,2000    pantoprazole (Protonix) 40 mg in sodium chloride 0.9% PF 10 mL IV push  40 mg Intravenous QAM AC     Medications Prior to Admission   Medication Sig    aspirin 81 MG Oral Tab EC Take 1 tablet (81 mg total) by mouth daily.    atorvastatin 10 MG Oral Tab Take 1 tablet (10 mg total) by mouth nightly.    midodrine 5 MG Oral Tab Take 1 tablet (5 mg total) by mouth in the morning and 1 tablet (5 mg total) at noon and 1 tablet (5 mg total) in the evening.    pantoprazole 40 MG Oral Tab EC Take 1 tablet (40 mg total) by mouth daily.     Allergies  No Known Allergies    Review of Systems:     Unable to obtain as patient intubated/sedated.    Physical Exam:   Patient Vitals for the past 24 hrs:   BP Temp Temp src Pulse Resp SpO2 Weight   08/23/24 0800 113/64 -- -- 66 16 100 % --   08/23/24 0630 91/64 -- -- 69 16 100 % --   08/23/24 0600 97/68 -- -- 74 16 100 % --   08/23/24 0530 121/72 -- -- 72 16 100 % --   08/23/24 0500 108/74 -- -- 77 16 100 % --   08/23/24 0430 95/55 -- -- 93 16 100 % --   08/23/24 0400 (!) 86/55 (!) 96.2 °F (35.7 °C) Temporal 97 16 100 % --   08/23/24 0330 103/70 -- -- 73 16 100 % --   08/23/24 0300 102/67 -- -- 78 16 100 % --   08/23/24 0230 90/62 -- -- 81 16 100 % --   08/23/24 0222 -- -- -- -- -- -- 91 lb 0.8 oz (41.3 kg)   08/23/24 0215 (!) 83/61 -- -- 84 16 100  % --   08/23/24 0200 99/56 (!) 96 °F (35.6 °C) Temporal 83 16 100 % --   08/23/24 0133 104/56 -- -- 106 16 100 % --   08/23/24 0130 (!) 84/57 -- -- 102 16 100 % --   08/23/24 0115 (!) 74/42 -- -- 115 16 99 % --   08/23/24 0100 (!) 77/50 -- -- 117 16 98 % --   08/23/24 0059 (!) 78/50 -- -- 110 16 99 % --   08/22/24 2330 (!) 160/98 -- -- (!) 127 (!) 29 94 % --   08/22/24 2245 149/80 -- -- 108 (!) 29 100 % --   08/22/24 2215 (!) 168/91 -- -- 116 (!) 36 99 % --   08/22/24 2201 (!) 161/110 98.7 °F (37.1 °C) Temporal 118 (!) 32 (!) 87 % 91 lb 0.8 oz (41.3 kg)     Intake/Output:   Last 3 shifts:   Intake/Output                   08/21/24 0700 - 08/22/24 0659 (Not Admitted) 08/22/24 0700 - 08/23/24 0659 08/23/24 0700 - 08/24/24 0659       Intake    I.V.  --  196.3  --    I.V. -- 42 --    Volume (mL) Propofol -- 6.1 --    Volume (mL) Phenylephrine -- 126.3 --    Volume (mL) Midazolam -- 21.9 --    Total Intake -- 196.3 --       Output    Urine  --  90  --    Output (mL) (Urethral Catheter) -- 90 --    Total Output -- 90 --       Net I/O     -- 106.3 --          Vent Settings: Vent Mode: VC/AC  FiO2 (%):  [40 %] 40 %  S RR:  [16] 16  S VT:  [450 mL] 450 mL  PEEP/CPAP (cm H2O):  [5 cm H20] 5 cm H20  MAP (cm H2O):  [10-13] 10    Scheduled Meds:    piperacillin-tazobactam  3.375 g Intravenous Q8H    heparin  5,000 Units Subcutaneous Q8H DEB    chlorhexidine gluconate  15 mL Mouth/Throat BID@0800,2000    pantoprazole  40 mg Intravenous QAM AC     Continuous Infusions:    nitroGLYCERIN in dextrose 5% Stopped (08/23/24 0100)    midazolam (Versed) 50 mg in sodium chloride 0.9% 100 mL infusion Stopped (08/23/24 0311)    propofol 10 mcg/kg/min (08/23/24 0311)    DOBUTamine Stopped (08/23/24 0429)    phenylephrine 100 mcg/min (08/23/24 0430)     General: Alert and oriented x 3. No apparent distress.   HEENT: Normocephalic, anicteric sclera, neck supple, no thyromegaly or adenopathy.  Neck: No JVD, carotids 2+, no bruits.  Cardiac:  Regular rate and rhythm. S1, S2 normal. No murmur, pericardial rub, S3, or extra cardiac sounds.  Lungs: Clear without wheezes, rales, rhonchi or dullness.  Normal excursions and effort.  Abdomen: Soft, non-tender. No organosplenomegally, mass or rebound, BS-present.  Extremities: Without clubbing or cyanosis. No edema.    Neurologic: Alert and oriented, normal affect. No focal defects  Skin: Warm and dry.     Results:   Laboratory Data:  Lab Results   Component Value Date    WBC 17.0 (H) 08/22/2024    HGB 10.2 (L) 08/22/2024    HCT 31.6 (L) 08/22/2024    .0 08/22/2024    CREATSERUM 0.71 08/22/2024    BUN 21 08/22/2024     08/22/2024    K 4.4 08/22/2024     08/22/2024    CO2 31.0 08/22/2024     (H) 08/22/2024    CA 9.2 08/22/2024    ALB 3.0 (L) 08/16/2024    ALKPHO 108 07/26/2024    TP 7.4 07/26/2024    AST 13 07/26/2024    ALT <7 (L) 07/26/2024    PTT 24.6 08/22/2024    INR 0.95 08/22/2024    PTP 13.3 08/22/2024    T4F 1.2 07/26/2024    TSH 0.282 (L) 07/26/2024    MG 2.2 08/22/2024    B12 170 (L) 06/27/2020    ETOH 378 (H) 06/23/2020         Recent Labs   Lab 08/18/24  0515 08/19/24  1258 08/22/24 2209   * 138* 202*   BUN 11 13 21   CREATSERUM 0.60 0.60 0.71   CA 8.1* 8.6* 9.2    138 136   K 4.6 4.1 4.4    102 102   CO2 33.0* 31.0 31.0     Recent Labs   Lab 08/22/24 2209   RBC 3.48*   HGB 10.2*   HCT 31.6*   MCV 90.8   MCH 29.3   MCHC 32.3   RDW 14.0   NEPRELIM 10.47*   WBC 17.0*   .0       No results for input(s): \"BNPML\" in the last 168 hours.    No results for input(s): \"TROP\", \"CK\" in the last 168 hours.    Imaging:  XR CHEST AP PORTABLE  (CPT=71045)    Result Date: 8/23/2024  CONCLUSION:  1. The nasogastric tube appears to be coiled in the inferior hypopharynx; retraction repositioning are advised.  2. Endotracheal tube in satisfactory position.  3. Small right pleural effusion associated basilar atelectasis, with or without superimposed pneumonia.  4.  Pulmonary interstitial edema with a relative basilar predominance, potentially developing alveolar edema.  5. Cardiomegaly with mild pulmonary vascular congestion.   6. Sequela of remote granulomatous disease.    A preliminary report was issued by the [facility] teleradiology service. There are no major discrepancies.   Dictated by (CST): Roberto Hilario MD on 8/23/2024 at 7:44 AM     Finalized by (CST): Roberto Hilario MD on 8/23/2024 at 7:46 AM          XR CHEST AP PORTABLE  (CPT=71045)    Result Date: 8/23/2024  CONCLUSION:  1. Interstitial opacities with development of bibasilar ground-glass airspace disease. These findings may reflect pulmonary interstitial and developing alveolar edema, with or without superimposed infectious process.  2. Cardiomegaly with mild pulmonary vascular congestion.  3. Sequela of remote granulomatous disease.   A preliminary report was issued by the [facility] teleradiology service. There are no major discrepancies.   Dictated by (CST): Roberto Hilario MD on 8/23/2024 at 7:03 AM     Finalized by (CST): Roberto Hilario MD on 8/23/2024 at 7:07 AM           Impression:   Takotsubo cardiomyopathy, EF 20-25%  - Presented with acute respiratory distress, findings of interstitial edema on chest x-ray and elevated proBNP.  - Cannot exclude the possibility of pneumonia/aspiration and currently covered with IV antibiotics  - Subsequently intubated, sedated; vent management per pulmonary  - Plan for chest CT throughout PE  - Repeat transthoracic echocardiogram  - Holding GDMT while intubated and hypotensive  - Continue Lasix 40 mg IV twice daily    COPD  - Empiric treatment for COPD exacerbation with IV antibiotics and steroids given presentation  - Management per pulmonary    Esophageal squamous cell carcinoma  - Management per primary service    HLD  - Hold statin while intubated/sedated    Thank you for allowing me to participate in the care of your patient.    Flako Juarez,  MD  [facility]  8/23/2024

## 2024-08-23 NOTE — RESPIRATORY THERAPY NOTE
Patient received intubated and on ventilator. AC/VC 16/450/+5/40%, weaned to 30%. No SBT this shift. Respiratory status stable and tolerating ventilator well, suctioned as needed.

## 2024-08-23 NOTE — DIETARY NOTE
ADULT NUTRITION INITIAL ASSESSMENT      RECOMMENDATIONS TO MD: See Nutrition Intervention for Enteral Nutrition ( EN) specifics     Pt is at high nutrition risk.  Pt meets moderate malnutrition criteria.      CRITERIA FOR MALNUTRITION DIAGNOSIS: Criteria for non-severe malnutrition diagnosis: acute illness/injury related to body fat mild depletion and muscle mass mild depletion.     ADMITTING DIAGNOSIS:  Acute pulmonary edema (HCC) [J81.0]  COPD exacerbation (HCC) [J44.1]  Acute respiratory failure with hypoxia (HCC) [J96.01]  Cardiomyopathy, unspecified type (HCC) [I42.9]  Community acquired pneumonia, unspecified laterality [J18.9]     PERTINENT PAST MEDICAL HISTORY:    Past Medical History:    Cancer (HCC)    COPD (chronic obstructive pulmonary disease) (HCC)    Depression    ETOH abuse    Falls frequently    12/27/2017    History of blood transfusion    EM     has a past surgical history that includes hip replacement surgery (Left).     PATIENT STATUS: Initial 08/23/24: Patient (pt) identified at Nutrition risk due to G-tube feeding nutrition support therapy, and Low BMI of 14.6 kg/m2 after the chart reviewed.  Received MD consult to initiate/manage EN.  Diagnosis & PMH above including recent dx of Esophageal squamous cell carcinoma, PEG placed on 8/14/24, was discharged on 8/20/24 with feeding tube regimen.   Medical findings:  CXR with interstitial edema, elevated proBNP, per MD.   Upon visit, pt intubated, sedated on small dose of propofol ( negligible 66 lipid calories), pressors, lasix. Recent Nutrition Assessment noted from 8/7/24 admit.  Physical exam deferred as recent past admit exam utilized. Diet hx/eval:    Pt was discharged on Bolus feeding. No family at bedside to obtain report. Wt eval:     Identified with h/o 11# or 10.8% significant wt loss from recent past Assessment wt eval. From 105# to 90#. This admit wt 91# appears to have maintained wt since 8/20/24 when pt was discharge here or 4 days  ago. Nutrition findings:    Resume EN via continuous regimen, minimum free water flushes given cardiac fluid status.         FOOD/NUTRITION INTAKE history:    Discharged on 8/20/24 with diet and EN regimen utilizing:  Full liq diet, low fiber  - Enteral Nutrition: Jevity 1.2 bolus: 1.5 cans @ 0800, 1200, 1600 and 1 can at 2000 for total of 5.5 cans daily. Provides 1568 kcal, 73 grams protein, 1051 ml total free water.  Water flushes 60 ml before and after each bolus (480 ml). Total water: 1531 ml daily.       Food Allergies: No Known Food Allergies (NKFA)  Cultural/Ethnic/Mandaen Preferences: Not Obtained    GASTROINTESTINAL: PEG/G-tube     MEDICATIONS: reviewed propofol at 2.5 ml/hr providing negligible lipid 66 kcal.     propofol 10 mcg/kg/min (08/23/24 1000)    DOBUTamine Stopped (08/23/24 0429)    phenylephrine 100 mcg/min (08/23/24 0430)    dextrose 10%        piperacillin-tazobactam  3.375 g Intravenous Q8H    heparin  5,000 Units Subcutaneous Q8H DEB    chlorhexidine gluconate  15 mL Mouth/Throat BID@0800,2000    pantoprazole  40 mg Intravenous QAM AC    furosemide  40 mg Intravenous BID (Diuretic)       LABS: reviewed proBNP 1083 (elevated) , BG elevated.   Last A1c value was 6.2% done 8/10/2024.    Recent Labs     08/22/24  2209   *   BUN 21   CREATSERUM 0.71   CA 9.2   MG 2.2      K 4.4      CO2 31.0   OSMOCALC 291       NUTRITION RELATED PHYSICAL FINDINGS:  - Nutrition Focused Physical Exam (NFPE): mild depletion body fat triceps region and mild depletion muscle mass temple region, clavicle region, shoulder region, dorsal monsalve region, thigh region, and calf region ( per past recent admit NFPE)   - Fluid Accumulation: none  see RN documentation for details  - Skin Integrity: at risk see RN documentation for details    ANTHROPOMETRICS:  HT:  5'7\"  WT: 41.3 kg (91 lb 0.8 oz)   BMI: Body mass index is 14.26 kg/m².  BMI CLASSIFICATION: less than 19 kg/m2 - underweight  IBW: 135 lbs         67% IBW  Usual Body Wt: 105 lbs per pt      87% UBW  WEIGHT HISTORY:    Patient Weight(s) for the past 336 hrs:   Weight   08/23/24 0222 41.3 kg (91 lb 0.8 oz)   08/22/24 2201 41.3 kg (91 lb 0.8 oz)     Wt Readings from Last 10 Encounters:   08/23/24 41.3 kg (91 lb 0.8 oz)   08/20/24 41.3 kg (91 lb 0.8 oz)   07/26/24 40.8 kg (90 lb)   07/05/24 47.6 kg (105 lb)   08/03/20 55.3 kg (122 lb)   07/29/20 54.9 kg (121 lb)   07/27/20 54.9 kg (121 lb)   07/22/20 54.9 kg (121 lb)   07/20/20 55.3 kg (122 lb)   07/15/20 56.2 kg (124 lb)       NUTRITION DIAGNOSIS/PROBLEM:    Inadequate enteral nutrition infusion related to Infusion volume not reached or schedule for infusion interrupted  as evidenced by Intubation status, NPO, 0 intake.     Moderate malnutrition related to acute injury as evidenced by recent h/o significant wt loss, mild Muscle mass loss and Adipose tissue depletion and Underweight status/low BMI of 14.2 kg/m2.    NUTRITION INTERVENTION:     NUTRITION PRESCRIPTION:   Estimated Nutrition needs: Dosing wt of 41.3 kg --wt taken on 8/23 .  Calories: 856-1033 calories/day ( kcal or 21-25 calories per kg Dosing wt)   8028-2200 calories/day ( 35-40 calories/kg dosing wt)--Rehabilative phase.   Protein: 50-83 g protein/day (1.2-2.0 g protein/kg  Dosing wt)   Fluids: ~ 1050  ml /day (25 ml/kg )--adjust per cardiac/fluid tolerance    - Diet:       Procedures    NPO      - Enteral Nutrition ( EN) : RD ordered the following,   Promote  @ 25 ml/hr, increase by 10 ml q 4 hrs goal rate of 45 ml/hr via PEG on ave 22 hr infusion time.         Water flushes of 40 ml q 4 hr (240 ml).  (Prescription  provides 990 kcal, 62 g pro & 830 ml free water.   Total free water of 1070 ml/day.   Met 100% of kcal & 100 % of Protein needs and or 99 % RDIs)  Above orders discussed with RN.     - RD Malnutrition Care Plan:  Long term Nutrition Support--EN  - Meals and snacks: NPO  - Medical Food Supplements- NPO--  - Vitamin and mineral  supplements: NPO  - Feeding assistance: NPO  - Nutrition education: not appropriate at this time   - Coordination of nutrition care: collaboration with other providers  - Discharge and transfer of nutrition care to new setting or provider: monitor plans      MONITOR AND EVALUATE/NUTRITION GOALS:  - Food and Nutrient Intake:    Monitor: for PO initiation as safe  - Food and Nutrient Administration:    Monitor: tolerance to enteral nutrition, adequacy of enteral nutrition, for enteral nutrition adjustment, and propofol rate  - Anthropometric Measurement:    Monitor weight  - Nutrition Goals:    gradual wt gain as able, true wt gain, tube feed meets greater than 80% of needs, labs within acceptable limits, minimize lean body mass loss, prevent skin breakdown, and improved GI status      RD will follow up.    Lisa Mejia RD, LDN, [facility]  Clinical Dietitian  [phone]

## 2024-08-23 NOTE — PLAN OF CARE
Problem: ALTERED NUTRIENT INTAKE - ADULT  Goal: Nutrient intake appropriate for improving, restoring or maintaining nutritional needs  Description: INTERVENTIONS:  - Assess nutritional status and recommend course of action  - Monitor oral intake when eating, labs, and treatment plans  - Recommend appropriate diets, oral nutritional supplements, and vitamin/mineral supplements  - monitor, and adjust tube feedings a based on assessed needs  - Provide specific nutrition education as appropriate  Outcome: Progressing

## 2024-08-23 NOTE — PROGRESS NOTES
[facility]  part of [facility]  Hospitalist Progress Note     Hortencia Kelley Patient Status:  Inpatient    10/2/1945  78 year old CSN [id number]   Location 218/218-A Attending Landen Akins MD   Hosp Day # 0 PCP No primary care provider on file.     Assessment & Plan:   ----------------------------------  Respiratory failure, acute hypoxic.  Due to chf.  Oxygen requirement is high.  -Supplemental oxygen as needed, titrate down  -Pulmonology consultation oh  -Treat chf as below  -Full vent support    Congestive heart failure, acute on chronic systolic. Most recent EF 25%. Patient with hypoxia, +radiographic/physical exam evidence of pulmonary edema, +peripheral edema.  -Cardiology consult appreciated  -Diuresis with lasix  -GDMT per cards  -Echo recently done  -Strict I/O  -Daily weights  -Low salt diet  -Monitor renal function  -Telemetry  -Supp O2 as needed, titrate off as tolerated    Possible gram-negative pneumonia.  Infiltrate seen on x-ray.  May be related to edema not infection though with high white count, respiratory failure would favor treating empirically.  -Empiric antibiotics  -Pulmonology following    Shock.  Cardiogenic versus septic.  -Continue pressor support  -Critical care following    Other problems  Takotsubo cardiomyopathy  Tracheal stenosis  Esophageal squamous cell carcinoma  COPD      DVT Mechanical Prophylaxis:        DVT Pharmacologic Prophylaxis   Medication    heparin (Porcine) 5000 UNIT/ML injection 5,000 Units              code status: Full  dispo: ICU    I personally reviewed the available laboratories, imaging including. I discussed/will discuss the case with consultants. I ordered laboratories and/or radiographic studies. I adjusted medications as detailed above.  Medical decision making high, risk is high.    Subjective:   ----------------------------------  Intubated sedated      Objective:   Chief Complaint:   Chief Complaint   Patient presents with     Difficulty Breathing     ----------------------------------  Temp:  [96 °F (35.6 °C)-98.7 °F (37.1 °C)] 97.5 °F (36.4 °C)  Pulse:  [] 62  Resp:  [16-36] 16  BP: ()/() 106/62  SpO2:  [87 %-100 %] 100 %  FiO2 (%):  [30 %-40 %] 30 %  Gen: Intubated sedated.  No distress.   HEENT: NCAT, neck supple, no carotid bruit.  ETT tube in place  CV: RRR, S1S2, and intact distal pulses. No gallop, rub, murmur.  Pulm: Coarse mechanical breath sounds, questional bibasilar rales  Abd: Soft, NTND, BS normal, no mass, no HSM, no rebound/guarding.   Neuro: Withdraws to pain  MS: No joint effusions.  No peripheral edema.  Skin: Skin is warm and dry. No rashes, erythema, diaphoresis.   Psych: Normal mood and affect. Calm, cooperative    Labs:  Lab Results   Component Value Date    HGB 10.2 (L) 08/22/2024    WBC 17.0 (H) 08/22/2024    .0 08/22/2024     08/22/2024    K 4.4 08/22/2024    CREATSERUM 0.71 08/22/2024    INR 0.95 08/22/2024    AST 13 07/26/2024    ALT <7 (L) 07/26/2024          piperacillin-tazobactam  3.375 g Intravenous Q8H    heparin  5,000 Units Subcutaneous Q8H DEB    chlorhexidine gluconate  15 mL Mouth/Throat BID@0800,2000    pantoprazole  40 mg Intravenous QAM AC    furosemide  40 mg Intravenous BID (Diuretic)       acetaminophen    sennosides    benzonatate    ondansetron    metoclopramide    fentaNYL **OR** fentaNYL    acetaminophen **OR** acetaminophen **OR** acetaminophen **OR** acetaminophen    polyethylene glycol (PEG 3350)    senna    bisacodyl    fleet enema    dextrose 10%    lipase-protease-amylase (Lip-Prot-Amyl) **AND** sodium bicarbonate      Dietitian Malnutrition Assessment        Evaluation for Malnutrition: Criteria for non-severe malnutrition diagnosis-         acute illness/injury related to Body fat mild depletion., Muscle mass mild depletion.         RD Malnutrition Care Plan:      Body Fat/Muscle Mass: Mild depletion body fat., Mild depletion muscle mass. triceps  region. temple region., clavicle region., shoulder region., dorsal monsalve region., thigh region., calf region.    Physician Assessment    Patient has a diagnosis of moderate malnutrition

## 2024-08-23 NOTE — H&P
CCU Acceptance Note:    78F hx Esophageal Ca/Dysphagia sp PEG Tube, Stress Cardiomyopathy w/ last EF 20-25%, Hypotension on midodrine, COPD, Vocal Cord Paralysis who presented from home via EMS due to respiratory distress. Sudden onset tonight associated with cough. In the ER, was started on BiPAP d/t increased WOB. Hour-long breathing tx, steroids, and 40 mg IV Lasix were administered. Despite these interventions, mental status worsened and pt eventually intubated. Had some post-intubation hypotension and was started on low dose peripheral phenylephrine.     She arrives sedated, mechanically ventilated, and unable to give history. There is no family at the bedside.     Objective:  VS reviewed  Gen: Intubated, sedated, no distress  HEENT: pupils mid-range, reactive to light, dried blood rt nare d/t NGT insertion attempt  Neck: No JVD  CV: SR, regular, no murmur  Pulm: ventilated, diminished bs b/l, no wheeze, basilar rales  Abd: PEG tube projecting from LUQ, nondistended  Ext: moves all four ext:  Neuro: sedated, awakens easily RAAS -1 Neuro exam grossly nonfocal    A/P  78F admitted to CCU for sudden respiratory distress, failing BiPAP d/t worsening mental status, requiring endotracheal intubation.   -Suspect primarily d/t Pulmonary Edema in the setting of HFrEF d/t takotsubo cardiomyopathy, in light of elevated BNP, Rales, pulmonary edema, and no fever. Difficult to excluded associated PNA and certainly high risk for aspiration, and history of sudden onset dyspnea difficult to corrobate given no family available. PE in differential - tolerating positive pressure quite well so large central PE somewhat less likely - CTPE performed just prior to transfer to CCU. Labs remarkable for BNP 1,083, normal troponin, WBC 17k    Neuro:   -On versed gtt for sedation  -Fentanyl pushes prn for pain    CV  #Hypotension  #Takotsubo cardiomyopathy  #Pulmonary Edema  -Continue peripheral vasopressors - cards to switch phenylephrine  for dobutamine  -S/p 40mg IV lasix x1 in ER, will diurese as pressure allows  -Did not tolerate nitroglycerin gtt d/t hypotension  -Consider restarting midodrine when able  -Hold GDMT in light of hypotension    Pulm:  #Acute hypoxic/hypercapneic respiratory failure  #COPD   #Possible Nosocomial pneumonia  #Left Vocal Cord Paralysis   - Continue full vent-support   -CTPE with pulmonary edema, suspected associated aspiration PNA, no PE   -Received zoysn in ER, continue zosyn q8h for now   -Nebulizers PRN     ID:  #Healthcare associated PNA:  -Continue zoysn q8h  -Follow up Bcx   -Septic but normal LA, hypotensive state likely 2/2 chronic hypotension and positive pressure, not a good candidate for large volume IVF bolus d/t severe cardiomyopathy      ENT:   #Esophageal SQCC  #Extrinsic Tracheal Compression   -With associated esophageal obstruction, additionally notable is significant extrinsic compression of the trachea due to mass. The ETT presently terminates just above the level of the narrowing. Presently, patient is tolerating vent well, so will observe this closely overnight, and consider further ENT or surgical intervention if necessary. Concern that advancing ETT blindly and resultant airway trauma could precipitate airway obstruction.     On call Nocturnist 08/23/24  Ricky Palacios MD

## 2024-08-23 NOTE — H&P
[facility]     Hortencia Kelley Patient Status:  Emergency    10/2/1945 MRN [id number]   Location [facility] EMERGENCY DEPARTMENT Attending Kermit Michelle MD   Hosp Day # 0 PCP No primary care provider on file.     Chief Complaint: Respiratory distress    History of Present Illness: Hortencia Kelley is a 78 year old female with past medical history significant for squamous cell carcinoma, COPD, who was discharged on  after being treated for Takotsubo cardiomyopathy.  She presents today secondary to respiratory distress.  According to the emergency department she presented with a sudden onset of shortness of breath and increased work of breathing.  EMS was called and she was found to be hypoxic and wheezing.  After getting hour-long SHE did not improve at all CT scan of the lungs were ordered however it could not be done secondary to patient worsening respiratory distress and she was intubated in the emergency department and admitted to ICU.    Past Medical History:  Past Medical History:    Cancer (HCC)    COPD (chronic obstructive pulmonary disease) (HCC)    Depression    ETOH abuse    Falls frequently    2017    History of blood transfusion    [facility]         Past Surgical History:   Past Surgical History:   Procedure Laterality Date    Hip replacement surgery Left        Social History:  reports that she quit smoking about 6 years ago. Her smoking use included cigarettes. She started smoking about 46 years ago. She has a 20 pack-year smoking history. She has never used smokeless tobacco. She reports that she does not currently use alcohol after a past usage of about 3.0 standard drinks of alcohol per week. She reports that she does not use drugs.    Family History:   Family History   Problem Relation Age of Onset    Heart Disorder Father     Dementia Mother     Dementia Sister         Allergies: No Known Allergies    Medications:    Current  Facility-Administered Medications on File Prior to Encounter   Medication Dose Route Frequency Provider Last Rate Last Admin    [COMPLETED] furosemide (Lasix) tab 20 mg  20 mg Per G Tube Once Deepika Jim MD   20 mg at 08/19/24 0640    [COMPLETED] sodium chloride 0.9 % IV bolus 500 mL  500 mL Intravenous Once Wilton Rosa  mL/hr at 08/17/24 0931 500 mL at 08/17/24 0931    [COMPLETED] sodium chloride 0.9 % IV bolus 500 mL  500 mL Intravenous Once Wilton Rosa  mL/hr at 08/17/24 1149 500 mL at 08/17/24 1149    [COMPLETED] magnesium sulfate in sterile water for injection 2 g/50mL IVPB premix 2 g  2 g Intravenous Once Wilton Rosa MD 50 mL/hr at 08/16/24 0845 2 g at 08/16/24 0845    [COMPLETED] calcium gluconate 2g in 100mL iso-NaCl IVPB premix  2 g Intravenous Once Wilton Rosa MD   2 g at 08/16/24 1242    [COMPLETED] magnesium sulfate in sterile water for injection 2 g/50mL IVPB premix 2 g  2 g Intravenous Once Nasir Claudio MD 50 mL/hr at 08/15/24 1555 2 g at 08/15/24 1555    [COMPLETED] potassium chloride 20 mEq/100mL IVPB premix 20 mEq  20 mEq Intravenous Once Nasir Claudio MD 50 mL/hr at 08/15/24 1700 20 mEq at 08/15/24 1700    [COMPLETED] glucagon (GlucaGen) 1 MG injection             [COMPLETED] midazolam (Versed) 2 MG/2ML injection             [COMPLETED] fentaNYL (Sublimaze) 50 mcg/mL injection             [COMPLETED] lidocaine PF (Xylocaine-MPF) 2 % injection             [COMPLETED] lidocaine PF (Xylocaine-MPF) 2 % injection             [COMPLETED] iohexol (Omnipaque) 300 MG/ML injection 50 mL  50 mL Intravenous ONCE PRN Toni Solomon MD   15 mL at 08/14/24 1159    [COMPLETED] magnesium sulfate 4 g/100mL IVPB premix 4 g  4 g Intravenous Once Rico Frost MD 50 mL/hr at 08/13/24 0957 4 g at 08/13/24 0957    [COMPLETED] iopamidol 76% (ISOVUE-370) injection for power injector  80 mL Intravenous ONCE PRN Rico Frost MD   80 mL at 08/13/24 1701    [COMPLETED] potassium  chloride 40 mEq in 250mL sodium chloride 0.9% IVPB premix  40 mEq Intravenous Once Rico Frost MD 62.5 mL/hr at 24 0800 40 mEq at 24 0800    [COMPLETED] magnesium oxide (Mag-Ox) tab 800 mg  800 mg Oral Once Rico Frost MD   800 mg at 24 1300    [] naloxone (Narcan) 0.4 MG/ML injection 0.08 mg  0.08 mg Intravenous Once PRN Dario Adam MD        [COMPLETED] lidocaine PF (Xylocaine-MPF) 2 % injection             [COMPLETED] heparin in sodium chloride 0.9% (Porcine) 2 Units/mL flush bag premix             [COMPLETED] heparin in sodium chloride 0.9% (Porcine) 2 Units/mL flush bag premix             [COMPLETED] heparin (Porcine) 1000 UNIT/ML injection             [COMPLETED] fentaNYL (Sublimaze) 50 mcg/mL injection             [COMPLETED] midazolam (Versed) 2 MG/2ML injection             [COMPLETED] verapamil (Isoptin) 2.5 mg/mL injection             [COMPLETED] Nitroglycerin in D5W 200-5 MCG/ML-% injection             [COMPLETED] iohexol (Omnipaque) 300 MG/ML injection 20 mL  20 mL Intravenous ONCE PRN Toni Obrien MD   20 mL at 24 1122    [COMPLETED] chlorhexidine (Hibiclens) 4 % external liquid   Topical On Call Flory Granados APRN   Given by Other at 24 0800    [] sodium chloride 0.9% infusion   Intravenous On Call Flory Granados APRN        [COMPLETED] ipratropium-albuterol (Duoneb) 0.5-2.5 (3) MG/3ML inhalation solution 3 mL  3 mL Nebulization Once Karaze, Tallib Tom, DO   3 mL at 24 0920    [COMPLETED] predniSONE (Deltasone) tab 60 mg  60 mg Oral Once Karaze, Tallib Tom, DO   60 mg at 24 0928    [COMPLETED] aspirin chewable tab 324 mg  324 mg Oral Once Karaze, Tallib Tom, DO   324 mg at 24 1035    [COMPLETED] heparin (Porcine) 1000 UNIT/ML injection - BOLUS IV 2,500 Units  60 Units/kg Intravenous Once Karaze, Tallib Tom, DO   2,500 Units at 24 1151    [COMPLETED] heparin (Porcine) 94020 units/250mL infusion ED INITIAL DOSE  12  Units/kg/hr Intravenous Once Se Mclain, DO 5 mL/hr at 08/07/24 1151 12 Units/kg/hr at 08/07/24 1151    [COMPLETED] iopamidol 76% (ISOVUE-370) injection for power injector  50 mL Intravenous ONCE PRN Se Mclain, DO   50 mL at 08/07/24 1140    [COMPLETED] Perflutren Lipid Microsphere (DEFINITY) 6.52 MG/ML injection 1.5 mL  1.5 mL Intravenous ONCE PRN Anita Knox MD   1.5 mL at 08/07/24 1632    [COMPLETED] heparin (Porcine) 1000 UNIT/ML injection 1,250 Units  30 Units/kg Intravenous Once Anita Knox MD   1,250 Units at 08/07/24 2126    [COMPLETED] budesonide (Pulmicort) 0.5 MG/2ML nebulizer suspension        0.5 mg at 08/07/24 2002    [COMPLETED] iopamidol 76% (ISOVUE-370) injection for power injector  50 mL Intravenous ONCE PRN Luis Angel Courtney MD   50 mL at 07/30/24 1626    [COMPLETED] ipratropium-albuterol (Duoneb) 0.5-2.5 (3) MG/3ML inhalation solution 3 mL  3 mL Nebulization Once Antonino Bernstein MD   3 mL at 07/05/24 1720     Current Outpatient Medications on File Prior to Encounter   Medication Sig Dispense Refill    aspirin 81 MG Oral Tab EC Take 1 tablet (81 mg total) by mouth daily. 30 tablet 1    atorvastatin 10 MG Oral Tab Take 1 tablet (10 mg total) by mouth nightly. 30 tablet 1    midodrine 5 MG Oral Tab Take 1 tablet (5 mg total) by mouth in the morning and 1 tablet (5 mg total) at noon and 1 tablet (5 mg total) in the evening. 90 tablet 1    pantoprazole 40 MG Oral Tab EC Take 1 tablet (40 mg total) by mouth daily. 30 tablet 1       Review of Systems:   A comprehensive 14 point review of systems was completed.    Pertinent positives and negatives noted in the HPI.    Physical Exam:    BP (!) 160/98   Pulse (!) 127   Temp 98.7 °F (37.1 °C) (Temporal)   Resp (!) 29   Wt 91 lb 0.8 oz (41.3 kg)   SpO2 94%   BMI 14.26 kg/m²   General:  Awake, responding minimally, on Bipap, Increased work of breathing, Follows comnmands  HEENT: Normocephalic atraumatic. Moist mucous  membranes. EOM-I. PERRLA. Anicteric.  Neck: No lymphadenopathy. No JVD. No carotid bruits.  Respiratory: Bilateral expiratory wheezes  Cardiovascular: S1, S2. Regular rate and rhythm. No murmurs, rubs or gallops. Equal pulses.   Chest and Back: No tenderness or deformity.  Abdomen: Soft, nontender, nondistended.  Positive bowel sounds. No rebound, guarding or organomegaly.  Neurologic: No focal neurological deficits. CNII-XII grossly intact.  Musculoskeletal: Moves all extremities.  Extremities: No edema or cyanosis.  Integument: No rashes or lesions.   Psychiatric: Appropriate mood and affect.      Diagnostic Data:      Labs:  Recent Labs   Lab 08/16/24  0639 08/22/24 2209   WBC 10.8 17.0*   HGB 10.9* 10.2*   MCV 92.0 90.8   .0 374.0   INR  --  0.95       Recent Labs   Lab 08/16/24  0639 08/16/24  1357 08/18/24  0515 08/19/24  1258 08/22/24 2209   *   < > 132* 138* 202*   BUN 7*   < > 11 13 21   CREATSERUM 0.60   < > 0.60 0.60 0.71   CA 5.7*   < > 8.1* 8.6* 9.2   ALB 3.0*  --   --   --   --       < > 140 138 136   K 4.4  4.4   < > 4.6 4.1 4.4      < > 105 102 102   CO2 31.0   < > 33.0* 31.0 31.0    < > = values in this interval not displayed.       Estimated Creatinine Clearance: 43.9 mL/min (based on SCr of 0.71 mg/dL).    Recent Labs   Lab 08/22/24 2209   PTP 13.3   INR 0.95       COVID-19 Lab Results    COVID-19  Lab Results   Component Value Date    COVID19 Not Detected 08/07/2024    COVID19 Not Detected 07/10/2020    COVID19 Not Detected 06/23/2020       Pro-Calcitonin  No results for input(s): \"PCT\" in the last 168 hours.    Cardiac  Recent Labs   Lab 08/22/24 2209   PBNP 1,083*       Creatinine Kinase  No results for input(s): \"CK\" in the last 168 hours.    Inflammatory Markers  No results for input(s): \"CRP\", \"ROSALIA\", \"LDH\", \"DDIMER\" in the last 168 hours.    Recent Labs   Lab 08/22/24  2209   TROPHS 19       Imaging: Imaging data reviewed in Epic.      ASSESSMENT / PLAN:      Acute Hypoxic respiratory failure   Recent diagnosis of Takotsubo cardiomyopathy  Leukocytosis/SIRS  COPD exacerbation    Admit patient to ICU  Pulmo/Critical care  and Cards consulted  Broad spectrum abx  CT of chest pending at this time  Nebulizers PRN       Quality:  DVT Prophylaxis: Heparin   CODE status: Full   Reyes: None needed     Plan of care discussed with patient at the bedside, patient voiced his/her understanding of current treatment and plan.  All questions answered best able.    Mary Elena MD    Supplementary Documentation:

## 2024-08-23 NOTE — PROGRESS NOTES
[facility] Pharmacy Dosing Service      Initial Pharmacokinetic Consult for Vancomycin Dosing     Hortencia Kelley is a 78 year old female who is being initiated on vancomycin therapy for pneumonia.  Pharmacy has been asked to dose vancomycin by Dr. Cr.  The initial treatment and monitoring approach will be steady state AUC strategy.        Weight and Temperature:    Wt Readings from Last 1 Encounters:   24 41.3 kg (91 lb 0.8 oz)        Temp Readings from Last 1 Encounters:   24 97.5 °F (36.4 °C) (Temporal)      Labs:   Recent Labs   Lab 24  0515 24  1258 24  2209   CREATSERUM 0.60 0.60 0.71      Estimated Creatinine Clearance: 43.9 mL/min (based on SCr of 0.71 mg/dL).     Recent Labs   Lab 24  2209   WBC 17.0*          The Pharmacokinetic Target is:     to 600 mg-h/L and trough <=15 mg/L    Renal Dosing Considerations:    None     Assessment/Plan:   Initial/Loading dose: Will receive 1000 mg IV (25 mg/kg, capped at 2250 mg) x 1 loading dose.      Maintenance dose: Pharmacy will dose vancomycin at 500 mg IV every 24 hours    Monitorin) Plan for vancomycin peak and trough to be obtained at steady state    2) Pharmacy will order SCr as clinically indicated to assess renal function.    3) Pharmacy will monitor for toxicity and efficacy, adjust vancomycin dose and/or frequency, and order vancomycin levels as appropriate per the Pharmacy and Therapeutics Committee approved protocol until discontinuation of the medication.       We appreciate the opportunity to assist in the care of this patient.     Curt Massey, Janice  2024  4:24 PM  [facility] Pharmacy Extension: [phone]

## 2024-08-23 NOTE — PROGRESS NOTES
Pharmacist-Driven S. aureus Nasal PCR Protocol    Inclusion Criteria:  Adult patient (18 years or older)  Active linezolid or pharmacy-to-dose vancomycin consult with \"pneumonia\" listed as the indication    Exclusion Criteria:  Pediatric or  patient (17 years or younger)  Patient is receiving linezolid or IV vancomycin for confirmed MRSA pneumonia (confirmed with respiratory cultures)  Patient is receiving linezolid or IV vancomycin for pneumonia and another infection for which MRSA coverage is warranted  Patient was receiving linezolid or IV vancomycin for pneumonia prior to admission and vancomycin was continued at admission  Patient had an S. aureus nasal PCR collected in the preceding 7 days  Patient is receiving linezolid IV vancomycin strictly for gram-positive respiratory pathogen coverage (e.g., S. pneumoniae) in the setting of a severe beta-lactam allergy    Reviewed above criteria and patient is eligible for the pharmacist-driven S. aureus nasal PCR protocol    S. aureus nasal PCR has been ordered

## 2024-08-23 NOTE — CONSULTS
[facility]  part of [facility] ID CONSULT NOTE    Hortencia Kelley Patient Status:  Inpatient    10/2/1945 MRN [id number]   Location [facility] 2W/SW Attending Landen Akins MD   Hosp Day # 0 PCP No primary care provider on file.       Reason for Consultation:  COVID-19    ASSESSMENT:    Antibiotics: IV zosyn ; IV vancomycin, RDV     # Acute hypoxic respiratory failure s/p intubation    - related to aspiration/obstructive pneumonia and COVID-19  # Acute sepsis with shock on vasopressor  # Acute aspiration pneumonia with tracheal obstruction from esophageal mass  # COVID-19 pneumonia with hypoxia  # Recent diagnosis of esophageal SCC with obstructing mass seen on EGD 8/10/24    - s/p PEG 24    # COPD  # Recent NSTEMI with Takotsubo cardiomyopathy   - repeat TTE  with improved EF; +ASD      PLAN:    -  continue IV zosyn  -  start RDV, IV vancomycin  -  sputum cx  -  f/up BCx  -  isolation per protocol  -  [facility] discussion  -  Follow fever curve, wbc  -  Reviewed labs, micro, imaging reports, available old records  -  d/w patient, RN, Dr. Quispe, Dr. Akins    History of Present Illness:  Hortencia Kelley is a a(n) 78 year old female. Patient is a 78-year-old female with a history of COPD, esophageal squamous cell carcinoma, Takotsubo cardiomyopathy who was recently admitted and discharged on the hospital on  with a diagnosis of NSTEMI with Takotsubo cardiomyopathy.  Also had EGD at that time 8/10/2024 showing obstructing esophageal mass with associate dysphagia status post PEG.  Now returns on  with respiratory distress with shortness of breath, increased work of breathing and found to be hypoxic on admission requiring intubation, hypotensive.  On phenylephrine.  WBC 17.  Started on IV Zosyn.  CT chest without PE with a mass in the esophagus noted with obstruction of the esophagus as well as causing narrowing of the tracheal lumen.  Tracheostomy  tube with mucus and debris in the trachea.  Also found to be COVID-19 positive.  Blood cultures pending.    History:  Past Medical History:    Cancer (HCC)    COPD (chronic obstructive pulmonary disease) (HCC)    Depression    ETOH abuse    Falls frequently    12/27/2017    History of blood transfusion    [facility]      Past Surgical History:   Procedure Laterality Date    Hip replacement surgery Left      Family History   Problem Relation Age of Onset    Heart Disorder Father     Dementia Mother     Dementia Sister       reports that she quit smoking about 6 years ago. Her smoking use included cigarettes. She started smoking about 46 years ago. She has a 20 pack-year smoking history. She has never used smokeless tobacco. She reports that she does not currently use alcohol after a past usage of about 3.0 standard drinks of alcohol per week. She reports that she does not use drugs.    Allergies:  No Known Allergies    Medications:    Current Facility-Administered Medications:     sennosides (Senokot) tab 17.2 mg, 17.2 mg, Oral, Nightly PRN    benzonatate (Tessalon) cap 200 mg, 200 mg, Oral, TID PRN    heparin (Porcine) 5000 UNIT/ML injection 5,000 Units, 5,000 Units, Subcutaneous, Q8H DEB    ondansetron (Zofran) 4 MG/2ML injection 4 mg, 4 mg, Intravenous, Q6H PRN    metoclopramide (Reglan) 5 mg/mL injection 10 mg, 10 mg, Intravenous, Q8H PRN    propofol (Diprivan) 10 mg/mL infusion premix, 5-50 mcg/kg/min, Intravenous, Continuous    DOBUTamine in dextrose 5% (Dobutrex) 500 mg/250mL infusion premix, 2-20 mcg/kg/min (Dosing Weight), Intravenous, Continuous    phenylephrine (Zeb-Synephrine) 50 mg in sodium chloride 0.9% 250 mL infusion,  mcg/min, Intravenous, Continuous    fentaNYL (Sublimaze) 50 mcg/mL injection 25 mcg, 25 mcg, Intravenous, Q30 Min PRN **OR** fentaNYL (Sublimaze) 50 mcg/mL injection 50 mcg, 50 mcg, Intravenous, Q30 Min PRN    acetaminophen (Tylenol) tab 650 mg, 650 mg, Oral, Q4H PRN **OR**  acetaminophen (Tylenol) 160 MG/5ML oral liquid 650 mg, 650 mg, Oral, Q4H PRN **OR** acetaminophen (Tylenol) rectal suppository 650 mg, 650 mg, Rectal, Q4H PRN **OR** acetaminophen (Ofirmev) 10 mg/mL infusion premix 600 mg, 15 mg/kg, Intravenous, Q6H PRN    polyethylene glycol (PEG 3350) (Miralax) 17 g oral packet 17 g, 17 g, Oral, Daily PRN    senna (Senokot) 8.8 MG/5ML oral syrup 17.6 mg, 10 mL, Oral, Nightly PRN    bisacodyl (Dulcolax) 10 MG rectal suppository 10 mg, 10 mg, Rectal, Daily PRN    fleet enema (Fleet) 7-19 GM/118ML rectal enema 133 mL, 1 enema, Rectal, Once PRN    chlorhexidine gluconate (Peridex) 0.12 % oral solution 15 mL, 15 mL, Mouth/Throat, BID@0800,2000    pantoprazole (Protonix) 40 mg in sodium chloride 0.9% PF 10 mL IV push, 40 mg, Intravenous, QAM AC    furosemide (Lasix) 10 mg/mL injection 40 mg, 40 mg, Intravenous, BID (Diuretic)    dextrose 10% infusion (TPN no rate), , Intravenous, Continuous PRN    pancrelipase (Lip-Prot-Amyl) (Zenpep) DR particles cap 10,000 Units, 10,000 Units, Per G Tube, PRN **AND** sodium bicarbonate tab 325 mg, 325 mg, Oral, PRN    glucose (Dex4) 15 GM/59ML oral liquid 15 g, 15 g, Oral, Q15 Min PRN **OR** glucose (Glutose) 40% oral gel 15 g, 15 g, Oral, Q15 Min PRN **OR** glucose-vitamin C (Dex-4) chewable tab 4 tablet, 4 tablet, Oral, Q15 Min PRN **OR** dextrose 50% injection 50 mL, 50 mL, Intravenous, Q15 Min PRN **OR** glucose (Dex4) 15 GM/59ML oral liquid 30 g, 30 g, Oral, Q15 Min PRN **OR** glucose (Glutose) 40% oral gel 30 g, 30 g, Oral, Q15 Min PRN **OR** glucose-vitamin C (Dex-4) chewable tab 8 tablet, 8 tablet, Oral, Q15 Min PRN    insulin regular human (Novolin R, Humulin R) 100 UNIT/ML injection 1-5 Units, 1-5 Units, Subcutaneous, 4 times per day    piperacillin-tazobactam (Zosyn) 4.5 g in dextrose 5% 100 mL IVPB-ADDV, 4.5 g, Intravenous, Q8H    Review of Systems:  Unable to obtain 2/2 clinical status    Physical Exam:  Vital signs: Blood pressure 97/58,  pulse 72, temperature 97.5 °F (36.4 °C), temperature source Temporal, resp. rate 16, weight 91 lb 0.8 oz (41.3 kg), SpO2 99%, not currently breastfeeding.    General: Alert, alert, nad, thin female  HEENT: ETT in place. EOMI  Neck: No lymphadenopathy.  Supple.  Cardiovascular: No chest wall tenderness  Respiratory: Symmetric expansion  Abdomen: Soft, nontender, nondistended. PEG c/d/i  Musculoskeletal: No edema noted  Integument: No lesions. No erythema.  Reyes    Laboratory Data: Reviewed in EMR    Microbiology: Reviewed in EMR    Radiology: Reviewed    Thank you for allowing us to participate in the care of this patient. Please do not hesitate to call if you have any questions.     We will continue to follow with you and will make further recommendations based on his progress.    MD Mayela Garvey [facility]  [phone]  8/23/2024

## 2024-08-23 NOTE — ED QUICK NOTES
Late entry d/t pt care :   Pt WOB and mental status declined, pt noted to be guppy breathing and responsive to painful stimuli, pt placed on BIPAP with no improvement  in pt's WOB requiring pt to be intubated.

## 2024-08-24 ENCOUNTER — APPOINTMENT (OUTPATIENT)
Dept: GENERAL RADIOLOGY | Facility: HOSPITAL | Age: 79
End: 2024-08-24
Attending: INTERNAL MEDICINE
Payer: MEDICARE

## 2024-08-24 LAB
ALBUMIN SERPL-MCNC: 3.4 G/DL (ref 3.2–4.8)
ALBUMIN/GLOB SERPL: 1.4 {RATIO} (ref 1–2)
ALP LIVER SERPL-CCNC: 115 U/L
ALT SERPL-CCNC: 15 U/L
ANION GAP SERPL CALC-SCNC: 8 MMOL/L (ref 0–18)
AST SERPL-CCNC: 19 U/L (ref ?–34)
BASOPHILS # BLD AUTO: 0.03 X10(3) UL (ref 0–0.2)
BASOPHILS NFR BLD AUTO: 0.2 %
BILIRUB SERPL-MCNC: 0.2 MG/DL (ref 0.2–1.1)
BUN BLD-MCNC: 31 MG/DL (ref 9–23)
BUN/CREAT SERPL: 30.1 (ref 10–20)
CALCIUM BLD-MCNC: 8.7 MG/DL (ref 8.7–10.4)
CHLORIDE SERPL-SCNC: 98 MMOL/L (ref 98–112)
CO2 SERPL-SCNC: 33 MMOL/L (ref 21–32)
CREAT BLD-MCNC: 1.03 MG/DL
DEPRECATED RDW RBC AUTO: 43.5 FL (ref 35.1–46.3)
EGFRCR SERPLBLD CKD-EPI 2021: 56 ML/MIN/1.73M2 (ref 60–?)
EOSINOPHIL # BLD AUTO: 0 X10(3) UL (ref 0–0.7)
EOSINOPHIL NFR BLD AUTO: 0 %
ERYTHROCYTE [DISTWIDTH] IN BLOOD BY AUTOMATED COUNT: 14.1 % (ref 11–15)
GLOBULIN PLAS-MCNC: 2.5 G/DL (ref 2–3.5)
GLUCOSE BLD-MCNC: 156 MG/DL (ref 70–99)
GLUCOSE BLDC GLUCOMTR-MCNC: 120 MG/DL (ref 70–99)
GLUCOSE BLDC GLUCOMTR-MCNC: 128 MG/DL (ref 70–99)
GLUCOSE BLDC GLUCOMTR-MCNC: 155 MG/DL (ref 70–99)
GLUCOSE BLDC GLUCOMTR-MCNC: 187 MG/DL (ref 70–99)
HCT VFR BLD AUTO: 26.3 %
HGB BLD-MCNC: 8.8 G/DL
IMM GRANULOCYTES # BLD AUTO: 0.15 X10(3) UL (ref 0–1)
IMM GRANULOCYTES NFR BLD: 0.8 %
INR BLD: 1.02 (ref 0.8–1.2)
LYMPHOCYTES # BLD AUTO: 0.72 X10(3) UL (ref 1–4)
LYMPHOCYTES NFR BLD AUTO: 3.7 %
MAGNESIUM SERPL-MCNC: 2 MG/DL (ref 1.6–2.6)
MCH RBC QN AUTO: 28.9 PG (ref 26–34)
MCHC RBC AUTO-ENTMCNC: 33.5 G/DL (ref 31–37)
MCV RBC AUTO: 86.5 FL
MONOCYTES # BLD AUTO: 1.31 X10(3) UL (ref 0.1–1)
MONOCYTES NFR BLD AUTO: 6.7 %
NEUTROPHILS # BLD AUTO: 17.22 X10 (3) UL (ref 1.5–7.7)
NEUTROPHILS # BLD AUTO: 17.22 X10(3) UL (ref 1.5–7.7)
NEUTROPHILS NFR BLD AUTO: 88.6 %
OSMOLALITY SERPL CALC.SUM OF ELEC: 298 MOSM/KG (ref 275–295)
PHOSPHATE SERPL-MCNC: 3.7 MG/DL (ref 2.4–5.1)
PLATELET # BLD AUTO: 310 10(3)UL (ref 150–450)
POTASSIUM SERPL-SCNC: 3.5 MMOL/L (ref 3.5–5.1)
PROT SERPL-MCNC: 5.9 G/DL (ref 5.7–8.2)
PROTHROMBIN TIME: 14 SECONDS (ref 11.6–14.8)
RBC # BLD AUTO: 3.04 X10(6)UL
SODIUM SERPL-SCNC: 139 MMOL/L (ref 136–145)
WBC # BLD AUTO: 19.4 X10(3) UL (ref 4–11)

## 2024-08-24 PROCEDURE — 99233 SBSQ HOSP IP/OBS HIGH 50: CPT | Performed by: HOSPITALIST

## 2024-08-24 PROCEDURE — 71045 X-RAY EXAM CHEST 1 VIEW: CPT | Performed by: INTERNAL MEDICINE

## 2024-08-24 PROCEDURE — 99233 SBSQ HOSP IP/OBS HIGH 50: CPT | Performed by: INTERNAL MEDICINE

## 2024-08-24 RX ORDER — METOCLOPRAMIDE HYDROCHLORIDE 5 MG/ML
5 INJECTION INTRAMUSCULAR; INTRAVENOUS EVERY 8 HOURS PRN
Status: DISCONTINUED | OUTPATIENT
Start: 2024-08-24 | End: 2024-08-25

## 2024-08-24 NOTE — PROGRESS NOTES
[facility] - CARDIOLOGY PROGRESS NOTE  Hortencia Kelley Patient Status:  Inpatient    10/2/1945 MRN [id number]   Location [facility] 2W/SW Attending Landen Akins MD   Hosp Day # 1 PCP No primary care provider on file.     Assessment:    1.  Respiratory failure, intubated.  Mostly COPD related.  Management per pulmonary.  No CHF on chest x-ray today.    2.  Recent admission for Takotsubo cardiomyopathy.  Echo yesterday shows full recovery of LV function    3.  Hypotension.  Off Zeb-Synephrine drip.      Plan:    IV fluids for blood pressure support.    Hold Lasix.      Subjective:  No chest pain or shortness of breath.    Objective:  BP 96/58 (BP Location: Right arm)   Pulse 107   Temp 98.1 °F (36.7 °C) (Temporal)   Resp 16   Wt 91 lb 0.8 oz (41.3 kg)   SpO2 100%   BMI 14.26 kg/m²     Temp (24hrs), Av °F (36.7 °C), Min:97.5 °F (36.4 °C), Max:98.1 °F (36.7 °C)      Intake/Output:    Intake/Output Summary (Last 24 hours) at 2024 0756  Last data filed at 2024 0600  Gross per 24 hour   Intake 2070.6 ml   Output 2100 ml   Net -29.4 ml       Wt Readings from Last 2 Encounters:   24 91 lb 0.8 oz (41.3 kg)   24 91 lb 0.8 oz (41.3 kg)       Physical Exam:    General: Alert and oriented x 3,  No apparent distress.  HEENT: No focal deficits.  Neck: No JVD, carotids 2+ no bruits.  Cardiac: Regular rate and rhythm, S1, S2 normal, no murmur  Lungs: Clear without wheezes, rales, rhonchi.  Normal excursions and effort.  Abdomen: Soft, non-tender.   Extremities: Without clubbing, cyanosis or edema.  Peripheral pulses are 2+.  Skin: Warm and dry.     Labs:  Lab Results   Component Value Date    WBC 19.4 2024    HGB 8.8 2024    HCT 26.3 2024    .0 2024     Lab Results   Component Value Date    INR 1.02 2024     Lab Results   Component Value Date      08/24/2024    K 3.5 08/24/2024    CL 98 08/24/2024    CO2 33.0 08/24/2024    BUN 31 08/24/2024    CREATSERUM 1.03 08/24/2024     08/24/2024    MG 2.0 08/24/2024    CA 8.7 08/24/2024    ALT 15 08/24/2024    AST 19 08/24/2024    ALB 3.4 08/24/2024        No results found for: \"TROP\", \"CKMB\"     Medications:     sodium chloride  500 mL/hr Intravenous Once    heparin  5,000 Units Subcutaneous Q8H DEB    chlorhexidine gluconate  15 mL Mouth/Throat BID@0800,2000    pantoprazole  40 mg Intravenous QAM AC    furosemide  40 mg Intravenous BID (Diuretic)    insulin regular human  1-5 Units Subcutaneous 4 times per day    piperacillin-tazobactam  4.5 g Intravenous Q8H    remdesivir  100 mg Intravenous Q24H    vancomycin  15 mg/kg Intravenous Q24H      propofol 25 mcg/kg/min (08/24/24 0204)    DOBUTamine Stopped (08/23/24 0429)    phenylephrine Stopped (08/24/24 0200)    dextrose 10%         Nasir Claudio MD  8/24/2024  7:56 AM

## 2024-08-24 NOTE — RESPIRATORY THERAPY NOTE
Received patient vented with a size 7.5 ETT at 23 cm at the lip. Patient on the following settings:    VC/AC    Settings   FiO2 (%) 30 %   Resp Rate (Set) 16   Vt (Set, mL) 450 mL   Waveform Decelerating ramp   PEEP/CPAP (cm H2O) 5 cm H20   Insp Time (sec) 1 sec   Humidification Heat and moisture exchanger       Routine vent checks performed and patient suctioned as needed. No vent changes made overnight.

## 2024-08-24 NOTE — PLAN OF CARE
AxO x4. Extubation, transitioned to 4L NC. SLP evaluation pass. Pending PT/OT evaluation. All safety measures maintained.     Problem: Safety Risk - Non-Violent Restraints  Goal: Patient will remain free from self-harm  Description: INTERVENTIONS:  - Apply the least restrictive restraint to prevent harm  - Notify patient and family of reasons restraints applied  - Assess for any contributing factors to confusion (electrolyte disturbances, delirium, medications)  - Discontinue any unnecessary medical devices as soon as possible  - Assess the patient's physical comfort, circulation, skin condition, hydration, nutrition and elimination needs   - Reorient and redirection as needed  - Assess for the need to continue restraints  8/24/2024 1721 by Cheyenne Gupta, RN  Outcome: Completed  8/24/2024 0934 by Cheyenne Gupta, RN  Outcome: Progressing     Problem: CARDIOVASCULAR - ADULT  Goal: Maintains optimal cardiac output and hemodynamic stability  Description: INTERVENTIONS:  - Monitor vital signs, rhythm, and trends  - Monitor for bleeding, hypotension and signs of decreased cardiac output  - Evaluate effectiveness of vasoactive medications to optimize hemodynamic stability  - Monitor arterial and/or venous puncture sites for bleeding and/or hematoma  - Assess quality of pulses, skin color and temperature  - Assess for signs of decreased coronary artery perfusion - ex. Angina  - Evaluate fluid balance, assess for edema, trend weights  Outcome: Progressing  Goal: Absence of cardiac arrhythmias or at baseline  Description: INTERVENTIONS:  - Continuous cardiac monitoring, monitor vital signs, obtain 12 lead EKG if indicated  - Evaluate effectiveness of antiarrhythmic and heart rate control medications as ordered  - Initiate emergency measures for life threatening arrhythmias  - Monitor electrolytes and administer replacement therapy as ordered  Outcome: Progressing     Problem: RESPIRATORY - ADULT  Goal: Achieves optimal  ventilation and oxygenation  Description: INTERVENTIONS:  - Assess for changes in respiratory status  - Assess for changes in mentation and behavior  - Position to facilitate oxygenation and minimize respiratory effort  - Oxygen supplementation based on oxygen saturation or ABGs  - Provide Smoking Cessation handout, if applicable  - Encourage broncho-pulmonary hygiene including cough, deep breathe, Incentive Spirometry  - Assess the need for suctioning and perform as needed  - Assess and instruct to report SOB or any respiratory difficulty  - Respiratory Therapy support as indicated  - Manage/alleviate anxiety  - Monitor for signs/symptoms of CO2 retention  Outcome: Progressing

## 2024-08-24 NOTE — PROGRESS NOTES
[facility]  part of [facility]  Hospitalist Progress Note     Hortencia Kelley Patient Status:  Inpatient    10/2/1945  78 year old CSN [id number]   Location 218/218-A Attending Landen Akins MD   Hosp Day # 1 PCP No primary care provider on file.     Assessment & Plan:   ----------------------------------  Respiratory failure, acute hypoxic.  Oxygen requirement is high.  -Supplemental oxygen as needed, titrate down  -Pulmonology consultation oh  -Treat chf as below  -Full vent support    Congestive heart failure, acute on chronic systolic. Most recent EF 25%. Patient with hypoxia, +radiographic/physical exam evidence of pulmonary edema, +peripheral edema.  -Cardiology consult appreciated  -Hold on lasix per cards recs  -GDMT per cards  -Echo shows recovery of LV function  -Strict I/O  -Daily weights  -Monitor renal function  -Telemetry  -Supp O2 as needed, titrate off as tolerated    Possible gram-negative pneumonia.  Infiltrate seen on x-ray.  May be related to edema not infection though with high white count, respiratory failure would favor treating empirically.  -Empiric antibiotics with vancomycin and Zosyn  -Pulmonology following    Acute COVID infection.  Given other cardiopulmonary processes, unclear if this is incidental or not.  -ID consult appreciated  -Remdesivir  -Antibiotics as above    Shock.  Cardiogenic versus septic.  -Off pressor support  -Critical care following    Other problems  Takotsubo cardiomyopathy  Tracheal stenosis  Esophageal squamous cell carcinoma  COPD      DVT Mechanical Prophylaxis:        DVT Pharmacologic Prophylaxis   Medication    heparin (Porcine) 5000 UNIT/ML injection 5,000 Units              code status: Full  dispo: ICU    I personally reviewed the available laboratories, imaging including. I discussed/will discuss the case with consultants. I ordered laboratories and/or radiographic studies. I adjusted medications as detailed above.  Medical  decision making high, risk is high.    Subjective:   ----------------------------------  Intubated sedated      Objective:   Chief Complaint:   Chief Complaint   Patient presents with    Difficulty Breathing     ----------------------------------  Temp:  [97.5 °F (36.4 °C)-98.1 °F (36.7 °C)] 98.1 °F (36.7 °C)  Pulse:  [] 107  Resp:  [12-23] 16  BP: ()/(52-64) 96/58  SpO2:  [97 %-100 %] 100 %  FiO2 (%):  [30 %] 30 %  Gen: Intubated sedated.  No distress.   HEENT: NCAT, neck supple, no carotid bruit.  ETT tube in place  CV: RRR, S1S2, and intact distal pulses. No gallop, rub, murmur.  Pulm: Coarse mechanical breath sounds, questional bibasilar rales  Abd: Soft, NTND, BS normal, no mass, no HSM, no rebound/guarding.   Neuro: Withdraws to pain  MS: No joint effusions.  No peripheral edema.  Skin: Skin is warm and dry. No rashes, erythema, diaphoresis.   Psych: Normal mood and affect. Calm, cooperative    Labs:  Lab Results   Component Value Date    HGB 8.8 (L) 08/24/2024    WBC 19.4 (H) 08/24/2024    .0 08/24/2024     08/24/2024    K 3.5 08/24/2024    CREATSERUM 1.03 (H) 08/24/2024    INR 1.02 08/24/2024    AST 19 08/24/2024    ALT 15 08/24/2024          sodium chloride  500 mL/hr Intravenous Once    heparin  5,000 Units Subcutaneous Q8H DEB    chlorhexidine gluconate  15 mL Mouth/Throat BID@0800,2000    pantoprazole  40 mg Intravenous QAM AC    insulin regular human  1-5 Units Subcutaneous 4 times per day    piperacillin-tazobactam  4.5 g Intravenous Q8H    remdesivir  100 mg Intravenous Q24H    vancomycin  15 mg/kg Intravenous Q24H       sennosides    benzonatate    ondansetron    metoclopramide    fentaNYL **OR** fentaNYL    acetaminophen **OR** acetaminophen **OR** acetaminophen **OR** acetaminophen    polyethylene glycol (PEG 3350)    senna    bisacodyl    fleet enema    dextrose 10%    lipase-protease-amylase (Lip-Prot-Amyl) **AND** sodium bicarbonate    glucose **OR** glucose **OR**  glucose-vitamin C **OR** dextrose **OR** glucose **OR** glucose **OR** glucose-vitamin C      Dietitian Malnutrition Assessment        Evaluation for Malnutrition: Criteria for non-severe malnutrition diagnosis-         acute illness/injury related to Body fat mild depletion., Muscle mass mild depletion.         RD Malnutrition Care Plan:      Body Fat/Muscle Mass: Mild depletion body fat., Mild depletion muscle mass. triceps region. temple region., clavicle region., shoulder region., dorsal monsalve region., thigh region., calf region.    Physician Assessment    Patient has a diagnosis of moderate malnutrition

## 2024-08-24 NOTE — PROGRESS NOTES
[facility] ID PROGRESS NOTE    Hortencia Kelley Patient Status:  Inpatient    10/2/1945 MRN [id number]   Location [facility] 2W/SW Attending Landen Akins MD   Hosp Day # 1 PCP No primary care provider on file.     SUBJECTIVE  ROS unable to be performed. No fever.  HR WNL, MAP 80s. RN reports pt now off pressors and sedation. Awake and looking around. Staff at bedside preparing to extubate.     ASSESSMENT:     Antibiotics: IV zosyn ; IV vancomycin, RDV      # Acute hypoxic respiratory failure s/p intubation , extubated                - related to aspiration/obstructive pneumonia and COVID-19  # Acute sepsis with shock on vasopressor  # Acute aspiration pneumonia with tracheal obstruction from esophageal mass  # COVID-19 pneumonia with hypoxia  # Recent diagnosis of esophageal SCC with obstructing mass seen on EGD 8/10/24                - s/p PEG 24     # COPD  # Recent NSTEMI with Takotsubo cardiomyopathy               - repeat TTE  with improved EF; +ASD     PLAN:     -  Continue empiric IV vancomycin + zosyn  -  Continue RDV #  -  FU cx  -  isolation per protocol  -  FU GOC discussions  -  Follow fever curve, wbc. AM CBC w/diff  -  Reviewed labs, micro, imaging reports  -  d/w patient, staff     History of Present Illness:  Hortencia Kelley is a a(n) 78 year old female. Patient is a 78-year-old female with a history of COPD, esophageal squamous cell carcinoma, Takotsubo cardiomyopathy who was recently admitted and discharged on the hospital on  with a diagnosis of NSTEMI with Takotsubo cardiomyopathy.  Also had EGD at that time 8/10/2024 showing obstructing esophageal mass with associate dysphagia status post PEG.  Now returns on  with respiratory distress with shortness of breath, increased work of breathing and found to be hypoxic on admission requiring intubation, hypotensive.  On phenylephrine.  WBC 17.  Started on IV Zosyn.  CT  chest without PE with a mass in the esophagus noted with obstruction of the esophagus as well as causing narrowing of the tracheal lumen.  Tracheostomy tube with mucus and debris in the trachea.  Also found to be COVID-19 positive.  Blood cultures pending.    OBJECTIVE    BP 96/58 (BP Location: Right arm)   Pulse 107   Temp 98.1 °F (36.7 °C) (Temporal)   Resp 16   Wt 91 lb 0.8 oz (41.3 kg)   SpO2 100%   BMI 14.26 kg/m²     Gen:   Awake, alert  HEENT:  EOMI, ETT+, supple  CV/lungs:  RRR, CTAB  Abdom:  Soft, NT/ND, PEG+  Skin/extrem:  No rashes, no c/c/e  :   Reyes+    Laboratory Data: Reviewed     Microbiology: Reviewed     Radiology: Reviewed    [facility]  [phone]

## 2024-08-24 NOTE — RESPIRATORY THERAPY NOTE
Pt passed SBT at 5/5/30% with VSS.   08/24/24 0923   Spontaneous Parameters   Spontaneous RR Rate 15   Spontaneous Minute Volume 4.6   Average Spontaneous Tidal Volume 310   $ Spontaneous Vital Capacity 0.607   Negative Inspiratory Force -38   Total RSBI 46     D/W Dr. Quispe decision made to extubate, pt tolerated well, no strider, transitioned to 4L NC.

## 2024-08-24 NOTE — PLAN OF CARE
Pt commanding in all extremities. Propofol increased d/t pt discomfort and dyssynchrony on ventilator. Copious oral secretions, moderate in line. Phenylephrine gtt weaned off, BP stable. Bilateral wrist restraints in place, all safety measures in place, frequent nursing rounds made.     Problem: Patient Centered Care  Goal: Patient preferences are identified and integrated in the patient's plan of care  Description: Interventions:  - What would you like us to know as we care for you?   - Provide timely, complete, and accurate information to patient/family  - Incorporate patient and family knowledge, values, beliefs, and cultural backgrounds into the planning and delivery of care  - Encourage patient/family to participate in care and decision-making at the level they choose  - Honor patient and family perspectives and choices  Outcome: Progressing     Problem: Safety Risk - Non-Violent Restraints  Goal: Patient will remain free from self-harm  Description: INTERVENTIONS:  - Apply the least restrictive restraint to prevent harm  - Notify patient and family of reasons restraints applied  - Assess for any contributing factors to confusion (electrolyte disturbances, delirium, medications)  - Discontinue any unnecessary medical devices as soon as possible  - Assess the patient's physical comfort, circulation, skin condition, hydration, nutrition and elimination needs   - Reorient and redirection as needed  - Assess for the need to continue restraints  Outcome: Progressing     Problem: CARDIOVASCULAR - ADULT  Goal: Maintains optimal cardiac output and hemodynamic stability  Description: INTERVENTIONS:  - Monitor vital signs, rhythm, and trends  - Monitor for bleeding, hypotension and signs of decreased cardiac output  - Evaluate effectiveness of vasoactive medications to optimize hemodynamic stability  - Monitor arterial and/or venous puncture sites for bleeding and/or hematoma  - Assess quality of pulses, skin color and  temperature  - Assess for signs of decreased coronary artery perfusion - ex. Angina  - Evaluate fluid balance, assess for edema, trend weights  Outcome: Progressing  Goal: Absence of cardiac arrhythmias or at baseline  Description: INTERVENTIONS:  - Continuous cardiac monitoring, monitor vital signs, obtain 12 lead EKG if indicated  - Evaluate effectiveness of antiarrhythmic and heart rate control medications as ordered  - Initiate emergency measures for life threatening arrhythmias  - Monitor electrolytes and administer replacement therapy as ordered  Outcome: Progressing     Problem: RESPIRATORY - ADULT  Goal: Achieves optimal ventilation and oxygenation  Description: INTERVENTIONS:  - Assess for changes in respiratory status  - Assess for changes in mentation and behavior  - Position to facilitate oxygenation and minimize respiratory effort  - Oxygen supplementation based on oxygen saturation or ABGs  - Provide Smoking Cessation handout, if applicable  - Encourage broncho-pulmonary hygiene including cough, deep breathe, Incentive Spirometry  - Assess the need for suctioning and perform as needed  - Assess and instruct to report SOB or any respiratory difficulty  - Respiratory Therapy support as indicated  - Manage/alleviate anxiety  - Monitor for signs/symptoms of CO2 retention  Outcome: Progressing     Problem: Patient/Family Goals  Goal: Patient/Family Long Term Goal  Description: Patient's Long Term Goal:     Interventions:  -   - See additional Care Plan goals for specific interventions  Outcome: Progressing  Goal: Patient/Family Short Term Goal  Description: Patient's Short Term Goal:     Interventions:   -   - See additional Care Plan goals for specific interventions  Outcome: Progressing     Problem: Delirium  Goal: Minimize duration of delirium  Description: Interventions:  - Encourage use of hearing aids, eye glasses  - Promote highest level of mobility daily  - Provide frequent reorientation  - Promote  wakefulness i.e. lights on, blinds open  - Promote sleep, encourage patient's normal rest cycle i.e. lights off, TV off, minimize noise and interruptions  - Encourage family to assist in orientation and promotion of home routines  Outcome: Progressing

## 2024-08-24 NOTE — SLP NOTE
ADULT SWALLOWING EVALUATION    ASSESSMENT    ASSESSMENT/OVERALL IMPRESSION:    PT IS COVID 19 +. PPE REQUIRED. THIS SLP WORE GLOVES, GOWN, FACE SHIELD AND DROPLET MASK OVER N95 MASK. HANDS SANITIZED/WASHED UPON ENTRANCE/EXIT.    BSE ordered 2/2 Pt S/P extubation (<24 hours intubated). Pt admitted 8/22/24 with acute respiratory failure with hypoxia. Pt on full liquid diet along with G-tube feedings at home, with spouse. PMH includes COPD, ETOH Abuse, Frequent Falls, Esophageal Mass dx 8/07/24 with subsequent G-tube placement 8/14/24. No PMH of dysphagia at [facility]. Currently, Pt NPO.    CXR 8/24/24:  CONCLUSION:   No new pulmonary consolidation.       Pt alert, on 4L/min 02 NC, afebrile and assessed sitting upright in bed (after consulting with RN). Pt agreed to participate. Learning preference verbal. No verbal or non-verbal indication of pain. Vocal quality/intensity weak and hoarse. Volitional swallow and cough present; considered functional. Oral motor exam unremarkable. Functional dentition. Pt self-fed pureed and thin liquid trials. Bilabial seal adequate; no anterior loss. Lingual skills adequate for bolus formation, preparation and control of both consistencies. Oral cavity clear post swallows.     Pharyngeal response appeared slightly delayed per hyolaryngeal elevation to completion (considered weak in strength/rise to palpation). No overt CSA on pureed nor thin liquid swallows. Overall, swallow function appeared weak. Sp02 ~96% during this assessment.        IMPRESSIONS:    Pt presents with functional oral swallow (for consistencies assessed) and possible pharyngeal dysfunction. Collaborated with RN regarding Pt's swallowing plan of care. BSE results/recommendations discussed with Pt. Pt v/u; would benefit from additional reinforcement. Swallowing precautions written on white board in room for reinforcement/carry-over of skills for Pt, family and staff. Call light within Pt's reach upon SLP discharge from  room.          PLAN:    To f/u x1-2 sessions to ensure safe intake of diet and reinforce swallowing/aspiration precautions. To monitor for new CXR results. Family education as available.      RECOMMENDATIONS   Diet Recommendations - Solids:  (full liquids)  Diet Recommendations - Liquids: Thin Liquids    Compensatory Strategies Recommended: No straws  Aspiration Precautions: Small bites and sips;Slow rate;Upright position;No straw  Medication Administration Recommendations: One pill at a time  Treatment Plan/Recommendations: Aspiration precautions    HISTORY   MEDICAL HISTORY  Reason for Referral:  (post extubation)    Problem List  Principal Problem:    Acute respiratory failure with hypoxia (HCC)  Active Problems:    Acute pulmonary edema (HCC)    Cardiomyopathy, unspecified type (HCC)    COPD exacerbation (HCC)    Community acquired pneumonia, unspecified laterality      Past Medical History  Past Medical History:    Cancer (HCC)    COPD (chronic obstructive pulmonary disease) (HCC)    Depression    ETOH abuse    Falls frequently    12/27/2017    History of blood transfusion    [facility]        Prior Living Situation:  (from home)  Diet Prior to Admission:  (full liquids with G-tube feedings)  Precautions: Aspiration    Patient/Family Goals: to eat    SWALLOWING HISTORY  Current Diet Consistency: NPO  OBJECTIVE   ORAL MOTOR EXAMINATION  Dentition: Functional  Symmetry: Within Functional Limits  Strength: Within Functional Limits  Range of Motion: Within Functional Limits  Rate of Motion: Within Functional Limits    Voice Quality: Weak;Hoarse  Respiratory Status: Supplemental O2;Nasal cannula  Consistencies Trialed: Thin liquids  Method of Presentation: Self presentation;Cup;Spoon  Patient Positioning: Upright;Midline    Oral Phase of Swallow: Within Functional Limits (for consistencies trialed)  Pharyngeal Phase of Swallow: Impaired  Laryngeal Elevation Timing: Appears impaired  Laryngeal Elevation Strength: Appears  impaired     (Please note: Silent aspiration cannot be evaluated clinically. Videofluoroscopic Swallow Study is required to rule-out silent aspiration.)    GOALS  Goal #1 The patient will tolerate full liquid  consistency without overt signs or symptoms of aspiration with 100% accuracy over 1-2 session(s).  In Progress   Goal #2 The patient/family/caregiver will demonstrate understanding and implementation of aspiration precautions and swallow strategies independently over 1-2 session(s).    In Progress   FOLLOW UP  Treatment Plan/Recommendations: Aspiration precautions  Number of Visits to Meet Established Goals:  (1-2x)  Follow Up Needed (Documentation Required): Yes  SLP Follow-up Date: 08/26/24    Thank you for your referral.   If you have any questions, please contact   Consuelo Mcqueen M.S. CCC/SLP  Speech-Language Pathologist  [facility]  #[phone]

## 2024-08-24 NOTE — PROGRESS NOTES
[facility]  part of [facility]     Progress Note    Hortencia Kelley Patient Status:  Inpatient    10/2/1945 MRN [id number]   Location [facility] 2W/SW Attending Landen Akins MD   Hosp Day # 1 PCP No primary care provider on file.       Subjective:   Patient seen and examined.  Tolerating spontaneous breathing trial this morning.  Alert awake    Objective:   Blood pressure 112/69, pulse 85, temperature 97.2 °F (36.2 °C), temperature source Temporal, resp. rate 26, weight 91 lb 0.8 oz (41.3 kg), SpO2 99%, not currently breastfeeding.  Intake/Output:   Last 3 shifts: I/O last 3 completed shifts:  In: 2266.9 [I.V.:1483.9; NG/GT:783]  Out: 2190 [Urine:2190]   This shift: I/O this shift:  In: 500 [I.V.:500]  Out: -      Vent Settings: Vent Mode: CPAP;PS  FiO2 (%):  [30 %] 30 %  S RR:  [16] 16  S VT:  [450 mL] 450 mL  PEEP/CPAP (cm H2O):  [5 cm H20] 5 cm H20  MAP (cm H2O):  [9-10] 10    Hemodynamic parameters (last 24 hours):      Scheduled Meds:   Current Facility-Administered Medications   Medication Dose Route Frequency    sennosides (Senokot) tab 17.2 mg  17.2 mg Oral Nightly PRN    benzonatate (Tessalon) cap 200 mg  200 mg Oral TID PRN    heparin (Porcine) 5000 UNIT/ML injection 5,000 Units  5,000 Units Subcutaneous Q8H DEB    ondansetron (Zofran) 4 MG/2ML injection 4 mg  4 mg Intravenous Q6H PRN    metoclopramide (Reglan) 5 mg/mL injection 10 mg  10 mg Intravenous Q8H PRN    propofol (Diprivan) 10 mg/mL infusion premix  5-50 mcg/kg/min Intravenous Continuous    DOBUTamine in dextrose 5% (Dobutrex) 500 mg/250mL infusion premix  2-20 mcg/kg/min (Dosing Weight) Intravenous Continuous    phenylephrine (Zeb-Synephrine) 50 mg in sodium chloride 0.9% 250 mL infusion   mcg/min Intravenous Continuous    fentaNYL (Sublimaze) 50 mcg/mL injection 25 mcg  25 mcg Intravenous Q30 Min PRN    Or    fentaNYL (Sublimaze) 50 mcg/mL injection 50 mcg  50 mcg Intravenous Q30 Min PRN    acetaminophen  (Tylenol) tab 650 mg  650 mg Oral Q4H PRN    Or    acetaminophen (Tylenol) 160 MG/5ML oral liquid 650 mg  650 mg Oral Q4H PRN    Or    acetaminophen (Tylenol) rectal suppository 650 mg  650 mg Rectal Q4H PRN    Or    acetaminophen (Ofirmev) 10 mg/mL infusion premix 600 mg  15 mg/kg Intravenous Q6H PRN    polyethylene glycol (PEG 3350) (Miralax) 17 g oral packet 17 g  17 g Oral Daily PRN    senna (Senokot) 8.8 MG/5ML oral syrup 17.6 mg  10 mL Oral Nightly PRN    bisacodyl (Dulcolax) 10 MG rectal suppository 10 mg  10 mg Rectal Daily PRN    fleet enema (Fleet) 7-19 GM/118ML rectal enema 133 mL  1 enema Rectal Once PRN    chlorhexidine gluconate (Peridex) 0.12 % oral solution 15 mL  15 mL Mouth/Throat BID@0800,2000    pantoprazole (Protonix) 40 mg in sodium chloride 0.9% PF 10 mL IV push  40 mg Intravenous QAM AC    dextrose 10% infusion (TPN no rate)   Intravenous Continuous PRN    pancrelipase (Lip-Prot-Amyl) (Zenpep) DR particles cap 10,000 Units  10,000 Units Per G Tube PRN    And    sodium bicarbonate tab 325 mg  325 mg Oral PRN    glucose (Dex4) 15 GM/59ML oral liquid 15 g  15 g Oral Q15 Min PRN    Or    glucose (Glutose) 40% oral gel 15 g  15 g Oral Q15 Min PRN    Or    glucose-vitamin C (Dex-4) chewable tab 4 tablet  4 tablet Oral Q15 Min PRN    Or    dextrose 50% injection 50 mL  50 mL Intravenous Q15 Min PRN    Or    glucose (Dex4) 15 GM/59ML oral liquid 30 g  30 g Oral Q15 Min PRN    Or    glucose (Glutose) 40% oral gel 30 g  30 g Oral Q15 Min PRN    Or    glucose-vitamin C (Dex-4) chewable tab 8 tablet  8 tablet Oral Q15 Min PRN    insulin regular human (Novolin R, Humulin R) 100 UNIT/ML injection 1-5 Units  1-5 Units Subcutaneous 4 times per day    piperacillin-tazobactam (Zosyn) 4.5 g in dextrose 5% 100 mL IVPB-ADDV  4.5 g Intravenous Q8H    remdesivir (Veklury) 100 mg in sodium chloride 0.9% 270 mL IVPB  100 mg Intravenous Q24H    vancomycin (Vancocin) 500 mg in sodium chloride 0.9% 150 mL IVPB  15 mg/kg  Intravenous Q24H       Continuous Infusions:    propofol Stopped (08/24/24 0845)    DOBUTamine Stopped (08/23/24 0429)    phenylephrine Stopped (08/24/24 0200)    dextrose 10%         Physical Exam  Constitutional: no acute distress  Eyes: PERRL  ENT: nares pateint  Neck: supple, no JVD  Cardio: RRR, S1 S2  Respiratory: Basilar Rales  GI: abdomen soft, non tender, active bowel sounds, no organomegaly + PEG tube in place  Extremities: no clubbing, cyanosis, edema  Neurologic: no gross motor deficits  Skin: warm, dry      Results:     Lab Results   Component Value Date    WBC 19.4 08/24/2024    HGB 8.8 08/24/2024    HCT 26.3 08/24/2024    .0 08/24/2024    CREATSERUM 1.03 08/24/2024    BUN 31 08/24/2024     08/24/2024    K 3.5 08/24/2024    CL 98 08/24/2024    CO2 33.0 08/24/2024     08/24/2024    CA 8.7 08/24/2024    ALB 3.4 08/24/2024    ALKPHO 115 08/24/2024    BILT 0.2 08/24/2024    TP 5.9 08/24/2024    AST 19 08/24/2024    ALT 15 08/24/2024    INR 1.02 08/24/2024    MG 2.0 08/24/2024    PHOS 3.7 08/24/2024       XR CHEST AP PORTABLE  (CPT=71045)    Result Date: 8/24/2024  CONCLUSION:  No new pulmonary consolidation.  Hyperinflated lungs, suggesting air trapping/emphysema.       Dictated by (CST): Martha Connors MD on 8/24/2024 at 8:53 AM     Finalized by (CST): Martha Connors MD on 8/24/2024 at 8:55 AM          CT CHEST PE AORTA (IV ONLY) (CPT=71260)    Result Date: 8/23/2024  CONCLUSION:  1. No evidence of acute pulmonary embolism to the level of the first order subsegmental pulmonary artery branches.  2. There is a mass of the esophagus contributing to apparent esophageal obstruction with distention of the proximal esophagus, which is filled with fluid and debris. The mass substantially narrows the tracheal lumen and may invade through the posterior margin of the trachea. This is concerning for neoplastic process.  3. Pulmonary interstitial and alveolar edema, with or without superimposed  pneumonia.  4. Small pleural effusions.  5. A 2.3 cm hypoattenuating right thyroid lobe nodule is noted.  6. Tracheostomy tube with mucus/debris in the trachea, with or without associated bronchitis.   7. A percutaneous gastrostomy tube is partially visualized.  8. Nonspecific nodularity left adrenal gland.  9. Postoperative changes of proximal right humeral fixation are partially delineated.  10. Sequela of remote granulomatous disease.  11. Lesser incidental findings as above.    A preliminary report was issued by the [facility] teleradiology service. There are no major discrepancies.   Dictated by (CST): Roberto Hilario MD on 8/23/2024 at 1:07 PM     Finalized by (CST): Roberto Hilario MD on 8/23/2024 at 1:22 PM          XR CHEST AP PORTABLE  (CPT=71045)    Result Date: 8/23/2024  CONCLUSION:  1. The nasogastric tube appears to be coiled in the inferior hypopharynx; retraction repositioning are advised.  2. Endotracheal tube in satisfactory position.  3. Small right pleural effusion associated basilar atelectasis, with or without superimposed pneumonia.  4. Pulmonary interstitial edema with a relative basilar predominance, potentially developing alveolar edema.  5. Cardiomegaly with mild pulmonary vascular congestion.   6. Sequela of remote granulomatous disease.    A preliminary report was issued by the [facility] teleradiology service. There are no major discrepancies.   Dictated by (CST): Roberto Hilario MD on 8/23/2024 at 7:44 AM     Finalized by (CST): Roberto Hilario MD on 8/23/2024 at 7:46 AM          XR CHEST AP PORTABLE  (CPT=71045)    Result Date: 8/23/2024  CONCLUSION:  1. Interstitial opacities with development of bibasilar ground-glass airspace disease. These findings may reflect pulmonary interstitial and developing alveolar edema, with or without superimposed infectious process.  2. Cardiomegaly with mild pulmonary vascular congestion.  3. Sequela of remote granulomatous disease.   A  preliminary report was issued by the [facility] teleradiology service. There are no major discrepancies.   Dictated by (CST): Roberto Hilario MD on 8/23/2024 at 7:03 AM     Finalized by (CST): Roberto Hilario MD on 8/23/2024 at 7:07 AM           EKG 12 Lead    Result Date: 8/23/2024  Sinus tachycardia Septal infarct (cited on or before 05-JUL-2024) Abnormal ECG When compared with ECG of 07-AUG-2024 10:52, Questionable change in initial forces of Septal leads Confirmed by BLESSING CABRERA JORDAN ([id number]) on 8/23/2024 7:36:23 AM     Assessment   1.  Acute hypoxemic respiratory failure  2.  Esophageal squamous cell carcinoma  3.  Left vocal cord paralysis  4.  Possible pneumonia  5.  COPD  6.  Tracheal stenosis  7.  Shock state  8.  Leukocytosis  9.  Takotsubo cardiomyopathy  10.  COVID-19     Plan   -Patient presents with sudden onset respiratory distress worsening hypoxia altered mental status despite steroid and diuresis in emergency department.  Intubated emergency department on 8/23/2024  -CT chest on 8/23/2024 with evidence of esophageal mass with significant tracheal narrowing seen.  Lower lobe groundglass basilar opacities and consolidation.  Concern for possible edema and/or infectious process.  -Positive for COVID-19.  Started on remdesivir per ID  -Agreeable with empiric IV antibiotic therapy at this time  -Tolerating spontaneous breathing trial this morning.  Will proceed with extubation  -The echo during current hospitalization with recovery of left ventricular fraction with ejection fraction of 50 to 55%  -Diuresis as tolerated  -IV steroids and gradually wean  -DVT prophylaxis: Heparin  -Will discuss goals of care and CODE STATUS with patient once extubated    Prakash Quispe, DO  Pulmonary Critical Care Medicine  [facility]

## 2024-08-25 ENCOUNTER — HOSPITAL ENCOUNTER (INPATIENT)
Facility: HOSPITAL | Age: 79
LOS: 3 days | Discharge: HOSPICE | End: 2024-08-28
Attending: HOSPITALIST | Admitting: HOSPITALIST
Payer: OTHER MISCELLANEOUS

## 2024-08-25 ENCOUNTER — HOSPITAL ENCOUNTER (INPATIENT)
Facility: HOSPITAL | Age: 79
LOS: 3 days | Discharge: HOSPICE | DRG: 189 | End: 2024-08-28
Attending: HOSPITALIST | Admitting: HOSPITALIST
Payer: OTHER MISCELLANEOUS

## 2024-08-25 ENCOUNTER — HOSPITAL ENCOUNTER (INPATIENT)
Facility: HOSPITAL | Age: 79
End: 2024-08-25
Attending: HOSPITALIST | Admitting: HOSPITALIST
Payer: OTHER MISCELLANEOUS

## 2024-08-25 VITALS
DIASTOLIC BLOOD PRESSURE: 86 MMHG | HEART RATE: 100 BPM | TEMPERATURE: 98 F | WEIGHT: 91.06 LBS | OXYGEN SATURATION: 95 % | SYSTOLIC BLOOD PRESSURE: 151 MMHG | RESPIRATION RATE: 16 BRPM | BODY MASS INDEX: 14 KG/M2

## 2024-08-25 PROBLEM — C15.9 ESOPHAGEAL CANCER (HCC): Status: ACTIVE | Noted: 2024-08-25

## 2024-08-25 LAB
ALBUMIN SERPL-MCNC: 3.5 G/DL (ref 3.2–4.8)
ANION GAP SERPL CALC-SCNC: 5 MMOL/L (ref 0–18)
BASE EXCESS BLD CALC-SCNC: 9.5 MMOL/L (ref ?–2)
BASOPHILS # BLD AUTO: 0.03 X10(3) UL (ref 0–0.2)
BASOPHILS NFR BLD AUTO: 0.2 %
BUN BLD-MCNC: 24 MG/DL (ref 9–23)
BUN/CREAT SERPL: 34.3 (ref 10–20)
CA-I BLD-SCNC: 1.23 MMOL/L (ref 0.95–1.32)
CALCIUM BLD-MCNC: 8.6 MG/DL (ref 8.7–10.4)
CHLORIDE SERPL-SCNC: 104 MMOL/L (ref 98–112)
CO2 SERPL-SCNC: 33 MMOL/L (ref 21–32)
COHGB MFR BLD: 1.7 % (ref 0–3)
CREAT BLD-MCNC: 0.7 MG/DL
DEPRECATED RDW RBC AUTO: 47.6 FL (ref 35.1–46.3)
EGFRCR SERPLBLD CKD-EPI 2021: 90 ML/MIN/1.73M2 (ref 60–?)
EOSINOPHIL # BLD AUTO: 0.03 X10(3) UL (ref 0–0.7)
EOSINOPHIL NFR BLD AUTO: 0.2 %
ERYTHROCYTE [DISTWIDTH] IN BLOOD BY AUTOMATED COUNT: 14.5 % (ref 11–15)
GLUCOSE BLD-MCNC: 109 MG/DL (ref 70–99)
GLUCOSE BLDC GLUCOMTR-MCNC: 118 MG/DL (ref 70–99)
GLUCOSE BLDC GLUCOMTR-MCNC: 166 MG/DL (ref 70–99)
HCO3 BLDA-SCNC: 32.4 MEQ/L (ref 21–27)
HCT VFR BLD AUTO: 28.7 %
HGB BLD-MCNC: 10.3 G/DL
HGB BLD-MCNC: 9.3 G/DL
IMM GRANULOCYTES # BLD AUTO: 0.07 X10(3) UL (ref 0–1)
IMM GRANULOCYTES NFR BLD: 0.5 %
LACTATE BLD-SCNC: 0.6 MMOL/L (ref 0.5–2)
LYMPHOCYTES # BLD AUTO: 1.16 X10(3) UL (ref 1–4)
LYMPHOCYTES NFR BLD AUTO: 7.6 %
MCH RBC QN AUTO: 29.3 PG (ref 26–34)
MCHC RBC AUTO-ENTMCNC: 32.4 G/DL (ref 31–37)
MCV RBC AUTO: 90.5 FL
METHGB MFR BLD: 0.7 % SAT (ref 0.4–1.5)
MODIFIED ALLEN TEST: POSITIVE
MONOCYTES # BLD AUTO: 1.05 X10(3) UL (ref 0.1–1)
MONOCYTES NFR BLD AUTO: 6.9 %
NEUTROPHILS # BLD AUTO: 12.91 X10 (3) UL (ref 1.5–7.7)
NEUTROPHILS # BLD AUTO: 12.91 X10(3) UL (ref 1.5–7.7)
NEUTROPHILS NFR BLD AUTO: 84.6 %
O2 CT BLD-SCNC: 14.5 VOL% (ref 15–23)
O2/TOTAL GAS SETTING VFR VENT: 45 %
OSMOLALITY SERPL CALC.SUM OF ELEC: 299 MOSM/KG (ref 275–295)
OXYGEN LITERS/MINUTE: 50 L/MIN
PCO2 BLDA: 69 MM HG (ref 35–45)
PH BLDA: 7.34 [PH] (ref 7.35–7.45)
PHOSPHATE SERPL-MCNC: 3 MG/DL (ref 2.4–5.1)
PLATELET # BLD AUTO: 285 10(3)UL (ref 150–450)
PO2 BLDA: 173 MM HG (ref 80–100)
POTASSIUM BLD-SCNC: 3.8 MMOL/L (ref 3.6–5.1)
POTASSIUM SERPL-SCNC: 4 MMOL/L (ref 3.5–5.1)
PUNCTURE CHARGE: YES
RBC # BLD AUTO: 3.17 X10(6)UL
SAO2 % BLDA: >99 % (ref 94–100)
SODIUM BLD-SCNC: 136 MMOL/L (ref 135–145)
SODIUM SERPL-SCNC: 142 MMOL/L (ref 136–145)
WBC # BLD AUTO: 15.3 X10(3) UL (ref 4–11)

## 2024-08-25 PROCEDURE — 99233 SBSQ HOSP IP/OBS HIGH 50: CPT | Performed by: HOSPITALIST

## 2024-08-25 PROCEDURE — 5A0935A ASSISTANCE WITH RESPIRATORY VENTILATION, LESS THAN 24 CONSECUTIVE HOURS, HIGH NASAL FLOW/VELOCITY: ICD-10-PCS | Performed by: HOSPITALIST

## 2024-08-25 PROCEDURE — 99291 CRITICAL CARE FIRST HOUR: CPT

## 2024-08-25 PROCEDURE — 99497 ADVNCD CARE PLAN 30 MIN: CPT | Performed by: HOSPITALIST

## 2024-08-25 PROCEDURE — 99233 SBSQ HOSP IP/OBS HIGH 50: CPT | Performed by: INTERNAL MEDICINE

## 2024-08-25 RX ORDER — FUROSEMIDE 10 MG/ML
40 INJECTION INTRAMUSCULAR; INTRAVENOUS EVERY 8 HOURS PRN
Status: DISCONTINUED | OUTPATIENT
Start: 2024-08-25 | End: 2024-08-28

## 2024-08-25 RX ORDER — SODIUM CHLORIDE 0.9 % (FLUSH) 0.9 %
10 SYRINGE (ML) INJECTION AS NEEDED
Status: DISCONTINUED | OUTPATIENT
Start: 2024-08-25 | End: 2024-08-28

## 2024-08-25 RX ORDER — MORPHINE SULFATE 2 MG/ML
1 INJECTION, SOLUTION INTRAMUSCULAR; INTRAVENOUS
Status: DISCONTINUED | OUTPATIENT
Start: 2024-08-25 | End: 2024-08-28

## 2024-08-25 RX ORDER — ACETAMINOPHEN 650 MG/1
650 SUPPOSITORY RECTAL EVERY 6 HOURS PRN
Status: DISCONTINUED | OUTPATIENT
Start: 2024-08-25 | End: 2024-08-28

## 2024-08-25 RX ORDER — IPRATROPIUM BROMIDE AND ALBUTEROL SULFATE 2.5; .5 MG/3ML; MG/3ML
3 SOLUTION RESPIRATORY (INHALATION) EVERY 6 HOURS PRN
Status: CANCELLED | OUTPATIENT
Start: 2024-08-25

## 2024-08-25 RX ORDER — SCOLOPAMINE TRANSDERMAL SYSTEM 1 MG/1
1 PATCH, EXTENDED RELEASE TRANSDERMAL
Status: DISCONTINUED | OUTPATIENT
Start: 2024-08-25 | End: 2024-08-28

## 2024-08-25 RX ORDER — GLYCOPYRROLATE 0.2 MG/ML
0.4 INJECTION, SOLUTION INTRAMUSCULAR; INTRAVENOUS
Status: DISCONTINUED | OUTPATIENT
Start: 2024-08-25 | End: 2024-08-28

## 2024-08-25 RX ORDER — LORAZEPAM 2 MG/ML
2 INJECTION INTRAMUSCULAR EVERY 4 HOURS PRN
Status: DISCONTINUED | OUTPATIENT
Start: 2024-08-25 | End: 2024-08-28

## 2024-08-25 RX ORDER — MORPHINE SULFATE 2 MG/ML
2 INJECTION, SOLUTION INTRAMUSCULAR; INTRAVENOUS
Status: DISCONTINUED | OUTPATIENT
Start: 2024-08-25 | End: 2024-08-25

## 2024-08-25 RX ORDER — IPRATROPIUM BROMIDE AND ALBUTEROL SULFATE 2.5; .5 MG/3ML; MG/3ML
3 SOLUTION RESPIRATORY (INHALATION)
Status: DISCONTINUED | OUTPATIENT
Start: 2024-08-25 | End: 2024-08-25

## 2024-08-25 RX ORDER — IPRATROPIUM BROMIDE AND ALBUTEROL SULFATE 2.5; .5 MG/3ML; MG/3ML
3 SOLUTION RESPIRATORY (INHALATION) EVERY 6 HOURS PRN
Status: DISCONTINUED | OUTPATIENT
Start: 2024-08-25 | End: 2024-08-25

## 2024-08-25 RX ORDER — ATROPINE SULFATE 10 MG/ML
2 SOLUTION/ DROPS OPHTHALMIC EVERY 2 HOUR PRN
Status: DISCONTINUED | OUTPATIENT
Start: 2024-08-25 | End: 2024-08-28

## 2024-08-25 RX ORDER — ACETYLCYSTEINE 200 MG/ML
SOLUTION ORAL; RESPIRATORY (INHALATION)
Status: COMPLETED
Start: 2024-08-25 | End: 2024-08-25

## 2024-08-25 RX ORDER — ONDANSETRON 2 MG/ML
4 INJECTION INTRAMUSCULAR; INTRAVENOUS EVERY 6 HOURS PRN
Status: DISCONTINUED | OUTPATIENT
Start: 2024-08-25 | End: 2024-08-28

## 2024-08-25 RX ORDER — GUAIFENESIN 600 MG/1
600 TABLET, EXTENDED RELEASE ORAL 2 TIMES DAILY
Status: DISCONTINUED | OUTPATIENT
Start: 2024-08-25 | End: 2024-08-25

## 2024-08-25 RX ORDER — METHYLPREDNISOLONE SODIUM SUCCINATE 125 MG/2ML
60 INJECTION, POWDER, LYOPHILIZED, FOR SOLUTION INTRAMUSCULAR; INTRAVENOUS ONCE
Status: COMPLETED | OUTPATIENT
Start: 2024-08-25 | End: 2024-08-25

## 2024-08-25 RX ORDER — FUROSEMIDE 10 MG/ML
20 INJECTION INTRAMUSCULAR; INTRAVENOUS ONCE
Status: DISCONTINUED | OUTPATIENT
Start: 2024-08-25 | End: 2024-08-25

## 2024-08-25 RX ORDER — IPRATROPIUM BROMIDE AND ALBUTEROL SULFATE 2.5; .5 MG/3ML; MG/3ML
3 SOLUTION RESPIRATORY (INHALATION) EVERY 6 HOURS PRN
Status: DISCONTINUED | OUTPATIENT
Start: 2024-08-25 | End: 2024-08-28

## 2024-08-25 RX ORDER — DILTIAZEM HYDROCHLORIDE 5 MG/ML
5 INJECTION INTRAVENOUS EVERY 6 HOURS PRN
Status: DISCONTINUED | OUTPATIENT
Start: 2024-08-25 | End: 2024-08-25

## 2024-08-25 RX ORDER — HALOPERIDOL 5 MG/ML
1 INJECTION INTRAMUSCULAR
Status: DISCONTINUED | OUTPATIENT
Start: 2024-08-25 | End: 2024-08-28

## 2024-08-25 RX ORDER — BISACODYL 10 MG
10 SUPPOSITORY, RECTAL RECTAL
Status: DISCONTINUED | OUTPATIENT
Start: 2024-08-25 | End: 2024-08-28

## 2024-08-25 RX ORDER — ACETYLCYSTEINE 200 MG/ML
2 SOLUTION ORAL; RESPIRATORY (INHALATION) 2 TIMES DAILY
Status: DISCONTINUED | OUTPATIENT
Start: 2024-08-25 | End: 2024-08-25

## 2024-08-25 RX ORDER — ACETAMINOPHEN 160 MG/5ML
650 SOLUTION ORAL EVERY 6 HOURS PRN
Status: DISCONTINUED | OUTPATIENT
Start: 2024-08-25 | End: 2024-08-28

## 2024-08-25 RX ORDER — LORAZEPAM 2 MG/ML
1 INJECTION INTRAMUSCULAR EVERY 4 HOURS PRN
Status: DISCONTINUED | OUTPATIENT
Start: 2024-08-25 | End: 2024-08-28

## 2024-08-25 NOTE — PROGRESS NOTES
[facility]  part of [facility]  Hospitalist Progress Note     Hortencia Kelley Patient Status:  Inpatient    10/2/1945  78 year old CSN [id number]   Location 218/218-A Attending Landen Akins MD   Hosp Day # 2 PCP No primary care provider on file.     Assessment & Plan:   ----------------------------------  Respiratory failure, acute hypoxic.  Oxygen requirement is better.  -Supplemental oxygen as needed, titrate down  -Pulmonology consultation oh  -Treat chf as below  -Extubated  4 AM, doing well    Congestive heart failure, acute on chronic systolic. Most recent EF 25%. Patient with hypoxia  -Cardiology consult appreciated  -Hold on lasix per cards recs  -GDMT per cards  -Echo shows recovery of LV function  -Strict I/O  -Daily weights  -Monitor renal function  -Telemetry  -Supp O2 as needed, titrate off as tolerated    Possible gram-negative pneumonia.  Infiltrate seen on x-ray.  May be related to edema not infection though with high white count, respiratory failure would favor treating empirically.  -Empiric antibiotics with vancomycin and Zosyn  -Pulmonology following    Acute COVID infection.  Given other cardiopulmonary processes, unclear if this is incidental or not.  -ID consult appreciated  -Remdesivir  -Antibiotics as above    Shock.  Cardiogenic versus septic. Resolved  -Off pressor support  -Critical care following    Advance care planning/goals of care.  Patient very clear about her wishes to not be reintubated should that become necessary.  She would not want ACLS.  30 minutes spent discussing advance care planning, goals of care, CODE STATUS.  -DNR status    Other problems  Takotsubo cardiomyopathy  Tracheal stenosis  Esophageal squamous cell carcinoma  COPD      DVT Mechanical Prophylaxis:        DVT Pharmacologic Prophylaxis   Medication    heparin (Porcine) 5000 UNIT/ML injection 5,000 Units              code status: DNR  dispo: transfer to floor    I personally  reviewed the available laboratories, imaging including. I discussed/will discuss the case with consultants. I ordered laboratories and/or radiographic studies. I adjusted medications as detailed above.  Medical decision making high, risk is high.    Subjective:   ----------------------------------  Awake, alert.  Feels congested.  States that she does not want to be reintubated if that becomes necessary.      Objective:   Chief Complaint:   Chief Complaint   Patient presents with    Difficulty Breathing     ----------------------------------  Temp:  [97.5 °F (36.4 °C)-98.3 °F (36.8 °C)] 98 °F (36.7 °C)  Pulse:  [] 78  Resp:  [17-33] 20  BP: ()/(48-85) 138/79  SpO2:  [95 %-100 %] 99 %  FiO2 (%):  [30 %] 30 %  Gen: I alert and oriented x 4 no distress.   HEENT: NCAT, neck supple, no carotid bruit.  ETT tube in place  CV: RRR, S1S2, and intact distal pulses. No gallop, rub, murmur.  Pulm: Coarse breath sounds, no wheezing.  Abd: Soft, NTND, BS normal, no mass, no HSM, no rebound/guarding.   Neuro: Moving all 4 extremities well, no focal findings  MS: No joint effusions.  No peripheral edema.  Skin: Skin is warm and dry. No rashes, erythema, diaphoresis.   Psych: Normal mood and affect. Calm, cooperative    Labs:  Lab Results   Component Value Date    HGB 9.3 (L) 08/25/2024    WBC 15.3 (H) 08/25/2024    .0 08/25/2024     08/25/2024    K 4.0 08/25/2024    CREATSERUM 0.70 08/25/2024    INR 1.02 08/24/2024    AST 19 08/24/2024    ALT 15 08/24/2024          heparin  5,000 Units Subcutaneous Q8H DEB    pantoprazole  40 mg Intravenous QAM AC    insulin regular human  1-5 Units Subcutaneous 4 times per day    piperacillin-tazobactam  4.5 g Intravenous Q8H    remdesivir  100 mg Intravenous Q24H    vancomycin  15 mg/kg Intravenous Q24H       metoclopramide    sennosides    benzonatate    ondansetron    acetaminophen **OR** acetaminophen **OR** acetaminophen **OR** acetaminophen    polyethylene glycol  (PEG 3350)    senna    bisacodyl    fleet enema    dextrose 10%    lipase-protease-amylase (Lip-Prot-Amyl) **AND** sodium bicarbonate    glucose **OR** glucose **OR** glucose-vitamin C **OR** dextrose **OR** glucose **OR** glucose **OR** glucose-vitamin C      Dietitian Malnutrition Assessment        Evaluation for Malnutrition: Criteria for non-severe malnutrition diagnosis-         acute illness/injury related to Body fat mild depletion., Muscle mass mild depletion.         RD Malnutrition Care Plan:      Body Fat/Muscle Mass: Mild depletion body fat., Mild depletion muscle mass. triceps region. temple region., clavicle region., shoulder region., dorsal monsalve region., thigh region., calf region.    Physician Assessment    Patient has a diagnosis of moderate malnutrition

## 2024-08-25 NOTE — PROGRESS NOTES
[facility]  part of [facility]     Progress Note    Hortencia Kelley Patient Status:  Inpatient    10/2/1945 MRN [id number]   Location [facility] 2W/SW Attending Landen Akins MD   Hosp Day # 2 PCP No primary care provider on file.       Subjective:   Patient seen and examined.  Awake admits to some dyspnea and congestion this morning  Objective:   Blood pressure 134/74, pulse 75, temperature 97.2 °F (36.2 °C), temperature source Temporal, resp. rate 22, weight 91 lb 0.8 oz (41.3 kg), SpO2 98%, not currently breastfeeding.  Intake/Output:   Last 3 shifts: I/O last 3 completed shifts:  In: 4591 [P.O.:100; I.V.:1730; NG/GT:2041; IV PIGGYBACK:720]  Out: 2775 [Urine:2775]   This shift: I/O this shift:  In: 100 [P.O.:100]  Out: -      Vent Settings:      Hemodynamic parameters (last 24 hours):      Scheduled Meds:   Current Facility-Administered Medications   Medication Dose Route Frequency    ipratropium-albuterol (Duoneb) 0.5-2.5 (3) MG/3ML inhalation solution 3 mL  3 mL Nebulization Q6H PRN    ipratropium-albuterol (Duoneb) 0.5-2.5 (3) MG/3ML inhalation solution 3 mL  3 mL Nebulization Q6H WA    guaiFENesin ER (Mucinex) 12 hr tab 600 mg  600 mg Oral BID    metoclopramide (Reglan) 5 mg/mL injection 5 mg  5 mg Intravenous Q8H PRN    sennosides (Senokot) tab 17.2 mg  17.2 mg Oral Nightly PRN    benzonatate (Tessalon) cap 200 mg  200 mg Oral TID PRN    heparin (Porcine) 5000 UNIT/ML injection 5,000 Units  5,000 Units Subcutaneous Q8H DEB    ondansetron (Zofran) 4 MG/2ML injection 4 mg  4 mg Intravenous Q6H PRN    DOBUTamine in dextrose 5% (Dobutrex) 500 mg/250mL infusion premix  2-20 mcg/kg/min (Dosing Weight) Intravenous Continuous    phenylephrine (Zeb-Synephrine) 50 mg in sodium chloride 0.9% 250 mL infusion   mcg/min Intravenous Continuous    acetaminophen (Tylenol) tab 650 mg  650 mg Oral Q4H PRN    Or    acetaminophen (Tylenol) 160 MG/5ML oral liquid 650 mg  650 mg Oral Q4H PRN     Or    acetaminophen (Tylenol) rectal suppository 650 mg  650 mg Rectal Q4H PRN    Or    acetaminophen (Ofirmev) 10 mg/mL infusion premix 600 mg  15 mg/kg Intravenous Q6H PRN    polyethylene glycol (PEG 3350) (Miralax) 17 g oral packet 17 g  17 g Oral Daily PRN    senna (Senokot) 8.8 MG/5ML oral syrup 17.6 mg  10 mL Oral Nightly PRN    bisacodyl (Dulcolax) 10 MG rectal suppository 10 mg  10 mg Rectal Daily PRN    fleet enema (Fleet) 7-19 GM/118ML rectal enema 133 mL  1 enema Rectal Once PRN    pantoprazole (Protonix) 40 mg in sodium chloride 0.9% PF 10 mL IV push  40 mg Intravenous QAM AC    dextrose 10% infusion (TPN no rate)   Intravenous Continuous PRN    pancrelipase (Lip-Prot-Amyl) (Zenpep) DR particles cap 10,000 Units  10,000 Units Per G Tube PRN    And    sodium bicarbonate tab 325 mg  325 mg Oral PRN    glucose (Dex4) 15 GM/59ML oral liquid 15 g  15 g Oral Q15 Min PRN    Or    glucose (Glutose) 40% oral gel 15 g  15 g Oral Q15 Min PRN    Or    glucose-vitamin C (Dex-4) chewable tab 4 tablet  4 tablet Oral Q15 Min PRN    Or    dextrose 50% injection 50 mL  50 mL Intravenous Q15 Min PRN    Or    glucose (Dex4) 15 GM/59ML oral liquid 30 g  30 g Oral Q15 Min PRN    Or    glucose (Glutose) 40% oral gel 30 g  30 g Oral Q15 Min PRN    Or    glucose-vitamin C (Dex-4) chewable tab 8 tablet  8 tablet Oral Q15 Min PRN    insulin regular human (Novolin R, Humulin R) 100 UNIT/ML injection 1-5 Units  1-5 Units Subcutaneous 4 times per day    piperacillin-tazobactam (Zosyn) 4.5 g in dextrose 5% 100 mL IVPB-ADDV  4.5 g Intravenous Q8H    remdesivir (Veklury) 100 mg in sodium chloride 0.9% 270 mL IVPB  100 mg Intravenous Q24H    vancomycin (Vancocin) 500 mg in sodium chloride 0.9% 150 mL IVPB  15 mg/kg Intravenous Q24H       Continuous Infusions:    DOBUTamine Stopped (08/23/24 0429)    phenylephrine Stopped (08/24/24 0200)    dextrose 10%         Physical Exam  Constitutional: no acute distress  Eyes: PERRL  ENT: nares  pateint  Neck: supple, no JVD  Cardio: RRR, S1 S2  Respiratory: Basilar Rales  GI: abdomen soft, non tender, active bowel sounds, no organomegaly + PEG tube in place  Extremities: no clubbing, cyanosis, edema  Neurologic: no gross motor deficits  Skin: warm, dry      Results:     Lab Results   Component Value Date    WBC 15.3 08/25/2024    HGB 9.3 08/25/2024    HCT 28.7 08/25/2024    .0 08/25/2024    CREATSERUM 0.70 08/25/2024    BUN 24 08/25/2024     08/25/2024    K 4.0 08/25/2024     08/25/2024    CO2 33.0 08/25/2024     08/25/2024    CA 8.6 08/25/2024    ALB 3.5 08/25/2024    PHOS 3.0 08/25/2024       XR CHEST AP PORTABLE  (CPT=71045)    Result Date: 8/24/2024  CONCLUSION:  No new pulmonary consolidation.  Hyperinflated lungs, suggesting air trapping/emphysema.       Dictated by (CST): Martha Connors MD on 8/24/2024 at 8:53 AM     Finalized by (CST): Martha Connors MD on 8/24/2024 at 8:55 AM                 Assessment   1.  Acute hypoxemic respiratory failure  2.  Esophageal squamous cell carcinoma  3.  Left vocal cord paralysis  4.  Possible pneumonia  5.  COPD  6.  Tracheal stenosis  7.  Shock state  8.  Leukocytosis  9.  Takotsubo cardiomyopathy  10.  COVID-19     Plan   -Patient presents with sudden onset respiratory distress worsening hypoxia altered mental status despite steroid and diuresis in emergency department.  Intubated emergency department on 8/23/2024  -CT chest on 8/23/2024 with evidence of esophageal mass with significant tracheal narrowing seen.  Lower lobe groundglass basilar opacities and consolidation.  Concern for possible edema and/or infectious process.  -Positive for COVID-19.  Started on remdesivir per ID  -Agreeable with empiric IV antibiotic therapy at this time  -Tolerating extubation on 8/24/2024  -2D echo during current hospitalization with recovery of left ventricular dysfunction  -Diuresis as tolerated  -IV steroids and gradually wean  -Nebulizer  treatment  -DVT prophylaxis: Heparin  -Discussion with patient regarding CODE STATUS moving forward.  Requesting DNR/DNI status moving forward    Prakash Quispe DO  Pulmonary Critical Care Medicine  [facility]

## 2024-08-25 NOTE — PROGRESS NOTES
Patient is alert and aware. Patient explained to this RN wanting to transition to comfort care. MD made aware and consult to hospice was placed. Code status changed to reflect patient's request. Family made aware.

## 2024-08-25 NOTE — HOSPICE RN NOTE
[facility] RN met with Hortencia to discuss comfort care. Informational meeting complete. Hospice philosophy, Medicare benefit, and levels of care discussed. All questions answered. She would like to proceed with comfort care. Hortencia signed her own consents for hospice care. Offered to update her son, however, Hortencia declined my offer and stated she will call him herself. She did say he plans to visit her later today.    [facility] RN admitted the patient to general inpatient hospice per Dr. Akins and Dr. Hill. Patient is eligible for general inpatient hospice care for symptom management of dyspnea requiring frequent nursing assessments and interventions including titration of IV medications.    Chart flipped.    Comfort order set placed. Morphine drip ordered, if needed.    Regular diet with pleasure feeding.    Aspiration, fall, and droplet/contact precautions in place.    PPS: 30%    POC discussed with patient and Cheyenne CORNEJO. Updated Dr. Akins, Dr. Quispe, and Bailee DAVIS. All in agreement. [facility] to follow daily to support the patient and family.      -1930 spoke to patient's son Maite over the phone. Lengthy conversation about patient's wishes for comfort. Plan to meet at bedside with patient and family in the morning.    Patrizia Hunter RN, BSN  Transitional Nurse Liaison  [facility]  [phone] (After-hours)

## 2024-08-25 NOTE — PROGRESS NOTES
[facility] - CARDIOLOGY PROGRESS NOTE  Hortencia Kelley Patient Status:  Inpatient    10/2/1945 MRN [id number]   Location [facility] 2W/SW Attending Landen Akins MD   Hosp Day # 2 PCP No primary care provider on file.     Assessment:    1.  Respiratory failure, much improved.  Extubated.  Respiratory insufficiency is mostly COPD related.  Management per pulmonary.  No CHF on chest x-ray yesterday.  Appears euvolemic.     2.  Recent admission for Takotsubo cardiomyopathy.  Echo shows full recovery of LV function     3.  Hypotension, resolved.  Off Zeb-Synephrine drip.      Plan:    Reduce fluids to keep open rate.    Subjective:  No chest pain or shortness of breath.    Objective:  /79 (BP Location: Right arm)   Pulse 78   Temp 98 °F (36.7 °C) (Temporal)   Resp 20   Wt 91 lb 0.8 oz (41.3 kg)   SpO2 99%   BMI 14.26 kg/m²     Temp (24hrs), Av.8 °F (36.6 °C), Min:97.5 °F (36.4 °C), Max:98.3 °F (36.8 °C)      Intake/Output:    Intake/Output Summary (Last 24 hours) at 2024 0815  Last data filed at 2024 0617  Gross per 24 hour   Intake 2483 ml   Output 1425 ml   Net 1058 ml       Wt Readings from Last 2 Encounters:   24 91 lb 0.8 oz (41.3 kg)   24 91 lb 0.8 oz (41.3 kg)       Physical Exam:    General: Alert and oriented x 3,  No apparent distress.  HEENT: No focal deficits.  Neck: No JVD, carotids 2+ no bruits.  Cardiac: Regular rate and rhythm, S1, S2 normal, no murmur  Lungs: Wheezes and rhonchi bilaterally.  Mildly tachypneic.  Abdomen: Soft, non-tender.   Extremities: Without clubbing, cyanosis or edema.  Peripheral pulses are 2+.  Skin: Warm and dry.     Labs:  Lab Results   Component Value Date    WBC 15.3 2024    HGB 9.3 2024    HCT 28.7 2024    .0 2024     Lab Results   Component Value Date    INR 1.02 2024     Lab Results    Component Value Date     08/25/2024    K 4.0 08/25/2024     08/25/2024    CO2 33.0 08/25/2024    BUN 24 08/25/2024    CREATSERUM 0.70 08/25/2024     08/25/2024    CA 8.6 08/25/2024    ALB 3.5 08/25/2024        No results found for: \"TROP\", \"CKMB\"     Medications:     heparin  5,000 Units Subcutaneous Q8H DEB    pantoprazole  40 mg Intravenous QAM AC    insulin regular human  1-5 Units Subcutaneous 4 times per day    piperacillin-tazobactam  4.5 g Intravenous Q8H    remdesivir  100 mg Intravenous Q24H    vancomycin  15 mg/kg Intravenous Q24H      DOBUTamine Stopped (08/23/24 0429)    phenylephrine Stopped (08/24/24 0200)    dextrose 10%         Nasir Claudio MD  8/25/2024  8:15 AM

## 2024-08-25 NOTE — HOSPICE RN NOTE
[facility] new referral received. Hospice will reach out to patient/family to set up a time to meet.    Patrizia Hunter RN, BSN  Transitional Nurse Liaison  [facility]  [phone] (After-hours)

## 2024-08-25 NOTE — CM/SW NOTE
MDO to DEL for hospice.  DEL informed Tameka at [facility] of order.    DEL/CM to remain available for support and/or discharge planning.      Gabriela MARISCAL MSW, LSW  Social Work/Case Management

## 2024-08-25 NOTE — PLAN OF CARE
Patient alert and oriented x 4. No acute events noted overnight. Patient remained on 4L nasal cannula.  Problem: Patient Centered Care  Goal: Patient preferences are identified and integrated in the patient's plan of care  Description: Interventions:  - What would you like us to know as we care for you? I am a retired nurse  - Provide timely, complete, and accurate information to patient/family  - Incorporate patient and family knowledge, values, beliefs, and cultural backgrounds into the planning and delivery of care  - Encourage patient/family to participate in care and decision-making at the level they choose  - Honor patient and family perspectives and choices  Outcome: Progressing     Problem: CARDIOVASCULAR - ADULT  Goal: Maintains optimal cardiac output and hemodynamic stability  Description: INTERVENTIONS:  - Monitor vital signs, rhythm, and trends  - Monitor for bleeding, hypotension and signs of decreased cardiac output  - Evaluate effectiveness of vasoactive medications to optimize hemodynamic stability  - Monitor arterial and/or venous puncture sites for bleeding and/or hematoma  - Assess quality of pulses, skin color and temperature  - Assess for signs of decreased coronary artery perfusion - ex. Angina  - Evaluate fluid balance, assess for edema, trend weights  Outcome: Progressing  Goal: Absence of cardiac arrhythmias or at baseline  Description: INTERVENTIONS:  - Continuous cardiac monitoring, monitor vital signs, obtain 12 lead EKG if indicated  - Evaluate effectiveness of antiarrhythmic and heart rate control medications as ordered  - Initiate emergency measures for life threatening arrhythmias  - Monitor electrolytes and administer replacement therapy as ordered  Outcome: Progressing     Problem: RESPIRATORY - ADULT  Goal: Achieves optimal ventilation and oxygenation  Description: INTERVENTIONS:  - Assess for changes in respiratory status  - Assess for changes in mentation and behavior  - Position  to facilitate oxygenation and minimize respiratory effort  - Oxygen supplementation based on oxygen saturation or ABGs  - Provide Smoking Cessation handout, if applicable  - Encourage broncho-pulmonary hygiene including cough, deep breathe, Incentive Spirometry  - Assess the need for suctioning and perform as needed  - Assess and instruct to report SOB or any respiratory difficulty  - Respiratory Therapy support as indicated  - Manage/alleviate anxiety  - Monitor for signs/symptoms of CO2 retention  Outcome: Progressing    Problem: Delirium  Goal: Minimize duration of delirium  Description: Interventions:  - Encourage use of hearing aids, eye glasses  - Promote highest level of mobility daily  - Provide frequent reorientation  - Promote wakefulness i.e. lights on, blinds open  - Promote sleep, encourage patient's normal rest cycle i.e. lights off, TV off, minimize noise and interruptions  - Encourage family to assist in orientation and promotion of home routines  Outcome: Progressing

## 2024-08-25 NOTE — PROGRESS NOTES
Seen and examined for increased SOB  Has had complicated hospital course with multiple respiratory co morbidities      Patient is visibly SOB but not in distress, ABG shows mild resp acidosis with hypercapnea  Currently on high flow o2  Received steroid dose and nebulizers earlier, now c/o pain all over and being \"tired\" of being SOB  She states she is tired and wishes to speak with hospice    Will give dose of lasix to try and provide some breathing comfort  Prn morphine for pain  DNAR/comfort code status  Her son visited earlier today. She states he is aware of her wishes and she will call him. She declined my offer to update him  Hospice consult placed    Updated Dr Matt of above     Bailee Rocha NP  PCCU     Greater than 35 min critical care time spent

## 2024-08-26 PROCEDURE — 99222 1ST HOSP IP/OBS MODERATE 55: CPT | Performed by: HOSPITALIST

## 2024-08-26 NOTE — H&P
[facility]  part of [facility]  HISTORY AND PHYSICAL       Hortencia Kelley Patient Status:  Inpatient    10/2/1945  78 year old CSN [id number]   Location 218/218-A Attending Landen Akins MD     PCP No primary care provider on file.     ASSESSMENT/PLAN  Respiratory failure, acute hypoxic  Congestive heart failure, acute on chronic systolic  Possible gram-negative pneumonia  Acute COVID infection  Shock  Advance care planning/goals of care  Takotsubo cardiomyopathy  Tracheal stenosis  Esophageal squamous cell carcinoma  COPD    Continue comfort care measures.  Hospice consult appreciated.  Currently comfortable with nasal cannula and as needed morphine for dyspnea.  Patient slept fairly well overnight.  Plan for meeting between hospice and family later today.    ----------------------------------  dvt prophylaxis: scd  code status: DNR  dispo: transfer to 4th floor    DATE OF ADMISSION: 24    CHIEF COMPLAINT: hospice    HISTORY OF PRESENT ILLNESS  Hortencia Kelley is a 78 year old female with past medical history significant for squamous cell carcinoma, COPD, who was discharged on  after being treated for Takotsubo cardiomyopathy.  She presents today secondary to respiratory distress.  According to the emergency department she presented with a sudden onset of shortness of breath and increased work of breathing.  EMS was called and she was found to be hypoxic and wheezing.  After getting hour-long SHE did not improve at all CT scan of the lungs were ordered however it could not be done secondary to patient worsening respiratory distress and she was intubated in the emergency department and admitted to ICU.      Patient was admitted, intubated for respiratory failure.  Treated for presumed pneumonia.  Subsequently found to be positive for COVID and started on remdesivir.  She was extubated successfully .  Shock state improved.  Continued on IV antibiotics.  She did express  to numerous care team members including myself that she did not want to be reintubated and preferred DNR status.  She later expressed that she wanted to speak with hospice.  After meeting with the patient patient was admitted to inpatient hospice.    PAST MEDICAL HISTORY  Past Medical History:    Cancer (HCC)    COPD (chronic obstructive pulmonary disease) (HCC)    Depression    ETOH abuse    Falls frequently    2017    History of blood transfusion    [facility]         PAST SURGICAL HISTORY  Past Surgical History:   Procedure Laterality Date    Hip replacement surgery Left        ALLERGIES   Patient has no known allergies.    MEDICATIONS  Current Discharge Medication List        CONTINUE these medications which have NOT CHANGED    Details   aspirin 81 MG Oral Tab EC Take 1 tablet (81 mg total) by mouth daily.  Qty: 30 tablet, Refills: 1      atorvastatin 10 MG Oral Tab Take 1 tablet (10 mg total) by mouth nightly.  Qty: 30 tablet, Refills: 1      midodrine 5 MG Oral Tab Take 1 tablet (5 mg total) by mouth in the morning and 1 tablet (5 mg total) at noon and 1 tablet (5 mg total) in the evening.  Qty: 90 tablet, Refills: 1      pantoprazole 40 MG Oral Tab EC Take 1 tablet (40 mg total) by mouth daily.  Qty: 30 tablet, Refills: 1             SOCIAL HISTORY  Social History     Socioeconomic History    Marital status:    Tobacco Use    Smoking status: Former     Current packs/day: 0.00     Average packs/day: 0.5 packs/day for 40.0 years (20.0 ttl pk-yrs)     Types: Cigarettes     Start date: 1978     Quit date: 2018     Years since quittin.1    Smokeless tobacco: Never   Vaping Use    Vaping status: Never Used   Substance and Sexual Activity    Alcohol use: Not Currently     Alcohol/week: 3.0 standard drinks of alcohol     Types: 3 Glasses of wine per week     Comment: social    Drug use: No       FAMILY HISTORY  Family History   Problem Relation Age of Onset    Heart Disorder Father     Dementia  Mother     Dementia Sister        REVIEW OF SYSTEMS  Gen: Neg for chills, fever, diaphoresis and fatigue.  No weight loss or weight gain.  Endo: No heat or cold intolerance.  No polyuria.  HEENT: Neg for trouble swallowing, visual disturbance.  no hearing changes, no speech difficulties, no neck stiffness.  Resp: cough improved. Breathing better  CV: Neg for chest pain and palpitations.   GI: Neg for nausea, vomiting, diarrhea, abdominal pain/distention, constipation, blood in stool, black stools  : Neg for dysuria, frequency and hematuria.   MS :Neg for back pain and joint pain.  No swelling, open wounds  Skin: Neg for rash.   Neuro: Neg for dizziness, focal weakness, LH, HA.   Psych: Neg for suicidal ideas, confusion, agitation and depressed mood.   Other: All other review systems negative except what is mentioned in the HPI    PHYSICAL EXAM  Vital signs:  /62 (BP Location: Right arm)   Pulse 58   Temp 97.7 °F (36.5 °C) (Temporal)   Resp 24   SpO2 94%   Gen: alert and oriented x 4 no distress.   HEENT: NCAT, neck supple, no carotid bruit.  ETT tube in place  CV: RRR, S1S2, and intact distal pulses. No gallop, rub, murmur.  Pulm: Coarse breath sounds, no wheezing.  Abd: Soft, NTND, BS normal, no mass, no HSM, no rebound/guarding.   Neuro:  Moving all 4 extremities well, no focal findings  MS: No joint effusions.  No peripheral edema.  Skin: Skin is warm and dry. No rashes, erythema, diaphoresis.   Psych:   Normal mood and affect. Calm, cooperative    Data:  Recent Labs   Lab 08/22/24 2209 08/24/24  0430 08/25/24  0342   RBC 3.48* 3.04* 3.17*   HGB 10.2* 8.8* 9.3*   HCT 31.6* 26.3* 28.7*   MCV 90.8 86.5 90.5   MCH 29.3 28.9 29.3   MCHC 32.3 33.5 32.4   RDW 14.0 14.1 14.5   NEPRELIM 10.47* 17.22* 12.91*   WBC 17.0* 19.4* 15.3*   .0 310.0 285.0     Recent Labs   Lab 08/22/24 2209 08/24/24  0430 08/25/24  0342   * 156* 109*   BUN 21 31* 24*   CREATSERUM 0.71 1.03* 0.70   CA 9.2 8.7 8.6*    ALB  --  3.4 3.5    139 142   K 4.4 3.5 4.0    98 104   CO2 31.0 33.0* 33.0*   ALKPHO  --  115  --    AST  --  19  --    ALT  --  15  --    BILT  --  0.2  --    TP  --  5.9  --

## 2024-08-26 NOTE — SPIRITUAL CARE NOTE
Spiritual Care Visit Note    Patient Name: Hortencia Kelley Date of Spiritual Care Visit: 24   : 10/2/1945 Primary Dx: <principal problem not specified>       Referred By: Referral From: Hospice    Spiritual Care Taxonomy:    Intended Effects: Build relationship of care and support    Methods: Encourage sharing of feelings;Explore dl and values;Offer emotional support;Encourage story-telling;Offer spiritual/Gnosticism support    Interventions: Acknowledge current situation;Acknowledge response to difficult experience;Active listening;Ask guided questions;Ask guided questions about dl;Connect someone with their dl community/clergy;Discuss plan of care;Share words of hope and inspiration;[place]    Visit Type/Summary:     - Spiritual Care: Responded to a request for spiritual care and assessed the patient for spiritual care needs. Offered empathic listening and emotional support. Patient and family expressed appreciation for  visit. Provided support for Patient's spiritual/Gnosticism requests. Coordinated [employer] Communion and verified NPO status. Offered prayer.  visited pt per EPIC consult from hospice for support. Pt in CCU. [employer] Scient[employer]. Has difficulty talking due to illness. Pt shared with me about deciding to enter hospice. PT stated being admitted inpatient hospice.  and son just left hospital. Has adult daughter in special needs residence who is coming to [facility] later this week. Pt asked if a  was available to anoint later in the week. Gave pt contact card to call  or ask RN to place consult to set up anointing a couple days in advance. With pt permission I placed pt on [employer] communion list.   Pt   remains available for follow up.    Spiritual Care support can be requested via an Epic consult. For urgent/immediate needs, please contact the On Call  at: [place]: ext [phone]    Rev. Sharyn Blevins

## 2024-08-26 NOTE — PLAN OF CARE
Problem: PAIN - ADULT  Goal: Verbalizes/displays adequate comfort level or patient's stated pain goal  Description: INTERVENTIONS:  - Encourage pt to monitor pain and request assistance  - Assess pain using appropriate pain scale  - Administer analgesics based on type and severity of pain and evaluate response  - Implement non-pharmacological measures as appropriate and evaluate response  - Consider cultural and social influences on pain and pain management  - Manage/alleviate anxiety  - Utilize distraction and/or relaxation techniques  - Monitor for opioid side effects  - Notify MD/LIP if interventions unsuccessful or patient reports new pain  - Anticipate increased pain with activity and pre-medicate as appropriate  Outcome: Progressing     Problem: Patient/Family Goals  Goal: Patient/Family Long Term Goal  Description: Patient's Long Term Goal: to be comfortable    Interventions:  - comfort measures, milan, pain medicine prn  - See additional Care Plan goals for specific interventions  Outcome: Progressing    Goal: Patient/Family Short Term Goal  Description: Patient's Short Term Goal: to be comfortable  Interventions:   - comfort measures, milan, pain medicine prn  - See additional Care Plan goals for specific interventions  Outcome: Progressing

## 2024-08-26 NOTE — PLAN OF CARE
Double RN skin check done prior to transfer off Unit. Skin check performed by this RN and CLEMENTE Crook.    Wounds are as follows: none     Report called to CLEMENTE Garcia. Will remain available for any further questions or concerns.

## 2024-08-26 NOTE — HOSPICE RN NOTE
[facility] Inpatient Nursing Rounds:     Visited patient at bedside, no family present at this time. Patient is awake and alert. 6LNC, lungs sound very diminished, slight expiratory wheeze in upper lobes. RR 20 and slightly labored, especially with talking. Skin is warm, no mottling noted. States she has a poor appetite. BS active. G-tube remains clamped. Reyes draining clear yellow urine. LBM: 8/25/24.     Reviewed symptom management over the past 24 hours: 3 doses of IVP Morphine for dyspnea and pain. 2 nebulizer treatments for wheezing and dyspnea.     PPS: 30%    Patient remains eligible for general inpatient hospice care for symptom management of dyspnea requiring frequent nursing assessments and interventions including titration of IV medications. POC discussed with patient and bedside RN. All in agreement. Updated patient's son, Maite, via phone. [facility] will continue to support the patient and family.    Patrizia Hunter RN, BSN  Transitional Nurse Liaison  [facility]  [phone] (After-hours)

## 2024-08-27 PROCEDURE — 99232 SBSQ HOSP IP/OBS MODERATE 35: CPT | Performed by: HOSPITALIST

## 2024-08-27 RX ORDER — LORAZEPAM 2 MG/ML
1 CONCENTRATE ORAL EVERY 8 HOURS PRN
Status: DISCONTINUED | OUTPATIENT
Start: 2024-08-27 | End: 2024-08-28

## 2024-08-27 RX ORDER — MORPHINE SULFATE 20 MG/ML
5 SOLUTION ORAL
Status: DISCONTINUED | OUTPATIENT
Start: 2024-08-27 | End: 2024-08-28

## 2024-08-27 RX ORDER — GUAIFENESIN 600 MG/1
600 TABLET, EXTENDED RELEASE ORAL 2 TIMES DAILY
Status: DISCONTINUED | OUTPATIENT
Start: 2024-08-27 | End: 2024-08-28

## 2024-08-27 NOTE — HOSPICE RN NOTE
[facility] Inpatient Nursing Rounds:     Assessed patient at bedside, family not present. Alert and Oriented x 4 Reports dyspnea as mild with relief to \"almost nothing\" after dose of Morphine, Mucinex and 6L oxygen via NC. RR 18, dry cough with rhonchi. Able to hold a full conversation and appears to be tolerating current oxygenation level. LBM: 8/27. Bowel sounds active, G-tube clamped. Reyes with clear yellow output. Able to ambulate with standby assistance. PPS: 30 % In the last 24 hours PRN medications given include: IV Morphine, 1 mg total, given for dyspnea, Duoneb x4, given for dyspnea.     Discussion with CLEMENTE Naranjo and MD Akins and MD Hill regarding trial of PO Comfort medications to transition to routine level of care within patient home as symptoms appear to be managed today. POC discussed with patient, RN, MD and Son Maite. All in agreement. DEL Consuelo aware.    [facility] will begin discharge planning to home in [place]. Ambulance  scheduled tomorrow 8/28 at 10am.     Grecia uSárez RN, BSN  Transitional Nurse Liaison  [facility]  [phone] (After-hours)

## 2024-08-27 NOTE — PLAN OF CARE
Problem: PAIN - ADULT  Goal: Verbalizes/displays adequate comfort level or patient's stated pain goal  Description: INTERVENTIONS:  - Encourage pt to monitor pain and request assistance  - Assess pain using appropriate pain scale  - Administer analgesics based on type and severity of pain and evaluate response  - Implement non-pharmacological measures as appropriate and evaluate response  - Consider cultural and social influences on pain and pain management  - Manage/alleviate anxiety  - Utilize distraction and/or relaxation techniques  - Monitor for opioid side effects  - Notify MD/LIP if interventions unsuccessful or patient reports new pain  - Anticipate increased pain with activity and pre-medicate as appropriate  Outcome: Progressing     Problem: RESPIRATORY - ADULT  Goal: Achieves optimal ventilation and oxygenation  Description: INTERVENTIONS:  - Assess for changes in respiratory status  - Assess for changes in mentation and behavior  - Position to facilitate oxygenation and minimize respiratory effort  - Oxygen supplementation based on oxygen saturation or ABGs  - Provide Smoking Cessation handout, if applicable  - Encourage broncho-pulmonary hygiene including cough, deep breathe, Incentive Spirometry  - Assess the need for suctioning and perform as needed  - Assess and instruct to report SOB or any respiratory difficulty  - Respiratory Therapy support as indicated  - Manage/alleviate anxiety  - Monitor for signs/symptoms of CO2 retention  Outcome: Progressing     Patient on hospice care. PRN medications given per MAR for pain. Scopolamine patch applied per MAR for secretions. Patient remains on 6 L nasal cannula. Reyes patent and draining. Tolerating regular diet with pleasure feeds.   Safety precautions in place, frequent nursing rounding completed, call light within reach. All questions answered and needs met.

## 2024-08-27 NOTE — PLAN OF CARE
Pt A&Ox4. Pt transferred from ccu due to COVID + status. Pt remains with inpatient hospice. Pt declines morphine at this time, reported some pain around gtube sight but denies pain. Reyes in place. G-tube site dry and intact and clamped. Bed low and locked. Call light within reach. Comfort measures in place  Problem: PAIN - ADULT  Goal: Verbalizes/displays adequate comfort level or patient's stated pain goal  Description: INTERVENTIONS:  - Encourage pt to monitor pain and request assistance  - Assess pain using appropriate pain scale  - Administer analgesics based on type and severity of pain and evaluate response  - Implement non-pharmacological measures as appropriate and evaluate response  - Consider cultural and social influences on pain and pain management  - Manage/alleviate anxiety  - Utilize distraction and/or relaxation techniques  - Monitor for opioid side effects  - Notify MD/LIP if interventions unsuccessful or patient reports new pain  - Anticipate increased pain with activity and pre-medicate as appropriate  8/27/2024 0204 by Dedra Phillips RN  Outcome: Progressing  8/27/2024 0203 by Dedra Phillips RN  Outcome: Progressing     Problem: Patient/Family Goals  Goal: Patient/Family Long Term Goal  Description: Patient's Long Term Goal:     Interventions:  -   - See additional Care Plan goals for specific interventions  8/27/2024 0204 by Dedra Phillips RN  Outcome: Progressing  8/27/2024 0203 by Dedra Phillips RN  Outcome: Progressing  Goal: Patient/Family Short Term Goal  Description: Patient's Short Term Goal:     Interventions:   -   - See additional Care Plan goals for specific interventions  8/27/2024 0204 by Dedra Phillips RN  Outcome: Progressing  8/27/2024 0203 by Dedra Phillips RN  Outcome: Progressing     Problem: RESPIRATORY - ADULT  Goal: Achieves optimal ventilation and oxygenation  Description: INTERVENTIONS:  - Assess for changes in respiratory status  - Assess for  changes in mentation and behavior  - Position to facilitate oxygenation and minimize respiratory effort  - Oxygen supplementation based on oxygen saturation or ABGs  - Provide Smoking Cessation handout, if applicable  - Encourage broncho-pulmonary hygiene including cough, deep breathe, Incentive Spirometry  - Assess the need for suctioning and perform as needed  - Assess and instruct to report SOB or any respiratory difficulty  - Respiratory Therapy support as indicated  - Manage/alleviate anxiety  - Monitor for signs/symptoms of CO2 retention  Outcome: Progressing

## 2024-08-27 NOTE — PLAN OF CARE
Pt. Was taken to room 520  with transport. All belongings were packed and pt. Left on 6L NC via wheelchair, alert, oriented and in good spirits. Report was given to receiving nurse by day shift. This RN will remain available for any further questions.   Problem: PAIN - ADULT  Goal: Verbalizes/displays adequate comfort level or patient's stated pain goal  Description: INTERVENTIONS:  - Encourage pt to monitor pain and request assistance  - Assess pain using appropriate pain scale  - Administer analgesics based on type and severity of pain and evaluate response  - Implement non-pharmacological measures as appropriate and evaluate response  - Consider cultural and social influences on pain and pain management  - Manage/alleviate anxiety  - Utilize distraction and/or relaxation techniques  - Monitor for opioid side effects  - Notify MD/LIP if interventions unsuccessful or patient reports new pain  - Anticipate increased pain with activity and pre-medicate as appropriate  Outcome: Progressing     Problem: Patient/Family Goals  Goal: Patient/Family Long Term Goal  Description: Patient's Long Term Goal:     Interventions:  -   - See additional Care Plan goals for specific interventions  Outcome: Progressing  Goal: Patient/Family Short Term Goal  Description: Patient's Short Term Goal:     Interventions:   -   - See additional Care Plan goals for specific interventions  Outcome: Progressing

## 2024-08-27 NOTE — PROGRESS NOTES
[facility]  part of [facility]  Hospitalist Progress Note     Hortencia Kelley Patient Status:  Inpatient    10/2/1945  78 year old CSN [id number]   Location 520/520-A Attending Landen Akins MD   Hosp Day # 2 PCP No primary care provider on file.     Assessment & Plan:   ----------------------------------  Respiratory failure, acute hypoxic  Congestive heart failure, acute on chronic systolic  Possible gram-negative pneumonia  Acute COVID infection  Shock  Advance care planning/goals of care  Takotsubo cardiomyopathy  Tracheal stenosis  Esophageal squamous cell carcinoma  COPD     Continue comfort care measures.  Hospice consult appreciated.  Currently comfortable with nasal cannula.  No as needed medications needed.  Discussed with hospice team, planning on discharge with home hospice soon    DVT Mechanical Prophylaxis:        DVT Pharmacologic Prophylaxis   Medication   None              code status: dnr  dispo: Home with hospice when arrangements made  malnutrition status at bottom of note    I personally reviewed the available laboratories, imaging including. I discussed/will discuss the case with consultants. I ordered laboratories and/or radiographic studies. I adjusted medications as detailed above.  Medical decision making high, risk is high.    Subjective:   ----------------------------------  Feels a little bit more congested, not more short of breath.      Objective:   Chief Complaint: No chief complaint on file.    ----------------------------------  Temp:  [97.9 °F (36.6 °C)-98.6 °F (37 °C)] 98.6 °F (37 °C)  Pulse:  [73-84] 83  Resp:  [16-22] 22  BP: (111-123)/(62-78) 117/68  SpO2:  [100 %] 100 %  Gen: A+Ox3.  No distress.   HEENT: NCAT, neck supple, no carotid bruit.  CV: RRR, S1S2, and intact distal pulses. No gallop, rub, murmur.  Pulm: Effort and breath sounds normal. No distress, wheezes, rales, rhonchi.  Abd: Soft, NTND, BS normal, no mass, no HSM, no rebound/guarding.    Neuro: Normal reflexes, CN. Sensory/motor exams grossly normal deficit.   MS: No joint effusions.  No peripheral edema.  Skin: Skin is warm and dry. No rashes, erythema, diaphoresis.   Psych: Normal mood and affect. Calm, cooperative    Labs:  Lab Results   Component Value Date    HGB 9.3 (L) 08/25/2024    WBC 15.3 (H) 08/25/2024    .0 08/25/2024     08/25/2024    K 4.0 08/25/2024    CREATSERUM 0.70 08/25/2024    INR 1.02 08/24/2024    AST 19 08/24/2024    ALT 15 08/24/2024          guaiFENesin ER  600 mg Oral BID       ipratropium-albuterol    sodium chloride 0.9%    morphINE    morphINE in sodium chloride 0.9%    haloperidol lactate    LORazepam **OR** LORazepam    scopolamine    atropine    furosemide    glycopyrrolate    bisacodyl    ondansetron    acetaminophen **OR** acetaminophen  **Certification      PHYSICIAN Certification of Need for Inpatient Hospitalization - Initial Certification    Patient will require inpatient services that will reasonably be expected to span two midnight's based on the clinical documentation in H+P.   Based on patients current state of illness, I anticipate that, after discharge, patient will require TBD.

## 2024-08-28 VITALS
TEMPERATURE: 98 F | OXYGEN SATURATION: 100 % | RESPIRATION RATE: 20 BRPM | SYSTOLIC BLOOD PRESSURE: 126 MMHG | DIASTOLIC BLOOD PRESSURE: 71 MMHG | HEART RATE: 77 BPM

## 2024-08-28 PROCEDURE — 99239 HOSP IP/OBS DSCHRG MGMT >30: CPT | Performed by: HOSPITALIST

## 2024-08-28 RX ORDER — GUAIFENESIN 200 MG/10ML
200 LIQUID ORAL 4 TIMES DAILY PRN
Qty: 236 ML | Refills: 1 | Status: SHIPPED | OUTPATIENT
Start: 2024-08-28

## 2024-08-28 RX ORDER — IPRATROPIUM BROMIDE AND ALBUTEROL SULFATE 2.5; .5 MG/3ML; MG/3ML
3 SOLUTION RESPIRATORY (INHALATION) 3 TIMES DAILY
Qty: 90 EACH | Refills: 0 | Status: SHIPPED | OUTPATIENT
Start: 2024-08-28

## 2024-08-28 NOTE — PAYOR COMM NOTE
--------------  DISCHARGE REVIEW    Payor: [employer]  Subscriber #:  [id number]  Authorization Number: [id number]    Admit date: 24  Admit time:   2:13 AM  Discharge Date: 2024  4:09 PM     Admitting Physician: Mary Elena MD  Attending Physician:  No att. providers found  Primary Care Physician: No primary care provider on file.          Discharge Summary Notes        Discharge Summary signed by Landen Akins MD at 2024  7:39 AM       Author: Landen Akins MD Specialty: HOSPITALIST Author Type: Physician    Filed: 2024  7:39 AM Date of Service: 2024  4:00 PM Status: Signed    : Landen Akins MD (Physician)           [facility]  part of [facility]     DISCHARGE SUMMARY     Hortencia Kelley Patient Status:  Inpatient    10/2/1945 MRN [id number]   Location [facility] 2W/ Attending No att. providers found   Hosp Day # 2 PCP No primary care provider on file.     DATE OF ADMISSION: 2024  DATE OF DISCHARGE: 2024   DISPOSITION: hospice  CONDITION ON DISCHARGE: good    DISCHARGE DIAGNOSES:  Respiratory failure, acute hypoxic  Congestive heart failure, acute on chronic systolic  Possible gram-negative pneumonia  Acute COVID infection  Shock  Advance care planning/goals of care  Takotsubo cardiomyopathy  Tracheal stenosis  Esophageal squamous cell carcinoma  COPD     HISTORY OF PRESENT ILLNESS (COPIED FROM ADMISSION H&P)  Hortencia Kelley is a 78 year old female with past medical history significant for squamous cell carcinoma, COPD, who was discharged on  after being treated for Takotsubo cardiomyopathy.  She presents today secondary to respiratory distress.  According to the emergency department she presented with a sudden onset of shortness of breath and increased work of breathing.  EMS was called and she was found to be hypoxic and wheezing.  After getting hour-long SHE did not improve at all CT scan of the lungs were  ordered however it could not be done secondary to patient worsening respiratory distress and she was intubated in the emergency department and admitted to ICU.     HOSPITAL COURSE:  Patient was admitted, intubated for respiratory failure.  Treated for presumed pneumonia.  Subsequently found to be positive for COVID and started on remdesivir.  She was extubated successfully 8/24.  Shock state improved.  Continued on IV antibiotics.  She did express to numerous care team members including myself that she did not want to be reintubated and preferred DNR status.  She later expressed that she wanted to speak with hospice.  After meeting with the patient patient was admitted to inpatient hospice.    Patient understands return to the emergency room for increased pain, fever, discharge, shortness of breath, chest pain, new neurologic symptoms, other concerning symptoms.    DISCHARGE MEDICATIONS     Discharge Medications        ASK your doctor about these medications        Instructions Prescription details   aspirin 81 MG Tbec      Take 1 tablet (81 mg total) by mouth daily.   Quantity: 30 tablet  Refills: 1     atorvastatin 10 MG Tabs  Commonly known as: Lipitor      Take 1 tablet (10 mg total) by mouth nightly.   Quantity: 30 tablet  Refills: 1     midodrine 5 MG Tabs  Commonly known as: ProAmatine      Take 1 tablet (5 mg total) by mouth in the morning and 1 tablet (5 mg total) at noon and 1 tablet (5 mg total) in the evening.   Quantity: 90 tablet  Refills: 1     pantoprazole 40 MG Tbec  Commonly known as: Protonix      Take 1 tablet (40 mg total) by mouth daily.   Quantity: 30 tablet  Refills: 1              CONSULTANTS  Consultants         Provider   Role Specialty     Caden Cr MD      Consulting Physician INFECTIOUS DISEASES     Nasir Claudio MD      Consulting Physician Interventional, Cardiology     Prakash Quispe DO      Consulting Physician PULMONARY DISEASES            FOLLOW UP:  No follow-up  provider specified.  The above plan and follow-up instructions were reviewed with the patient and they verbalized understanding and agreement.  They understand to return to the emergency room for any concerning signs or symptoms.  Greater than 30 minutes spent on discharge.  -----------------------    Hospital Discharge Diagnoses:  resp failure    Lace+ Score: 79  59-90 High Risk  29-58 Medium Risk  0-28   Low Risk.    TCM Follow-Up Recommendation:  LACE > 58: High Risk of readmission after discharge from the hospital.    Electronically signed by Landen Akins MD on 8/26/2024  7:39 AM         REVIEWER COMMENTS

## 2024-08-28 NOTE — PLAN OF CARE
Comfort interventions administered per pt request, pt is A&Ox4, calls appropriately. Plan is to discharge home with Son today via ambulance @ 10:00 am. Fall precautions in place, bedside makeshift commode set up for pt request and comfort.       Problem: PAIN - ADULT  Goal: Verbalizes/displays adequate comfort level or patient's stated pain goal  Description: INTERVENTIONS:  - Encourage pt to monitor pain and request assistance  - Assess pain using appropriate pain scale  - Administer analgesics based on type and severity of pain and evaluate response  - Implement non-pharmacological measures as appropriate and evaluate response  - Consider cultural and social influences on pain and pain management  - Manage/alleviate anxiety  - Utilize distraction and/or relaxation techniques  - Monitor for opioid side effects  - Notify MD/LIP if interventions unsuccessful or patient reports new pain  - Anticipate increased pain with activity and pre-medicate as appropriate  Outcome: Progressing     Problem: Patient/Family Goals  Goal: Patient/Family Long Term Goal  Description: Patient's Long Term Goal:     Interventions:  -   - See additional Care Plan goals for specific interventions  Outcome: Progressing  Goal: Patient/Family Short Term Goal  Description: Patient's Short Term Goal:     Interventions:   -   - See additional Care Plan goals for specific interventions  Outcome: Progressing     Problem: RESPIRATORY - ADULT  Goal: Achieves optimal ventilation and oxygenation  Description: INTERVENTIONS:  - Assess for changes in respiratory status  - Assess for changes in mentation and behavior  - Position to facilitate oxygenation and minimize respiratory effort  - Oxygen supplementation based on oxygen saturation or ABGs  - Provide Smoking Cessation handout, if applicable  - Encourage broncho-pulmonary hygiene including cough, deep breathe, Incentive Spirometry  - Assess the need for suctioning and perform as needed  - Assess and  instruct to report SOB or any respiratory difficulty  - Respiratory Therapy support as indicated  - Manage/alleviate anxiety  - Monitor for signs/symptoms of CO2 retention  Outcome: Progressing

## 2024-08-28 NOTE — PLAN OF CARE
Pt comfortable with general complaints of pain, declined prn. Pt okay to discharge home with son via ambulance. 6L o2, Ivs removed by RN. frequent rounding by staff      Problem: PAIN - ADULT  Goal: Verbalizes/displays adequate comfort level or patient's stated pain goal  Description: INTERVENTIONS:  - Encourage pt to monitor pain and request assistance  - Assess pain using appropriate pain scale  - Administer analgesics based on type and severity of pain and evaluate response  - Implement non-pharmacological measures as appropriate and evaluate response  - Consider cultural and social influences on pain and pain management  - Manage/alleviate anxiety  - Utilize distraction and/or relaxation techniques  - Monitor for opioid side effects  - Notify MD/LIP if interventions unsuccessful or patient reports new pain  - Anticipate increased pain with activity and pre-medicate as appropriate  Outcome: Adequate for Discharge     Problem: Patient/Family Goals  Goal: Patient/Family Long Term Goal  Description: Patient's Long Term Goal:     Interventions:  - See additional Care Plan goals for specific interventions  Outcome: Adequate for Discharge  Goal: Patient/Family Short Term Goal  Description: Patient's Short Term Goal:     Interventions:   - See additional Care Plan goals for specific interventions  Outcome: Adequate for Discharge     Problem: RESPIRATORY - ADULT  Goal: Achieves optimal ventilation and oxygenation  Description: INTERVENTIONS:  - Assess for changes in respiratory status  - Assess for changes in mentation and behavior  - Position to facilitate oxygenation and minimize respiratory effort  - Oxygen supplementation based on oxygen saturation or ABGs  - Provide Smoking Cessation handout, if applicable  - Encourage broncho-pulmonary hygiene including cough, deep breathe, Incentive Spirometry  - Assess the need for suctioning and perform as needed  - Assess and instruct to report SOB or any respiratory  difficulty  - Respiratory Therapy support as indicated  - Manage/alleviate anxiety  - Monitor for signs/symptoms of CO2 retention  Outcome: Adequate for Discharge

## 2024-09-04 ENCOUNTER — TELEPHONE (OUTPATIENT)
Dept: HEMATOLOGY/ONCOLOGY | Facility: HOSPITAL | Age: 79
End: 2024-09-04

## 2024-09-04 NOTE — CDS QUERY
Dear Dr Akins,  Can status of sepsis and shock be clarified?  ( ) Sepsis ruled out  ( ) Sepsis is valid - please document supporting clinical information here:_________________  ( ) sepsis with Shock is valid- please document supporting clinical information here:_  ( x ) Other- please specify: shock. Cause unknown. Not sepsis________________________________  Clinical indicators:  Blood pressure 161/110 160/98 78/50 74/52 MAP 59 54 66  Pulse 118 116 102 127 115 Respiration 32 36 29 8/24 28-33  Temp  98.7 96 96.2 97.2   SPO2 87% RA, Intubation, ventilation   WBC 17 19.4 15.3, lactic acid 1.4 [place] coma score 7, 10   pneumonia, Covid 19 infection, hypotension, acute pulmonary edema  Hospitalist: Shock cardiogenic vs septic  Respiratory failure, acute hypoxic.  Due to chf.    ID notes: Acute sepsis with shock on vasopressor  Acute hypoxic respiratory failure - related to aspiration/obstructive pneumonia and COVID-19   CAR: Respiratory failure, intubated.  Mostly COPD related.    Risk factors:  78yr F COPD, esophageal carcinoma, Recent admission for Takotsubo cardiomyopathy.  Treatment: pressure support with Zeb-Synephrine, IV vancomycin + Zosyn, Remdesivir, ventilator support, IV steroid. Lasix    If you have any questions, please contact Clinical Documentation  Specialist:  RICKEY Tapia at [phone]     Thank You!     THIS FORM IS A PERMANENT PART OF THE MEDICAL RECORD

## 2024-09-04 NOTE — CDS QUERY
The original portion of note was entered in error and has been removed.  To inquire about the original notation, please contact the [facility] Department at ([phone]).

## 2024-09-04 NOTE — CDS QUERY
The original portion of note was entered in error and has been removed.  To inquire about the original notation, please contact the [facility] Department at ([phone]).

## 2024-09-04 NOTE — TELEPHONE ENCOUNTER
Called to set up a new consult appointment with the patient (Hortencia) to see Dr. Ortiz and I spoke to the patient's son Maite and he told me that the patient is now on Hospice Care and she was unable to leave her house. Called 9/4/24

## 2024-09-11 ENCOUNTER — TELEPHONE (OUTPATIENT)
Dept: INTERNAL MEDICINE UNIT | Facility: HOSPITAL | Age: 79
End: 2024-09-11

## 2024-09-11 NOTE — TELEPHONE ENCOUNTER
Received fax from [facility], Prescriber Order for Jevity tube feeding formula needing signature for start of care 8/19/2024. Order signed by  faxed to [phone].

## 2024-09-18 NOTE — DISCHARGE SUMMARY
[facility]     Hortencia Kelley Patient Status:  Inpatient    10/2/1945 MRN [id number]   Location [facility]5W Attending No att. providers found   Hosp Day # 3 PCP No primary care provider on file.     Date of Admission: 2024  Date of Discharge: 2024  Discharge Disposition: Hospice/Home    Admitting Chief Complaint:   esophageal cancer  Esophageal cancer (HCC)    PCP: No primary care provider on file.    Discharge Diagnosis:   Respiratory failure, acute hypoxic  Congestive heart failure, acute on chronic systolic  Possible gram-negative pneumonia  Acute COVID infection  Shock  Takotsubo cardiomyopathy  Tracheal stenosis  Esophageal squamous cell carcinoma  COPD    History of Present Illness:   Hortencia Kelley is a 78 year old female with past medical history significant for squamous cell carcinoma, COPD, who was discharged on  after being treated for Takotsubo cardiomyopathy.  She presents today secondary to respiratory distress.  According to the emergency department she presented with a sudden onset of shortness of breath and increased work of breathing.  EMS was called and she was found to be hypoxic and wheezing.  After getting hour-long SHE did not improve at all CT scan of the lungs were ordered however it could not be done secondary to patient worsening respiratory distress and she was intubated in the emergency department and admitted to ICU.      Patient was admitted, intubated for respiratory failure.  Treated for presumed pneumonia.  Subsequently found to be positive for COVID and started on remdesivir.  She was extubated successfully .  Shock state improved.  Continued on IV antibiotics.  She did express to numerous care team members including myself that she did not want to be reintubated and preferred DNR status.  She later expressed that she wanted to speak with hospice.  After meeting with the patient patient was admitted to inpatient  hospice.    Brief Synopsis:   After stabilization, plans were made to transition to home hospice.           Discharge Medication List:     Discharge Medications        START taking these medications        Instructions Prescription details   guaiFENesin 100 MG/5ML Liqd      Take 10 mL by mouth 4 (four) times daily as needed for cough or congestion.   Quantity: 236 mL  Refills: 1     ipratropium-albuterol 0.5-2.5 (3) MG/3ML Soln  Commonly known as: Duoneb      Take 3 mL by nebulization in the morning, at noon, and at bedtime. For shortness of breath   Quantity: 90 each  Refills: 0            STOP taking these medications      aspirin 81 MG Tbec        atorvastatin 10 MG Tabs  Commonly known as: Lipitor        midodrine 5 MG Tabs  Commonly known as: ProAmatine        pantoprazole 40 MG Tbec  Commonly known as: Protonix                  Where to Get Your Medications        These medications were sent to [id number] [place], [place] - [address] AT [facility] [address], [phone], [phone] [address], [facility] [address]      Phone: [phone]   guaiFENesin 100 MG/5ML Liqd  ipratropium-albuterol 0.5-2.5 (3) MG/3ML Soln         Follow-up appointment:   No follow-up provider specified.       -----------------------------------------------------------------------------------------------  PATIENT DISCHARGE INSTRUCTIONS: See electronic chart           Hospital Discharge Diagnoses:  sepsis    Lace+ Score: 64  59-90 High Risk  29-58 Medium Risk  0-28   Low Risk.    TCM Follow-Up Recommendation:  LACE > 58: High Risk of readmission after discharge from the hospital.        CHIKIS GAMBOA MD 9/18/2024    Time spent:  > 30 minutes

## (undated) DEVICE — GAMMEX® NON-LATEX PI ORTHO SIZE 8, STERILE POLYISOPRENE POWDER-FREE SURGICAL GLOVE: Brand: GAMMEX

## (undated) DEVICE — TRAY SKIN PREP PVP-1

## (undated) DEVICE — T5 HOOD WITH PEEL AWAY FACE SHIELD

## (undated) DEVICE — STERILE LATEX POWDER-FREE SURGICAL GLOVESWITH NITRILE COATING: Brand: PROTEXIS

## (undated) DEVICE — Device: Brand: JELCO

## (undated) DEVICE — 3M™ IOBAN™ 2 ANTIMICROBIAL INCISE DRAPE 6640EZ: Brand: IOBAN™ 2

## (undated) DEVICE — UNDYED BRAIDED (POLYGLACTIN 910), SYNTHETIC ABSORBABLE SUTURE: Brand: COATED VICRYL

## (undated) DEVICE — ANTERIOR HIP: Brand: MEDLINE INDUSTRIES, INC.

## (undated) DEVICE — BATTERY

## (undated) DEVICE — GIJAW SINGLE-USE BIOPSY FORCEPS WITH NEEDLE: Brand: GIJAW

## (undated) DEVICE — SOL  .9 1000ML BTL

## (undated) DEVICE — INTENDED TO BE USED TO OCCLUDE, RETRACT AND IDENTIFY ARTERIES, VEINS, TENDONS AND NERVES IN SURGICAL PROCEDURES: Brand: STERION®  VESSEL LOOP

## (undated) DEVICE — DERMABOND LIQUID ADHESIVE

## (undated) DEVICE — KIT CLEAN ENDOKIT 1.1OZ GOWNX2

## (undated) DEVICE — T-MAX DISPOSABLE FACE MASK 8 PER BOX

## (undated) DEVICE — CO2 CANNULA,SSOFT,ADLT,7O2,4CO2,FEMALE: Brand: MEDLINE

## (undated) DEVICE — SYRINGE, LUER SLIP, STERILE, 60ML: Brand: MEDLINE

## (undated) DEVICE — 2DE14 2-0 PDO 14X14: Brand: 2DE14 2-0 PDO 14X14

## (undated) DEVICE — SOL  .9 1000ML BAG

## (undated) DEVICE — DRAPE SHEET LG

## (undated) DEVICE — BIPOLAR SEALER 23-301-1 AQM MBS: Brand: AQUAMANTYS™

## (undated) DEVICE — ENCORE® LATEX ACCLAIM SIZE 7.5, STERILE LATEX POWDER-FREE SURGICAL GLOVE: Brand: ENCORE

## (undated) DEVICE — DRAPE C-ARM UNIVERSAL

## (undated) DEVICE — MEDI-VAC NON-CONDUCTIVE SUCTION TUBING: Brand: CARDINAL HEALTH

## (undated) DEVICE — PHOTONSABER Y METAL TIP

## (undated) DEVICE — SYRINGE MED 10ML SLIP TIP CLR BRL TAPR PLUNG

## (undated) DEVICE — MEDI-VAC NON-CONDUCTIVE SUCTION TUBING 6MM X 1.8M (6FT.) L: Brand: CARDINAL HEALTH

## (undated) DEVICE — FAN SPRAY KIT: Brand: PULSAVAC®

## (undated) DEVICE — WEBRIL COTTON UNDERCAST PADDING: Brand: WEBRIL

## (undated) DEVICE — SUCTION CANISTER, 3000CC,SAFELINER: Brand: DEROYAL

## (undated) DEVICE — PERINEAL POST PAD 1

## (undated) DEVICE — SUTURE PDS II 2-0 CP

## (undated) DEVICE — ADAPTER SWIVEL CHRISTMAS TREE 95-911-00

## (undated) DEVICE — SUTURE MONOCRYL 2-0 SH

## (undated) DEVICE — DRILL BIT SYNT 2.5X110 310.25

## (undated) DEVICE — DRAPE CASSETTE X-RAY

## (undated) DEVICE — POSITIONER OR KT PT CR

## (undated) DEVICE — SUTURE VLOC 90 3-0 9\" 2044

## (undated) DEVICE — 3M™ MEDITPORE™ SOFT CLOTH TAPE 6 IN X 10 YD 12 ROLLS/CASE 2966: Brand: 3M™ MEDIPORE™

## (undated) DEVICE — PROXIMATE SKIN STAPLERS (35 WIDE) CONTAINS 35 STAINLESS STEEL STAPLES (FIXED HEAD): Brand: PROXIMATE

## (undated) DEVICE — STERILE POLYISOPRENE POWDER-FREE SURGICAL GLOVES: Brand: PROTEXIS

## (undated) DEVICE — NEEDLE HPO 18GA 1.5IN ECLPS

## (undated) DEVICE — CHLORAPREP 26ML APPLICATOR

## (undated) DEVICE — STERILE TETRA-FLEX CF, ELASTIC BANDAGE, 4" X 5.5YD: Brand: TETRA-FLEX™CF

## (undated) DEVICE — 20 ML SYRINGE LUER-LOCK TIP: Brand: MONOJECT

## (undated) DEVICE — CONMED SCOPE SAVER BITE BLOCK, 20X27 MM: Brand: SCOPE SAVER

## (undated) DEVICE — GAUZE SPONGES,12 PLY: Brand: CURITY

## (undated) DEVICE — BIT DRL 200MM 2.8MM LCP PERC

## (undated) DEVICE — 3 ML SYRINGE LUER-LOCK TIP: Brand: MONOJECT

## (undated) DEVICE — DBD-NEBULIZER,AEROMISTBAN,TMTHPIECE,7'TU: Brand: MEDLINE

## (undated) DEVICE — COVER SGL STRL LGHT HNDL BLU

## (undated) DEVICE — POUCH: SSEAL TYVEK 2000/CS: Brand: MEDICAL ACTION INDUSTRIES

## (undated) DEVICE — DRESSING 10X4IN ANMC SAFETAC

## (undated) DEVICE — SINGLE USE BIOPSY VALVE MAJ-210: Brand: SINGLE USE BIOPSY VALVE (STERILE)

## (undated) DEVICE — Device

## (undated) DEVICE — COTTON UNDERCAST PADDING,REGULAR FINISH: Brand: WEBRIL

## (undated) DEVICE — SUTURE VLOC 180 3-0 18\" #0024

## (undated) DEVICE — SPONGE LAP 18X18 XRAY STRL

## (undated) DEVICE — ESMARK: Brand: DEROYAL

## (undated) DEVICE — SINGLE USE SUCTION VALVE MAJ-209: Brand: SINGLE USE SUCTION VALVE (STERILE)

## (undated) DEVICE — SUTURE VLOC 90 2-0 9\" 2145

## (undated) DEVICE — DRESSING BORDER AQUACEL 4X10

## (undated) DEVICE — KIT ENDO ORCAPOD 160/180/190

## (undated) DEVICE — YANKAUER,BULB TIP,W/O VENT,RIGID,STERILE: Brand: MEDLINE

## (undated) DEVICE — SUTURE VICRYL 1-0 J977H

## (undated) DEVICE — GAMMEX® NON-LATEX PI ORTHO SIZE 7.5, STERILE POLYISOPRENE POWDER-FREE SURGICAL GLOVE: Brand: GAMMEX

## (undated) DEVICE — BLADE SAW SAGITTAL 19.5

## (undated) NOTE — LETTER
[facility]  Administration of Anesthesia  I, Hortencia Kelley agree to be cared for by a physician anesthesiologist alone and/or with a nurse anesthetist, who is specially trained to monitor me and give me medicine to put me to sleep or keep me comfortable during my procedure    I understand that my anesthesiologist and/or anesthetist is not an employee or agent of [facility] or [email or web address]. He or she works for [facility]    As the patient asking for anesthesia services, I agree to:  Allow the anesthesiologist (anesthesia doctor) to give me medicine and do additional procedures as necessary. Some examples are: Starting or using an “IV” to give me medicine, fluids or blood during my procedure, and having a breathing tube placed to help me breathe when I’m asleep (intubation). In the event that my heart stops working properly, I understand that my anesthesiologist will make every effort to sustain my life, unless otherwise directed by [facility] Do Not Resuscitate documents.  Tell my anesthesia doctor before my procedure:  If I am pregnant.  The last time that I ate or drank.  iii. All of the medicines I take (including prescriptions, herbal supplements, and pills I can buy without a prescription (including street drugs/illegal medications). Failure to inform my anesthesiologist about these medicines may increase my risk of anesthetic complications.  iv.If I am allergic to anything or have had a reaction to anesthesia before.  I understand how the anesthesia medicine will help me (benefits).  I understand that with any type of anesthesia medicine there are risks:  The most common risks are: nausea, vomiting, sore throat, muscle soreness, damage to my eyes, mouth, or teeth (from breathing tube placement).  Rare risks include: remembering what happened during my procedure, allergic reactions to medications, injury to my airway, heart, lungs, vision, nerves, or  muscles and in extremely rare instances death.  My doctor has explained to me other choices available to me for my care (alternatives).  Pregnant Patients (“epidural”):  I understand that the risks of having an epidural (medicine given into my back to help control pain during labor), include itching, low blood pressure, difficulty urinating, headache or slowing of the baby’s heart. Very rare risks include infection, bleeding, seizure, irregular heart rhythms and nerve injury.  Regional Anesthesia (“spinal”, “epidural”, & “nerve blocks”):  I understand that rare but potential complications include headache, bleeding, infection, seizure, irregular heart rhythms, and nerve injury.    _____________________________________________________________________________  Patient (or Representative) Signature/Relationship to Patient  Date   Time    _____________________________________________________________________________   Name (if used)    Language/Organization   Time    _____________________________________________________________________________  Nurse Anesthetist Signature     Date   Time  _____________________________________________________________________________  Anesthesiologist Signature     Date   Time  I have discussed the procedure and information above with the patient (or patient’s representative) and answered their questions. The patient or their representative has agreed to have anesthesia services.    _____________________________________________________________________________  Witness        Date   Time  I have verified that the signature is that of the patient or patient’s representative, and that it was signed before the procedure  Patient Name: Hortencia Kelley     : 10/2/1945                 Printed: 2024 at 11:31 AM    Medical Record #: [id number]                                            Page 1 of 1  ----------ANESTHESIA CONSENT----------

## (undated) NOTE — LETTER
Mayur Castillo Md  [address] [facility]       09/14/17        Patient: Rajat Porras   YOB: 1945   Date of Visit: 9/14/2017       Dear  Dr. Len Crain MD,      Thank you for referring Rajat Porras to my practice

## (undated) NOTE — IP AVS SNAPSHOT
[address]  [address] [facility] [phone]                Discharge Summary   1/23/2017    Sg Pratt           Admission Information        Provider Department    1/23/2017 Marguerite Reis MD [facility] 4w Last time this was given:  325 mg on 1/26/2017 10:00 AM   Next dose due:  START THIS EVENING        Take 1 tablet (325 mg total) by mouth 2 (two) times daily with meals.     Abida Popper        TAKE WITH BREAKFAST           TAKE WITH DINNER           [facility] with Melyssa Reyes PTA   [facility] AND [facility] Physical [facility] ([address])    [facility].   [address] [address] [phone]              Immunization History as of 1/26/2017  Never Revi - If you don’t have insurance, [facility] has partnered with [facility] to help you get signed up for insurance coverage.   [facility] is a Federal Navigator program that can help with your Affordable Care Act cover Use:  “Thinning” of blood to prevent clotting within blood vessels, Pain relief   Most common side effects:  Bleeding, stomach upset   What to report to your healthcare team: Unusual bleeding, abdominal pain, dark, loose bowel movements           Blood

## (undated) NOTE — LETTER
[place] [address]  Authorization for Invasive Procedures  Date:08/13/24                         Time:15:44    I hereby authorize Juanito, my physician and his/her assistants (if applicable), which may include medical students, residents, and/or fellows, to perform the following surgical operation/ procedure and administer such anesthesia as may be determined necessary by my physician:  Operation/Procedure name (s)  gastrostomy tube insertion on Hortencia Kelley   2.   I recognize that during the surgical operation/procedure, unforeseen conditions may necessitate additional or different procedures than those listed above.  I, therefore, further authorize and request that the above-named surgeon, assistants, or designees perform such procedures as are, in their judgment, necessary and desirable.    3.   My surgeon/physician has discussed prior to my surgery the potential benefits, risks and side effects of this procedure; the likelihood of achieving goals; and potential problems that might occur during recuperation.  They also discussed reasonable alternatives to the procedure, including risks, benefits, and side effects related to the alternatives and risks related to not receiving this procedure.  I have had all my questions answered and I acknowledge that no guarantee has been made as to the result that may be obtained.    4.   Should the need arise during my operation/procedure, which includes change of level of care prior to discharge, I also consent to the administration of blood and/or blood products.  Further, I understand that despite careful testing and screening of blood or blood products by collecting agencies, I may still be subject to ill effects as a result of receiving a blood transfusion and/or blood products.  The following are some, but not all, of the potential risks that can occur: fever and allergic reactions, hemolytic reactions, transmission of diseases such as  Hepatitis, AIDS and Cytomegalovirus (CMV) and fluid overload.  In the event that I wish to have an autologous transfusion of my own blood, or a directed donor transfusion, I will discuss this with my physician.  Check only if Refusing Blood or Blood Products  I understand refusal of blood or blood products as deemed necessary by my physician may have serious consequences to my condition to include possible death. I hereby assume responsibility for my refusal and release the hospital, its personnel, and my physicians from any responsibility for the consequences of my refusal.          o  Refuse      5.   I authorize the use of any specimen, organs, tissues, body parts or foreign objects that may be removed from my body during the operation/procedure for diagnosis, research or teaching purposes and their subsequent disposal by hospital authorities.  I also authorize the release of specimen test results and/or written reports to my treating physician on the hospital medical staff or other referring or consulting physicians involved in my care, at the discretion of the Pathologist or my treating physician.    6.   I consent to the photographing or videotaping of the operations or procedures to be performed, including appropriate portions of my body for medical, scientific, or educational purposes, provided my identity is not revealed by the pictures or by descriptive texts accompanying them.  If the procedure has been photographed/videotaped, the surgeon will obtain the original picture, image, videotape or CD.  The hospital will not be responsible for storage, release or maintenance of the picture, image, tape or CD.    7.   I consent to the presence of a  or observers in the operating room as deemed necessary by my physician or their designees.    8.   I recognize that in the event my procedure results in extended X-Ray/fluoroscopy time, I may develop a skin reaction.    9. If I have a Do Not Attempt  Resuscitation (DNAR) order in place, that status will be suspended while in the operating room, procedural suite, and during the recovery period unless otherwise explicitly stated by me (or a person authorized to consent on my behalf). The surgeon or my attending physician will determine when the applicable recovery period ends for purposes of reinstating the DNAR order.  10. Patients having a sterilization procedure: I understand that if the procedure is successful the results will be permanent and it will therefore be impossible for me to inseminate, conceive, or bear children.  I also understand that the procedure is intended to result in sterility, although the result has not been guaranteed.   11. I acknowledge that my physician has explained sedation/analgesia administration to me including the risk and benefits I consent to the administration of sedation/analgesia as may be necessary or desirable in the judgment of my physician.    I CERTIFY THAT I HAVE READ AND FULLY UNDERSTAND THE ABOVE CONSENT TO OPERATION and/or OTHER PROCEDURE.        ____________________________________       _________________________________      ______________________________  Signature of Patient         Signature of Responsible Person        Printed Name of Responsible Person    ____________________________________      _________________________________      ______________________________       Signature of Witness          Relationship to Patient                       Date                                       Time  Patient Name: Hortencia Kelley  : 10/2/1945    Reviewed: 2024   Printed: 2024  Medical Record #: [id number] Page 1 of 2           STATEMENT OF PHYSICIAN My signature below affirms that prior to the time of the procedure; I have explained to the patient and/or his/her legal representative, the risks and benefits involved in the proposed treatment and any reasonable alternative to the proposed  treatment. I have also explained the risks and benefits involved in refusal of the proposed treatment and alternatives to the proposed treatment and have answered the patient's questions. If I have a significant financial interest in a co-management agreement or a significant financial interest in any product or implant, or other significant relationship used in this procedure/surgery, I have disclosed this and had a discussion with my patient.     _______________________________________________________________ _____________________________  (Signature of Physician)                                                                                         (Date)                                   (Time)  Patient Name: Hortencia Kelley  : 10/2/1945    Reviewed: 2024   Printed: 2024  Medical Record #: [id number] Page 2 of 2

## (undated) NOTE — LETTER
[place] [address]  Authorization for Invasive Procedures  Date:08/13/2024         Time: 11:17 am    I hereby authorize Jose Enrique, my physician and his/her assistants (if applicable), which may include medical students, residents, and/or fellows, to perform the following surgical operation/ procedure and administer such anesthesia as may be determined necessary by my physician:  Operation/Procedure name (s) Bronchoscopy on Hortenciaayan Kelley   2.   I recognize that during the surgical operation/procedure, unforeseen conditions may necessitate additional or different procedures than those listed above.  I, therefore, further authorize and request that the above-named surgeon, assistants, or designees perform such procedures as are, in their judgment, necessary and desirable.    3.   My surgeon/physician has discussed prior to my surgery the potential benefits, risks and side effects of this procedure; the likelihood of achieving goals; and potential problems that might occur during recuperation.  They also discussed reasonable alternatives to the procedure, including risks, benefits, and side effects related to the alternatives and risks related to not receiving this procedure.  I have had all my questions answered and I acknowledge that no guarantee has been made as to the result that may be obtained.    4.   Should the need arise during my operation/procedure, which includes change of level of care prior to discharge, I also consent to the administration of blood and/or blood products.  Further, I understand that despite careful testing and screening of blood or blood products by collecting agencies, I may still be subject to ill effects as a result of receiving a blood transfusion and/or blood products.  The following are some, but not all, of the potential risks that can occur: fever and allergic reactions, hemolytic reactions, transmission of diseases such as Hepatitis, AIDS and  Cytomegalovirus (CMV) and fluid overload.  In the event that I wish to have an autologous transfusion of my own blood, or a directed donor transfusion, I will discuss this with my physician.  Check only if Refusing Blood or Blood Products  I understand refusal of blood or blood products as deemed necessary by my physician may have serious consequences to my condition to include possible death. I hereby assume responsibility for my refusal and release the hospital, its personnel, and my physicians from any responsibility for the consequences of my refusal.          o  Refuse      5.   I authorize the use of any specimen, organs, tissues, body parts or foreign objects that may be removed from my body during the operation/procedure for diagnosis, research or teaching purposes and their subsequent disposal by hospital authorities.  I also authorize the release of specimen test results and/or written reports to my treating physician on the hospital medical staff or other referring or consulting physicians involved in my care, at the discretion of the Pathologist or my treating physician.    6.   I consent to the photographing or videotaping of the operations or procedures to be performed, including appropriate portions of my body for medical, scientific, or educational purposes, provided my identity is not revealed by the pictures or by descriptive texts accompanying them.  If the procedure has been photographed/videotaped, the surgeon will obtain the original picture, image, videotape or CD.  The hospital will not be responsible for storage, release or maintenance of the picture, image, tape or CD.    7.   I consent to the presence of a  or observers in the operating room as deemed necessary by my physician or their designees.    8.   I recognize that in the event my procedure results in extended X-Ray/fluoroscopy time, I may develop a skin reaction.    9. If I have a Do Not Attempt Resuscitation  (DNAR) order in place, that status will be suspended while in the operating room, procedural suite, and during the recovery period unless otherwise explicitly stated by me (or a person authorized to consent on my behalf). The surgeon or my attending physician will determine when the applicable recovery period ends for purposes of reinstating the DNAR order.  10. Patients having a sterilization procedure: I understand that if the procedure is successful the results will be permanent and it will therefore be impossible for me to inseminate, conceive, or bear children.  I also understand that the procedure is intended to result in sterility, although the result has not been guaranteed.   11. I acknowledge that my physician has explained sedation/analgesia administration to me including the risk and benefits I consent to the administration of sedation/analgesia as may be necessary or desirable in the judgment of my physician.    I CERTIFY THAT I HAVE READ AND FULLY UNDERSTAND THE ABOVE CONSENT TO OPERATION and/or OTHER PROCEDURE.        ____________________________________       _________________________________      ______________________________  Signature of Patient         Signature of Responsible Person        Printed Name of Responsible Person    ____________________________________      _________________________________      ______________________________       Signature of Witness          Relationship to Patient                       Date                                       Time  Patient Name: Hortencia Kelley  : 10/2/1945    Reviewed: 2024   Printed: 2024  Medical Record #: [id number] Page 1 of 2           STATEMENT OF PHYSICIAN My signature below affirms that prior to the time of the procedure; I have explained to the patient and/or his/her legal representative, the risks and benefits involved in the proposed treatment and any reasonable alternative to the proposed treatment. I have  also explained the risks and benefits involved in refusal of the proposed treatment and alternatives to the proposed treatment and have answered the patient's questions. If I have a significant financial interest in a co-management agreement or a significant financial interest in any product or implant, or other significant relationship used in this procedure/surgery, I have disclosed this and had a discussion with my patient.     _______________________________________________________________ _____________________________  (Signature of Physician)                                                                                         (Date)                                   (Time)  Patient Name: Hortencia Kelley  : 10/2/1945    Reviewed: 2024   Printed: 2024  Medical Record #: [id number] Page 2 of 2

## (undated) NOTE — IP AVS SNAPSHOT
[facility]            (For Outpatient Use Only) Initial Admit Date: 6/23/2020   Inpt/Obs Admit Date: Inpt: 6/23/20 / Obs: N/A   Discharge Date:    Melissa Galdamez:  [de-identified]   MRN: [de-identified]   CSN: [id number]   CEID: [id number]        [facility] Subscriber Name:  Semaj Wellington :    Subscriber ID:  Pt Rel to Subscriber:    Hospital Account Financial Class: Medicare Advantage    2020

## (undated) NOTE — LETTER
1/31/2019        Mart Chris  [address]  [address]         Dear Rhys Suazo,      On behalf of your primary physician  Dr Matthew Silver, we have been unable  to reach you to schedule an appointment for your annual Medicare Health

## (undated) NOTE — LETTER
[place] [address]  Authorization for Invasive Procedures  Date: 08/09/24           Time: 1350    I hereby authorize DR. CHRISTINE, my physician and his/her assistants (if applicable), which may include medical students, residents, and/or fellows, to perform the following surgical operation/ procedure and administer such anesthesia as may be determined necessary by my physician:  esophagogastroduodenoscopy on Hortencia Kelley  2.   I recognize that during the surgical operation/procedure, unforeseen conditions may necessitate additional or different procedures than those listed above.  I, therefore, further authorize and request that the above-named surgeon, assistants, or designees perform such procedures as are, in their judgment, necessary and desirable.    3.   My surgeon/physician has discussed prior to my surgery the potential benefits, risks and side effects of this procedure; the likelihood of achieving goals; and potential problems that might occur during recuperation.  They also discussed reasonable alternatives to the procedure, including risks, benefits, and side effects related to the alternatives and risks related to not receiving this procedure.  I have had all my questions answered and I acknowledge that no guarantee has been made as to the result that may be obtained.    4.   Should the need arise during my operation/procedure, which includes change of level of care prior to discharge, I also consent to the administration of blood and/or blood products.  Further, I understand that despite careful testing and screening of blood or blood products by collecting agencies, I may still be subject to ill effects as a result of receiving a blood transfusion and/or blood products.  The following are some, but not all, of the potential risks that can occur: fever and allergic reactions, hemolytic reactions, transmission of diseases such as Hepatitis, AIDS and Cytomegalovirus (CMV) and  fluid overload.  In the event that I wish to have an autologous transfusion of my own blood, or a directed donor transfusion, I will discuss this with my physician.   Check only if Refusing Blood or Blood Products  I understand refusal of blood or blood products as deemed necessary by my physician may have serious consequences to my condition to include possible death. I hereby assume responsibility for my refusal and release the hospital, its personnel, and my physicians from any responsibility for the consequences of my refusal.         o  Refuse         5.   I authorize the use of any specimen, organs, tissues, body parts or foreign objects that may be removed from my body during the operation/procedure for diagnosis, research or teaching purposes and their subsequent disposal by hospital authorities.  I also authorize the release of specimen test results and/or written reports to my treating physician on the hospital medical staff or other referring or consulting physicians involved in my care, at the discretion of the Pathologist or my treating physician.    6.   I consent to the photographing or videotaping of the operations or procedures to be performed, including appropriate portions of my body for medical, scientific, or educational purposes, provided my identity is not revealed by the pictures or by descriptive texts accompanying them.  If the procedure has been photographed/videotaped, the surgeon will obtain the original picture, image, videotape or CD.  The hospital will not be responsible for storage, release or maintenance of the picture, image, tape or CD.    7.   I consent to the presence of a  or observers in the operating room as deemed necessary by my physician or their designees.    8.   I recognize that in the event my procedure results in extended X-Ray/fluoroscopy time, I may develop a skin reaction.    9. If I have a Do Not Attempt Resuscitation (DNAR) order in place, that  status will be suspended while in the operating room, procedural suite, and during the recovery period unless otherwise explicitly stated by me (or a person authorized to consent on my behalf). The surgeon or my attending physician will determine when the applicable recovery period ends for purposes of reinstating the DNAR order.  10. Patients having a sterilization procedure: I understand that if the procedure is successful the results will be permanent and it will therefore be impossible for me to inseminate, conceive, or bear children.  I also understand that the procedure is intended to result in sterility, although the result has not been guaranteed.   11. I acknowledge that my physician has explained sedation/analgesia administration to me including the risk and benefits I consent to the administration of sedation/analgesia as may be necessary or desirable in the judgment of my physician.    I CERTIFY THAT I HAVE READ AND FULLY UNDERSTAND THE ABOVE CONSENT TO OPERATION and/or OTHER PROCEDURE.        ____________________________________       _________________________________      ______________________________  Signature of Patient         Signature of Responsible Person        Printed Name of Responsible Person        ____________________________________      _________________________________      ______________________________       Signature of Witness          Relationship to Patient                       Date                                       Time  Patient Name: Hortencia Kelley  : 10/2/1945    Reviewed: 2024   Printed: 2024  Medical Record #: [id number] Page 1 of 2             STATEMENT OF PHYSICIAN My signature below affirms that prior to the time of the procedure; I have explained to the patient and/or his/her legal representative, the risks and benefits involved in the proposed treatment and any reasonable alternative to the proposed treatment. I have also explained the risks  and benefits involved in refusal of the proposed treatment and alternatives to the proposed treatment and have answered the patient's questions. If I have a significant financial interest in a co-management agreement or a significant financial interest in any product or implant, or other significant relationship used in this procedure/surgery, I have disclosed this and had a discussion with my patient.     _______________________________________________________________ _____________________________  (Signature of Physician)                                                                                         (Date)                                   (Time)  Patient Name: Hortencia Kelley  : 10/2/1945    Reviewed: 2024   Printed: 2024  Medical Record #: [id number] Page 2 of 2

## (undated) NOTE — LETTER
[address], [place], [place]    Authorization for Surgical Operation and Procedure                               I hereby authorize Ina Huddleston MD, my physician and his/her assistants (if applicable), which may include medical students, residents, and/or fellows, to perform the following surgical operation/ procedure and administer such anesthesia as may be determined necessary by my physician: Operation/Procedure name (s) ESOPHAGOGASTRODUODENOSCOPY (EGD) on Hortencia Kelley   2.   I recognize that during the surgical operation/procedure, unforeseen conditions may necessitate additional or different procedures than those listed above.  I, therefore, further authorize and request that the above-named surgeon, assistants, or designees perform such procedures as are, in their judgment, necessary and desirable.    3.   My surgeon/physician has discussed prior to my surgery the potential benefits, risks and side effects of this procedure; the likelihood of achieving goals; and potential problems that might occur during recuperation.  They also discussed reasonable alternatives to the procedure, including risks, benefits, and side effects related to the alternatives and risks related to not receiving this procedure.  I have had all my questions answered and I acknowledge that no guarantee has been made as to the result that may be obtained.    4.   Should the need arise during my operation/procedure, which includes change of level of care prior to discharge, I also consent to the administration of blood and/or blood products.  Further, I understand that despite careful testing and screening of blood or blood products by collecting agencies, I may still be subject to ill effects as a result of receiving a blood transfusion and/or blood products.  The following are some, but not all, of the potential risks that can occur: fever and allergic reactions, hemolytic reactions, transmission  of diseases such as Hepatitis, AIDS and Cytomegalovirus (CMV) and fluid overload.  In the event that I wish to have an autologous transfusion of my own blood, or a directed donor transfusion, I will discuss this with my physician.  Check only if Refusing Blood or Blood Products  I understand refusal of blood or blood products as deemed necessary by my physician may have serious consequences to my condition to include possible death. I hereby assume responsibility for my refusal and release the hospital, its personnel, and my physicians from any responsibility for the consequences of my refusal.    o  Refuse   5.   I authorize the use of any specimen, organs, tissues, body parts or foreign objects that may be removed from my body during the operation/procedure for diagnosis, research or teaching purposes and their subsequent disposal by hospital authorities.  I also authorize the release of specimen test results and/or written reports to my treating physician on the hospital medical staff or other referring or consulting physicians involved in my care, at the discretion of the Pathologist or my treating physician.    6.   I consent to the photographing or videotaping of the operations or procedures to be performed, including appropriate portions of my body for medical, scientific, or educational purposes, provided my identity is not revealed by the pictures or by descriptive texts accompanying them.  If the procedure has been photographed/videotaped, the surgeon will obtain the original picture, image, videotape or CD.  The hospital will not be responsible for storage, release or maintenance of the picture, image, tape or CD.    7.   I consent to the presence of a  or observers in the operating room as deemed necessary by my physician or their designees.    8.   I recognize that in the event my procedure results in extended X-Ray/fluoroscopy time, I may develop a skin reaction.    9. If I have a Do  Not Attempt Resuscitation (DNAR) order in place, that status will be suspended while in the operating room, procedural suite, and during the recovery period unless otherwise explicitly stated by me (or a person authorized to consent on my behalf). The surgeon or my attending physician will determine when the applicable recovery period ends for purposes of reinstating the DNAR order.  10. Patients having a sterilization procedure: I understand that if the procedure is successful the results will be permanent and it will therefore be impossible for me to inseminate, conceive, or bear children.  I also understand that the procedure is intended to result in sterility, although the result has not been guaranteed.   11. I acknowledge that my physician has explained sedation/analgesia administration to me including the risk and benefits I consent to the administration of sedation/analgesia as may be necessary or desirable in the judgment of my physician.    I CERTIFY THAT I HAVE READ AND FULLY UNDERSTAND THE ABOVE CONSENT TO OPERATION and/or OTHER PROCEDURE.     ____________________________________  _________________________________        ______________________________  Signature of Patient    Signature of Responsible Person                Printed Name of Responsible Person                                      ____________________________________  _____________________________                ________________________________  Signature of Witness        Date  Time         Relationship to Patient    STATEMENT OF PHYSICIAN My signature below affirms that prior to the time of the procedure; I have explained to the patient and/or his/her legal representative, the risks and benefits involved in the proposed treatment and any reasonable alternative to the proposed treatment. I have also explained the risks and benefits involved in refusal of the proposed treatment and alternatives to the proposed treatment and have answered the  patient's questions. If I have a significant financial interest in a co-management agreement or a significant financial interest in any product or implant, or other significant relationship used in this procedure/surgery, I have disclosed this and had a discussion with my patient.     _____________________________________________________              _____________________________  (Signature of Physician)                                                                                         (Date)                                   (Time)  Patient Name: Hortencia Kelley      : 10/2/1945      Printed: 2024     Medical Record #: [id number]                                      Page 1 of 1

## (undated) NOTE — ED AVS SNAPSHOT
Rajat Porras   MRN: [id number]    Department:  [facility] Emergency Department   Date of Visit:  6/23/2019           Disclosure     Insurance plans vary and the physician(s) referred by the ER may not be covered by your plan.  Please contac within the next three months to obtain basic health screening including reassessment of your blood pressure.     IF THERE IS ANY CHANGE OR WORSENING OF YOUR CONDITION, CALL YOUR PRIMARY CARE PHYSICIAN AT ONCE OR RETURN IMMEDIATELY TO THE EMERGENCY DEPARTMEN
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
## (undated) NOTE — LETTER
[place] [address]  Authorization for Invasive Procedures  Date: 08/09/24           Time: 8:51    I hereby authorize Dr. URBAN, my physician and his/her assistants (if applicable), which may include medical students, residents, and/or fellows, to perform the following surgical operation/ procedure and administer such anesthesia as may be determined necessary by my physician:  Operation/Procedure name (s)  Cardiac Catheterization, Left Ventricular Cineangiography, Bilateral Selective Coronary Angiography and/or Right Heart Catheterization; possible Percutaneous Transluminal Coronary Angioplasty, Coronary Atherectomy, Coronary Stent, Intracoronary Thrombolytic therapy, Antiplatelet therapy and/or Intravascular Ultrasound on Hortencia Kelley   2.   I recognize that during the surgical operation/procedure, unforeseen conditions may necessitate additional or different procedures than those listed above.  I, therefore, further authorize and request that the above-named surgeon, assistants, or designees perform such procedures as are, in their judgment, necessary and desirable.    3.   My surgeon/physician has discussed prior to my surgery the potential benefits, risks and side effects of this procedure; the likelihood of achieving goals; and potential problems that might occur during recuperation.  They also discussed reasonable alternatives to the procedure, including risks, benefits, and side effects related to the alternatives and risks related to not receiving this procedure.  I have had all my questions answered and I acknowledge that no guarantee has been made as to the result that may be obtained.    4.   Should the need arise during my operation/procedure, which includes change of level of care prior to discharge, I also consent to the administration of blood and/or blood products.  Further, I understand that despite careful testing and screening of blood or blood products by collecting  agencies, I may still be subject to ill effects as a result of receiving a blood transfusion and/or blood products.  The following are some, but not all, of the potential risks that can occur: fever and allergic reactions, hemolytic reactions, transmission of diseases such as Hepatitis, AIDS and Cytomegalovirus (CMV) and fluid overload.  In the event that I wish to have an autologous transfusion of my own blood, or a directed donor transfusion, I will discuss this with my physician.  Check only if Refusing Blood or Blood Products  I understand refusal of blood or blood products as deemed necessary by my physician may have serious consequences to my condition to include possible death. I hereby assume responsibility for my refusal and release the hospital, its personnel, and my physicians from any responsibility for the consequences of my refusal.          o  Refuse      5.   I authorize the use of any specimen, organs, tissues, body parts or foreign objects that may be removed from my body during the operation/procedure for diagnosis, research or teaching purposes and their subsequent disposal by hospital authorities.  I also authorize the release of specimen test results and/or written reports to my treating physician on the hospital medical staff or other referring or consulting physicians involved in my care, at the discretion of the Pathologist or my treating physician.    6.   I consent to the photographing or videotaping of the operations or procedures to be performed, including appropriate portions of my body for medical, scientific, or educational purposes, provided my identity is not revealed by the pictures or by descriptive texts accompanying them.  If the procedure has been photographed/videotaped, the surgeon will obtain the original picture, image, videotape or CD.  The hospital will not be responsible for storage, release or maintenance of the picture, image, tape or CD.    7.   I consent to the  presence of a  or observers in the operating room as deemed necessary by my physician or their designees.    8.   I recognize that in the event my procedure results in extended X-Ray/fluoroscopy time, I may develop a skin reaction.    9. If I have a Do Not Attempt Resuscitation (DNAR) order in place, that status will be suspended while in the operating room, procedural suite, and during the recovery period unless otherwise explicitly stated by me (or a person authorized to consent on my behalf). The surgeon or my attending physician will determine when the applicable recovery period ends for purposes of reinstating the DNAR order.  10. Patients having a sterilization procedure: I understand that if the procedure is successful the results will be permanent and it will therefore be impossible for me to inseminate, conceive, or bear children.  I also understand that the procedure is intended to result in sterility, although the result has not been guaranteed.   11. I acknowledge that my physician has explained sedation/analgesia administration to me including the risk and benefits I consent to the administration of sedation/analgesia as may be necessary or desirable in the judgment of my physician.    I CERTIFY THAT I HAVE READ AND FULLY UNDERSTAND THE ABOVE CONSENT TO OPERATION and/or OTHER PROCEDURE.        ____________________________________       _________________________________      ______________________________  Signature of Patient         Signature of Responsible Person        Printed Name of Responsible Person    ____________________________________      _________________________________      ______________________________       Signature of Witness          Relationship to Patient                       Date                                       Time  Patient Name: Hortencia Kelley  : 10/2/1945    Reviewed: 2024   Printed: 2024  Medical Record #: [id number] Page 1 of 2            STATEMENT OF PHYSICIAN My signature below affirms that prior to the time of the procedure; I have explained to the patient and/or his/her legal representative, the risks and benefits involved in the proposed treatment and any reasonable alternative to the proposed treatment. I have also explained the risks and benefits involved in refusal of the proposed treatment and alternatives to the proposed treatment and have answered the patient's questions. If I have a significant financial interest in a co-management agreement or a significant financial interest in any product or implant, or other significant relationship used in this procedure/surgery, I have disclosed this and had a discussion with my patient.     _______________________________________________________________ _____________________________  (Signature of Physician)                                                                                         (Date)                                   (Time)  Patient Name: Hortencia Kelley  : 10/2/1945    Reviewed: 2024   Printed: 2024  Medical Record #: [id number] Page 2 of 2

## (undated) NOTE — [place] [address]  Authorization for Invasive Procedures  Date:08/13/24                 Time:11:38 am    I hereby authorize Patricia, my physician and his/her assistants (if applicable), which may include medical students, residents, and/or fellows, to perform the following surgical operation/ procedure and administer such anesthesia as may be determined necessary by my physician:  Operation/Procedure name (s)  robotic assisted jejunostomy with tube placement on Hortencia Kelley   2.   I recognize that during the surgical operation/procedure, unforeseen conditions may necessitate additional or different procedures than those listed above.  I, therefore, further authorize and request that the above-named surgeon, assistants, or designees perform such procedures as are, in their judgment, necessary and desirable.    3.   My surgeon/physician has discussed prior to my surgery the potential benefits, risks and side effects of this procedure; the likelihood of achieving goals; and potential problems that might occur during recuperation.  They also discussed reasonable alternatives to the procedure, including risks, benefits, and side effects related to the alternatives and risks related to not receiving this procedure.  I have had all my questions answered and I acknowledge that no guarantee has been made as to the result that may be obtained.    4.   Should the need arise during my operation/procedure, which includes change of level of care prior to discharge, I also consent to the administration of blood and/or blood products.  Further, I understand that despite careful testing and screening of blood or blood products by collecting agencies, I may still be subject to ill effects as a result of receiving a blood transfusion and/or blood products.  The following are some, but not all, of the potential risks that can occur: fever and allergic reactions, hemolytic reactions, transmission of  diseases such as Hepatitis, AIDS and Cytomegalovirus (CMV) and fluid overload.  In the event that I wish to have an autologous transfusion of my own blood, or a directed donor transfusion, I will discuss this with my physician.  Check only if Refusing Blood or Blood Products  I understand refusal of blood or blood products as deemed necessary by my physician may have serious consequences to my condition to include possible death. I hereby assume responsibility for my refusal and release the hospital, its personnel, and my physicians from any responsibility for the consequences of my refusal.          o  Refuse      5.   I authorize the use of any specimen, organs, tissues, body parts or foreign objects that may be removed from my body during the operation/procedure for diagnosis, research or teaching purposes and their subsequent disposal by hospital authorities.  I also authorize the release of specimen test results and/or written reports to my treating physician on the hospital medical staff or other referring or consulting physicians involved in my care, at the discretion of the Pathologist or my treating physician.    6.   I consent to the photographing or videotaping of the operations or procedures to be performed, including appropriate portions of my body for medical, scientific, or educational purposes, provided my identity is not revealed by the pictures or by descriptive texts accompanying them.  If the procedure has been photographed/videotaped, the surgeon will obtain the original picture, image, videotape or CD.  The hospital will not be responsible for storage, release or maintenance of the picture, image, tape or CD.    7.   I consent to the presence of a  or observers in the operating room as deemed necessary by my physician or their designees.    8.   I recognize that in the event my procedure results in extended X-Ray/fluoroscopy time, I may develop a skin reaction.    9. If I have  a Do Not Attempt Resuscitation (DNAR) order in place, that status will be suspended while in the operating room, procedural suite, and during the recovery period unless otherwise explicitly stated by me (or a person authorized to consent on my behalf). The surgeon or my attending physician will determine when the applicable recovery period ends for purposes of reinstating the DNAR order.  10. Patients having a sterilization procedure: I understand that if the procedure is successful the results will be permanent and it will therefore be impossible for me to inseminate, conceive, or bear children.  I also understand that the procedure is intended to result in sterility, although the result has not been guaranteed.   11. I acknowledge that my physician has explained sedation/analgesia administration to me including the risk and benefits I consent to the administration of sedation/analgesia as may be necessary or desirable in the judgment of my physician.    I CERTIFY THAT I HAVE READ AND FULLY UNDERSTAND THE ABOVE CONSENT TO OPERATION and/or OTHER PROCEDURE.        ____________________________________       _________________________________      ______________________________  Signature of Patient         Signature of Responsible Person        Printed Name of Responsible Person    ____________________________________      _________________________________      ______________________________       Signature of Witness          Relationship to Patient                       Date                                       Time  Patient Name: Hortencia Kelley  : 10/2/1945    Reviewed: 2024   Printed: 2024  Medical Record #: [id number] Page 1 of 2           STATEMENT OF PHYSICIAN My signature below affirms that prior to the time of the procedure; I have explained to the patient and/or his/her legal representative, the risks and benefits involved in the proposed treatment and any reasonable alternative to the  proposed treatment. I have also explained the risks and benefits involved in refusal of the proposed treatment and alternatives to the proposed treatment and have answered the patient's questions. If I have a significant financial interest in a co-management agreement or a significant financial interest in any product or implant, or other significant relationship used in this procedure/surgery, I have disclosed this and had a discussion with my patient.     _______________________________________________________________ _____________________________  (Signature of Physician)                                                                                         (Date)                                   (Time)  Patient Name: Hortencia Kelley  : 10/2/1945    Reviewed: 2024   Printed: 2024  Medical Record #: [id number] Page 2 of 2

## (undated) NOTE — ED AVS SNAPSHOT
[facility] Emergency Department  [address] [id number]  Phone:  [phone]  Fax:  [phone]          Jennifer Marte   MRN: [id number]    Department:  [facility] Emergency Department   Date of Visit:  7/10/2017 and Class Registration line at [phone] or find a doctor online by visiting [email or web address].    IF THERE IS ANY CHANGE OR WORSENING OF YOUR CONDITION, CALL YOUR PRIMARY CARE PHYSICIAN AT ONCE OR RETURN IMMEDIATELY TO [address] [place] [address].     If

## (undated) NOTE — LETTER
[facility]  Administration of Anesthesia  I, Hortencia Kelley agree to be cared for by a physician anesthesiologist alone and/or with a nurse anesthetist, who is specially trained to monitor me and give me medicine to put me to sleep or keep me comfortable during my procedure    I understand that my anesthesiologist and/or anesthetist is not an employee or agent of [facility] or [employer]. He or she works for [facility]    As the patient asking for anesthesia services, I agree to:  Allow the anesthesiologist (anesthesia doctor) to give me medicine and do additional procedures as necessary. Some examples are: Starting or using an “IV” to give me medicine, fluids or blood during my procedure, and having a breathing tube placed to help me breathe when I’m asleep (intubation). In the event that my heart stops working properly, I understand that my anesthesiologist will make every effort to sustain my life, unless otherwise directed by [facility] Do Not Resuscitate documents.  Tell my anesthesia doctor before my procedure:  If I am pregnant.  The last time that I ate or drank.  iii. All of the medicines I take (including prescriptions, herbal supplements, and pills I can buy without a prescription (including street drugs/illegal medications). Failure to inform my anesthesiologist about these medicines may increase my risk of anesthetic complications.  iv.If I am allergic to anything or have had a reaction to anesthesia before.  I understand how the anesthesia medicine will help me (benefits).  I understand that with any type of anesthesia medicine there are risks:  The most common risks are: nausea, vomiting, sore throat, muscle soreness, damage to my eyes, mouth, or teeth (from breathing tube placement).  Rare risks include: remembering what happened during my procedure, allergic reactions to medications, injury to my airway, heart, lungs, vision, nerves, or  muscles and in extremely rare instances death.  My doctor has explained to me other choices available to me for my care (alternatives).  Pregnant Patients (“epidural”):  I understand that the risks of having an epidural (medicine given into my back to help control pain during labor), include itching, low blood pressure, difficulty urinating, headache or slowing of the baby’s heart. Very rare risks include infection, bleeding, seizure, irregular heart rhythms and nerve injury.  Regional Anesthesia (“spinal”, “epidural”, & “nerve blocks”):  I understand that rare but potential complications include headache, bleeding, infection, seizure, irregular heart rhythms, and nerve injury.    _____________________________________________________________________________  Patient (or Representative) Signature/Relationship to Patient  Date   Time    _____________________________________________________________________________   Name (if used)    Language/Organization   Time    _____________________________________________________________________________  Nurse Anesthetist Signature     Date   Time  _____________________________________________________________________________  Anesthesiologist Signature     Date   Time  I have discussed the procedure and information above with the patient (or patient’s representative) and answered their questions. The patient or their representative has agreed to have anesthesia services.    _____________________________________________________________________________  Witness        Date   Time  I have verified that the signature is that of the patient or patient’s representative, and that it was signed before the procedure  Patient Name: Hortencia Kelley     : 10/2/1945                 Printed: 2024 at 4:07 PM    Medical Record #: [id number]                                            Page 1 of 1  ----------ANESTHESIA CONSENT----------